# Patient Record
Sex: MALE | Race: WHITE | HISPANIC OR LATINO | Employment: OTHER | URBAN - METROPOLITAN AREA
[De-identification: names, ages, dates, MRNs, and addresses within clinical notes are randomized per-mention and may not be internally consistent; named-entity substitution may affect disease eponyms.]

---

## 2017-11-12 ENCOUNTER — HOSPITAL ENCOUNTER (INPATIENT)
Facility: HOSPITAL | Age: 67
LOS: 3 days | Discharge: HOME | DRG: 292 | End: 2017-11-15
Attending: EMERGENCY MEDICINE | Admitting: HOSPITALIST
Payer: COMMERCIAL

## 2017-11-12 ENCOUNTER — OFFICE VISIT (OUTPATIENT)
Dept: URGENT CARE | Facility: CLINIC | Age: 67
End: 2017-11-12
Payer: COMMERCIAL

## 2017-11-12 VITALS
HEIGHT: 74 IN | WEIGHT: 174 LBS | HEART RATE: 99 BPM | DIASTOLIC BLOOD PRESSURE: 96 MMHG | OXYGEN SATURATION: 95 % | TEMPERATURE: 98 F | SYSTOLIC BLOOD PRESSURE: 152 MMHG | BODY MASS INDEX: 22.33 KG/M2

## 2017-11-12 DIAGNOSIS — Z79.4 TYPE 2 DIABETES MELLITUS WITH DIABETIC NEPHROPATHY, WITH LONG-TERM CURRENT USE OF INSULIN: ICD-10-CM

## 2017-11-12 DIAGNOSIS — I51.7 CARDIOMEGALY: ICD-10-CM

## 2017-11-12 DIAGNOSIS — R06.00 DYSPNEA, UNSPECIFIED TYPE: Primary | ICD-10-CM

## 2017-11-12 DIAGNOSIS — I10 HYPERTENSION, ESSENTIAL: ICD-10-CM

## 2017-11-12 DIAGNOSIS — J90 PLEURAL EFFUSION: Primary | ICD-10-CM

## 2017-11-12 DIAGNOSIS — R05.9 COUGH: ICD-10-CM

## 2017-11-12 DIAGNOSIS — R06.02 SOB (SHORTNESS OF BREATH): ICD-10-CM

## 2017-11-12 DIAGNOSIS — E11.21 TYPE 2 DIABETES MELLITUS WITH DIABETIC NEPHROPATHY, WITH LONG-TERM CURRENT USE OF INSULIN: ICD-10-CM

## 2017-11-12 DIAGNOSIS — I51.89 COMBINED SYSTOLIC AND DIASTOLIC CARDIAC DYSFUNCTION: ICD-10-CM

## 2017-11-12 DIAGNOSIS — J90 PLEURAL EFFUSION, BILATERAL: ICD-10-CM

## 2017-11-12 PROBLEM — E11.9 DIABETES MELLITUS, TYPE 2: Status: ACTIVE | Noted: 2017-11-12

## 2017-11-12 LAB
ALBUMIN SERPL BCP-MCNC: 3.5 G/DL
ALP SERPL-CCNC: 93 U/L
ALT SERPL W/O P-5'-P-CCNC: 42 U/L
ANION GAP SERPL CALC-SCNC: 7 MMOL/L
AST SERPL-CCNC: 21 U/L
BACTERIA #/AREA URNS AUTO: ABNORMAL /HPF
BASOPHILS # BLD AUTO: 0.04 K/UL
BASOPHILS NFR BLD: 0.6 %
BILIRUB SERPL-MCNC: 1 MG/DL
BILIRUB UR QL STRIP: NEGATIVE
BNP SERPL-MCNC: 1136 PG/ML
BUN SERPL-MCNC: 14 MG/DL
CALCIUM SERPL-MCNC: 9.7 MG/DL
CHLORIDE SERPL-SCNC: 102 MMOL/L
CLARITY UR REFRACT.AUTO: ABNORMAL
CO2 SERPL-SCNC: 28 MMOL/L
COLOR UR AUTO: YELLOW
CREAT SERPL-MCNC: 0.8 MG/DL
CREAT UR-MCNC: 45 MG/DL
DIFFERENTIAL METHOD: ABNORMAL
EOSINOPHIL # BLD AUTO: 0.1 K/UL
EOSINOPHIL NFR BLD: 1.1 %
ERYTHROCYTE [DISTWIDTH] IN BLOOD BY AUTOMATED COUNT: 11.7 %
EST. GFR  (AFRICAN AMERICAN): >60 ML/MIN/1.73 M^2
EST. GFR  (NON AFRICAN AMERICAN): >60 ML/MIN/1.73 M^2
GLUCOSE SERPL-MCNC: 273 MG/DL
GLUCOSE UR QL STRIP: ABNORMAL
HCT VFR BLD AUTO: 45.3 %
HGB BLD-MCNC: 15.5 G/DL
HGB UR QL STRIP: ABNORMAL
HYALINE CASTS UR QL AUTO: 0 /LPF
IMM GRANULOCYTES # BLD AUTO: 0.02 K/UL
IMM GRANULOCYTES NFR BLD AUTO: 0.3 %
INR PPP: 1.1
KETONES UR QL STRIP: ABNORMAL
LEUKOCYTE ESTERASE UR QL STRIP: ABNORMAL
LYMPHOCYTES # BLD AUTO: 1.1 K/UL
LYMPHOCYTES NFR BLD: 17.3 %
MCH RBC QN AUTO: 31.4 PG
MCHC RBC AUTO-ENTMCNC: 34.2 G/DL
MCV RBC AUTO: 92 FL
MICROSCOPIC COMMENT: ABNORMAL
MONOCYTES # BLD AUTO: 0.6 K/UL
MONOCYTES NFR BLD: 9 %
NEUTROPHILS # BLD AUTO: 4.6 K/UL
NEUTROPHILS NFR BLD: 71.7 %
NITRITE UR QL STRIP: NEGATIVE
NRBC BLD-RTO: 0 /100 WBC
PH UR STRIP: 5 [PH] (ref 5–8)
PLATELET # BLD AUTO: 201 K/UL
PMV BLD AUTO: 11.8 FL
POCT GLUCOSE: 285 MG/DL (ref 70–110)
POCT GLUCOSE: 318 MG/DL (ref 70–110)
POCT GLUCOSE: 325 MG/DL (ref 70–110)
POTASSIUM SERPL-SCNC: 4.1 MMOL/L
PROT SERPL-MCNC: 6.9 G/DL
PROT UR QL STRIP: ABNORMAL
PROT UR-MCNC: 66 MG/DL
PROT/CREAT RATIO, UR: 1.47
PROTHROMBIN TIME: 11.5 SEC
RBC # BLD AUTO: 4.93 M/UL
RBC #/AREA URNS AUTO: 6 /HPF (ref 0–4)
SODIUM SERPL-SCNC: 137 MMOL/L
SP GR UR STRIP: 1.03 (ref 1–1.03)
SQUAMOUS #/AREA URNS AUTO: 1 /HPF
TROPONIN I SERPL DL<=0.01 NG/ML-MCNC: 0.01 NG/ML
URN SPEC COLLECT METH UR: ABNORMAL
UROBILINOGEN UR STRIP-ACNC: NEGATIVE EU/DL
WBC # BLD AUTO: 6.42 K/UL
WBC #/AREA URNS AUTO: 21 /HPF (ref 0–5)
YEAST UR QL AUTO: ABNORMAL

## 2017-11-12 PROCEDURE — 96374 THER/PROPH/DIAG INJ IV PUSH: CPT

## 2017-11-12 PROCEDURE — 63600175 PHARM REV CODE 636 W HCPCS: Performed by: HOSPITALIST

## 2017-11-12 PROCEDURE — 81001 URINALYSIS AUTO W/SCOPE: CPT

## 2017-11-12 PROCEDURE — 83880 ASSAY OF NATRIURETIC PEPTIDE: CPT

## 2017-11-12 PROCEDURE — 11000001 HC ACUTE MED/SURG PRIVATE ROOM

## 2017-11-12 PROCEDURE — 85025 COMPLETE CBC W/AUTO DIFF WBC: CPT

## 2017-11-12 PROCEDURE — 85610 PROTHROMBIN TIME: CPT

## 2017-11-12 PROCEDURE — 82962 GLUCOSE BLOOD TEST: CPT

## 2017-11-12 PROCEDURE — 25500020 PHARM REV CODE 255: Performed by: EMERGENCY MEDICINE

## 2017-11-12 PROCEDURE — 63600175 PHARM REV CODE 636 W HCPCS: Performed by: PHYSICIAN ASSISTANT

## 2017-11-12 PROCEDURE — 93010 ELECTROCARDIOGRAM REPORT: CPT | Mod: ,,, | Performed by: INTERNAL MEDICINE

## 2017-11-12 PROCEDURE — 96372 THER/PROPH/DIAG INJ SC/IM: CPT | Mod: S$GLB,,, | Performed by: EMERGENCY MEDICINE

## 2017-11-12 PROCEDURE — 93005 ELECTROCARDIOGRAM TRACING: CPT

## 2017-11-12 PROCEDURE — 93010 ELECTROCARDIOGRAM REPORT: CPT | Mod: 76,,, | Performed by: INTERNAL MEDICINE

## 2017-11-12 PROCEDURE — 84484 ASSAY OF TROPONIN QUANT: CPT

## 2017-11-12 PROCEDURE — 80053 COMPREHEN METABOLIC PANEL: CPT

## 2017-11-12 PROCEDURE — 99204 OFFICE O/P NEW MOD 45 MIN: CPT | Mod: 25,S$GLB,, | Performed by: EMERGENCY MEDICINE

## 2017-11-12 PROCEDURE — 96372 THER/PROPH/DIAG INJ SC/IM: CPT

## 2017-11-12 PROCEDURE — 99285 EMERGENCY DEPT VISIT HI MDM: CPT | Mod: 25

## 2017-11-12 PROCEDURE — 82570 ASSAY OF URINE CREATININE: CPT

## 2017-11-12 PROCEDURE — 99223 1ST HOSP IP/OBS HIGH 75: CPT | Mod: ,,, | Performed by: HOSPITALIST

## 2017-11-12 PROCEDURE — 99285 EMERGENCY DEPT VISIT HI MDM: CPT | Mod: ,,, | Performed by: EMERGENCY MEDICINE

## 2017-11-12 RX ORDER — IBUPROFEN 200 MG
16 TABLET ORAL
Status: DISCONTINUED | OUTPATIENT
Start: 2017-11-12 | End: 2017-11-15 | Stop reason: HOSPADM

## 2017-11-12 RX ORDER — INSULIN ASPART 100 [IU]/ML
0-5 INJECTION, SOLUTION INTRAVENOUS; SUBCUTANEOUS
Status: DISCONTINUED | OUTPATIENT
Start: 2017-11-12 | End: 2017-11-15 | Stop reason: HOSPADM

## 2017-11-12 RX ORDER — BETAMETHASONE SODIUM PHOSPHATE AND BETAMETHASONE ACETATE 3; 3 MG/ML; MG/ML
9 INJECTION, SUSPENSION INTRA-ARTICULAR; INTRALESIONAL; INTRAMUSCULAR; SOFT TISSUE
Status: DISCONTINUED | OUTPATIENT
Start: 2017-11-12 | End: 2017-11-12 | Stop reason: HOSPADM

## 2017-11-12 RX ORDER — IBUPROFEN 200 MG
24 TABLET ORAL
Status: DISCONTINUED | OUTPATIENT
Start: 2017-11-12 | End: 2017-11-15 | Stop reason: HOSPADM

## 2017-11-12 RX ORDER — LISINOPRIL 10 MG/1
10 TABLET ORAL DAILY
Status: DISCONTINUED | OUTPATIENT
Start: 2017-11-13 | End: 2017-11-15 | Stop reason: HOSPADM

## 2017-11-12 RX ORDER — SODIUM CHLORIDE 0.9 % (FLUSH) 0.9 %
3 SYRINGE (ML) INJECTION EVERY 6 HOURS PRN
Status: DISCONTINUED | OUTPATIENT
Start: 2017-11-12 | End: 2017-11-15 | Stop reason: HOSPADM

## 2017-11-12 RX ORDER — FUROSEMIDE 10 MG/ML
40 INJECTION INTRAMUSCULAR; INTRAVENOUS
Status: COMPLETED | OUTPATIENT
Start: 2017-11-12 | End: 2017-11-12

## 2017-11-12 RX ORDER — FUROSEMIDE 10 MG/ML
40 INJECTION INTRAMUSCULAR; INTRAVENOUS 2 TIMES DAILY
Status: DISCONTINUED | OUTPATIENT
Start: 2017-11-13 | End: 2017-11-13

## 2017-11-12 RX ORDER — ACETAMINOPHEN 325 MG/1
650 TABLET ORAL EVERY 4 HOURS PRN
Status: DISCONTINUED | OUTPATIENT
Start: 2017-11-12 | End: 2017-11-15 | Stop reason: HOSPADM

## 2017-11-12 RX ORDER — GLUCAGON 1 MG
1 KIT INJECTION
Status: DISCONTINUED | OUTPATIENT
Start: 2017-11-12 | End: 2017-11-15 | Stop reason: HOSPADM

## 2017-11-12 RX ADMIN — INSULIN DETEMIR 5 UNITS: 100 INJECTION, SOLUTION SUBCUTANEOUS at 11:11

## 2017-11-12 RX ADMIN — INSULIN ASPART 3 UNITS: 100 INJECTION, SOLUTION INTRAVENOUS; SUBCUTANEOUS at 04:11

## 2017-11-12 RX ADMIN — IOHEXOL 75 ML: 350 INJECTION, SOLUTION INTRAVENOUS at 01:11

## 2017-11-12 RX ADMIN — BETAMETHASONE SODIUM PHOSPHATE AND BETAMETHASONE ACETATE 9 MG: 3; 3 INJECTION, SUSPENSION INTRA-ARTICULAR; INTRALESIONAL; INTRAMUSCULAR; SOFT TISSUE at 09:11

## 2017-11-12 RX ADMIN — FUROSEMIDE 40 MG: 10 INJECTION, SOLUTION INTRAMUSCULAR; INTRAVENOUS at 03:11

## 2017-11-12 RX ADMIN — INSULIN ASPART 2 UNITS: 100 INJECTION, SOLUTION INTRAVENOUS; SUBCUTANEOUS at 08:11

## 2017-11-12 NOTE — ASSESSMENT & PLAN NOTE
CBC and vitals not suggestive of infection      BMP 1,136.  Troponin 0.012.  +LE edema on admit.  Chest CT shows moderate bilateral pleural effusions with underlying depressive atelectasis.  Small ill-defined bilateral upper lobe groundglass opacities versus nodules possibly infectious/inflammatory disease or malignancy.  Given untreated DM, HTn, BNP, LE edema, PAD by exam favor CHF causing effusions.  - TTE  - thoracentesis to r/o other causes of effusion and therapeutic as he needs O2 NC now  - c/w lasix IV

## 2017-11-12 NOTE — HOSPITAL COURSE
See problem list  11/14 - discussed s cardiology.  Card spect scan for tomorrow.  Pt feels well. No chest pain.  Discussed CHF< HTN and diabetes.  Need diabetic education, insulin instruction.   11/15 - reveied spect scan w cardiology, negative.  Pt can be discharged.

## 2017-11-12 NOTE — SUBJECTIVE & OBJECTIVE
Past Medical History:   Diagnosis Date    Diabetes mellitus, type 2 11/12/2017    Hypertension, essential 11/12/2017       Past Surgical History:   Procedure Laterality Date    BACK SURGERY  1990       Review of patient's allergies indicates:   Allergen Reactions    Metformin Diarrhea     On 500mg he had such GI issues he lost weight       Current Facility-Administered Medications on File Prior to Encounter   Medication    [COMPLETED] betamethasone acetate-betamethasone sodium phosphate injection 9 mg     No current outpatient prescriptions on file prior to encounter.     Family History     None        Social History Main Topics    Smoking status: Never Smoker    Smokeless tobacco: Never Used    Alcohol use 3.6 oz/week     6 Cans of beer per week    Drug use: No    Sexual activity: Yes     Review of Systems   Constitutional: Negative for chills, fever and unexpected weight change.   HENT: Negative for congestion.    Eyes: Negative for discharge.   Respiratory: Positive for cough and shortness of breath. Negative for wheezing.    Cardiovascular: Positive for leg swelling. Negative for chest pain and palpitations.   Gastrointestinal: Negative for abdominal distention and abdominal pain.   Endocrine: Negative for cold intolerance.   Genitourinary: Negative for difficulty urinating.   Musculoskeletal: Negative for arthralgias.   Skin: Negative for color change.   Allergic/Immunologic: Negative for environmental allergies.   Neurological: Negative for dizziness.   Hematological: Negative for adenopathy.   Psychiatric/Behavioral: Negative for agitation.     Objective:     Vital Signs (Most Recent):  Temp: 98.5 °F (36.9 °C) (11/12/17 1600)  Pulse: 101 (11/12/17 1603)  Resp: (!) 24 (11/12/17 1600)  BP: (!) 162/103 (11/12/17 1603)  SpO2: 95 % (11/12/17 1603) Vital Signs (24h Range):  Temp:  [98 °F (36.7 °C)-98.5 °F (36.9 °C)] 98.5 °F (36.9 °C)  Pulse:  [] 101  Resp:  [17-28] 24  SpO2:  [92 %-96 %] 95 %  BP:  (150-168)/() 162/103     Weight: 78.9 kg (174 lb)  Body mass index is 22.34 kg/m².    Physical Exam   Constitutional: He is oriented to person, place, and time. He appears well-developed and well-nourished. No distress.   HENT:   Head: Atraumatic.   Eyes: EOM are normal. No scleral icterus.   Neck: No JVD present.   Cardiovascular: Normal rate, regular rhythm and normal heart sounds.    No murmur heard.  Pulses:       Dorsalis pedis pulses are 1+ on the right side, and 1+ on the left side.   Pulmonary/Chest: Effort normal. No respiratory distress. He has decreased breath sounds in the right lower field and the left lower field. He has no wheezes. He has no rhonchi.   Abdominal: Soft. Bowel sounds are normal. He exhibits no distension.   Musculoskeletal: He exhibits edema (pitting to shins).   Lymphadenopathy:     He has no cervical adenopathy.   Neurological: He is alert and oriented to person, place, and time.   Skin: Capillary refill takes more than 3 seconds. No cyanosis. No pallor (but feet cool to  touch). Nails show no clubbing.   Psychiatric: He has a normal mood and affect.        Significant Labs: All pertinent labs within the past 24 hours have been reviewed.    Significant Imaging: I have reviewed and interpreted all pertinent imaging results/findings within the past 24 hours.

## 2017-11-12 NOTE — PATIENT INSTRUCTIONS
GO TO THE EMERGENCY DEPARTMENT NOW AS PLANNED FOR FURTHER EVALUATION, IMAGING AND RELIEF OF FLUID BUILDUP.  THEY WILL HAVE YOUR XRAY REPORTS AND IMAGES

## 2017-11-12 NOTE — H&P
[facility] Hospital Medicine  History & Physical    Patient Name: Jose Pete  MRN: [id number]  Admission Date: 11/12/2017  Attending Physician: Devin Segovia MD   Primary Care Provider: Primary Doctor [facility] Medicine Team: [facility] Devin Segovia MD     Patient information was obtained from patient and ER records.     Subjective:     Principal Problem:Pleural effusion    Chief Complaint:   Chief Complaint   Patient presents with    Shortness of Breath     cough for 3 weeks, seen in  and sent here fluid in L lung' per  family member        HPI: Patient is a 66-year-old male who does not follow with any PCP who presents to the emergency department due to a 3 week history of a dry cough.  Patient states he was having nasal drip 3 weeks ago and then developed a dry cough.  Patient states that he is having worsening shortness of breath on exertion and orthopnea but denies PND.  Patient denies  weight gain or loss.  He has had some LE edema last few days.      Patient denies any fevers or chills.  Patient denies any chest pain.  Patient states that he reported to the urgent care for above symptoms and was given a steroid shot and underwent a chest x-ray which showed bilateral pleural effusions.  She was instructed to report to the emergency department.    He last saw a physician during [place] when he was told he had HTN and DM for the first time.  Due to moving/evacuating he was lost to follow-up.    Past Medical History:   Diagnosis Date    Diabetes mellitus, type 2 11/12/2017    Hypertension, essential 11/12/2017       Past Surgical History:   Procedure Laterality Date    BACK SURGERY  1990       Review of patient's allergies indicates:   Allergen Reactions    Metformin Diarrhea     On 500mg he had such GI issues he lost weight       Current Facility-Administered Medications on File Prior to Encounter   Medication    [COMPLETED] betamethasone acetate-betamethasone  sodium phosphate injection 9 mg     No current outpatient prescriptions on file prior to encounter.     Family History     None        Social History Main Topics    Smoking status: Never Smoker    Smokeless tobacco: Never Used    Alcohol use 3.6 oz/week     6 Cans of beer per week    Drug use: No    Sexual activity: Yes     Review of Systems   Constitutional: Negative for chills, fever and unexpected weight change.   HENT: Negative for congestion.    Eyes: Negative for discharge.   Respiratory: Positive for cough and shortness of breath. Negative for wheezing.    Cardiovascular: Positive for leg swelling. Negative for chest pain and palpitations.   Gastrointestinal: Negative for abdominal distention and abdominal pain.   Endocrine: Negative for cold intolerance.   Genitourinary: Negative for difficulty urinating.   Musculoskeletal: Negative for arthralgias.   Skin: Negative for color change.   Allergic/Immunologic: Negative for environmental allergies.   Neurological: Negative for dizziness.   Hematological: Negative for adenopathy.   Psychiatric/Behavioral: Negative for agitation.     Objective:     Vital Signs (Most Recent):  Temp: 98.5 °F (36.9 °C) (11/12/17 1600)  Pulse: 101 (11/12/17 1603)  Resp: (!) 24 (11/12/17 1600)  BP: (!) 162/103 (11/12/17 1603)  SpO2: 95 % (11/12/17 1603) Vital Signs (24h Range):  Temp:  [98 °F (36.7 °C)-98.5 °F (36.9 °C)] 98.5 °F (36.9 °C)  Pulse:  [] 101  Resp:  [17-28] 24  SpO2:  [92 %-96 %] 95 %  BP: (150-168)/() 162/103     Weight: 78.9 kg (174 lb)  Body mass index is 22.34 kg/m².    Physical Exam   Constitutional: He is oriented to person, place, and time. He appears well-developed and well-nourished. No distress.   HENT:   Head: Atraumatic.   Eyes: EOM are normal. No scleral icterus.   Neck: No JVD present.   Cardiovascular: Normal rate, regular rhythm and normal heart sounds.    No murmur heard.  Pulses:       Dorsalis pedis pulses are 1+ on the right side, and  1+ on the left side.   Pulmonary/Chest: Effort normal. No respiratory distress. He has decreased breath sounds in the right lower field and the left lower field. He has no wheezes. He has no rhonchi.   Abdominal: Soft. Bowel sounds are normal. He exhibits no distension.   Musculoskeletal: He exhibits edema (pitting to shins).   Lymphadenopathy:     He has no cervical adenopathy.   Neurological: He is alert and oriented to person, place, and time.   Skin: Capillary refill takes more than 3 seconds. No cyanosis. No pallor (but feet cool to  touch). Nails show no clubbing.   Psychiatric: He has a normal mood and affect.        Significant Labs: All pertinent labs within the past 24 hours have been reviewed.    Significant Imaging: I have reviewed and interpreted all pertinent imaging results/findings within the past 24 hours.    Assessment/Plan:     * Pleural effusion    CBC and vitals not suggestive of infection      BMP 1,136.  Troponin 0.012.   +LE edema on admit.  Chest CT shows moderate bilateral pleural effusions with underlying depressive atelectasis.  Small ill-defined bilateral upper lobe groundglass opacities versus nodules possibly infectious/inflammatory disease or malignancy.  Given untreated DM, HTn, BNP, LE edema, PAD by exam favor CHF causing effusions.  - TTE  - thoracentesis to r/o other causes of effusion and therapeutic as he needs O2 NC now  - c/w lasix IV          Hypertension, essential    New diagnosis  -  Start low dose acei + diuretic  - later BB if BP tolerates  - start ASA after thoracentesis          Diabetes mellitus, type 2    New diagnosis.  - SSi and titrate   - a1c            VTE Risk Mitigation         Ordered     Low Risk of VTE  Once      11/12/17 1522             Devin Segovia MD  Department of Hospital Medicine   [facility]

## 2017-11-12 NOTE — ED TRIAGE NOTES
Patient states he has SOB and weakness for about 3 weeks.  Patient states he was diagnosed with fluid in one of his lungs today at the urgent care.  Patient is sating 90% on RA.  Denies pain. Patient c/o dry cough.

## 2017-11-12 NOTE — ED PROVIDER NOTES
Encounter Date: 11/12/2017       History     Chief Complaint   Patient presents with    Shortness of Breath     cough for 3 weeks, seen in  and sent here fluid in L lung' per  family member     Patient is a 66-year-old male with no significant past medical history who presents to the emergency department due to a 3 week history of a dry cough.  Patient states he was having nasal drip 3 weeks ago and then developed a dry cough.  Patient states that he is having worsening shortness of breath on exertion and orthopnea but denies PND.  Patient denies any lower extremity edema or weight gain.  Patient denies any fevers or chills.  Patient denies any chest pain.  Patient states that he reported to the urgent care for above symptoms and was given a steroid shot and underwent a chest x-ray which showed bilateral pleural effusions.  She was instructed to report to the emergency department.          Review of patient's allergies indicates:  No Known Allergies  History reviewed. No pertinent past medical history.  Past Surgical History:   Procedure Laterality Date    BACK SURGERY       History reviewed. No pertinent family history.  Social History   Substance Use Topics    Smoking status: Never Smoker    Smokeless tobacco: Never Used    Alcohol use 3.6 oz/week     6 Cans of beer per week     Review of Systems   Constitutional: Negative for activity change, appetite change, chills, fatigue and fever.   HENT: Negative for congestion, drooling, ear discharge, ear pain, facial swelling, hearing loss, mouth sores, sore throat and trouble swallowing.    Eyes: Negative for photophobia, pain and discharge.   Respiratory: Positive for cough and shortness of breath. Negative for apnea, chest tightness and wheezing.    Cardiovascular: Negative for chest pain and leg swelling.   Gastrointestinal: Negative for abdominal distention, abdominal pain, blood in stool, constipation, diarrhea, nausea and vomiting.   Endocrine: Negative for  cold intolerance, polydipsia and polyuria.   Genitourinary: Negative for decreased urine volume, difficulty urinating, enuresis, frequency and hematuria.   Musculoskeletal: Negative for arthralgias, gait problem, myalgias and neck stiffness.   Skin: Negative for color change, rash and wound.   Allergic/Immunologic: Negative for environmental allergies.   Neurological: Negative for dizziness, tremors, syncope, weakness, light-headedness, numbness and headaches.   Hematological: Negative for adenopathy.   Psychiatric/Behavioral: Negative for agitation.       Physical Exam     Initial Vitals [11/12/17 1052]   BP Pulse Resp Temp SpO2   (!) 150/91 100 (!) 24 98.3 °F (36.8 °C) (!) 93 %      MAP       110.67         Physical Exam    Nursing note and vitals reviewed.  Constitutional: He appears well-developed and well-nourished. He is not diaphoretic. No distress.   HENT:   Head: Normocephalic and atraumatic.   Neck: Normal range of motion. Neck supple.   Cardiovascular: Normal rate, regular rhythm and normal heart sounds. Exam reveals no gallop and no friction rub.    No murmur heard.  Pulmonary/Chest: He has no wheezes. He has no rhonchi. He has rales.   Abdominal: Soft. Bowel sounds are normal. There is no tenderness. There is no rebound and no guarding.   Musculoskeletal: Normal range of motion.   Neurological: He is alert and oriented to person, place, and time.   Skin: Skin is warm and dry. No rash noted. No erythema.   Psychiatric: He has a normal mood and affect.         ED Course   Procedures  Labs Reviewed   CBC W/ AUTO DIFFERENTIAL - Abnormal; Notable for the following:        Result Value    MCH 31.4 (*)     Lymph% 17.3 (*)     All other components within normal limits   COMPREHENSIVE METABOLIC PANEL - Abnormal; Notable for the following:     Glucose 273 (*)     Anion Gap 7 (*)     All other components within normal limits   B-TYPE NATRIURETIC PEPTIDE - Abnormal; Notable for the following:     BNP 1,136 (*)      All other components within normal limits   TROPONIN I   PROTIME-INR   PROTIME-INR    Narrative:     ADDING ON PROTIME INR PER LEIGHANN LEHMAN PA-C 12:35  11/12/2017                    APC / Resident Notes:   Patient is a 66-year-old male with no significant past medical history who presents to the emergency department due to a 3 week history of a dry cough.  Vital signs show tachycardia of a heart rate of 100, respiratory rate 25, and pulse ox of 93% on room air.  Physical exam reveals male in no acute distress.  Heart regular rate and rhythm.  Lungs with crackles and lower lung fields.  Will do a cardiac workup.    CBC reveals white blood cell count of 6.42.  CMP shows glucose of 273 and anion gap 7.  BMP 1,136.  Troponin 0.012.  Chest CT shows moderate bilateral pleural effusions with underlying depressive atelectasis.  Small ill-defined bilateral upper lobe groundglass opacities versus nodules possibly infectious/inflammatory disease or malignancy.  Patient will be given 40 mg of IV Lasix.  Patient will be admitted to hospital medicine for further workup of pleural effusions.  Suspect patient will need workup to include echocardiogram and thoracentesis.  Patient of treatment discussed with attending physician and he is agreeable.             Attending Attestation:     Physician Attestation Statement for NP/PA:   I have conducted a face to face encounter with this patient in addition to the NP/PA, due to Medical Complexity    Other NP/PA Attestation Additions:    History of Present Illness: Sent from urgent care for further eval  States only several days of dyspnea  Dry cough   no fevers or night sweats  approx 18lb unintentional weight loss in last 6 weeks  No cp  No edema     Physical Exam: bs diminished but w/ bibasilar rales bilat, no wheezing  Speaking full sentences, appears mildly dyspneic  Rrr, nl s1/2  abd benign  1+ edema BLE, symmetric  2+ distal pulses  aox3   Medical Decision Making: New-onset bilat  pleural effusions - r/o chf, liver failure, malignancy, less likely parapneumonic/empyema.                   ED Course      Clinical Impression:   The primary encounter diagnosis was Pleural effusion. A diagnosis of SOB (shortness of breath) was also pertinent to this visit.    Disposition:   Disposition: Admitted  Condition: Stable                        Kiko Pagan MD  11/12/17 5495

## 2017-11-12 NOTE — ASSESSMENT & PLAN NOTE
New diagnosis  -  Start low dose acei + diuretic  - later BB if BP tolerates  - start ASA after thoracentesis

## 2017-11-12 NOTE — HPI
Patient is a 66-year-old male who does not follow with any PCP who presents to the emergency department due to a 3 week history of a dry cough.  Patient states he was having nasal drip 3 weeks ago and then developed a dry cough.  Patient states that he is having worsening shortness of breath on exertion and orthopnea but denies PND.  Patient denies  weight gain or loss.  He has had some LE edema last few days.      Patient denies any fevers or chills.  Patient denies any chest pain.  Patient states that he reported to the urgent care for above symptoms and was given a steroid shot and underwent a chest x-ray which showed bilateral pleural effusions.  She was instructed to report to the emergency department.    He last saw a physician during [place] when he was told he had HTN and DM for the first time.  Due to moving/evacuating he was lost to follow-up.

## 2017-11-12 NOTE — PROGRESS NOTES
"Subjective:       Patient ID: Jose Pete is a 66 y.o. male.    Vitals:  height is 6' 2" (1.88 m) and weight is 78.9 kg (174 lb). His temperature is 98 °F (36.7 °C). His blood pressure is 152/96 (abnormal) and his pulse is 99. His oxygen saturation is 95%.     Chief Complaint: Cough    COUGH FOR 3 WEEKS AND WORSENING GRADUAL SHORTNESS OF BREATH FOR 3 WEEKS. DENIES FEVERS, DENIES PAIN, REPORTS LYONS, AND UNABLE TO DO WHAT HE NORMALLY DOES. NO LE EDEMA, NO LE PAIN, NO CHILLS, NO FEVERS, NO WEAKNESS. NO HX OF SMOKING, NO HX OF COPD NOR CHF      Cough   This is a new problem. The current episode started 1 to 4 weeks ago (3 weeks with cough). The problem has been unchanged. The problem occurs every few minutes. The cough is non-productive. Associated symptoms include shortness of breath. Pertinent negatives include no chest pain, chills, fever, headaches, rash or sore throat. The symptoms are aggravated by lying down and other. He has tried nothing for the symptoms. The treatment provided no relief.     Review of Systems   Constitution: Positive for decreased appetite and weakness. Negative for chills and fever.   HENT: Negative for sore throat.    Eyes: Negative for blurred vision.   Cardiovascular: Negative for chest pain.   Respiratory: Positive for cough and shortness of breath.    Skin: Negative for rash.   Musculoskeletal: Negative for back pain and joint pain.   Gastrointestinal: Negative for abdominal pain, diarrhea, nausea and vomiting.   Neurological: Negative for headaches.   Psychiatric/Behavioral: The patient is not nervous/anxious.        Objective:      Physical Exam   Constitutional: He is oriented to person, place, and time. He appears well-developed and well-nourished. He is cooperative.  Non-toxic appearance. He does not appear ill. No distress.   HENT:   Head: Normocephalic and atraumatic.   Right Ear: Hearing, tympanic membrane, external ear and ear canal normal.   Left Ear: Hearing, tympanic " membrane, external ear and ear canal normal.   Nose: Nose normal. No mucosal edema, rhinorrhea or nasal deformity. No epistaxis. Right sinus exhibits no maxillary sinus tenderness and no frontal sinus tenderness. Left sinus exhibits no maxillary sinus tenderness and no frontal sinus tenderness.   Mouth/Throat: Uvula is midline, oropharynx is clear and moist and mucous membranes are normal. No trismus in the jaw. Normal dentition. No uvula swelling. No posterior oropharyngeal erythema.   Eyes: Conjunctivae and lids are normal.   Neck: Trachea normal, full passive range of motion without pain and phonation normal. Neck supple.   Cardiovascular: Normal rate, regular rhythm, normal heart sounds, intact distal pulses and normal pulses.    Pulmonary/Chest: Effort normal. He has rales.   DECREASED BS AT THE BASES AND SLIGHT CRACKLES MID TO LOWER LUNG ANDERSON POSTERIORLY   Abdominal: Soft. Normal appearance and bowel sounds are normal. He exhibits no distension. There is no tenderness.   Musculoskeletal: Normal range of motion. He exhibits no edema or deformity.   NO LOWER EXTREMITY EDEMA   Neurological: He is alert and oriented to person, place, and time. He exhibits normal muscle tone. Coordination normal.   Skin: Skin is warm, dry and intact. He is not diaphoretic. No pallor.   Psychiatric: He has a normal mood and affect. His speech is normal and behavior is normal. Cognition and memory are normal.   Nursing note and vitals reviewed.      Assessment:       1. Dyspnea, unspecified type    2. Cough    3. Pleural effusion, bilateral        Plan:         Dyspnea, unspecified type  -     betamethasone acetate-betamethasone sodium phosphate injection 9 mg; Inject 1.5 mLs (9 mg total) into the muscle one time.  -     X-Ray Chest PA And Lateral; Future; Expected date: 11/12/2017    Cough  -     betamethasone acetate-betamethasone sodium phosphate injection 9 mg; Inject 1.5 mLs (9 mg total) into the muscle one time.  -     X-Ray  Chest PA And Lateral; Future; Expected date: 11/12/2017    Pleural effusion, bilateral  -     Refer to Emergency Dept.

## 2017-11-13 LAB
ALBUMIN SERPL BCP-MCNC: 3.2 G/DL
ANION GAP SERPL CALC-SCNC: 10 MMOL/L
APPEARANCE FLD: NORMAL
BASOPHILS # BLD AUTO: 0.02 K/UL
BASOPHILS NFR BLD: 0.2 %
BODY FLD TYPE: NORMAL
BODY FLUID SOURCE, LDH: NORMAL
BUN SERPL-MCNC: 17 MG/DL
CALCIUM SERPL-MCNC: 9.9 MG/DL
CHLORIDE SERPL-SCNC: 101 MMOL/L
CO2 SERPL-SCNC: 26 MMOL/L
COLOR FLD: YELLOW
CREAT SERPL-MCNC: 0.8 MG/DL
DIFFERENTIAL METHOD: ABNORMAL
EOSINOPHIL # BLD AUTO: 0 K/UL
EOSINOPHIL NFR BLD: 0 %
ERYTHROCYTE [DISTWIDTH] IN BLOOD BY AUTOMATED COUNT: 11.6 %
EST. GFR  (AFRICAN AMERICAN): >60 ML/MIN/1.73 M^2
EST. GFR  (NON AFRICAN AMERICAN): >60 ML/MIN/1.73 M^2
ESTIMATED AVG GLUCOSE: 246 MG/DL
GLOBAL PERICARDIAL EFFUSION: ABNORMAL
GLUCOSE SERPL-MCNC: 306 MG/DL
GRAM STN SPEC: NORMAL
HBA1C MFR BLD HPLC: 10.2 %
HCT VFR BLD AUTO: 45.7 %
HGB BLD-MCNC: 15.8 G/DL
IMM GRANULOCYTES # BLD AUTO: 0.02 K/UL
IMM GRANULOCYTES NFR BLD AUTO: 0.2 %
LDH FLD L TO P-CCNC: 83 U/L
LYMPHOCYTES # BLD AUTO: 1 K/UL
LYMPHOCYTES NFR BLD: 10.3 %
LYMPHOCYTES NFR FLD MANUAL: 79 %
MCH RBC QN AUTO: 31.6 PG
MCHC RBC AUTO-ENTMCNC: 34.6 G/DL
MCV RBC AUTO: 91 FL
MESOTHL CELL NFR FLD MANUAL: 5 %
MITRAL VALVE MOBILITY: NORMAL
MONOCYTES # BLD AUTO: 0.6 K/UL
MONOCYTES NFR BLD: 6.6 %
MONOS+MACROS NFR FLD MANUAL: 10 %
NEUTROPHILS # BLD AUTO: 7.6 K/UL
NEUTROPHILS NFR BLD: 82.7 %
NEUTROPHILS NFR FLD MANUAL: 6 %
NRBC BLD-RTO: 0 /100 WBC
PHOSPHATE SERPL-MCNC: 2.7 MG/DL
PLATELET # BLD AUTO: 208 K/UL
PMV BLD AUTO: 11.8 FL
POCT GLUCOSE: 126 MG/DL (ref 70–110)
POCT GLUCOSE: 192 MG/DL (ref 70–110)
POCT GLUCOSE: 291 MG/DL (ref 70–110)
POCT GLUCOSE: 312 MG/DL (ref 70–110)
POTASSIUM SERPL-SCNC: 4.2 MMOL/L
PROT FLD-MCNC: 2.3 G/DL
RBC # BLD AUTO: 5 M/UL
RETIRED EF AND QEF - SEE NOTES: 25 (ref 55–65)
SODIUM SERPL-SCNC: 137 MMOL/L
SPECIMEN SOURCE: NORMAL
TSH SERPL DL<=0.005 MIU/L-ACNC: 1.03 UIU/ML
WBC # BLD AUTO: 9.21 K/UL
WBC # FLD: 359 /CU MM

## 2017-11-13 PROCEDURE — 25000003 PHARM REV CODE 250: Performed by: HOSPITALIST

## 2017-11-13 PROCEDURE — 93307 TTE W/O DOPPLER COMPLETE: CPT | Mod: 26,,, | Performed by: INTERNAL MEDICINE

## 2017-11-13 PROCEDURE — 85025 COMPLETE CBC W/AUTO DIFF WBC: CPT

## 2017-11-13 PROCEDURE — 99222 1ST HOSP IP/OBS MODERATE 55: CPT | Mod: GC,,, | Performed by: INTERNAL MEDICINE

## 2017-11-13 PROCEDURE — 83036 HEMOGLOBIN GLYCOSYLATED A1C: CPT

## 2017-11-13 PROCEDURE — 11000001 HC ACUTE MED/SURG PRIVATE ROOM

## 2017-11-13 PROCEDURE — 87015 SPECIMEN INFECT AGNT CONCNTJ: CPT

## 2017-11-13 PROCEDURE — 99232 SBSQ HOSP IP/OBS MODERATE 35: CPT | Mod: ,,, | Performed by: HOSPITALIST

## 2017-11-13 PROCEDURE — 36415 COLL VENOUS BLD VENIPUNCTURE: CPT

## 2017-11-13 PROCEDURE — 87070 CULTURE OTHR SPECIMN AEROBIC: CPT

## 2017-11-13 PROCEDURE — 88112 CYTOPATH CELL ENHANCE TECH: CPT | Mod: 26,,, | Performed by: PATHOLOGY

## 2017-11-13 PROCEDURE — 0W9B3ZX DRAINAGE OF LEFT PLEURAL CAVITY, PERCUTANEOUS APPROACH, DIAGNOSTIC: ICD-10-PCS | Performed by: RADIOLOGY

## 2017-11-13 PROCEDURE — 63600175 PHARM REV CODE 636 W HCPCS: Performed by: HOSPITALIST

## 2017-11-13 PROCEDURE — 88305 TISSUE EXAM BY PATHOLOGIST: CPT | Mod: 26,,, | Performed by: PATHOLOGY

## 2017-11-13 PROCEDURE — 83615 LACTATE (LD) (LDH) ENZYME: CPT

## 2017-11-13 PROCEDURE — 80069 RENAL FUNCTION PANEL: CPT

## 2017-11-13 PROCEDURE — 89051 BODY FLUID CELL COUNT: CPT

## 2017-11-13 PROCEDURE — 88305 TISSUE EXAM BY PATHOLOGIST: CPT | Performed by: PATHOLOGY

## 2017-11-13 PROCEDURE — 87205 SMEAR GRAM STAIN: CPT

## 2017-11-13 PROCEDURE — C8923 2D TTE W OR W/O FOL W/CON,CO: HCPCS

## 2017-11-13 PROCEDURE — 87116 MYCOBACTERIA CULTURE: CPT

## 2017-11-13 PROCEDURE — 87075 CULTR BACTERIA EXCEPT BLOOD: CPT

## 2017-11-13 PROCEDURE — 84157 ASSAY OF PROTEIN OTHER: CPT

## 2017-11-13 PROCEDURE — 84443 ASSAY THYROID STIM HORMONE: CPT

## 2017-11-13 RX ORDER — FUROSEMIDE 40 MG/1
40 TABLET ORAL DAILY
Status: DISCONTINUED | OUTPATIENT
Start: 2017-11-14 | End: 2017-11-15 | Stop reason: HOSPADM

## 2017-11-13 RX ORDER — INSULIN ASPART 100 [IU]/ML
5 INJECTION, SOLUTION INTRAVENOUS; SUBCUTANEOUS
Status: DISCONTINUED | OUTPATIENT
Start: 2017-11-13 | End: 2017-11-15 | Stop reason: HOSPADM

## 2017-11-13 RX ORDER — ASPIRIN 81 MG/1
81 TABLET ORAL DAILY
Status: DISCONTINUED | OUTPATIENT
Start: 2017-11-14 | End: 2017-11-15 | Stop reason: HOSPADM

## 2017-11-13 RX ORDER — CARVEDILOL 3.12 MG/1
3.12 TABLET ORAL 2 TIMES DAILY
Status: DISCONTINUED | OUTPATIENT
Start: 2017-11-13 | End: 2017-11-15 | Stop reason: HOSPADM

## 2017-11-13 RX ORDER — ENOXAPARIN SODIUM 100 MG/ML
40 INJECTION SUBCUTANEOUS EVERY 24 HOURS
Status: DISCONTINUED | OUTPATIENT
Start: 2017-11-14 | End: 2017-11-15 | Stop reason: HOSPADM

## 2017-11-13 RX ADMIN — INSULIN DETEMIR 15 UNITS: 100 INJECTION, SOLUTION SUBCUTANEOUS at 09:11

## 2017-11-13 RX ADMIN — CARVEDILOL 3.12 MG: 3.12 TABLET, FILM COATED ORAL at 09:11

## 2017-11-13 RX ADMIN — LISINOPRIL 10 MG: 10 TABLET ORAL at 08:11

## 2017-11-13 RX ADMIN — INSULIN ASPART 5 UNITS: 100 INJECTION, SOLUTION INTRAVENOUS; SUBCUTANEOUS at 05:11

## 2017-11-13 RX ADMIN — INSULIN ASPART 3 UNITS: 100 INJECTION, SOLUTION INTRAVENOUS; SUBCUTANEOUS at 08:11

## 2017-11-13 RX ADMIN — INSULIN ASPART 5 UNITS: 100 INJECTION, SOLUTION INTRAVENOUS; SUBCUTANEOUS at 11:11

## 2017-11-13 RX ADMIN — INSULIN ASPART 4 UNITS: 100 INJECTION, SOLUTION INTRAVENOUS; SUBCUTANEOUS at 11:11

## 2017-11-13 RX ADMIN — FUROSEMIDE 40 MG: 10 INJECTION, SOLUTION INTRAVENOUS at 08:11

## 2017-11-13 NOTE — PROCEDURES
Radiology Post-Procedure Note    Pre Op Diagnosis: Pleural effusion   Post Op Diagnosis: Same    Procedure: Thoracentesis     Procedure performed by: Dr. Pearce and Dr. Burgos     Written Informed Consent Obtained: Yes  Specimen Removed: YES   Estimated Blood Loss: Minimal    Findings:   Successful thoracentesis. Fluid sent to the lab for ordered studies.     Patient tolerated procedure well.    Clyde Burgos MD  Department of Radiology   PGY II  [phone]

## 2017-11-13 NOTE — SUBJECTIVE & OBJECTIVE
Interval History:   Symptoms:  Breathing better after thoracentesis  Diet:  Adequate intake.    Activity level:  Returning to normal.  Pain:  No pain.       Review of Systems   Constitutional: Negative for fever.   Respiratory: Positive for cough (dry) and shortness of breath.    Cardiovascular: Negative for chest pain.   Gastrointestinal: Negative for abdominal distention.     Objective:     Vital Signs (Most Recent):  Temp: 97.2 °F (36.2 °C) (11/13/17 1131)  Pulse: 82 (11/13/17 1131)  Resp: 18 (11/13/17 1131)  BP: 122/69 (11/13/17 1131)  SpO2: 95 % (11/13/17 1131) Vital Signs (24h Range):  Temp:  [96.9 °F (36.1 °C)-98.5 °F (36.9 °C)] 97.2 °F (36.2 °C)  Pulse:  [] 82  Resp:  [15-28] 18  SpO2:  [93 %-96 %] 95 %  BP: (122-168)/() 122/69     Weight: 78.9 kg (174 lb)  Body mass index is 22.34 kg/m².    Intake/Output Summary (Last 24 hours) at 11/13/17 1347  Last data filed at 11/13/17 0542   Gross per 24 hour   Intake                0 ml   Output              650 ml   Net             -650 ml      Physical Exam   Constitutional: He is oriented to person, place, and time. No distress.   Neck: No JVD present.   Cardiovascular: Normal rate, regular rhythm and normal heart sounds.    Pulmonary/Chest: Effort normal. He has decreased breath sounds in the right lower field. He has rales in the left lower field.   Abdominal: Soft. Bowel sounds are normal. He exhibits no distension.   Musculoskeletal: He exhibits edema (1+ pitting).   Neurological: He is alert and oriented to person, place, and time.   Skin: Skin is warm and dry.   Psychiatric: He has a normal mood and affect.       Significant Labs: All pertinent labs within the past 24 hours have been reviewed.    Significant Imaging: I have reviewed and interpreted all pertinent imaging results/findings within the past 24 hours.

## 2017-11-13 NOTE — PROGRESS NOTES
Thoracentesis complete.  1500 mLs Clear yellow pleural fluid drained. Pt tolerated well. Dressing to  L back clean, dry, and intact. Lab specimens ordered, collected and sent to lab for testing. Report called to CITLALY Arenas.  Awaiting CXR.

## 2017-11-13 NOTE — ASSESSMENT & PLAN NOTE
CBC and vitals not suggestive of infection      BMP 1,136.  Troponin 0.012.  +LE edema on admit.  TSH WNL.  EKG in NSR but with minor other abnormalities.  Chest CT shows moderate bilateral pleural effusions with underlying depressive atelectasis.  Small ill-defined bilateral upper lobe groundglass opacities versus nodules possibly infectious/inflammatory disease or malignancy.  11/13 thoracentesis by radiology, 1500 mLs Clear yellow pleural fluid drained.    Given untreated DM, HTn, BNP, LE edema on admit, slightly abnormal ekg, PAD by exam favor CHF causing effusions.  - TTE  - f/u pleural fluid labs  - c/w lasix IV

## 2017-11-13 NOTE — PROGRESS NOTES
[facility] Hospital Medicine  Progress Note    Patient Name: Jose Pete  MRN: [id number]  Patient Class: IP- Inpatient   Admission Date: 11/12/2017  Length of Stay: 1 days  Attending Physician: Devin Segovia MD  Primary Care Provider: Primary Doctor [facility] Medicine Team: [facility] MED  Devin Segoiva MD    Subjective:     Principal Problem:Pleural effusion    HPI:  Patient is a 66-year-old male who does not follow with any PCP who presents to the emergency department due to a 3 week history of a dry cough.  Patient states he was having nasal drip 3 weeks ago and then developed a dry cough.  Patient states that he is having worsening shortness of breath on exertion and orthopnea but denies PND.  Patient denies  weight gain or loss.  He has had some LE edema last few days.      Patient denies any fevers or chills.  Patient denies any chest pain.  Patient states that he reported to the urgent care for above symptoms and was given a steroid shot and underwent a chest x-ray which showed bilateral pleural effusions.  She was instructed to report to the emergency department.    He last saw a physician during Sarah when he was told he had HTN and DM for the first time.  Due to moving/evacuating he was lost to follow-up.    Hospital Course:  See problem list    Interval History:   Symptoms:  Breathing better after thoracentesis  Diet:  Adequate intake.    Activity level:  Returning to normal.  Pain:  No pain.       Review of Systems   Constitutional: Negative for fever.   Respiratory: Positive for cough (dry) and shortness of breath.    Cardiovascular: Negative for chest pain.   Gastrointestinal: Negative for abdominal distention.     Objective:     Vital Signs (Most Recent):  Temp: 97.2 °F (36.2 °C) (11/13/17 1131)  Pulse: 82 (11/13/17 1131)  Resp: 18 (11/13/17 1131)  BP: 122/69 (11/13/17 1131)  SpO2: 95 % (11/13/17 1131) Vital Signs (24h Range):  Temp:  [96.9 °F (36.1 °C)-98.5 °F  (36.9 °C)] 97.2 °F (36.2 °C)  Pulse:  [] 82  Resp:  [15-28] 18  SpO2:  [93 %-96 %] 95 %  BP: (122-168)/() 122/69     Weight: 78.9 kg (174 lb)  Body mass index is 22.34 kg/m².    Intake/Output Summary (Last 24 hours) at 11/13/17 1347  Last data filed at 11/13/17 0542   Gross per 24 hour   Intake                0 ml   Output              650 ml   Net             -650 ml      Physical Exam   Constitutional: He is oriented to person, place, and time. No distress.   Neck: No JVD present.   Cardiovascular: Normal rate, regular rhythm and normal heart sounds.    Pulmonary/Chest: Effort normal. He has decreased breath sounds in the right lower field. He has rales in the left lower field.   Abdominal: Soft. Bowel sounds are normal. He exhibits no distension.   Musculoskeletal: He exhibits edema (1+ pitting).   Neurological: He is alert and oriented to person, place, and time.   Skin: Skin is warm and dry.   Psychiatric: He has a normal mood and affect.       Significant Labs: All pertinent labs within the past 24 hours have been reviewed.    Significant Imaging: I have reviewed and interpreted all pertinent imaging results/findings within the past 24 hours.    Assessment/Plan:      * Pleural effusion    CBC and vitals not suggestive of infection      B NP 1,136.  Troponin 0.012.  +LE edema on admit.  TSH WNL.  EKG in NSR but with minor other abnormalities.  Chest CT shows moderate bilateral pleural effusions with underlying depressive atelectasis.  Small ill-defined bilateral upper lobe groundglass opacities versus nodules possibly infectious/inflammatory disease or malignancy.  11/13 thoracentesis by radiology, 1500 mLs Clear yellow pleural fluid drained.    Given untreated DM, HTn, BNP, LE edema on admit, slightly abnormal ekg, PAD by exam favor CHF causing effusions.  - TTE  - f/u pleural fluid labs  - c/w lasix IV  - repeat cxr in am (reviewed today's cxr with IR radiology who do not believe he has a ptx; pt  doing well on exam)          Hypertension, essential    New diagnosis  -  Start low dose acei + diuretic  - later BB if BP tolerates  - start ASA after thoracentesis          Type 2 diabetes mellitus with diabetic nephropathy, with long-term current use of insulin    New diagnosis. A1c 10.6  UA had 2+protein, 1+ketones, and a pr/Cr ration of 1.47H all indicating nephropathy.  - SSi and titrate   - acei started  - DC referral to nephrology (ordered); referred to PCP (ordered)  - needs insulin education on DC              VTE Risk Mitigation         Ordered     Low Risk of VTE  Once      11/13/17 0158              Devin Segovia MD  Department of Hospital Medicine   [facility]

## 2017-11-13 NOTE — PLAN OF CARE
CM met with patient's friend for discharge planning.  Plan is to discharge home with no needs.    Pharmacy:  [facility]    PCP:  Dr. Milagros Lebron    Payor: MEDICARE / Plan: MEDICARE PART A & B / Product Type: [facility] /        11/13/17 1603   Discharge Assessment   Assessment Type Discharge Planning Assessment   Confirmed/corrected address and phone number on facesheet? Yes   Assessment information obtained from? Caregiver   Expected Length of Stay (days) 3   Communicated expected length of stay with patient/caregiver yes   Prior to hospitilization cognitive status: Alert/Oriented   Prior to hospitalization functional status: Independent   Current cognitive status: Alert/Oriented   Current Functional Status: Independent   Facility Arrived From: Emergency   Lives With alone   Able to Return to Prior Arrangements yes   Is patient able to care for self after discharge? Yes   Who are your caregiver(s) and their phone number(s)? Pallavi Maria - friend [phone]   Patient's perception of discharge disposition home or selfcare   Readmission Within The Last 30 Days no previous admission in last 30 days   Patient currently being followed by outpatient case management? No   Patient currently receives any other outside agency services? No   Equipment Currently Used at Home none   Do you have any problems affording any of your prescribed medications? No   Is the patient taking medications as prescribed? yes   Does the patient have transportation home? Yes   Transportation Available family or friend will provide   Does the patient receive services at the [facility]mad[facility]? No   Discharge Plan A Home   Discharge Plan B Home Health   Patient/Family In Agreement With Plan yes

## 2017-11-13 NOTE — CONSULTS
[facility]  Cardiology  Consult Note    Patient Name: Jose Pete  MRN: [id number]  Admission Date: 11/12/2017  Hospital Length of Stay: 1 days  Code Status: Full Code   Attending Provider: Dmitry Morelos MD  Consulting Provider: Jared Kolb MD  Primary Care Physician: Milagros Lebron MD  Principal Problem:Pleural effusion    Patient information was obtained from patient and past medical records.     Inpatient consult to Cardiology  Consult performed by: JARED CUMMINGS  Consult ordered by: WILVER LEVY        Subjective:     Chief Complaint:  SOB    HPI: Patient is A 66 year old male with h/o DM and HTN presented to ED with SOB /cough for 3 weeks duration. Also gives history of symptoms suggestive of orthopnea, PND, leg swelling. 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 He reports difficulty in doing routine work for the past 3 weeks. Went to urgent care. CXR showed pleural effusions and hence referred for thoracentesis. Since he was symptomatic he came to ED.   He was on treatment for DM/HTN over 10 years ago pre Sarah, but stopped taking them as he had some side effects from metformin- severe GI side effects and his blood pressure had improved hence stopped taking BP meds too. Denies any CP. Never had a stress test evaluation.     Past Medical History:   Diagnosis Date    Diabetes mellitus, type 2 11/12/2017    Hypertension, essential 11/12/2017       Past Surgical History:   Procedure Laterality Date    BACK SURGERY  1990       Review of patient's allergies indicates:   Allergen Reactions    Metformin Diarrhea     On 500mg he had such GI issues he lost weight       No current facility-administered medications on file prior to encounter.      No current outpatient prescriptions on file prior to encounter.     Family History     None        Social History Main Topics    Smoking status: Never Smoker    Smokeless tobacco:  Never Used    Alcohol use 3.6 oz/week     6 Cans of beer per week    Drug use: No    Sexual activity: Yes     ROS     Constitutional: Negative for chills, fever and unexpected weight change.   HENT: Negative for congestion.    Eyes: Negative for discharge.   Respiratory: Positive for cough and shortness of breath. Negative for wheezing.    Cardiovascular: Positive for leg swelling. Negative for chest pain and palpitations.   Gastrointestinal: Negative for abdominal distention and abdominal pain.   Endocrine: Negative for cold intolerance.   Genitourinary: Negative for difficulty urinating.   Musculoskeletal: Negative for arthralgias.   Skin: Negative for color change.   Allergic/Immunologic: Negative for environmental allergies.   Neurological: Negative for dizziness.   Hematological: Negative for adenopathy.   Psychiatric/Behavioral: Negative for agitation.    Objective:     Vital Signs (Most Recent):  Temp: 98 °F (36.7 °C) (11/13/17 1519)  Pulse: 84 (11/13/17 1519)  Resp: 18 (11/13/17 1519)  BP: 123/72 (11/13/17 1519)  SpO2: (!) 93 % (11/13/17 1519) Vital Signs (24h Range):  Temp:  [96.9 °F (36.1 °C)-98.3 °F (36.8 °C)] 98 °F (36.7 °C)  Pulse:  [80-92] 84  Resp:  [15-20] 18  SpO2:  [93 %-96 %] 93 %  BP: (122-157)/(69-95) 123/72     Weight: 78.9 kg (174 lb)  Body mass index is 22.34 kg/m².    SpO2: (!) 93 %  O2 Device (Oxygen Therapy): room air      Intake/Output Summary (Last 24 hours) at 11/13/17 5777  Last data filed at 11/13/17 0542   Gross per 24 hour   Intake                0 ml   Output              650 ml   Net             -650 ml       Physical Exam    Constitutional: He is oriented to person, place, and time. He appears well-developed and well-nourished. No distress.   HENT:   Head: Atraumatic.   Eyes: EOM are normal. No scleral icterus.   Neck: JVD present.   Cardiovascular: Normal rate, regular rhythm and normal heart sounds.    No murmur heard.  Pulses:      Dorsalis pedis pulses are 1+ on the right  side, and 1+ on the left side.   Pulmonary/Chest: Effort normal. No respiratory distress. He has bibasilar crackles He has no wheezes. He has no rhonchi.   Abdominal: Soft. Bowel sounds are normal. He exhibits no distension.   Musculoskeletal: He exhibits edema .   Neurological: He is alert and oriented to person, place, and time.   Psychiatric: He has a normal mood and affect.      Significant Labs:   CMP   Recent Labs  Lab 11/12/17  1135 11/13/17  0547    137   K 4.1 4.2    101   CO2 28 26   * 306*   BUN 14 17   CREATININE 0.8 0.8   CALCIUM 9.7 9.9   PROT 6.9  --    ALBUMIN 3.5 3.2*   BILITOT 1.0  --    ALKPHOS 93  --    AST 21  --    ALT 42  --    ANIONGAP 7* 10   ESTGFRAFRICA >60.0 >60.0   EGFRNONAA >60.0 >60.0   , CBC   Recent Labs  Lab 11/12/17  1135 11/13/17  0547   WBC 6.42 9.21   HGB 15.5 15.8   HCT 45.3 45.7    208    and Troponin   Recent Labs  Lab 11/12/17  1135   TROPONINI 0.012       Significant Imaging: Echocardiogram: 2D echo with color flow doppler: No results found for this or any previous visit.     CONCLUSIONS     1 - Eccentric LVH with severely depressed left ventricular systolic function (EF 25-30%).     2 - Severe left atrial enlargement.     3 - There is diastolic dysfunction present the stage is Indeterminate.     4 - Right ventricular enlargement with low normal to mildly depressed systolic function.     5 - Right pleural effusion.     6 - Small pericardial effusion.     7 - There is AI noted on limited images.   Assessment and Plan:     Active Diagnoses:    Diagnosis Date Noted POA    PRINCIPAL PROBLEM:  Pleural effusion [J90] 11/12/2017 Yes    Type 2 diabetes mellitus with diabetic nephropathy, with long-term current use of insulin [E11.21, Z79.4] 11/12/2017 Not Applicable    Hypertension, essential [I10] 11/12/2017 Yes      Problems Resolved During this Admission:    Diagnosis Date Noted Date Resolved POA       VTE Risk Mitigation         Ordered      enoxaparin injection 40 mg  Daily     Route:  Subcutaneous        11/13/17 1527     Low Risk of VTE  Once      11/13/17 0158          66 year old male patient with h/o DM/HTN with newly diagnosed severely depressed LV systolic function and symptoms of HF- decompensated. Feels better with 1.5 L thoracentesis. However he still has mild increased volume. He is on Lasix. Continue diuretics. Fluid and salt restriction.  Goal directed medical therapy with beta blockers, ACEi, spironolactone.   Once he is euvolemic will get ischemic evaluation with LHC tomorrow or day after . Please keep him NPO from midnight tonight will reassess in AM to determine if a LHC is feasible on 11/14.    Bilateral Pleural effusion: transudative. Likely secondary to heart failure. Serum Albumin low normal (3.5)    Will d/w Dr Morelos.     Thank you for your consult. I will follow-up with patient. Please contact us if you have any additional questions.    Jared Kolb MD  Cardiology   [facility]

## 2017-11-13 NOTE — H&P
Inpatient Radiology Pre-procedure Note    History of Present Illness:  Jose Pete is a 66 y.o. male who presents for thoracentesis.    Admission H&P reviewed.  Past Medical History:   Diagnosis Date    Diabetes mellitus, type 2 11/12/2017    Hypertension, essential 11/12/2017     Past Surgical History:   Procedure Laterality Date    BACK SURGERY  1990       Review of Systems:   As documented in primary team H&P    Home Meds:   Prior to Admission medications    Not on File     Scheduled Meds:    furosemide  40 mg Intravenous BID    insulin detemir  5 Units Subcutaneous QHS    lisinopril  10 mg Oral Daily     Continuous Infusions:    PRN Meds:acetaminophen, dextrose 50%, dextrose 50%, glucagon (human recombinant), glucose, glucose, insulin aspart, sodium chloride 0.9%  Anticoagulants/Antiplatelets: no anticoagulation    Allergies:   Review of patient's allergies indicates:   Allergen Reactions    Metformin Diarrhea     On 500mg he had such GI issues he lost weight     Sedation Hx: have not been any systemic reactions    Labs:    Recent Labs  Lab 11/12/17  1135   INR 1.1       Recent Labs  Lab 11/13/17  0547   WBC 9.21   HGB 15.8   HCT 45.7   MCV 91         Recent Labs  Lab 11/12/17  1135 11/13/17  0547   * 306*    137   K 4.1 4.2    101   CO2 28 26   BUN 14 17   CREATININE 0.8 0.8   CALCIUM 9.7 9.9   ALT 42  --    AST 21  --    ALBUMIN 3.5 3.2*   BILITOT 1.0  --          Vitals:  Temp: 98.3 °F (36.8 °C) (11/13/17 0743)  Pulse: 91 (11/13/17 0743)  Resp: 16 (11/13/17 0743)  BP: (!) 153/95 (11/13/17 0743)  SpO2: (!) 94 % (11/13/17 0743)     Physical Exam:  ASA: 2  Mallampati: 2    General: no acute distress  Mental Status: alert and oriented to person, place and time  HEENT: normocephalic, atraumatic  Chest: unlabored breathing  Heart: regular heart rate  Abdomen: nondistended  Extremity: moves all extremities    Plan: patient will undergo thoracentesis, diagnostic and therapeutic.    Sedation Plan: local     Clyde Burgos MD  Department of Radiology   PGY II  [phone]

## 2017-11-13 NOTE — UM SECONDARY REVIEW
Physician Advisor External    Level of Care Issue    Approved Inpatient- 11/13/2017 @ 0831- per Dr. Donna Ontiveros, [facility], approves Inpatient.   LMD, RN

## 2017-11-13 NOTE — ASSESSMENT & PLAN NOTE
New diagnosis. A1c 10.6  UA had 2+protein, 1+ketones, and a pr/Cr ration of 1.47H all indicating nephropathy.  - SSi and titrate   - acei started  - DC referral to nephrology (ordered); referred to PCP (ordered)  - needs insulin education on DC

## 2017-11-13 NOTE — PROGRESS NOTES
Dr. Burgos reviewing CXR.  Pt ok to return to room per Dr. Burgos. Pt transported back to [facility] via transport service.

## 2017-11-13 NOTE — PLAN OF CARE
Problem: Patient Care Overview  Goal: Plan of Care Review  Outcome: Ongoing (interventions implemented as appropriate)  Pt free of falls or injury during shift. VSS, afebrile, AAOx4. Pain assessed and denied. Pt anxious regarding thoracentesis schedule. Bed locked and in lowest position, side rails raised x2, call light and personal belongings within reach. Will continue to monitor.

## 2017-11-13 NOTE — PROGRESS NOTES
Per glycemic index committee protocol, diabetes education provided for HbA1c of 10.2. Provided and explained handout regarding carb counting and the plate method. Pt voiced understanding. Pt would benefit from outpt diabetes education. RD to f/u.

## 2017-11-13 NOTE — PROGRESS NOTES
Administered optison contrast to assist with Echo. Allergies verified and consent signed. NAD noted. IV flushed.

## 2017-11-13 NOTE — ASSESSMENT & PLAN NOTE
CBC and vitals not suggestive of infection      BNP 1,136.  Troponin 0.012.  +LE edema on admit.  TSH WNL.  EKG in NSR but with minor other abnormalities.  Chest CT shows moderate bilateral pleural effusions with underlying depressive atelectasis.  Small ill-defined bilateral upper lobe groundglass opacities versus nodules possibly infectious/inflammatory disease or malignancy.  11/13 thoracentesis by radiology, 1500 mLs Clear yellow pleural fluid drained.    Given untreated DM, HTn, BNP, LE edema on admit, slightly abnormal ekg, PAD by exam favor CHF causing effusions.  - TTE  - f/u pleural fluid labs  - c/w lasix IV  - repeat cxr in am (reviewed today's cxr with IR radiology who do not believe he has a ptx; pt doing well on exam)  11/14 - dyspnea resolved

## 2017-11-14 DIAGNOSIS — I51.7 CARDIOMEGALY: ICD-10-CM

## 2017-11-14 DIAGNOSIS — I51.89 HYPERKINETIC HEART DISEASE: ICD-10-CM

## 2017-11-14 DIAGNOSIS — R06.02 SHORTNESS OF BREATH: Primary | ICD-10-CM

## 2017-11-14 LAB
ALBUMIN SERPL BCP-MCNC: 3 G/DL
ANION GAP SERPL CALC-SCNC: 7 MMOL/L
BASOPHILS # BLD AUTO: 0.05 K/UL
BASOPHILS NFR BLD: 0.5 %
BUN SERPL-MCNC: 20 MG/DL
CALCIUM SERPL-MCNC: 9.9 MG/DL
CHLORIDE SERPL-SCNC: 100 MMOL/L
CO2 SERPL-SCNC: 34 MMOL/L
CREAT SERPL-MCNC: 0.9 MG/DL
DIFFERENTIAL METHOD: NORMAL
EOSINOPHIL # BLD AUTO: 0.1 K/UL
EOSINOPHIL NFR BLD: 1.2 %
ERYTHROCYTE [DISTWIDTH] IN BLOOD BY AUTOMATED COUNT: 11.6 %
EST. GFR  (AFRICAN AMERICAN): >60 ML/MIN/1.73 M^2
EST. GFR  (NON AFRICAN AMERICAN): >60 ML/MIN/1.73 M^2
GLUCOSE SERPL-MCNC: 156 MG/DL
HCT VFR BLD AUTO: 45.5 %
HGB BLD-MCNC: 15.1 G/DL
IMM GRANULOCYTES # BLD AUTO: 0.02 K/UL
IMM GRANULOCYTES NFR BLD AUTO: 0.2 %
LYMPHOCYTES # BLD AUTO: 1.9 K/UL
LYMPHOCYTES NFR BLD: 18 %
MCH RBC QN AUTO: 30.4 PG
MCHC RBC AUTO-ENTMCNC: 33.2 G/DL
MCV RBC AUTO: 92 FL
MONOCYTES # BLD AUTO: 1 K/UL
MONOCYTES NFR BLD: 9.4 %
NEUTROPHILS # BLD AUTO: 7.3 K/UL
NEUTROPHILS NFR BLD: 70.7 %
NRBC BLD-RTO: 0 /100 WBC
PHOSPHATE SERPL-MCNC: 2.7 MG/DL
PLATELET # BLD AUTO: 218 K/UL
PMV BLD AUTO: 11.9 FL
POCT GLUCOSE: 149 MG/DL (ref 70–110)
POCT GLUCOSE: 212 MG/DL (ref 70–110)
POCT GLUCOSE: 253 MG/DL (ref 70–110)
POCT GLUCOSE: 268 MG/DL (ref 70–110)
POTASSIUM SERPL-SCNC: 4 MMOL/L
RBC # BLD AUTO: 4.97 M/UL
SODIUM SERPL-SCNC: 141 MMOL/L
WBC # BLD AUTO: 10.33 K/UL

## 2017-11-14 PROCEDURE — 36415 COLL VENOUS BLD VENIPUNCTURE: CPT

## 2017-11-14 PROCEDURE — 99232 SBSQ HOSP IP/OBS MODERATE 35: CPT | Mod: GC,,, | Performed by: INTERNAL MEDICINE

## 2017-11-14 PROCEDURE — 25000003 PHARM REV CODE 250: Performed by: HOSPITALIST

## 2017-11-14 PROCEDURE — 99233 SBSQ HOSP IP/OBS HIGH 50: CPT | Mod: ,,, | Performed by: HOSPITALIST

## 2017-11-14 PROCEDURE — 63600175 PHARM REV CODE 636 W HCPCS: Performed by: HOSPITALIST

## 2017-11-14 PROCEDURE — 11000001 HC ACUTE MED/SURG PRIVATE ROOM

## 2017-11-14 PROCEDURE — 80069 RENAL FUNCTION PANEL: CPT

## 2017-11-14 PROCEDURE — 85025 COMPLETE CBC W/AUTO DIFF WBC: CPT

## 2017-11-14 RX ORDER — LANCING DEVICE
1 EACH MISCELLANEOUS 2 TIMES DAILY WITH MEALS
Qty: 1 EACH | Refills: 0 | Status: SHIPPED | OUTPATIENT
Start: 2017-11-14 | End: 2024-03-04

## 2017-11-14 RX ORDER — LANCETS
1 EACH MISCELLANEOUS 2 TIMES DAILY WITH MEALS
Qty: 100 EACH | Refills: 11 | Status: SHIPPED | OUTPATIENT
Start: 2017-11-14 | End: 2018-11-28

## 2017-11-14 RX ORDER — INSULIN PUMP SYRINGE, 3 ML
EACH MISCELLANEOUS
Qty: 1 EACH | Refills: 0 | Status: SHIPPED | OUTPATIENT
Start: 2017-11-14 | End: 2018-11-28

## 2017-11-14 RX ADMIN — INSULIN ASPART 1 UNITS: 100 INJECTION, SOLUTION INTRAVENOUS; SUBCUTANEOUS at 10:11

## 2017-11-14 RX ADMIN — INSULIN ASPART 2 UNITS: 100 INJECTION, SOLUTION INTRAVENOUS; SUBCUTANEOUS at 12:11

## 2017-11-14 RX ADMIN — INSULIN ASPART 3 UNITS: 100 INJECTION, SOLUTION INTRAVENOUS; SUBCUTANEOUS at 03:11

## 2017-11-14 RX ADMIN — INSULIN ASPART 5 UNITS: 100 INJECTION, SOLUTION INTRAVENOUS; SUBCUTANEOUS at 03:11

## 2017-11-14 RX ADMIN — ENOXAPARIN SODIUM 40 MG: 40 INJECTION SUBCUTANEOUS at 05:11

## 2017-11-14 RX ADMIN — ASPIRIN 81 MG: 81 TABLET, COATED ORAL at 09:11

## 2017-11-14 RX ADMIN — LISINOPRIL 10 MG: 10 TABLET ORAL at 09:11

## 2017-11-14 RX ADMIN — CARVEDILOL 3.12 MG: 3.12 TABLET, FILM COATED ORAL at 10:11

## 2017-11-14 RX ADMIN — INSULIN DETEMIR 15 UNITS: 100 INJECTION, SOLUTION SUBCUTANEOUS at 10:11

## 2017-11-14 RX ADMIN — FUROSEMIDE 40 MG: 40 TABLET ORAL at 09:11

## 2017-11-14 RX ADMIN — CARVEDILOL 3.12 MG: 3.12 TABLET, FILM COATED ORAL at 09:11

## 2017-11-14 NOTE — ASSESSMENT & PLAN NOTE
- CXR with bilateral pleural effusion.  - Thoracentesis with 1.5L removed showed transudate.  - Secondary to CHF decompensation.

## 2017-11-14 NOTE — ASSESSMENT & PLAN NOTE
New diagnosis  -  Start low dose acei + diuretic  - later BB if BP tolerates  - start ASA after thoracentesis  11/14 - 132/75

## 2017-11-14 NOTE — HPI
Patient is A 66 year old male with h/o DM and HTN presented to ED with SOB /cough for 3 weeks duration. Also gives history of symptoms suggestive of orthopnea, PND, leg swelling. 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 He reports difficulty in doing routine work for the past 3 weeks. Went to urgent care. CXR showed pleural effusions and hence referred for thoracentesis. Since he was symptomatic he came to ED.   He was on treatment for DM/HTN over 10 years ago pre Sarah, but stopped taking them as he had some side effects from metformin- severe GI side effects and his blood pressure had improved hence stopped taking BP meds too. Denies any CP. Never had a stress test evaluation.

## 2017-11-14 NOTE — PLAN OF CARE
Problem: Patient Care Overview  Goal: Plan of Care Review  Outcome: Ongoing (interventions implemented as appropriate)  Diminished breath sounds noted. 97% on room air. Denies LYONS. Currently in CXR. Strict intake and output maintained.Tolerating 1800 ADA diet.  Skin intact.  No c/o pain this shift.  Patient to be NPO past midnight for nuclear med stress test.  Telemetry in progress with SR noted.    Problem: Diabetes, Type 2 (Adult)  Intervention: Optimize Glycemic Control  Glucoses have ranged between 149 - 212 mg/dl with patient receiving 2 units novolog insulin per correctional dose this shift.      Problem: Fall Risk (Adult)  Intervention: Patient Rounds  No injuries noted this shift.

## 2017-11-14 NOTE — PROGRESS NOTES
[facility]  Cardiology  Progress Note    Patient Name: Jose Pete  MRN: [id number]  Admission Date: 11/12/2017  Hospital Length of Stay: 2 days  Code Status: Full Code   Attending Physician: Hannah Garcia MD   Primary Care Physician: Milagros Lebron MD  Expected Discharge Date: 11/15/2017  Principal Problem:Combined systolic and diastolic cardiac dysfunction    Subjective:       Interval History: Patient reports improvement of his symptoms since thoracentesis. Will go for a SPECT tomorrow.    Review of Systems   Constitution: Negative for chills and fever.   Cardiovascular: Negative for chest pain, dyspnea on exertion and leg swelling.   Respiratory: Negative for cough and shortness of breath.    Gastrointestinal: Negative for abdominal pain, nausea and vomiting.   Genitourinary: Negative for dysuria and hematuria.   Neurological: Negative for dizziness and light-headedness.   Psychiatric/Behavioral: Negative for altered mental status.   All other systems reviewed and are negative.    Objective:     Vital Signs (Most Recent):  Temp: 97.8 °F (36.6 °C) (11/14/17 0811)  Pulse: 77 (11/14/17 1055)  Resp: 18 (11/14/17 0811)  BP: 135/81 (11/14/17 0811)  SpO2: 99 % (11/14/17 0811) Vital Signs (24h Range):  Temp:  [97.6 °F (36.4 °C)-98 °F (36.7 °C)] 97.8 °F (36.6 °C)  Pulse:  [71-84] 77  Resp:  [14-18] 18  SpO2:  [91 %-99 %] 99 %  BP: (117-135)/(69-85) 135/81     Weight: 79.3 kg (174 lb 13.2 oz)  Body mass index is 22.45 kg/m².     SpO2: 99 %  O2 Device (Oxygen Therapy): room air      Intake/Output Summary (Last 24 hours) at 11/14/17 1142  Last data filed at 11/14/17 1032   Gross per 24 hour   Intake               60 ml   Output              300 ml   Net             -240 ml       Lines/Drains/Airways     Peripheral Intravenous Line                 Peripheral IV - Single Lumen 11/12/17 1114 Left Antecubital 2 days                Physical Exam   Constitutional: He is oriented to person, place, and  time. He appears well-developed and well-nourished. No distress.   HENT:   Head: Normocephalic and atraumatic.   Eyes: Pupils are equal, round, and reactive to light.   Neck: Neck supple. No JVD present.   Cardiovascular: Normal rate, regular rhythm, normal heart sounds and intact distal pulses.    No murmur heard.  Pulmonary/Chest: Effort normal. No respiratory distress. He has no wheezes. He has rales (bibasilar).   Abdominal: Soft. Bowel sounds are normal. He exhibits no distension. There is no tenderness.   Musculoskeletal: He exhibits edema (+1 BLE, pitting).   Neurological: He is alert and oriented to person, place, and time.   Skin: Skin is warm and dry. He is not diaphoretic.   Psychiatric: He has a normal mood and affect. His behavior is normal.       Significant Labs:   CMP   Recent Labs  Lab 11/13/17  0547 11/14/17  0407    141   K 4.2 4.0    100   CO2 26 34*   * 156*   BUN 17 20   CREATININE 0.8 0.9   CALCIUM 9.9 9.9   ALBUMIN 3.2* 3.0*   ANIONGAP 10 7*   ESTGFRAFRICA >60.0 >60.0   EGFRNONAA >60.0 >60.0   , CBC   Recent Labs  Lab 11/13/17  0547 11/14/17  0408   WBC 9.21 10.33   HGB 15.8 15.1   HCT 45.7 45.5    218    and All pertinent lab results from the last 24 hours have been reviewed.      Assessment and Plan:       * Combined systolic and diastolic cardiac dysfunction    - Patient presented to with cough for 3 weeks and SOB for 2 days.  - CXR at urgent care showed bilateral pleural effusion.  - Referred to [facility] [place] (1.5L removed), which showed a transudative effucion.  - Patient reports much improvement in his symptoms after thoracentesis.  - Echo on 11/13/2017:  CONCLUSIONS     1 - Eccentric LVH with severely depressed left ventricular systolic function (EF 25-30%).     2 - Severe left atrial enlargement.     3 - There is diastolic dysfunction present the stage is Indeterminate.     4 - Right ventricular enlargement with low normal to mildly depressed systolic  function.  - New diagnosis of heart failure established.  - Likely secondary to uncontrolled HTN vs ischemia.  - Will do SPECT tomorrow to rule out ischemic etiology.  - Continue goal-directed medical therapy with diuresis, carvedilol, lisinopril, and ASA.        Pleural effusion    - CXR with bilateral pleural effusion.  - Thoracentesis with 1.5L removed showed transudate.  - Secondary to CHF decompensation.            VTE Risk Mitigation         Ordered     enoxaparin injection 40 mg  Daily     Route:  Subcutaneous        11/13/17 1527     Low Risk of VTE  Once      11/13/17 0151            Isaura Bustillo MD  Cardiology  [facility]  Pager # [phone]  SpectraLink # [phone]

## 2017-11-14 NOTE — PROGRESS NOTES
[facility] Hospital Medicine  Progress Note    Patient Name: Jose Pete  MRN: [id number]  Patient Class: IP- Inpatient   Admission Date: 11/12/2017  Length of Stay: 2 days  Attending Physician: Hannah Garcia MD  Primary Care Provider: Milagros Lebron MD    [facility] Medicine Team: [facility] G Hannah Garcia MD    Subjective:     Principal Problem:Combined systolic and diastolic cardiac dysfunction    HPI:  Patient is a 66-year-old male who does not follow with any PCP who presents to the emergency department due to a 3 week history of a dry cough.  Patient states he was having nasal drip 3 weeks ago and then developed a dry cough.  Patient states that he is having worsening shortness of breath on exertion and orthopnea but denies PND.  Patient denies  weight gain or loss.  He has had some LE edema last few days.      Patient denies any fevers or chills.  Patient denies any chest pain.  Patient states that he reported to the urgent care for above symptoms and was given a steroid shot and underwent a chest x-ray which showed bilateral pleural effusions.  She was instructed to report to the emergency department.    He last saw a physician during [place] when he was told he had HTN and DM for the first time.  Due to moving/evacuating he was lost to follow-up.    Hospital Course:  See problem list  11/14 - discussed s cardiology.  Card spect scan for tomorrow.  Pt feels well. No chest pain.  Discussed CHF< HTN and diabetes.  Need diabetic education, insulin instruction.     Interval History: comfortable, no SOB    Review of Systems   Constitutional: Negative for chills, diaphoresis, fatigue and fever.   HENT: Negative for congestion, sore throat and trouble swallowing.    Eyes: Negative for visual disturbance.   Respiratory: Negative for cough, choking, chest tightness and shortness of breath.    Cardiovascular: Negative for chest pain and leg swelling.   Gastrointestinal: Negative for  abdominal distention, abdominal pain, blood in stool, constipation, diarrhea, nausea and vomiting.   Genitourinary: Negative for difficulty urinating and hematuria.   Musculoskeletal: Negative for arthralgias, gait problem, joint swelling, myalgias, neck pain and neck stiffness.   Skin: Negative for rash.   Neurological: Negative for dizziness and headaches.   Psychiatric/Behavioral: Negative for agitation, behavioral problems, confusion and decreased concentration.     Objective:     Vital Signs (Most Recent):  Temp: 97.9 °F (36.6 °C) (11/14/17 1212)  Pulse: 75 (11/14/17 1212)  Resp: 16 (11/14/17 1212)  BP: 132/75 (11/14/17 1212)  SpO2: (!) 92 % (11/14/17 1212) Vital Signs (24h Range):  Temp:  [97.6 °F (36.4 °C)-98 °F (36.7 °C)] 97.9 °F (36.6 °C)  Pulse:  [71-84] 75  Resp:  [14-18] 16  SpO2:  [91 %-99 %] 92 %  BP: (117-135)/(69-85) 132/75     Weight: 79.3 kg (174 lb 13.2 oz)  Body mass index is 22.45 kg/m².    Intake/Output Summary (Last 24 hours) at 11/14/17 1355  Last data filed at 11/14/17 1338   Gross per 24 hour   Intake              180 ml   Output              875 ml   Net             -695 ml      Physical Exam   Constitutional: He is oriented to person, place, and time. He appears well-developed and well-nourished. No distress.   HENT:   Head: Normocephalic and atraumatic.   Mouth/Throat: Oropharynx is clear and moist.   Eyes: EOM are normal. Pupils are equal, round, and reactive to light. No scleral icterus.   Neck: Normal range of motion. Neck supple.   Cardiovascular: Normal rate, regular rhythm, normal heart sounds and intact distal pulses.    Pulmonary/Chest: Effort normal. He has no wheezes. He has no rales.   Abdominal: Soft. Bowel sounds are normal. He exhibits no distension and no mass. There is no tenderness. There is no rebound and no guarding. No hernia.   Musculoskeletal: Normal range of motion. He exhibits no edema or deformity.   Lymphadenopathy:     He has no cervical adenopathy.    Neurological: He is alert and oriented to person, place, and time.   Skin: Skin is warm and dry. No rash noted. He is not diaphoretic.   Psychiatric: He has a normal mood and affect. His behavior is normal. Thought content normal.       Significant Labs:   CBC:   Recent Labs  Lab 11/13/17  0547 11/14/17  0408   WBC 9.21 10.33   HGB 15.8 15.1   HCT 45.7 45.5    218     CMP:   Recent Labs  Lab 11/13/17  0547 11/14/17  0407    141   K 4.2 4.0    100   CO2 26 34*   * 156*   BUN 17 20   CREATININE 0.8 0.9   CALCIUM 9.9 9.9   ALBUMIN 3.2* 3.0*   ANIONGAP 10 7*   EGFRNONAA >60.0 >60.0         Assessment/Plan:      * Combined systolic and diastolic cardiac dysfunction    Echo ef 25 %  Discussed w cardiology   spect scan in am          Hypertension, essential    New diagnosis  -  Start low dose acei + diuretic  - later BB if BP tolerates  - start ASA after thoracentesis  11/14 - 132/75            Type 2 diabetes mellitus with diabetic nephropathy, with long-term current use of insulin    New diagnosis. A1c 10.6  UA had 2+protein, 1+ketones, and a pr/Cr ration of 1.47H all indicating nephropathy.  - SSi and titrate   - acei started  - DC referral to nephrology (ordered); referred to PCP (ordered)  - needs insulin education on DC  Recent Labs      11/13/17   0804  11/13/17   1132  11/13/17   1705  11/13/17   2122  11/14/17   0822  11/14/17   1159   POCTGLUCOSE  291*  312*  192*  126*  149*  212*               Pleural effusion    CBC and vitals not suggestive of infection      B NP 1,136.  Troponin 0.012.  +LE edema on admit.  TSH WNL.  EKG in NSR but with minor other abnormalities.  Chest CT shows moderate bilateral pleural effusions with underlying depressive atelectasis.  Small ill-defined bilateral upper lobe groundglass opacities versus nodules possibly infectious/inflammatory disease or malignancy.  11/13 thoracentesis by radiology, 1500 mLs Clear yellow pleural fluid drained.    Given  untreated DM, HTn, BNP, LE edema on admit, slightly abnormal ekg, PAD by exam favor CHF causing effusions.  - TTE  - f/u pleural fluid labs  - c/w lasix IV  - repeat cxr in am (reviewed today's cxr with IR radiology who do not believe he has a ptx; pt doing well on exam)  11/14 - dyspnea resolved            VTE Risk Mitigation         Ordered     enoxaparin injection 40 mg  Daily     Route:  Subcutaneous        11/13/17 1527     Low Risk of VTE  Once      11/13/17 0158              Hannah Garcia MD  Department of Hospital Medicine   [facility]

## 2017-11-14 NOTE — ASSESSMENT & PLAN NOTE
New diagnosis. A1c 10.6  UA had 2+protein, 1+ketones, and a pr/Cr ration of 1.47H all indicating nephropathy.  - SSi and titrate   - acei started  - DC referral to nephrology (ordered); referred to PCP (ordered)  - needs insulin education on DC  Recent Labs      11/13/17   0804  11/13/17   1132  11/13/17   1705  11/13/17   2122  11/14/17   0822  11/14/17   1159   POCTGLUCOSE  291*  312*  192*  126*  149*  212*

## 2017-11-14 NOTE — SUBJECTIVE & OBJECTIVE
Interval History: comfortable, no SOB    Review of Systems   Constitutional: Negative for chills, diaphoresis, fatigue and fever.   HENT: Negative for congestion, sore throat and trouble swallowing.    Eyes: Negative for visual disturbance.   Respiratory: Negative for cough, choking, chest tightness and shortness of breath.    Cardiovascular: Negative for chest pain and leg swelling.   Gastrointestinal: Negative for abdominal distention, abdominal pain, blood in stool, constipation, diarrhea, nausea and vomiting.   Genitourinary: Negative for difficulty urinating and hematuria.   Musculoskeletal: Negative for arthralgias, gait problem, joint swelling, myalgias, neck pain and neck stiffness.   Skin: Negative for rash.   Neurological: Negative for dizziness and headaches.   Psychiatric/Behavioral: Negative for agitation, behavioral problems, confusion and decreased concentration.     Objective:     Vital Signs (Most Recent):  Temp: 97.9 °F (36.6 °C) (11/14/17 1212)  Pulse: 75 (11/14/17 1212)  Resp: 16 (11/14/17 1212)  BP: 132/75 (11/14/17 1212)  SpO2: (!) 92 % (11/14/17 1212) Vital Signs (24h Range):  Temp:  [97.6 °F (36.4 °C)-98 °F (36.7 °C)] 97.9 °F (36.6 °C)  Pulse:  [71-84] 75  Resp:  [14-18] 16  SpO2:  [91 %-99 %] 92 %  BP: (117-135)/(69-85) 132/75     Weight: 79.3 kg (174 lb 13.2 oz)  Body mass index is 22.45 kg/m².    Intake/Output Summary (Last 24 hours) at 11/14/17 1355  Last data filed at 11/14/17 1338   Gross per 24 hour   Intake              180 ml   Output              875 ml   Net             -695 ml      Physical Exam   Constitutional: He is oriented to person, place, and time. He appears well-developed and well-nourished. No distress.   HENT:   Head: Normocephalic and atraumatic.   Mouth/Throat: Oropharynx is clear and moist.   Eyes: EOM are normal. Pupils are equal, round, and reactive to light. No scleral icterus.   Neck: Normal range of motion. Neck supple.   Cardiovascular: Normal rate, regular  rhythm, normal heart sounds and intact distal pulses.    Pulmonary/Chest: Effort normal. He has no wheezes. He has no rales.   Abdominal: Soft. Bowel sounds are normal. He exhibits no distension and no mass. There is no tenderness. There is no rebound and no guarding. No hernia.   Musculoskeletal: Normal range of motion. He exhibits no edema or deformity.   Lymphadenopathy:     He has no cervical adenopathy.   Neurological: He is alert and oriented to person, place, and time.   Skin: Skin is warm and dry. No rash noted. He is not diaphoretic.   Psychiatric: He has a normal mood and affect. His behavior is normal. Thought content normal.       Significant Labs:   CBC:   Recent Labs  Lab 11/13/17  0547 11/14/17  0408   WBC 9.21 10.33   HGB 15.8 15.1   HCT 45.7 45.5    218     CMP:   Recent Labs  Lab 11/13/17  0547 11/14/17  0407    141   K 4.2 4.0    100   CO2 26 34*   * 156*   BUN 17 20   CREATININE 0.8 0.9   CALCIUM 9.9 9.9   ALBUMIN 3.2* 3.0*   ANIONGAP 10 7*   EGFRNONAA >60.0 >60.0

## 2017-11-14 NOTE — ASSESSMENT & PLAN NOTE
- Patient presented to with cough for 3 weeks and SOB for 2 days.  - CXR at urgent care showed bilateral pleural effusion.  - Referred to [facility] for thoracentesis (1.5L removed), which showed a transudative effucion.  - Patient reports much improvement in his symptoms after thoracentesis.  - Echo on 11/13/2017:  CONCLUSIONS     1 - Eccentric LVH with severely depressed left ventricular systolic function (EF 25-30%).     2 - Severe left atrial enlargement.     3 - There is diastolic dysfunction present the stage is Indeterminate.     4 - Right ventricular enlargement with low normal to mildly depressed systolic function.  - New diagnosis of heart failure established.  - Likely secondary to uncontrolled HTN vs ischemia.  - Will do SPECT tomorrow to rule out ischemic etiology.  - Continue goal-directed medical therapy with diuresis, carvedilol, lisinopril, and ASA.

## 2017-11-14 NOTE — SUBJECTIVE & OBJECTIVE
Interval History: Patient reports improvement of his symptoms since thoracentesis. Will go for a SPECT tomorrow.    Review of Systems   Constitution: Negative for chills and fever.   Cardiovascular: Negative for chest pain, dyspnea on exertion and leg swelling.   Respiratory: Negative for cough and shortness of breath.    Gastrointestinal: Negative for abdominal pain, nausea and vomiting.   Genitourinary: Negative for dysuria and hematuria.   Neurological: Negative for dizziness and light-headedness.   Psychiatric/Behavioral: Negative for altered mental status.   All other systems reviewed and are negative.    Objective:     Vital Signs (Most Recent):  Temp: 97.8 °F (36.6 °C) (11/14/17 0811)  Pulse: 77 (11/14/17 1055)  Resp: 18 (11/14/17 0811)  BP: 135/81 (11/14/17 0811)  SpO2: 99 % (11/14/17 0811) Vital Signs (24h Range):  Temp:  [97.6 °F (36.4 °C)-98 °F (36.7 °C)] 97.8 °F (36.6 °C)  Pulse:  [71-84] 77  Resp:  [14-18] 18  SpO2:  [91 %-99 %] 99 %  BP: (117-135)/(69-85) 135/81     Weight: 79.3 kg (174 lb 13.2 oz)  Body mass index is 22.45 kg/m².     SpO2: 99 %  O2 Device (Oxygen Therapy): room air      Intake/Output Summary (Last 24 hours) at 11/14/17 1142  Last data filed at 11/14/17 1032   Gross per 24 hour   Intake               60 ml   Output              300 ml   Net             -240 ml       Lines/Drains/Airways     Peripheral Intravenous Line                 Peripheral IV - Single Lumen 11/12/17 1114 Left Antecubital 2 days                Physical Exam   Constitutional: He is oriented to person, place, and time. He appears well-developed and well-nourished. No distress.   HENT:   Head: Normocephalic and atraumatic.   Eyes: Pupils are equal, round, and reactive to light.   Neck: Neck supple. No JVD present.   Cardiovascular: Normal rate, regular rhythm, normal heart sounds and intact distal pulses.    No murmur heard.  Pulmonary/Chest: Effort normal. No respiratory distress. He has no wheezes. He has rales  (bibasilar).   Abdominal: Soft. Bowel sounds are normal. He exhibits no distension. There is no tenderness.   Musculoskeletal: He exhibits edema (+1 BLE, pitting).   Neurological: He is alert and oriented to person, place, and time.   Skin: Skin is warm and dry. He is not diaphoretic.   Psychiatric: He has a normal mood and affect. His behavior is normal.       Significant Labs:   CMP   Recent Labs  Lab 11/13/17  0547 11/14/17  0407    141   K 4.2 4.0    100   CO2 26 34*   * 156*   BUN 17 20   CREATININE 0.8 0.9   CALCIUM 9.9 9.9   ALBUMIN 3.2* 3.0*   ANIONGAP 10 7*   ESTGFRAFRICA >60.0 >60.0   EGFRNONAA >60.0 >60.0   , CBC   Recent Labs  Lab 11/13/17  0547 11/14/17  0408   WBC 9.21 10.33   HGB 15.8 15.1   HCT 45.7 45.5    218    and All pertinent lab results from the last 24 hours have been reviewed.

## 2017-11-15 ENCOUNTER — CLINICAL SUPPORT (OUTPATIENT)
Dept: CARDIOLOGY | Facility: CLINIC | Age: 67
DRG: 292 | End: 2017-11-15
Attending: EMERGENCY MEDICINE
Payer: COMMERCIAL

## 2017-11-15 VITALS
OXYGEN SATURATION: 95 % | RESPIRATION RATE: 18 BRPM | HEART RATE: 74 BPM | WEIGHT: 174.81 LBS | DIASTOLIC BLOOD PRESSURE: 80 MMHG | HEIGHT: 74 IN | BODY MASS INDEX: 22.43 KG/M2 | TEMPERATURE: 98 F | SYSTOLIC BLOOD PRESSURE: 134 MMHG

## 2017-11-15 DIAGNOSIS — I51.89 HYPERKINETIC HEART DISEASE: ICD-10-CM

## 2017-11-15 DIAGNOSIS — R06.02 SHORTNESS OF BREATH: ICD-10-CM

## 2017-11-15 DIAGNOSIS — I51.7 CARDIOMEGALY: ICD-10-CM

## 2017-11-15 LAB
DIASTOLIC DYSFUNCTION: NO
POCT GLUCOSE: 156 MG/DL (ref 70–110)
POCT GLUCOSE: 162 MG/DL (ref 70–110)
POCT GLUCOSE: 180 MG/DL (ref 70–110)
POCT GLUCOSE: 202 MG/DL (ref 70–110)

## 2017-11-15 PROCEDURE — 78452 HT MUSCLE IMAGE SPECT MULT: CPT | Mod: 26,,, | Performed by: INTERNAL MEDICINE

## 2017-11-15 PROCEDURE — 63600175 PHARM REV CODE 636 W HCPCS: Performed by: HOSPITALIST

## 2017-11-15 PROCEDURE — 99239 HOSP IP/OBS DSCHRG MGMT >30: CPT | Mod: ,,, | Performed by: HOSPITALIST

## 2017-11-15 PROCEDURE — 93018 CV STRESS TEST I&R ONLY: CPT | Mod: ,,, | Performed by: INTERNAL MEDICINE

## 2017-11-15 PROCEDURE — 99232 SBSQ HOSP IP/OBS MODERATE 35: CPT | Mod: ,,, | Performed by: INTERNAL MEDICINE

## 2017-11-15 PROCEDURE — 93016 CV STRESS TEST SUPVJ ONLY: CPT | Mod: ,,, | Performed by: INTERNAL MEDICINE

## 2017-11-15 PROCEDURE — 25000003 PHARM REV CODE 250: Performed by: HOSPITALIST

## 2017-11-15 RX ORDER — FUROSEMIDE 40 MG/1
40 TABLET ORAL DAILY
Qty: 30 TABLET | Refills: 11 | Status: SHIPPED | OUTPATIENT
Start: 2017-11-16 | End: 2018-03-21 | Stop reason: SDUPTHER

## 2017-11-15 RX ORDER — INSULIN LISPRO 100 [IU]/ML
5 INJECTION, SOLUTION INTRAVENOUS; SUBCUTANEOUS
Qty: 4.5 ML | Refills: 11 | Status: SHIPPED | OUTPATIENT
Start: 2017-11-15 | End: 2017-12-12

## 2017-11-15 RX ORDER — LISINOPRIL 10 MG/1
10 TABLET ORAL DAILY
Qty: 90 TABLET | Refills: 3 | Status: SHIPPED | OUTPATIENT
Start: 2017-11-16 | End: 2018-01-08 | Stop reason: SDUPTHER

## 2017-11-15 RX ORDER — CARVEDILOL 3.12 MG/1
3.12 TABLET ORAL 2 TIMES DAILY
Qty: 60 TABLET | Refills: 11 | Status: SHIPPED | OUTPATIENT
Start: 2017-11-15 | End: 2017-12-04 | Stop reason: SDUPTHER

## 2017-11-15 RX ORDER — ASPIRIN 81 MG/1
81 TABLET ORAL DAILY
Refills: 0 | COMMUNITY
Start: 2017-11-16 | End: 2018-07-09 | Stop reason: SDUPTHER

## 2017-11-15 RX ADMIN — FUROSEMIDE 40 MG: 40 TABLET ORAL at 11:11

## 2017-11-15 RX ADMIN — ENOXAPARIN SODIUM 40 MG: 40 INJECTION SUBCUTANEOUS at 05:11

## 2017-11-15 RX ADMIN — INSULIN ASPART 5 UNITS: 100 INJECTION, SOLUTION INTRAVENOUS; SUBCUTANEOUS at 03:11

## 2017-11-15 RX ADMIN — LISINOPRIL 10 MG: 10 TABLET ORAL at 11:11

## 2017-11-15 RX ADMIN — INSULIN ASPART 5 UNITS: 100 INJECTION, SOLUTION INTRAVENOUS; SUBCUTANEOUS at 11:11

## 2017-11-15 RX ADMIN — INSULIN ASPART 2 UNITS: 100 INJECTION, SOLUTION INTRAVENOUS; SUBCUTANEOUS at 03:11

## 2017-11-15 RX ADMIN — ASPIRIN 81 MG: 81 TABLET, COATED ORAL at 11:11

## 2017-11-15 RX ADMIN — CARVEDILOL 3.12 MG: 3.12 TABLET, FILM COATED ORAL at 11:11

## 2017-11-15 NOTE — SUBJECTIVE & OBJECTIVE
Interval History: NAEON. Continues to report improvement, denies SOB or orthopnea. Diuresing well (net -2.5L). Plan to do SPECT today.    Review of Systems   Constitution: Negative for chills and fever.   Cardiovascular: Negative for chest pain, dyspnea on exertion and leg swelling.   Respiratory: Negative for cough and shortness of breath.    Gastrointestinal: Negative for abdominal pain, nausea and vomiting.   Genitourinary: Negative for dysuria and hematuria.   Neurological: Negative for dizziness and light-headedness.   Psychiatric/Behavioral: Negative for altered mental status.   All other systems reviewed and are negative.    Objective:     Vital Signs (Most Recent):  Temp: 97.7 °F (36.5 °C) (11/15/17 0704)  Pulse: 82 (11/15/17 0846)  Resp: 18 (11/15/17 0704)  BP: 136/80 (11/15/17 0704)  SpO2: (!) 93 % (11/15/17 0704) Vital Signs (24h Range):  Temp:  [96.1 °F (35.6 °C)-98.9 °F (37.2 °C)] 97.7 °F (36.5 °C)  Pulse:  [71-85] 82  Resp:  [16-18] 18  SpO2:  [92 %-98 %] 93 %  BP: (128-142)/(75-85) 136/80     Weight: 79.3 kg (174 lb 13.2 oz)  Body mass index is 22.45 kg/m².     SpO2: (!) 93 %  O2 Device (Oxygen Therapy):  (O2 order changed to PRN per Dr. Hannah Garcia)      Intake/Output Summary (Last 24 hours) at 11/15/17 1051  Last data filed at 11/15/17 0800   Gross per 24 hour   Intake              540 ml   Output             3050 ml   Net            -2510 ml       Lines/Drains/Airways     Peripheral Intravenous Line                 Peripheral IV - Single Lumen 11/12/17 1114 Left Antecubital 2 days                Physical Exam   Constitutional: He is oriented to person, place, and time. He appears well-developed and well-nourished. No distress.   HENT:   Head: Normocephalic and atraumatic.   Eyes: Pupils are equal, round, and reactive to light.   Neck: Neck supple. No JVD present.   Cardiovascular: Normal rate, regular rhythm, normal heart sounds and intact distal pulses.    No murmur heard.  Pulmonary/Chest:  Effort normal. No respiratory distress. He has no wheezes.   Abdominal: Soft. Bowel sounds are normal. He exhibits no distension. There is no tenderness.   Musculoskeletal: He exhibits no edema.   Neurological: He is alert and oriented to person, place, and time.   Skin: Skin is warm and dry. He is not diaphoretic.   Psychiatric: He has a normal mood and affect. His behavior is normal.

## 2017-11-15 NOTE — DISCHARGE SUMMARY
[facility] Hospital Medicine  Discharge Summary      Patient Name: Jose Pete  MRN: [id number]  Admission Date: 11/12/2017  Hospital Length of Stay: 3 days  Discharge Date and Time: No discharge date for patient encounter.  Attending Physician: Hannah Garcia MD   Discharging Provider: Hannah Garcia MD  Primary Care Provider: Milagros Lebron MD  [facility] Medicine Team: [facility] MED  Hannah Garcia MD    HPI:   Patient is a 66-year-old male who does not follow with any PCP who presents to the emergency department due to a 3 week history of a dry cough.  Patient states he was having nasal drip 3 weeks ago and then developed a dry cough.  Patient states that he is having worsening shortness of breath on exertion and orthopnea but denies PND.  Patient denies  weight gain or loss.  He has had some LE edema last few days.      Patient denies any fevers or chills.  Patient denies any chest pain.  Patient states that he reported to the urgent care for above symptoms and was given a steroid shot and underwent a chest x-ray which showed bilateral pleural effusions.  She was instructed to report to the emergency department.    He last saw a physician during [place] when he was told he had HTN and DM for the first time.  Due to moving/evacuating he was lost to follow-up.    * No surgery found *      Hospital Course:   See problem list  11/14 - discussed s cardiology.  Card spect scan for tomorrow.  Pt feels well. No chest pain.  Discussed CHF< HTN and diabetes.  Need diabetic education, insulin instruction.   11/15 - reveied spect scan w cardiology, negative.  Pt can be discharged.      Consults:   Consults         Status Ordering Provider     Inpatient consult to Cardiology  Once     Provider:  (Not yet assigned)    Completed WILVER LEVY          * Combined systolic and diastolic cardiac dysfunction    Echo ef 25 %  Discussed w cardiology   spect scan in am  1115 - stable on lisinopril,   coreg, and lasix          Cardiomegaly              Hypertension, essential    New diagnosis  -  Start low dose acei + diuretic  - later BB if BP tolerates  - start ASA after thoracentesis  11/14 - 132/75            Type 2 diabetes mellitus with diabetic nephropathy, with long-term current use of insulin    New diagnosis. A1c 10.6  UA had 2+protein, 1+ketones, and a pr/Cr ration of 1.47H all indicating nephropathy.  - SSi and titrate   - acei started  - DC referral to nephrology (ordered); referred to PCP (ordered)  - needs insulin education on DC  Recent Labs      11/14/17   0822  11/14/17   1159  11/14/17   1552  11/14/17   2208  11/15/17   0045  11/15/17   0506   POCTGLUCOSE  149*  212*  268*  253*  180*  156*     Dc on insulin, Pt has been taught, new supplies ordered          Pleural effusion    CBC and vitals not suggestive of infection      BNP 1,136.  Troponin 0.012.  +LE edema on admit.  TSH WNL.  EKG in NSR but with minor other abnormalities.  Chest CT shows moderate bilateral pleural effusions with underlying depressive atelectasis.  Small ill-defined bilateral upper lobe groundglass opacities versus nodules possibly infectious/inflammatory disease or malignancy.  11/13 thoracentesis by radiology, 1500 mLs Clear yellow pleural fluid drained.    Given untreated DM, HTn, BNP, LE edema on admit, slightly abnormal ekg, PAD by exam favor CHF causing effusions.  - TTE  - f/u pleural fluid labs  - c/w lasix IV  - repeat cxr in am (reviewed today's cxr with IR radiology who do not believe he has a ptx; pt doing well on exam)  11/14 - dyspnea resolved            Final Active Diagnoses:    Diagnosis Date Noted POA    PRINCIPAL PROBLEM:  Combined systolic and diastolic cardiac dysfunction [I51.89] 11/14/2017 Yes    Cardiomegaly [I51.7] 11/14/2017 Yes    SOB (shortness of breath) [R06.02] 11/14/2017 Yes    Pleural effusion [J90] 11/12/2017 Yes    Type 2 diabetes mellitus with diabetic nephropathy, with  long-term current use of insulin [E11.21, Z79.4] 11/12/2017 Not Applicable    Hypertension, essential [I10] 11/12/2017 Yes      Problems Resolved During this Admission:    Diagnosis Date Noted Date Resolved POA       Discharged Condition: good    Disposition: Home or Self Care    Follow Up:  Follow-up Information     Schedule an appointment as soon as possible for a visit with Aryan [facility] - Nephrology.    Specialty:  Nephrology  Why:  for diabetic kidneys  Contact information:  [address] [facility] [address] [phone]  Additional information:  [facility] [place] - 5th Floor           Schedule an appointment as soon as possible for a visit with [facility].    Why:  primary care  Contact information:  [address] [place], [place] [address]  [address] [facility] [address]           Milagros Lebron MD.    Specialty:  Internal Medicine  Contact information:  [address]  [facility] [address]  [phone]             Aryan Mayer - Cardiology.    Specialty:  Cardiology  Contact information:  [address] [facility] [address] [phone]  Additional information:  3rd floor               Patient Instructions:     Ambulatory consult to Nephrology   Referral Priority: Routine Referral Type: Consultation   Referral Reason: Specialty Services Required    Requested Specialty: Nephrology    Number of Visits Requested: 1      Ambulatory consult to Internal Medicine   Referral Priority: Routine Referral Type: Consultation   Referral Reason: Specialty Services Required    Requested Specialty: Internal Medicine    Number of Visits Requested: 1      Diet general     Activity as tolerated         Significant Diagnostic Studies: Labs:   CMP   Recent Labs  Lab 11/14/17  0407      K 4.0      CO2 34*   *   BUN 20   CREATININE 0.9   CALCIUM 9.9   ALBUMIN 3.0*   ANIONGAP 7*   ESTGFRAFRICA >60.0   EGFRNONAA >60.0   , CBC   Recent Labs  Lab 11/14/17  0408   WBC  10.33   HGB 15.1   HCT 45.5      , Troponin   Recent Labs  Lab 11/12/17  1135   TROPONINI 0.012    and A1C:   Recent Labs  Lab 11/13/17  0547   HGBA1C 10.2*       Pending Diagnostic Studies:     Procedure Component Value Units Date/Time    Cytology Specimen-Medical Cytology (Fluid/Wash/Brush) [[id number]] Collected:  11/13/17 0937    Order Status:  Sent Lab Status:  In process Updated:  11/13/17 1328    Specimen:  Pleural Fluid          Medications:  Reconciled Home Medications:   Current Discharge Medication List      START taking these medications    Details   aspirin (ECOTRIN) 81 MG EC tablet Take 1 tablet (81 mg total) by mouth once daily.  Refills: 0      blood sugar diagnostic Strp 1 strip by Misc.(Non-Drug; Combo Route) route 2 (two) times daily with meals.  Qty: 100 strip, Refills: 11      blood-glucose meter kit Use as instructed  Qty: 1 each, Refills: 0      carvedilol (COREG) 3.125 MG tablet Take 1 tablet (3.125 mg total) by mouth 2 (two) times daily.  Qty: 60 tablet, Refills: 11      furosemide (LASIX) 40 MG tablet Take 1 tablet (40 mg total) by mouth once daily.  Qty: 30 tablet, Refills: 11      insulin detemir (LEVEMIR FLEXTOUCH) 100 unit/mL (3 mL) SubQ InPn pen Inject 15 Units into the skin every evening.  Qty: 3 mL, Refills: 0      insulin lispro (HUMALOG) 100 unit/mL injection Inject 5 Units into the skin 3 (three) times daily before meals.  Qty: 4.5 mL, Refills: 11      lancets Misc 1 lancet by Misc.(Non-Drug; Combo Route) route 2 (two) times daily with meals.  Qty: 100 each, Refills: 11      lancing device Misc 1 Device by Misc.(Non-Drug; Combo Route) route 2 (two) times daily with meals.  Qty: 1 each, Refills: 0      lisinopril 10 MG tablet Take 1 tablet (10 mg total) by mouth once daily.  Qty: 90 tablet, Refills: 3             Indwelling Lines/Drains at time of discharge:   Lines/Drains/Airways          No matching active lines, drains, or airways          Time spent on the discharge of  patient: 40  minutes  Patient was seen and examined on the date of discharge and determined to be suitable for discharge.         Hannah Garcia MD  Department of Hospital Medicine  [facility]

## 2017-11-15 NOTE — ASSESSMENT & PLAN NOTE
Echo ef 25 %  Discussed w cardiology   spect scan in am  1115 - stable on lisinopril,  coreg, and lasix

## 2017-11-15 NOTE — PLAN OF CARE
Problem: Patient Care Overview  Goal: Plan of Care Review  Outcome: Outcome(s) achieved Date Met: 11/15/17  No c/o pain this shift. Skin intact.  Stress echo done today with the following noted:  Impression: NORMAL MYOCARDIAL PERFUSION  1. The perfusion scan is free of evidence for myocardial ischemia or injury.   2. There is a small mild intensity defect in the anteroseptal wall consistent with the RV insertion site.   3. There is abnormal wall motion at rest showing severe global hypokinesis of the left ventricle.   4. There is resting LV dysfunction with a reduced ejection fraction of 22 %.  (normal is >= 51%)  5. The left ventricular volume is mildly increased at rest.   6. The extracardiac distribution of radioactivity is normal. There are bilateral pleural effusions     Patient being discharged today in stable condition; no questions/concerns regarding discharge.    Problem: Diabetes, Type 2 (Adult)  Intervention: Optimize Glycemic Control  Glucoses have ranged between 156 - 162mg/dl this shift; tolerating 1800 ADA diet without difficulty.      Problem: Fall Risk (Adult)  Intervention: Patient Rounds  No injuries noted this admission.    Goal: Identify Related Risk Factors and Signs and Symptoms  Related risk factors and signs and symptoms are identified upon initiation of Human Response Clinical Practice Guideline (CPG)   Outcome: Outcome(s) achieved Date Met: 11/15/17  Fall precautions maintained with no injuries noted this shift.

## 2017-11-15 NOTE — ASSESSMENT & PLAN NOTE
- Patient presented to with cough for 3 weeks and SOB for 2 days, with bilateral pleural effusion on CXR.  - Referred to [facility] for thoracentesis (1.5L removed), which showed a transudative effucion.  - Patient reports much improvement in his symptoms after thoracentesis.  - Echo on 11/13/2017: (EF 25-30%)  - New diagnosis of heart failure established.  - Likely secondary to uncontrolled HTN vs ischemia.    Recommendations:  - Continue goal-directed medical therapy with diuresis, carvedilol, lisinopril, and ASA.  - Follow up SPECT today.

## 2017-11-15 NOTE — ASSESSMENT & PLAN NOTE
New diagnosis. A1c 10.6  UA had 2+protein, 1+ketones, and a pr/Cr ration of 1.47H all indicating nephropathy.  - SSi and titrate   - acei started  - DC referral to nephrology (ordered); referred to PCP (ordered)  - needs insulin education on DC  Recent Labs      11/14/17   0822  11/14/17   1159  11/14/17   1552  11/14/17   2208  11/15/17   0045  11/15/17   0506   POCTGLUCOSE  149*  212*  268*  253*  180*  156*     Dc on insulin, Pt has been taught, new supplies ordered

## 2017-11-15 NOTE — PROGRESS NOTES
[facility]  Cardiology  Progress Note    Patient Name: Jose Pete  MRN: [id number]  Admission Date: 11/12/2017  Hospital Length of Stay: 3 days  Code Status: Full Code   Attending Physician: Hannah Garcia MD   Primary Care Physician: Milagros Lebron MD  Expected Discharge Date: 11/15/2017  Principal Problem:Combined systolic and diastolic cardiac dysfunction    Subjective:     Hospital Course:   11/14: Patient reports improvement of his symptoms since thoracentesis. Plan for SPECT scan.    Interval History: NAEON. Continues to report improvement, denies SOB or orthopnea. Diuresing well (net -2.5L). Plan to do SPECT today.    Review of Systems   Constitution: Negative for chills and fever.   Cardiovascular: Negative for chest pain, dyspnea on exertion and leg swelling.   Respiratory: Negative for cough and shortness of breath.    Gastrointestinal: Negative for abdominal pain, nausea and vomiting.   Genitourinary: Negative for dysuria and hematuria.   Neurological: Negative for dizziness and light-headedness.   Psychiatric/Behavioral: Negative for altered mental status.   All other systems reviewed and are negative.    Objective:     Vital Signs (Most Recent):  Temp: 97.7 °F (36.5 °C) (11/15/17 0704)  Pulse: 82 (11/15/17 0846)  Resp: 18 (11/15/17 0704)  BP: 136/80 (11/15/17 0704)  SpO2: (!) 93 % (11/15/17 0704) Vital Signs (24h Range):  Temp:  [96.1 °F (35.6 °C)-98.9 °F (37.2 °C)] 97.7 °F (36.5 °C)  Pulse:  [71-85] 82  Resp:  [16-18] 18  SpO2:  [92 %-98 %] 93 %  BP: (128-142)/(75-85) 136/80     Weight: 79.3 kg (174 lb 13.2 oz)  Body mass index is 22.45 kg/m².     SpO2: (!) 93 %  O2 Device (Oxygen Therapy):  (O2 order changed to PRN per Dr. Hannah Garcia)      Intake/Output Summary (Last 24 hours) at 11/15/17 1051  Last data filed at 11/15/17 0800   Gross per 24 hour   Intake              540 ml   Output             3050 ml   Net            -2510 ml       Lines/Drains/Airways      Peripheral Intravenous Line                 Peripheral IV - Single Lumen 11/12/17 1114 Left Antecubital 2 days                Physical Exam   Constitutional: He is oriented to person, place, and time. He appears well-developed and well-nourished. No distress.   HENT:   Head: Normocephalic and atraumatic.   Eyes: Pupils are equal, round, and reactive to light.   Neck: Neck supple. No JVD present.   Cardiovascular: Normal rate, regular rhythm, normal heart sounds and intact distal pulses.    No murmur heard.  Pulmonary/Chest: Effort normal. No respiratory distress. He has no wheezes.   Abdominal: Soft. Bowel sounds are normal. He exhibits no distension. There is no tenderness.   Musculoskeletal: He exhibits no edema.   Neurological: He is alert and oriented to person, place, and time.   Skin: Skin is warm and dry. He is not diaphoretic.   Psychiatric: He has a normal mood and affect. His behavior is normal.       Assessment and Plan:     * Combined systolic and diastolic cardiac dysfunction    - Patient presented to with cough for 3 weeks and SOB for 2 days, with bilateral pleural effusion on CXR.  - Referred to [facility] for thoracentesis (1.5L removed), which showed a transudative effucion.  - Patient reports much improvement in his symptoms after thoracentesis.  - Echo on 11/13/2017: (EF 25-30%)  - New diagnosis of heart failure established.  - Likely secondary to uncontrolled HTN vs ischemia.    Recommendations:  1- Continue goal-directed medical therapy with diuresis, carvedilol, lisinopril, and ASA.  2- Follow up SPECT today.            VTE Risk Mitigation         Ordered     enoxaparin injection 40 mg  Daily     Route:  Subcutaneous        11/13/17 1527     Low Risk of VTE  Once      11/13/17 0158            Isaura Bustillo MD  Cardiology  [facility]  Pager # [phone]  SpectraLink # [phone]

## 2017-11-16 ENCOUNTER — TELEPHONE (OUTPATIENT)
Dept: NEPHROLOGY | Facility: CLINIC | Age: 67
End: 2017-11-16

## 2017-11-16 LAB — BACTERIA SPEC AEROBE CULT: NO GROWTH

## 2017-11-16 RX ORDER — INSULIN PUMP SYRINGE, 3 ML
EACH MISCELLANEOUS
Qty: 1 EACH | Refills: 0 | Status: SHIPPED | OUTPATIENT
Start: 2017-11-16 | End: 2018-07-10

## 2017-11-16 NOTE — PLAN OF CARE
11/16/2017      Jose Pete  [address] [address]          [facility] Medicine Dept.  [facility]  [address] [facility] [address] [phone] after hours  [phone] fax Diagnosis:  Combined systolic and diastolic cardiac dysfunction                   Diabetes 2, insulin requiring      Home glucose meter    Strips #100  Lanccets #100            _______________________  Hannah Gracia MD  11/16/2017

## 2017-11-16 NOTE — TELEPHONE ENCOUNTER
"Called pt caregiver to inform her the pt can come next week because they had cancellation . Pt caregiver took 11/22/16 at 1pm      ----- Message from Kait Cedillo sent at 11/16/2017  8:21 AM CST -----  Contact: Pallavi Maria / Caregiver /  tel:  [phone]   Caller says pt. Was discharged yesterday.    Needs a f/u appt. From the hospital stay.    Pls call w/ an appt. To come in.    Doesn't remember what drBella He saw in the hospital, says the dr's name started w/ a "M".      Coming to [facility] on 11/29th, if you have any opennings on that day.    Pls call.     "

## 2017-11-16 NOTE — PLAN OF CARE
Plan is to discharge home with family and no needs.    Future Appointments  Date Time Provider Department Center   11/29/2017 3:00 PM [facility] Le[facility]        11/16/17 0721   Final Note   Assessment Type Final Discharge Note   Discharge Disposition Home   Hospital Follow Up  Appt(s) scheduled? Yes   Discharge plans and expectations educations in teach back method with documentation complete? Yes   Right Care Referral Info   Post Acute Recommendation No Care

## 2017-11-20 LAB — BACTERIA SPEC ANAEROBE CULT: NORMAL

## 2017-11-22 ENCOUNTER — OFFICE VISIT (OUTPATIENT)
Dept: NEPHROLOGY | Facility: CLINIC | Age: 67
End: 2017-11-22
Payer: COMMERCIAL

## 2017-11-22 VITALS
HEART RATE: 84 BPM | SYSTOLIC BLOOD PRESSURE: 120 MMHG | BODY MASS INDEX: 21.25 KG/M2 | DIASTOLIC BLOOD PRESSURE: 78 MMHG | HEIGHT: 74 IN | WEIGHT: 165.56 LBS | OXYGEN SATURATION: 97 %

## 2017-11-22 DIAGNOSIS — R80.9 PROTEINURIA, UNSPECIFIED TYPE: ICD-10-CM

## 2017-11-22 DIAGNOSIS — I10 ESSENTIAL HYPERTENSION: ICD-10-CM

## 2017-11-22 DIAGNOSIS — I50.42 CHRONIC COMBINED SYSTOLIC AND DIASTOLIC HEART FAILURE: ICD-10-CM

## 2017-11-22 DIAGNOSIS — N18.30 CHRONIC KIDNEY DISEASE (CKD), STAGE III (MODERATE): Primary | ICD-10-CM

## 2017-11-22 PROCEDURE — 99999 PR PBB SHADOW E&M-EST. PATIENT-LVL III: CPT | Mod: PBBFAC,,, | Performed by: INTERNAL MEDICINE

## 2017-11-22 PROCEDURE — 99213 OFFICE O/P EST LOW 20 MIN: CPT | Mod: PBBFAC | Performed by: INTERNAL MEDICINE

## 2017-11-22 PROCEDURE — 99203 OFFICE O/P NEW LOW 30 MIN: CPT | Mod: S$GLB,,, | Performed by: INTERNAL MEDICINE

## 2017-11-22 RX ORDER — INSULIN DETEMIR 100 [IU]/ML
INJECTION, SOLUTION SUBCUTANEOUS
Refills: 0 | COMMUNITY
Start: 2017-11-15 | End: 2017-11-22 | Stop reason: SDUPTHER

## 2017-11-22 NOTE — PROGRESS NOTES
Patient is here today for evaluation of CKD. 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 His most recent lab (1/14/17) Cr; 0.9 mg/dl; eGFR > 60 ml/min; potassium; 4.0 mmol/L; last A1c 10.2%. He is hypertensive and has chronic systolic and diastolic heart failure. Noted to have sub-nephrotic proteinuria. Today he has no new complaints      ROS;  Chronically ill man; no acute distress; oriented x 3  Mood and Affect;  Appropriate  Otherwise non-contributory  Exam  HEENT; (+) retinopathy  CHEST; Clear P&A  HEART; RR; S1&S2 no murmur rub gallop  ABD; BS(+) non-tender; (-) CVAT  EXT; (-) Edema      Impression  CKD By virtue of proteinuria he has glomerular disease;   Repeat labs; 1 mo       Plan  Return Visit; 6 weeks; pending above

## 2017-11-29 ENCOUNTER — OFFICE VISIT (OUTPATIENT)
Dept: INTERNAL MEDICINE | Facility: CLINIC | Age: 67
End: 2017-11-29
Payer: COMMERCIAL

## 2017-11-29 ENCOUNTER — DOCUMENTATION ONLY (OUTPATIENT)
Dept: INTERNAL MEDICINE | Facility: CLINIC | Age: 67
End: 2017-11-29

## 2017-11-29 VITALS
HEART RATE: 84 BPM | BODY MASS INDEX: 20.66 KG/M2 | DIASTOLIC BLOOD PRESSURE: 73 MMHG | WEIGHT: 161 LBS | SYSTOLIC BLOOD PRESSURE: 120 MMHG | HEIGHT: 74 IN | OXYGEN SATURATION: 95 %

## 2017-11-29 DIAGNOSIS — I10 HYPERTENSION, ESSENTIAL: ICD-10-CM

## 2017-11-29 DIAGNOSIS — Z12.11 COLON CANCER SCREENING: ICD-10-CM

## 2017-11-29 DIAGNOSIS — Z23 NEED FOR STREPTOCOCCUS PNEUMONIAE VACCINATION: ICD-10-CM

## 2017-11-29 DIAGNOSIS — I51.89 COMBINED SYSTOLIC AND DIASTOLIC CARDIAC DYSFUNCTION: ICD-10-CM

## 2017-11-29 DIAGNOSIS — E11.21 TYPE 2 DIABETES MELLITUS WITH DIABETIC NEPHROPATHY, WITH LONG-TERM CURRENT USE OF INSULIN: Primary | ICD-10-CM

## 2017-11-29 DIAGNOSIS — Z79.4 TYPE 2 DIABETES MELLITUS WITH DIABETIC NEPHROPATHY, WITH LONG-TERM CURRENT USE OF INSULIN: Primary | ICD-10-CM

## 2017-11-29 PROCEDURE — 99214 OFFICE O/P EST MOD 30 MIN: CPT | Mod: S$GLB,,, | Performed by: INTERNAL MEDICINE

## 2017-11-29 PROCEDURE — G0009 ADMIN PNEUMOCOCCAL VACCINE: HCPCS | Mod: PBBFAC

## 2017-11-29 PROCEDURE — G0009 ADMIN PNEUMOCOCCAL VACCINE: HCPCS | Mod: S$GLB,,, | Performed by: INTERNAL MEDICINE

## 2017-11-29 PROCEDURE — 90670 PCV13 VACCINE IM: CPT | Mod: S$GLB,,, | Performed by: INTERNAL MEDICINE

## 2017-11-29 PROCEDURE — 99999 PR PBB SHADOW E&M-EST. PATIENT-LVL V: CPT | Mod: PBBFAC,,, | Performed by: INTERNAL MEDICINE

## 2017-11-29 PROCEDURE — 99215 OFFICE O/P EST HI 40 MIN: CPT | Mod: PBBFAC | Performed by: INTERNAL MEDICINE

## 2017-11-29 PROCEDURE — 90670 PCV13 VACCINE IM: CPT | Mod: PBBFAC

## 2017-11-29 NOTE — PROGRESS NOTES
"Subjective:       Patient ID: Jose Pete is a 66 y.o. male.    Chief Complaint: Establish Care (seeking out a new primary care doctor.) and Diabetes (pt was diagnosed with diabetes, checked blood glucose this am, was 151. today it was 116.)    HPI   Jose Pete is a 66 y.o. male here to establish care and have yearly preventative healthcare visit.     Combined systolic and diastolic cardiac dysfunction  Echo ef 25%  Pleural effusion - Suspected to be 2/2 CHF, post thoracentesis  Bb and acei  Lasix 40 daily    HTN  Carvedilol 3.125 bid  Lisinopril 10mg daily    IDDM - new diagnosis during hospitalization earlier this month.   a1c 10.2% on 11/13/17  Started on insulin  acei  levemir 15 qhs; humalog 5 units tid  Has been doing well with his insulin.   BG log:  Evenings high sometimes up to 279, morning usually around 113. He doesn't have log with him.   He was started on metformin for diabetes after radha but was told he could stop it.     Proteinuria, last GFR >60  Saw Dr. Jo earlier this month.     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 Has been out of work since hospitalization.   Review of Systems   Constitutional: Negative for fever.   HENT: Negative.    Eyes: Negative.    Respiratory: Negative for shortness of breath.    Cardiovascular: Negative for chest pain and leg swelling.   Gastrointestinal: Negative for abdominal pain, diarrhea, nausea and vomiting.   Genitourinary: Negative.    Musculoskeletal: Negative for arthralgias.   Skin: Negative for rash.   Psychiatric/Behavioral: Negative.        Objective:   /73 (BP Location: Right arm, Patient Position: Sitting, BP Method: Small (Manual))   Pulse 84   Ht 6' 2" (1.88 m)   Wt 73 kg (161 lb)   SpO2 95%   BMI 20.67 kg/m²      Physical Exam   Constitutional: He is oriented to person, place, and time. He appears well-developed and well-nourished.   HENT:   Head: Normocephalic and atraumatic.   Eyes: Conjunctivae and EOM are normal. Pupils are " equal, round, and reactive to light.   Neck: Neck supple. No thyromegaly present.   Cardiovascular: Normal rate, regular rhythm and normal heart sounds.    No murmur heard.  Pulmonary/Chest: Effort normal and breath sounds normal. No respiratory distress. He has no wheezes.   Abdominal: Soft. Bowel sounds are normal. He exhibits no distension. There is no tenderness.   Musculoskeletal: Normal range of motion.   Neurological: He is alert and oriented to person, place, and time.   Skin: Skin is warm and dry. No rash noted.   Psychiatric: He has a normal mood and affect. Judgment and thought content normal.   Vitals reviewed.      Protective Sensation (w/ 10 gram monofilament):  Right: Decreased slightly decreased base of 3rd toe  Left: Decreased slightly decreased base of 3rd toe    Visual Inspection:  Callus -  Bilateral    Pedal Pulses:   Right: Present  Left: Present    Posterior tibialis:   Right:Present  Left: Present      Assessment:       1. Type 2 diabetes mellitus with diabetic nephropathy, with long-term current use of insulin    2. Hypertension, essential    3. Combined systolic and diastolic cardiac dysfunction    4. Need for Streptococcus pneumoniae vaccination    5. Colon cancer screening        Plan:       Jose was seen today for establish care and diabetes.    Diagnoses and all orders for this visit:    Type 2 diabetes mellitus with diabetic nephropathy, with long-term current use of insulin  -     Lipid panel; Future  -     Ambulatory Referral to Diabetes Education  -     Ambulatory referral to Optometry   Foot exam done today    Hypertension, essential  At goal, continue bb, acei    Combined systolic and diastolic cardiac dysfunction  -     Ambulatory Referral to Cardiology   Asymptomatic and appears euvolemic at this time    Need for Streptococcus pneumoniae vaccination  -     (In Office Administered) Pneumococcal Conjugate Vaccine (13 Valent) (IM)    Colon cancer screening  -     Fecal  Immunochemical Test (iFOBT); Future    Patient given written rx to receive tdap vaccine at pharmacy

## 2017-12-04 ENCOUNTER — CLINICAL SUPPORT (OUTPATIENT)
Dept: DIABETES | Facility: CLINIC | Age: 67
End: 2017-12-04
Payer: COMMERCIAL

## 2017-12-04 ENCOUNTER — OFFICE VISIT (OUTPATIENT)
Dept: CARDIOLOGY | Facility: CLINIC | Age: 67
End: 2017-12-04
Payer: COMMERCIAL

## 2017-12-04 VITALS
HEIGHT: 74 IN | HEART RATE: 85 BPM | DIASTOLIC BLOOD PRESSURE: 78 MMHG | SYSTOLIC BLOOD PRESSURE: 136 MMHG | BODY MASS INDEX: 20.32 KG/M2 | WEIGHT: 158.31 LBS

## 2017-12-04 DIAGNOSIS — E11.21 TYPE 2 DIABETES MELLITUS WITH DIABETIC NEPHROPATHY, WITH LONG-TERM CURRENT USE OF INSULIN: ICD-10-CM

## 2017-12-04 DIAGNOSIS — I50.20 SYSTOLIC HEART FAILURE, UNSPECIFIED HEART FAILURE CHRONICITY: ICD-10-CM

## 2017-12-04 DIAGNOSIS — Z79.4 TYPE 2 DIABETES MELLITUS WITH DIABETIC NEPHROPATHY, WITH LONG-TERM CURRENT USE OF INSULIN: ICD-10-CM

## 2017-12-04 PROCEDURE — 99999 PR PBB SHADOW E&M-EST. PATIENT-LVL IV: CPT | Mod: PBBFAC,,, | Performed by: INTERNAL MEDICINE

## 2017-12-04 PROCEDURE — 99214 OFFICE O/P EST MOD 30 MIN: CPT | Mod: S$GLB,,, | Performed by: INTERNAL MEDICINE

## 2017-12-04 PROCEDURE — G0108 DIAB MANAGE TRN  PER INDIV: HCPCS | Mod: PBBFAC | Performed by: DIETITIAN, REGISTERED

## 2017-12-04 PROCEDURE — 99214 OFFICE O/P EST MOD 30 MIN: CPT | Mod: PBBFAC | Performed by: INTERNAL MEDICINE

## 2017-12-04 RX ORDER — CARVEDILOL 3.12 MG/1
6.25 TABLET ORAL 2 TIMES DAILY
Qty: 120 TABLET | Refills: 11 | Status: SHIPPED | OUTPATIENT
Start: 2017-12-04 | End: 2018-02-05 | Stop reason: ALTCHOICE

## 2017-12-04 NOTE — PROGRESS NOTES
" Patient ID:  Jose Pete is a 67 y.o. male who presents for evaluation of HFrEF.    No prior Cardiology Clinic notes.    Discharged on 11/12/2017 after a 3 day admission for dry cough, worsening shortness of breath on exertion and orthopnea. He denied chest pain and PND. He had bilateral pleural effusions. He ruled out for myocardial infarction and was diagnosed with HFrEF and diastolic dysfunction (BNP 1136 pg/ml).    Pt has no prior cardiac hx. He never had symptoms to suggest myocardial ischemia and heart failure. He was unaware of having poor cardiac function until the recent hospital admission. 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     Since discharge he has felt better but he states that his physical capacity is only "50-60% of his normal baseline". He has not yet returned to work.    BP at home averages 120/90 mmHg    PMH includes: HFrEF with diastolic dysfunction; hypertension; T2 DM on insulin    He never smoked; used to drink 2 beers per day; he has no family hx of premature CAD.      Lab Results   Component Value Date     11/14/2017    K 4.0 11/14/2017     11/14/2017    CO2 34 (H) 11/14/2017    BUN 20 11/14/2017    CREATININE 0.9 11/14/2017     (H) 11/14/2017    HGBA1C 10.2 (H) 11/13/2017    AST 21 11/12/2017    ALT 42 11/12/2017    ALBUMIN 3.0 (L) 11/14/2017    PROT 6.9 11/12/2017    BILITOT 1.0 11/12/2017    WBC 10.33 11/14/2017    HGB 15.1 11/14/2017    HCT 45.5 11/14/2017    MCV 92 11/14/2017     11/14/2017    INR 1.1 11/12/2017    TSH 1.033 11/13/2017         No results found for: CHOL, HDL, TRIG    No results found for: LDLCALC    Past Medical History:   Diagnosis Date    Combined systolic and diastolic cardiac dysfunction 11/14/2017    Hypertension, essential 11/12/2017    Pleural effusion 11/12/2017    treated with thoracentesis, appeared to be due to CHF    Type 2 " diabetes mellitus with diabetic nephropathy, with long-term current use of insulin 11/12/2017     Hypertension Medications             carvedilol (COREG) 3.125 MG tablet Take 1 tablet (3.125 mg total) by mouth 2 (two) times daily.    furosemide (LASIX) 40 MG tablet Take 1 tablet (40 mg total) by mouth once daily.    lisinopril 10 MG tablet Take 1 tablet (10 mg total) by mouth once daily.            Review of Systems   Constitution: Positive for weakness. Negative for decreased appetite, diaphoresis, fever, malaise/fatigue, weight gain and weight loss.   HENT: Negative for congestion, ear discharge, ear pain and nosebleeds.    Eyes: Negative for blurred vision, double vision and visual disturbance.   Cardiovascular: Positive for dyspnea on exertion. Negative for chest pain, claudication, cyanosis, irregular heartbeat, leg swelling, near-syncope, orthopnea, palpitations, paroxysmal nocturnal dyspnea and syncope.   Respiratory: Negative for cough, hemoptysis, shortness of breath, sleep disturbances due to breathing, snoring, sputum production and wheezing.    Endocrine: Negative for polydipsia, polyphagia and polyuria.   Hematologic/Lymphatic: Negative for adenopathy and bleeding problem. Does not bruise/bleed easily.   Skin: Negative for color change, nail changes, poor wound healing and rash.   Musculoskeletal: Negative for muscle cramps and muscle weakness.   Gastrointestinal: Negative for abdominal pain, anorexia, change in bowel habit, hematochezia, nausea and vomiting.   Genitourinary: Negative for dysuria, frequency and hematuria.   Neurological: Negative for brief paralysis, difficulty with concentration, excessive daytime sleepiness, dizziness, focal weakness, headaches, light-headedness, seizures and vertigo.   Psychiatric/Behavioral: Negative for altered mental status and depression.   Allergic/Immunologic: Negative for persistent infections.                Objective:         /78 (BP Location: Left  "arm, Patient Position: Sitting, BP Method: Large (Automatic))   Pulse 85   Ht 6' 2" (1.88 m)   Wt 71.8 kg (158 lb 4.6 oz)   BMI 20.32 kg/m²    Physical Exam   Constitutional: He is oriented to person, place, and time. He appears well-developed and well-nourished.   HENT:   Head: Normocephalic.   Right Ear: External ear normal.   Left Ear: External ear normal.   Nose: Nose normal.   Inspection of lips, teeth and gums normal   Eyes: EOM are normal. Pupils are equal, round, and reactive to light. No scleral icterus.   Neck: Normal range of motion. Neck supple. No JVD present. No tracheal deviation present. No thyromegaly present.   Cardiovascular: Normal rate, regular rhythm, normal heart sounds and intact distal pulses.  Exam reveals no gallop and no friction rub.    No murmur heard.  Pulses:       Carotid pulses are 2+ on the right side, and 2+ on the left side.       Radial pulses are 2+ on the right side, and 2+ on the left side.        Femoral pulses are 2+ on the right side, and 2+ on the left side.       Dorsalis pedis pulses are 2+ on the right side, and 2+ on the left side.        Posterior tibial pulses are 2+ on the right side, and 2+ on the left side.   Pulmonary/Chest: Effort normal and breath sounds normal.   Abdominal: Bowel sounds are normal. He exhibits no distension. There is no hepatosplenomegaly. There is no tenderness. There is no guarding.   Musculoskeletal: Normal range of motion. He exhibits no edema or tenderness.   Lymphadenopathy:   Palpation of neck and groin lymph nodes normal   Neurological: He is alert and oriented to person, place, and time. No cranial nerve deficit. He exhibits normal muscle tone. Coordination normal.   Skin: Skin is dry.   No ankle nor pretibial edema.   Psychiatric: His behavior is normal. Judgment and thought content normal.             ECG (12-NOV-2017)  Normal sinus rhythm   Left atrial enlargement  Vertical axis  Nonspecific ST and/or T wave " abnormalities  Abnormal ECG  When compared with ECG of 12-NOV-2017 11:08,  No significant change was found          Echo (11/13/2017)    TEST DESCRIPTION   Technical Quality: This is a technically adequate study. This study was performed in conjunction with a 3ml intravenous injection of Optison contrast agent.     Aorta: The aortic root is normal in size, measuring 3.5 cm at sinotubular junction and 3.8 cm at Sinuses of Valsalva. The proximal ascending aorta is normal in size, measuring 3.5 cm across.     Left Atrium: The left atrial volume index is severely enlarged, measuring 52.97 cc/m2.     Left Ventricle: The left ventricle is mildly enlarged, with an end-diastolic diameter of 6.3 cm, and an end-systolic diameter of 5.7 cm. LV wall thickness is normal, with the septum and the posterior wall each measuring 1.0 cm across. Relative wall   thickness was normal at 0.32, and the LV mass index was increased at 156.6 g/m2 consistent with severe eccentric left ventricular hypertrophy. There is global hypokinesis. Left ventricular systolic function appears severely depressed. Visually estimated   ejection fraction is 25-30%. The LV Doppler derived stroke volume equals 43.0 ccs.     Diastolic indices: E wave velocity 0.8 m/s, E/A ratio 2.0,  msec., E/e' ratio(avg) 14. Pulmonary vein systolic/diastolic ratio is blunted. Diastolic function is indeterminate.     Right Atrium: The right atrium is normal in size, measuring 4.0 cm in length and 3.7 cm in width in the apical view.     Right Ventricle: The right ventricle is mildly enlarged measuring 4.1 cm at the base in the apical right ventricle-focused view. Global right ventricular systolic function appears low normal to mildly depressed. Tricuspid annular plane systolic excursion   (TAPSE) is 1.9 cm.     Aortic Valve:  The aortic valve is mildly sclerotic with normal leaflet mobility. The aortic valve is tri-leaflet in structure.     Mitral Valve:  The mitral valve  is mildly sclerotic with normal leaflet mobility.     Tricuspid Valve:  The tricuspid valve is normal in structure with normal leaflet mobility.     Pulmonary Valve:  The pulmonic valve is not well seen.     Pericardium: There is evidence of a small circumferential pericardial effusion.     IVC: IVC is normal in size and collapses > 50% with a sniff, suggesting normal right atrial pressure of 3 mmHg.     Intracavitary: There is no evidence of intracavity mass, thrombi, or vegetation.     Other: There is a right pleural effusion present. If clinically indicated, a full Doppler study can be performed.     CONCLUSIONS     1 - Eccentric LVH with severely depressed left ventricular systolic function (EF 25-30%).     2 - Severe left atrial enlargement.     3 - There is diastolic dysfunction present the stage is Indeterminate.     4 - Right ventricular enlargement with low normal to mildly depressed systolic function.     5 - Right pleural effusion.     6 - Small pericardial effusion.     7 - There is AI noted on limited images.     This document has been electronically    SIGNED BY: Ami Lai MD          Regadenoson Nuclear Stress Test (11/15/2017)    PRE-TEST DATA   EKG: Resting electrocardiogram reveals normal sinus rhythm at a rate of 82 bpm. There was right axis deviation, right ventricular hypertrophy or enlargement, and repolarization abnormality secondary to ventricular hypertrophy.     Indication for Stress Test:  decreased LVEF    Clinical Notes:  Hypertension,  Diabetes and Cardiomyopathy    TEST DESCRIPTION   The patient received 0.4 mg of Regadenoson as an IV bolus. Peak heart rate was 84 bpm, which is 55% of the age predicted maximum heart rate. .     EKG Conclusions:    1. The EKG portion of this study is abnormal but non diagnostic for ischemia at a peak heart rate of 84 bpm (55% of predicted).   2. Specificity is reduced secondary to resting ST segment changes.   3. Blood pressure remained stable  throughout the protocol  (Presenting BP: 130/86 Peak BP: 133/83).   4. No significant arrhythmias were present.   5. There were no symptoms of chest discomfort or significant dyspnea throughout the protocol.     Nuclear Procedure:  Following a single isotope protocol, 9.8 mCi of Tc99 labeled Tetrofosmin was given at rest and tomographic imaging was performed. Regadenoson pharmacologic stress testing was performed as described above. Immediately following the IV bolus of   regadenoson, 30 mCi of Tc99 labeled Tetrofosmin was given and tomographic imaging was performed. The site of the IV injection was the left AC. Images were obtained on a Siemens C-Cam camera.     Comments:  This is a technically excellent study. Inspection of the transaxial images demonstrated no significant cranial, caudal, or lateral patient motion in the camera between rest and stress acquisitions. On stress images, there is a mild slit-like defect seen   in the anteroseptal wall of the left ventricle, which correspond with the RV insertion site.  The remainder of the myocardium demonstrates normal radiotracer uptake. The extracardiac distribution of radioactivity is normal. The left ventricular cavity is   increased in size at rest and does not dilate further with stress. The left ventricular wall motion is abnormal at rest showing severe global hypokinesis of the left ventricle.     Nuclear Quantitative Functional Analysis:   LVEF: 22 % (normal is >= 51%)  LVED Volume: 184 ml (normal is <=171)  LVES Volume: 144 ml (normal is <=70)    Impression: NORMAL MYOCARDIAL PERFUSION  1. The perfusion scan is free of evidence for myocardial ischemia or injury.   2. There is a small mild intensity defect in the anteroseptal wall consistent with the RV insertion site.   3. There is abnormal wall motion at rest showing severe global hypokinesis of the left ventricle.   4. There is resting LV dysfunction with a reduced ejection fraction of 22 %.  (normal is  >= 51%)  5. The left ventricular volume is mildly increased at rest.   6. The extracardiac distribution of radioactivity is normal. There are bilateral pleural effusions (R>L)    This document has been electronically    SIGNED BY: Manish Arreguin MD         I have reviewed the following:     Details / Date    []   Labs     []   Imaging     []   Cardiology Procedures     []   Other      Assessment and Plan:       1. Systolic heart failure, unspecified heart failure chronicity         HFrEF (heart failure with reduced ejection fraction)  HFrEF of unknown etiology: possibly due to hypertension or CAD (no hx of CAD; no ischemia on Ragadenoson Nuclear Stress test).    Interventional Cardiology consult for possible LHC to rule out CAD  Home BP monitoring as instructed (tid for 5 days) starting 7-10 days after increasing carvedilol  Increase carvedilol from 3.125 mg to 6.25 mg bid  Continue lisinopril 10 mg qd  Continue Lasix 40 mg qd  RTC in 2 weeks

## 2017-12-04 NOTE — LETTER
December 4, 2017      Milagros Lebron MD  [address]  [place] [address]           [facility] - Cardiology  [address]  [place] [address]  Phone: [phone]          Patient: Jose Pete   MR Number: [id number]   YOB: 1950   Date of Visit: 12/4/2017       Dear Dr. Milagros Lebron:    Thank you for referring Jose Pete to me for evaluation. Attached you will find relevant portions of my assessment and plan of care.    If you have questions, please do not hesitate to call me. I look forward to following Jose Pete along with you.    Sincerely,    Keegan Velez MD    Enclosure  CC:  No Recipients    If you would like to receive this communication electronically, please contact [email or web address] or [phone] to request more information on [employer] Link access.    For providers and/or their staff who would like to refer a patient to [facility], please contact us through our one-stop-shop provider referral line, [facility], at [phone].    If you feel you have received this communication in error or would no longer like to receive these types of communications, please e-mail [email or web address]

## 2017-12-04 NOTE — ASSESSMENT & PLAN NOTE
HFrEF of unknown etiology (LVEF 25-30%) possibly due to hypertension or CAD (no hx of CAD; no ischemia on Ragadenoson Nuclear Stress test).    Interventional Cardiology consult for possible LHC to rule out CAD  Home BP monitoring as instructed (tid for 5 days) starting 7-10 days after increasing carvedilol  Increase carvedilol from 3.125 mg to 6.25 mg bid  Continue lisinopril 10 mg qd  Continue Lasix 40 mg qd  Continue ASA 81 mg qd  RTC in 2 weeks

## 2017-12-08 NOTE — PROGRESS NOTES
Diabetes Education  Author: Jannet Guerra RD  Date: 2017    Diabetes Education Visit  Diabetes Education Record Assessment/Progress: Initial    Diabetes Type  Diabetes Type : Type II    Diabetes History  Diabetes Diagnosis: >10 years (dx around Sarah (started on metformin, but stopped, lost to f/u); recently re-diagnosed on recent admit to hospital for pleural effucsion, A1c 10.2)    Nutrition  Meal Planning: water, 3 meals per day  What type of beverages do you drink?: diet soda/tea (diet cranberry juice; green tea (doesn't know if SF or not); milk 1% - 2 glasses per day - or with meals)    Meal Plan 24 Hour Recall - Breakfast:  (2 slices whole grain bread, 1 egg, half banana; coffee with SF creamer; OR kashi cereal)  Meal Plan 24 Hour Recall - Lunch:  (hamburger (whole grain bread), salad (lettuce), apple)  Meal Plan 24 Hour Recall - Dinner:  (red beans and rice (very little) with 1 slice whole grain bread, salad)  Meal Plan 24 Hour Recall - Snack:  (gingerbread cookie (2 or 3) in afternoon;)    Monitoring   Self Monitoring :  (SMBG 3 times daily before meals: fastin-149 mg/dL )  Blood Glucose Logs: No    Current Diabetes Treatment   Current Treatment: Insulin (levemir 15 units at bedtime; humalog 5 units AC (8am, 2pm, 8pm))    Social History  Preferred Learning Method: Face to Face  Primary Support: Self  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 Reviewed disease course over time. No known family history of  diabetes.)    Nutrition (Incorporating nutritional management into one's lifestyle): Discussion, Instructed, Individual Session, Written Materials Provided, Comprehends Key Points (Recent diet changes since hosp admit. Trying to limit sodium and saturated fats; eating mostly whole grains. Does not drink SSB. Reviewed carb vs non-carb foods. Discussed appropriate amounts of carbs to have at meals and snacks. Recommended 45-60 gm carb per meal, and 0-15 gm carb per snack. Discussed meal plans and snack lists amenable to pt.)    Physical Activity (incorporating physical activity into one's lifestyle): Discussion, Instructed, Individual Session, Written Materials Provided (Encouraged PA as tolerated under guidance of MD.)    Medications (states correct name, dose, onset, peak, duration, side effects & timing of meds): Discussion, Instructed, Individual Session, Written Materials Provided, Comprehends Key Points (Instructed on appropriate pen use and insulin storage. Reviewed rotation of sites. Discussed timing and MOA of levemir and humalog. Pt has been taking Humalog on a set schedule; discussed he can take whenever, as long as >4 hours apart. Always take with carbs at a meal. Skip humalog if skipping a meal or eating a very low carb meal. Instructed on correction scale of 180-230 +1. )    Monitoring (monitoring blood glucose/other parameters & using results): Discussion, Instructed, Individual Session, Comprehends Key Points, Written Materials Provided (Discussed BG goals. Encouraged to continue checking 3 times daily.)    Acute Complications (preventing, detecting, and treating acute complications): Discussion, Instructed, Individual Session, Written Materials Provided, Comprehends Key Points (Has not had a low BG since starting insulin. Discussed hypo symptoms and appropriate treatment.)    Chronic Complications (preventing, detecting, and treating chronic complications): Discussion, Written Materials  Provided    Clinical (diabetes and other pertinent medical history): Discussion, Individual Session (Pt was diagnosed with diabetes around Sarah, but was told he could stop metformin (unsure of who told him). He had not been seen by MD since that time. Recently admitted to hosp for pleural effusion, A1c 10.2 at that time; d/c'd on MDI.)    Cognitive (knowledge of self-management skills, functional health literacy): Discussion, Demonstrates Understanding/Competency (verbalizes/demonstrates)  Psychosocial (emotional response to diabetes): Discussion, Comprehends Key Points  Diabetes Distress and Support Systems: Comprehends Key Points, Discussion  Behavioral (readiness for change, lifestyle practices, self-care behaviors): Discussion, Demonstrates Understanding/Competency (verbalizes/demonstrates)      Goals  Patient has selected/evaluated goals during today's session: Yes, selected    Medications: Set (Take insulins as instructed)  Start Date: 12/04/17    Other: Set (Keep endo appt for 12/12)  Start Date: 12/04/17         Diabetes Care Plan/Intervention  Education Plan/Intervention: Endocrine Provider Visit Set Up, Individual Follow-Up DSMT      Diabetes Meal Plan  Restrictions: Restricted Carbohydrate  Carbohydrate Per Meal: 45-60g  Carbohydrate Per Snack :  (0-5 gm)      Education Units of Time   Time Spent: 60 min      Health Maintenance Topics with due status: Not Due       Topic Last Completion Date    Hemoglobin A1c 11/13/2017    Pneumococcal (65+) 11/29/2017    Foot Exam 11/29/2017     Health Maintenance Due   Topic Date Due    Hepatitis C Screening  1950    Lipid Panel  1950    Fecal Occult Blood Test (FOBT)/FitKit  1950    Eye Exam  11/30/1960    TETANUS VACCINE  11/30/1968    Zoster Vaccine  11/30/2010

## 2017-12-12 ENCOUNTER — LAB VISIT (OUTPATIENT)
Dept: LAB | Facility: HOSPITAL | Age: 67
End: 2017-12-12
Attending: INTERNAL MEDICINE
Payer: COMMERCIAL

## 2017-12-12 ENCOUNTER — OFFICE VISIT (OUTPATIENT)
Dept: ENDOCRINOLOGY | Facility: CLINIC | Age: 67
End: 2017-12-12
Payer: COMMERCIAL

## 2017-12-12 VITALS
WEIGHT: 160 LBS | HEIGHT: 74 IN | SYSTOLIC BLOOD PRESSURE: 138 MMHG | BODY MASS INDEX: 20.53 KG/M2 | HEART RATE: 82 BPM | DIASTOLIC BLOOD PRESSURE: 80 MMHG

## 2017-12-12 DIAGNOSIS — Z79.4 TYPE 2 DIABETES MELLITUS WITH DIABETIC NEPHROPATHY, WITH LONG-TERM CURRENT USE OF INSULIN: ICD-10-CM

## 2017-12-12 DIAGNOSIS — E11.29 PROTEINURIA DUE TO TYPE 2 DIABETES MELLITUS: ICD-10-CM

## 2017-12-12 DIAGNOSIS — E11.21 TYPE 2 DIABETES MELLITUS WITH DIABETIC NEPHROPATHY, WITH LONG-TERM CURRENT USE OF INSULIN: ICD-10-CM

## 2017-12-12 DIAGNOSIS — E11.42 TYPE 2 DIABETES MELLITUS WITH DIABETIC POLYNEUROPATHY, WITHOUT LONG-TERM CURRENT USE OF INSULIN: Chronic | ICD-10-CM

## 2017-12-12 DIAGNOSIS — R80.9 PROTEINURIA, UNSPECIFIED TYPE: ICD-10-CM

## 2017-12-12 DIAGNOSIS — Z79.4 TYPE 2 DIABETES MELLITUS WITH DIABETIC NEPHROPATHY, WITH LONG-TERM CURRENT USE OF INSULIN: Primary | ICD-10-CM

## 2017-12-12 DIAGNOSIS — E11.21 TYPE 2 DIABETES MELLITUS WITH DIABETIC NEPHROPATHY, WITH LONG-TERM CURRENT USE OF INSULIN: Primary | ICD-10-CM

## 2017-12-12 DIAGNOSIS — I51.89 COMBINED SYSTOLIC AND DIASTOLIC CARDIAC DYSFUNCTION: ICD-10-CM

## 2017-12-12 DIAGNOSIS — I10 HYPERTENSION, ESSENTIAL: ICD-10-CM

## 2017-12-12 DIAGNOSIS — R80.9 PROTEINURIA DUE TO TYPE 2 DIABETES MELLITUS: ICD-10-CM

## 2017-12-12 DIAGNOSIS — N18.30 CHRONIC KIDNEY DISEASE (CKD), STAGE III (MODERATE): ICD-10-CM

## 2017-12-12 PROBLEM — J90 PLEURAL EFFUSION: Status: RESOLVED | Noted: 2017-11-12 | Resolved: 2017-12-12

## 2017-12-12 PROBLEM — R06.02 SOB (SHORTNESS OF BREATH): Status: RESOLVED | Noted: 2017-11-14 | Resolved: 2017-12-12

## 2017-12-12 LAB
ALBUMIN SERPL BCP-MCNC: 3.8 G/DL
ANION GAP SERPL CALC-SCNC: 7 MMOL/L
BUN SERPL-MCNC: 17 MG/DL
C3 SERPL-MCNC: 110 MG/DL
C4 SERPL-MCNC: 29 MG/DL
CALCIUM SERPL-MCNC: 11.1 MG/DL
CHLORIDE SERPL-SCNC: 97 MMOL/L
CHOLEST SERPL-MCNC: 180 MG/DL
CHOLEST/HDLC SERPL: 4.4 {RATIO}
CO2 SERPL-SCNC: 34 MMOL/L
CREAT SERPL-MCNC: 1 MG/DL
EST. GFR  (AFRICAN AMERICAN): >60 ML/MIN/1.73 M^2
EST. GFR  (NON AFRICAN AMERICAN): >60 ML/MIN/1.73 M^2
GLUCOSE SERPL-MCNC: 195 MG/DL
HDLC SERPL-MCNC: 41 MG/DL
HDLC SERPL: 22.8 %
LDLC SERPL CALC-MCNC: 119.6 MG/DL
NONHDLC SERPL-MCNC: 139 MG/DL
PHOSPHATE SERPL-MCNC: 3.5 MG/DL
POTASSIUM SERPL-SCNC: 4.8 MMOL/L
SODIUM SERPL-SCNC: 138 MMOL/L
TRIGL SERPL-MCNC: 97 MG/DL

## 2017-12-12 PROCEDURE — 86706 HEP B SURFACE ANTIBODY: CPT

## 2017-12-12 PROCEDURE — 99999 PR PBB SHADOW E&M-EST. PATIENT-LVL IV: CPT | Mod: PBBFAC,,, | Performed by: NURSE PRACTITIONER

## 2017-12-12 PROCEDURE — 86038 ANTINUCLEAR ANTIBODIES: CPT

## 2017-12-12 PROCEDURE — 86160 COMPLEMENT ANTIGEN: CPT

## 2017-12-12 PROCEDURE — 80069 RENAL FUNCTION PANEL: CPT

## 2017-12-12 PROCEDURE — 87340 HEPATITIS B SURFACE AG IA: CPT

## 2017-12-12 PROCEDURE — 86803 HEPATITIS C AB TEST: CPT

## 2017-12-12 PROCEDURE — 84165 PROTEIN E-PHORESIS SERUM: CPT

## 2017-12-12 PROCEDURE — 86160 COMPLEMENT ANTIGEN: CPT | Mod: 59

## 2017-12-12 PROCEDURE — 80061 LIPID PANEL: CPT | Mod: 91

## 2017-12-12 PROCEDURE — 99204 OFFICE O/P NEW MOD 45 MIN: CPT | Mod: S$GLB,,, | Performed by: NURSE PRACTITIONER

## 2017-12-12 PROCEDURE — 84165 PROTEIN E-PHORESIS SERUM: CPT | Mod: 26,,, | Performed by: PATHOLOGY

## 2017-12-12 PROCEDURE — 36415 COLL VENOUS BLD VENIPUNCTURE: CPT

## 2017-12-12 RX ORDER — GLIPIZIDE 5 MG/1
5 TABLET ORAL 2 TIMES DAILY WITH MEALS
Qty: 60 TABLET | Refills: 4 | Status: SHIPPED | OUTPATIENT
Start: 2017-12-12 | End: 2018-03-21 | Stop reason: SDUPTHER

## 2017-12-12 NOTE — PROGRESS NOTES
"CC:  Diabetes.     HPI: Jose Pete is a 67 y.o. male presents for visit. The patient has had diabetes along with associated comorbidities indicated in the Visit Diagnosis section of this encounter. The patient was diagnosed with Type 2 diabetes in ~2005. Not on medications for DM before admission, previously on Metformin post Sarah but was told "he could stop it", unsure why and was not treated with any DM medications since until hospital admission 11/2017    Previous h/o significant diarrhea with low dose Metformin resulting in weight loss    Was hospitalized November for pleural effusion, stayed inpatient 2 week, d/c home on MDI.   Today, presents with complete BG logs, Doing great on MDI post diabetes education  BG near goal     DIABETES COMPLICATIONS: nephropathy and cardiovascular disease     STANDARDS of CARE:  Statin:  No - will assess at next visit   ACEI or ARB:  Yes - lisinopril   ASA:  Yes - 81 mg   Last eye exam  - plans to do 12/14/17  Microalbumin/Creatinine ratio:  No results found for: MICALBCREAT - will do today       CURRENT A1C:    Hemoglobin A1C   Date Value Ref Range Status   11/13/2017 10.2 (H) 4.0 - 5.6 % Final     Comment:     According to ADA guidelines, hemoglobin A1c <7.0% represents  optimal control in non-pregnant diabetic patients. Different  metrics may apply to specific patient populations.   Standards of Medical Care in Diabetes-2016.  For the purpose of screening for the presence of diabetes:  <5.7%     Consistent with the absence of diabetes  5.7-6.4%  Consistent with increasing risk for diabetes   (prediabetes)  >or=6.5%  Consistent with diabetes  Currently, no consensus exists for use of hemoglobin A1c  for diagnosis of diabetes for children.  This Hemoglobin A1c assay has significant interference with fetal   hemoglobin   (HbF). The results are invalid for patients with abnormal amounts of   HbF,   including those with known Hereditary Persistence   of Fetal Hemoglobin. " "Heterozygous hemoglobin variants (HbAS, HbAC,   HbAD, HbAE, HbA2) do not significantly interfere with this assay;   however, presence of multiple variants in a sample may impact the %   interference.         GOAL A1C: <7%    DM MEDICATIONS USED IN THE PAST: Metformin    CURRENT DIABETES MEDICATIONS: Humalog 5 units AC (vials), Levemir 15 units nightly (vials)  Denies missing any doses of medications     BLOOD GLUCOSE MONITORING:            HYPOGLYCEMIA:  No    MEALS:   Eating 3 meals daily    Snacking in between some meals on 2 small jovon snaps    CURRENT EXERCISE:  Yes - walking 6 blocks 2 times per week     MEDICATIONS, ALLERGIES, LABS, VS's, MEDICAL, SURGICAL, SOCIAL, AND FAMILY HISTORY reviewed and updated in the appropriate sections during this encounter    ROS:     Constitutional: Negative for weight change  Endocrine:  Denies polyuria, polydipsia, nocturia.  HENT: Denies neck swelling, lumps, horseness or trouble swallowing. Denies any personal or family history of thyroid cancer.    Eyes: no blurry vision.   Respiratory: Negative for cough or shortness or breath.  Cardiovascular: Negative for chest pain or claudication.   Gastrointestinal: Negative for nausea, diarrhea, vomiting, bloating.  No history of pancreatitis or gastroparesis.  Genitourinary: Negative for urgency, frequency, frequent urinary tract infections.  Skin: Denies prolonged wound healing.  Neurological: Negative for syncope, + intermittent numbness/burning of extremities.  Psychiatric/Behavioral: Negative for depression or anxiety      All other systems reviewed and are negative.    PE:  Constitutional: /80   Pulse 82   Ht 6' 2" (1.88 m)   Wt 72.6 kg (160 lb)   BMI 20.54 kg/m²    Well developed, well nourished, NAD.    HENT:   External ears, nose: no masses. No significant findings.   Hearing: normal     Neck:  No trachial deviation present, No neck masses noticed   Thyroid:  No thyromegaly present.  No thyroid tenderness. "      Cardiovascular:    Auscultation:  No murmurs or abnormal sounds   Lower extremities:  No edema or cyanosis.     Respiratory:    Effort:  Normal, no use of accessory muscles.   Auscultation: breath sounds normal, no adventitious sounds.  Abdomen:     Exam:  Soft, non-tender, no masses.      No hernia noted.  Skin:    Inspection: no rashes, lesion or ulcers, + acanthosis nigracans.   Palpation: no induration or nodules.    Insulin injection sites: no lipoatrophy or lipohypertrophy.  Psychiatric:  Judgement and insight good with normal mood and affect.  Musculoskeletal:  Gait steady. No gross abnormalities.        FOOT EXAMINATION:   Protective Sensation (w/ 10 gram monofilament):  Right: Decreased heel  Left: Decreased left great toe     Visual Inspection:  Callus -  Bilateral, thickened nails, well trimmed     Pedal Pulses:   Right: Present  Left: Present    Posterior tibialis:   Right:Present  Left: Present    Decreased vibratory response to 128 Hz tuning fork bilaterally.       Lab Results   Component Value Date    TSH 1.033 11/13/2017         ______________________________________________________________________     ASSESSMENT and PLAN:    1- Type 2 diabetes with neuropathy, proteinuria and hyperglycemia - A1c elevated before MDI start, now BG much improved, can attempt orals. Reviewed A1c and BG goals.  Discussed diagnosis of DM, progression of disease, long term complications and tx options, including Glipizide and possible DPP-4 in future. D/c Humalog. Start Glipizide 5 mg bID with breakfast and dinner. Discussed risk of hypoglycemia. Decrease Levemir to 10 units nightly (goal to wean this to off at next visit)    Asymptomatic for years and untreated so do not suspect T1DM. If does not respond to orals, will obtain T1DM/DENISE labs    Will avoid Metformin currently given h/o severe diarrhea leading to weight loss, although can consider XR use in future      Instructed to monitor BG twice dialy, document on  BG logs, and bring complete BG logs to all visits.     Return in about 4 weeks (around 1/9/2018). in [facility] DM clinic, f/u with diabetes education in 2 weeks for log review (and likely increase of Glip if needed, d/c of levemir)    Orders Placed This Encounter   Procedures    Microalbumin/creatinine urine ratio    Comprehensive metabolic panel    Hemoglobin A1c    Lipid panel    Ambulatory Referral to Diabetes Education     No results found for: LDLCALC - obtain before next visit    2- Peripheral neuropathy - Educated patient to check feet daily for any foreign objects and/or wounds. Discussed with patient the importance of wearing appropriate footwear at all times, not to walk barefoot ever, and to check shoes before putting them on feet. Instructed patient to keep feet dry by regularly changing shoes and socks and drying feet after baths and exercises. Also, instructed patient to report any new lesions, discolorations, or swelling to a healthcare professional.    3 - Proteinuria - MAC today     4- Hypertension - goal < 140/90 mmHg -  At goal, on ACE-I, continue current medications   BP Readings from Last 3 Encounters:   12/12/17 138/80   12/04/17 136/78   11/29/17 120/73     5- Chronic HF - EF 25-30% on echo 11/2017, no SOB, no LYONS, no chest pain

## 2017-12-12 NOTE — PATIENT INSTRUCTIONS
Start Glipizide 5 mg twice daily with breakfast and dinner - ALWAYS eat with this medication     STOP Humalog with meals     Decrease your Levemir to 10 units nightly

## 2017-12-13 LAB
ALBUMIN SERPL ELPH-MCNC: 4.05 G/DL
ALPHA1 GLOB SERPL ELPH-MCNC: 0.27 G/DL
ALPHA2 GLOB SERPL ELPH-MCNC: 0.87 G/DL
ANA SER QL IF: NORMAL
B-GLOBULIN SERPL ELPH-MCNC: 0.81 G/DL
GAMMA GLOB SERPL ELPH-MCNC: 1.39 G/DL
HBV SURFACE AB SER-ACNC: NEGATIVE M[IU]/ML
HBV SURFACE AG SERPL QL IA: NEGATIVE
HCV AB SERPL QL IA: NEGATIVE
PROT SERPL-MCNC: 7.4 G/DL

## 2017-12-14 ENCOUNTER — TELEPHONE (OUTPATIENT)
Dept: DIABETES | Facility: CLINIC | Age: 67
End: 2017-12-14

## 2017-12-14 ENCOUNTER — INITIAL CONSULT (OUTPATIENT)
Dept: OPTOMETRY | Facility: CLINIC | Age: 67
End: 2017-12-14
Payer: COMMERCIAL

## 2017-12-14 DIAGNOSIS — Z79.4 TYPE 2 DIABETES MELLITUS WITH BOTH EYES AFFECTED BY MODERATE NONPROLIFERATIVE RETINOPATHY AND MACULAR EDEMA, WITH LONG-TERM CURRENT USE OF INSULIN: Primary | ICD-10-CM

## 2017-12-14 DIAGNOSIS — E11.3313 TYPE 2 DIABETES MELLITUS WITH BOTH EYES AFFECTED BY MODERATE NONPROLIFERATIVE RETINOPATHY AND MACULAR EDEMA, WITH LONG-TERM CURRENT USE OF INSULIN: Primary | ICD-10-CM

## 2017-12-14 DIAGNOSIS — H25.13 NUCLEAR SCLEROSIS, BILATERAL: ICD-10-CM

## 2017-12-14 DIAGNOSIS — H40.013 OAG (OPEN ANGLE GLAUCOMA) SUSPECT, LOW RISK, BILATERAL: ICD-10-CM

## 2017-12-14 DIAGNOSIS — E78.00 PURE HYPERCHOLESTEROLEMIA: ICD-10-CM

## 2017-12-14 DIAGNOSIS — H52.4 HYPEROPIA WITH PRESBYOPIA OF BOTH EYES: ICD-10-CM

## 2017-12-14 DIAGNOSIS — H52.03 HYPEROPIA WITH PRESBYOPIA OF BOTH EYES: ICD-10-CM

## 2017-12-14 PROCEDURE — 92015 DETERMINE REFRACTIVE STATE: CPT | Mod: S$GLB,,, | Performed by: OPTOMETRIST

## 2017-12-14 PROCEDURE — 92004 COMPRE OPH EXAM NEW PT 1/>: CPT | Mod: S$GLB,,, | Performed by: OPTOMETRIST

## 2017-12-14 PROCEDURE — 99999 PR PBB SHADOW E&M-EST. PATIENT-LVL II: CPT | Mod: PBBFAC,,, | Performed by: OPTOMETRIST

## 2017-12-14 PROCEDURE — 92134 CPTRZ OPH DX IMG PST SGM RTA: CPT | Mod: S$GLB,,, | Performed by: OPTOMETRIST

## 2017-12-14 RX ORDER — ATORVASTATIN CALCIUM 20 MG/1
20 TABLET, FILM COATED ORAL DAILY
Qty: 90 TABLET | Refills: 3 | Status: ON HOLD | OUTPATIENT
Start: 2017-12-14 | End: 2018-01-29

## 2017-12-14 NOTE — TELEPHONE ENCOUNTER
Please call pt and notify him of the following for his labs  - kidney and liver function looked good  - LDL (bad) cholesterol was too high     Also, because of his high LDL cholesterol + risk factors (heart disease, age >40, diabetes, high blood pressure), I recommend that he start a cholesterol medication called Atorvastatin 20 mg daily. I sent the rx to [facility]

## 2017-12-14 NOTE — LETTER
December 14, 2017      Milagros Lebron MD  [address]  [facility] [address]           [facility]  [address]  [place] [address]  Phone: [phone]  Fax: [phone]          Patient: Jose Pete   MR Number: [id number]   YOB: 1950   Date of Visit: 12/14/2017       Dear Dr. Milagros Lebron:    Thank you for referring Jose Pete to me for evaluation. Attached you will find relevant portions of my assessment and plan of care.    If you have questions, please do not hesitate to call me. I look forward to following Jose Pete along with you.    Sincerely,    Manish Cedillo, OD    Enclosure  CC:  No Recipients    If you would like to receive this communication electronically, please contact [email or web address] or [phone] to request more information on [employer] access.    For providers and/or their staff who would like to refer a patient to [facility], please contact us through our one-stop-shop provider referral line, [facility] , at [phone].    If you feel you have received this communication in error or would no longer like to receive these types of communications, please e-mail [email or web address]

## 2017-12-14 NOTE — PROGRESS NOTES
HPI     67 year old male   1 st eye exam  Type 2 diabetes-new diagnosis    Hemoglobin A1C       Date                     Value               Ref Range             Status                12/12/2017               10.4 (H)            4.0 - 5.6 %           Final               11/13/2017               10.2 (H)            4.0 - 5.6 %           Final          Denies itch, tear, burn. No gtts  Denies Flashes, Floaters              Last edited by Manish Cedillo, OD on 12/14/2017  2:34 PM. (History)            Assessment /Plan     For exam results, see Encounter Report.    Type 2 diabetes mellitus with both eyes affected by moderate nonproliferative retinopathy and macular edema, with long-term current use of insulin  -     OCT- Retina; Future  -     Ambulatory Referral to Ophthalmology  -referral [facility]  -OCT DME OS    OAG (open angle glaucoma) suspect, low risk, bilateral  -High risk suspect 6 mo HVF and DFE after retina    Nuclear sclerosis, bilateral  -Educated patient on presence of cataracts at today's exam, monitor at annual dilated fundus exam. 8+ years surgical estimate.    Hyperopia with presbyopia of both eyes  -hold 2/2 DME OS    RTC 6 mo GLC

## 2017-12-14 NOTE — TELEPHONE ENCOUNTER
Spoke with patient in regards to his labs results,    kidney and liver function looked good  - LDL (bad) cholesterol was too high.    Also told patient that you sent a Rx to his [facility]

## 2017-12-15 ENCOUNTER — INITIAL CONSULT (OUTPATIENT)
Dept: CARDIOLOGY | Facility: CLINIC | Age: 67
End: 2017-12-15
Payer: COMMERCIAL

## 2017-12-15 ENCOUNTER — EDUCATION (OUTPATIENT)
Dept: CARDIOLOGY | Facility: CLINIC | Age: 67
End: 2017-12-15

## 2017-12-15 VITALS
BODY MASS INDEX: 22.13 KG/M2 | SYSTOLIC BLOOD PRESSURE: 156 MMHG | DIASTOLIC BLOOD PRESSURE: 86 MMHG | HEART RATE: 83 BPM | HEIGHT: 72 IN | WEIGHT: 163.38 LBS | OXYGEN SATURATION: 100 %

## 2017-12-15 DIAGNOSIS — I42.9 CARDIOMYOPATHY, UNSPECIFIED TYPE: Primary | ICD-10-CM

## 2017-12-15 DIAGNOSIS — I50.42 CHRONIC COMBINED SYSTOLIC AND DIASTOLIC CONGESTIVE HEART FAILURE: ICD-10-CM

## 2017-12-15 DIAGNOSIS — E78.00 PURE HYPERCHOLESTEROLEMIA: ICD-10-CM

## 2017-12-15 DIAGNOSIS — E11.42 TYPE 2 DIABETES MELLITUS WITH DIABETIC POLYNEUROPATHY, WITHOUT LONG-TERM CURRENT USE OF INSULIN: Chronic | ICD-10-CM

## 2017-12-15 DIAGNOSIS — I10 HYPERTENSION, ESSENTIAL: ICD-10-CM

## 2017-12-15 LAB — PATHOLOGIST INTERPRETATION SPE: NORMAL

## 2017-12-15 PROCEDURE — 99999 PR PBB SHADOW E&M-EST. PATIENT-LVL IV: CPT | Mod: PBBFAC,,, | Performed by: INTERNAL MEDICINE

## 2017-12-15 PROCEDURE — 99205 OFFICE O/P NEW HI 60 MIN: CPT | Mod: S$GLB,,, | Performed by: INTERNAL MEDICINE

## 2017-12-15 RX ORDER — DEXTROSE MONOHYDRATE AND SODIUM CHLORIDE 5; .45 G/100ML; G/100ML
INJECTION, SOLUTION INTRAVENOUS CONTINUOUS
Status: CANCELLED | OUTPATIENT
Start: 2017-12-15

## 2017-12-15 RX ORDER — CLOPIDOGREL BISULFATE 75 MG/1
TABLET ORAL
Qty: 30 TABLET | Refills: 11 | Status: ON HOLD | OUTPATIENT
Start: 2017-12-15 | End: 2018-01-29 | Stop reason: HOSPADM

## 2017-12-15 RX ORDER — DIPHENHYDRAMINE HCL 25 MG
50 CAPSULE ORAL ONCE
Status: CANCELLED | OUTPATIENT
Start: 2017-12-15 | End: 2017-12-15

## 2017-12-15 NOTE — ASSESSMENT & PLAN NOTE
Stable.   On Statin.   Results for as of 12/15/2017 10:57   Ref. Range 12/12/2017 12:20   Cholesterol Latest Ref Range: 120 - 199 mg/dL 161   HDL Latest Ref Range: 40 - 75 mg/dL 39 (L)   LDL Cholesterol Latest Ref Range: 63.0 - 159.0 mg/dL 106.6   Total Cholesterol/HDL Ratio Latest Ref Range: 2.0 - 5.0  4.1   Triglycerides Latest Ref Range: 30 - 150 mg/dL 77

## 2017-12-15 NOTE — PROGRESS NOTES
OUTPATIENT CATHETERIZATION INSTRUCTIONS    You have been scheduled for a procedure in the catheterization lab on Wednesday, January 3,  2018.     Please report to the Cardiology Waiting Area on the Third floor of the hospital and check in at 6 AM.   You will then be taken to the SSCU (Short Stay Cardiac Unit) and prepared for your procedure. Please be aware that this is not the time of your procedure but the time you are to arrive. The procedures are scheduled on an hourly basis; however, emergency cases take precedence over all other cases.       You may not have anything to eat or drink after midnight the night before your test. You may take your regular morning medications with water. If there are any medications that you should not take you will be instructed to hold them that morning. If you are diabetic and on Metformin (Glucophage) do not take it the day before, the day of, and for 2 days after your procedure.      The procedure will take 1-2 hours to perform. After the procedure, you will return to SSCU on the third floor of the hospital. You will need to lie still (or keep your arm still) for the next 4 to 6 hours to minimize bleeding from the puncture site. Your family may remain in the room with you during this time.       You may be able to be discharged home that same afternoon if there is someone to drive you home and there were no complications. If you have one of the balloon, stent, or device procedures you may spend the night in the hospital. Your doctor will determine, based on your progress, the date and time of your discharge. The results of your procedure will be discussed with you before you are discharged. Any further testing or procedures will be scheduled for you either before you leave or you will be called with these appointments.       If you should have any questions, concerns, or need to change the date of your procedure, please call [facility]AL[facility] Bond @ [phone]    Special  Instructions:  Plavix 75m tablets night before and 1 morning of        THE ABOVE INSTRUCTIONS WERE GIVEN TO THE PATIENT VERBALLY AND THEY VERBALIZED UNDERSTANDING.  THEY DO NOT REQUIRE ANY SPECIAL NEEDS AND DO NOT HAVE ANY LEARNING BARRIERS.          Directions for Reporting to Cardiology Waiting Area in the Hospital  If you park in the Parking Garage:  Take elevators to the1st floor of the parking garage.  Continue past the gift shop, coffee shop, and piano.  Take a right and go to the gold elevators. (Elevator B)  Take the elevator to the 3rd floor.  Follow the arrow on the sign on the wall that says Cath Lab Registration/EP Lab Registration.  Follow the long hallway all the way around until you come to a big open area.  This is the registration area.  Check in at Reception Desk.    OR    If family is dropping you off:  Have them drop you off at the front of the Hospital under the green overhang.  Enter through the doors and take a right.  Take the E elevators to the 3rd floor Cardiology Waiting Area.  Check in at the Reception Desk in the waiting room.

## 2017-12-15 NOTE — ASSESSMENT & PLAN NOTE
EF= 25% by echo. Unknown etiology.   Well compensated clinically at this time. NYHA Class II sx.  On Lasix daily + Coreg and lisinopril.

## 2017-12-15 NOTE — LETTER
December 15, 2017      Keegan Velez MD  [address]  [place] [address]           [facility]  [address]  [place] [address]  Phone: [phone]          Patient: Jose Pete   MR Number: [id number]   YOB: 1950   Date of Visit: 12/15/2017       Dear Dr. Keegan Velez:    Thank you for referring Jose Pete to me for evaluation. Attached you will find relevant portions of my assessment and plan of care.    If you have questions, please do not hesitate to call me. I look forward to following Jose Pete along with you.    Sincerely,    Stone Escudero MD    Enclosure  CC:  No Recipients    If you would like to receive this communication electronically, please contact [email or web address] or [phone] to request more information on [employer] access.    For providers and/or their staff who would like to refer a patient to [facility], please contact us through our one-stop-shop provider referral line, [facility], at [phone].    If you feel you have received this communication in error or would no longer like to receive these types of communications, please e-mail [email or web address]

## 2017-12-15 NOTE — PROGRESS NOTES
"Subjective:    Patient ID:  Jose Pete is a 67 y.o. male who presents for evaluation of Cardiomyopathy    Referring: Dr. Velez    HPI  Mr. Pete is a delightful man born in [place] with a PMH significant for DM and HTN. He was diagnosed just after Sarah, but he was unable to tolerate metformin and was lost to follow-up. His most recent Hgb A1C was 10. He is now on insulin and is following with his PCP, Dr. Lebron.     He was hospitalized on 11/12 with decompensated heart failure. He diuresed 1.5L and had a thoracentesis with 1500ccs clear fluid removed. An echo showed an EF= 25%.  He had a nuclear stress test that showed no evidence of ischemia.     He states since his discharge he feels "much better". He denies any LYONS, CP, PND, orthopnea, or LE edema. He walks 6-8 blocks/day and has no symptoms as long as he does not go "too fast". He does have fatigue and states his stamina has not returned to baseline since his discharge.     He saw Dr. Velez in clinic and was subsequently referred for coronary angiography to rule out ischemia as an etiology for his cardiomyopathy.     The 10-year ASCVD risk score (Anette DC Jr., et al., 2013) is: 40.1%    Values used to calculate the score:      Age: 67 years      Sex: Male      Is Non- : No      Diabetic: Yes      Tobacco smoker: No      Systolic Blood Pressure: 156 mmHg      Is BP treated: Yes      HDL Cholesterol: 39 mg/dL      Total Cholesterol: 161 mg/dL      Review of patient's allergies indicates:   Allergen Reactions    Metformin Diarrhea     On 500mg he had such GI issues he lost weight       Current Outpatient Prescriptions:     aspirin (ECOTRIN) 81 MG EC tablet, Take 1 tablet (81 mg total) by mouth once daily., Disp: , Rfl: 0    atorvastatin (LIPITOR) 20 MG tablet, Take 1 tablet (20 mg total) by mouth once daily., Disp: 90 tablet, Rfl: 3    carvedilol (COREG) 3.125 MG tablet, Take 2 tablets (6.25 mg total) by mouth 2 (two) " times daily., Disp: 120 tablet, Rfl: 11    furosemide (LASIX) 40 MG tablet, Take 1 tablet (40 mg total) by mouth once daily., Disp: 30 tablet, Rfl: 11    glipiZIDE (GLUCOTROL) 5 MG tablet, Take 1 tablet (5 mg total) by mouth 2 (two) times daily with meals., Disp: 60 tablet, Rfl: 4    insulin detemir (LEVEMIR FLEXTOUCH) 100 unit/mL (3 mL) SubQ InPn pen, Inject 10 Units into the skin every evening., Disp: 3 mL, Rfl: 0    lisinopril 10 MG tablet, Take 1 tablet (10 mg total) by mouth once daily., Disp: 90 tablet, Rfl: 3    blood sugar diagnostic Strp, 1 strip by Misc.(Non-Drug; Combo Route) route 2 (two) times daily with meals., Disp: 100 strip, Rfl: 11    blood-glucose meter kit, Use as instructed, Disp: 1 each, Rfl: 0    blood-glucose meter kit, Use as instructed, Disp: 1 each, Rfl: 0    clopidogrel (PLAVIX) 75 mg tablet, Take 4 pills the night before procedure, then 1 pill daily., Disp: 30 tablet, Rfl: 11    lancets Misc, 1 lancet by Misc.(Non-Drug; Combo Route) route 2 (two) times daily with meals., Disp: 100 each, Rfl: 11    lancing device Misc, 1 Device by Misc.(Non-Drug; Combo Route) route 2 (two) times daily with meals., Disp: 1 each, Rfl: 0    Review of Systems   Constitution: Positive for malaise/fatigue. Negative for chills, diaphoresis, fever, weakness, weight gain and weight loss.   HENT: Negative for sore throat.    Eyes: Negative for blurred vision, vision loss in left eye, vision loss in right eye and visual disturbance.   Cardiovascular: Negative for chest pain, claudication, dyspnea on exertion, leg swelling, near-syncope, orthopnea, palpitations, paroxysmal nocturnal dyspnea and syncope.   Respiratory: Negative for cough, hemoptysis, shortness of breath, sputum production and wheezing.    Endocrine: Negative for cold intolerance and heat intolerance.   Hematologic/Lymphatic: Negative for adenopathy. Does not bruise/bleed easily.   Skin: Negative for rash.   Musculoskeletal: Negative for  "falls, muscle weakness and myalgias.   Gastrointestinal: Negative for abdominal pain, change in bowel habit, constipation, diarrhea, melena and nausea.   Genitourinary: Negative for bladder incontinence.   Neurological: Negative for dizziness, focal weakness, headaches, light-headedness and numbness.   Psychiatric/Behavioral: Negative for altered mental status.         Vitals:    12/15/17 0929 12/15/17 0933   BP: (!) 154/84 (!) 156/86   BP Location: Right arm Left arm   Patient Position: Sitting Sitting   BP Method: Large (Automatic) Large (Automatic)   Pulse: 100 83   SpO2: (!) 84% 100%   Weight: 74.1 kg (163 lb 5.8 oz) 74.1 kg (163 lb 5.8 oz)   Height: 6' 2" (1.88 m) 6' 0.2" (1.834 m)   Body mass index is 22.03 kg/m².    Objective:    Physical Exam   Constitutional: He is oriented to person, place, and time. He appears well-developed and well-nourished.   HENT:   Head: Normocephalic and atraumatic.   Eyes: EOM are normal. Pupils are equal, round, and reactive to light.   Neck: Neck supple. No JVD present. No tracheal deviation present. No thyromegaly present.   Cardiovascular: Normal rate, regular rhythm, S1 normal, S2 normal, intact distal pulses and normal pulses.  PMI is not displaced.  Exam reveals gallop and S3. Exam reveals no friction rub.    No murmur heard.  Pulses:       Carotid pulses are 2+ on the right side, and 2+ on the left side.       Radial pulses are 2+ on the right side, and 2+ on the left side.        Femoral pulses are 2+ on the right side, and 2+ on the left side.  Normal Allens on R   Pulmonary/Chest: Effort normal and breath sounds normal. No respiratory distress. He has no wheezes. He has no rales. He exhibits no tenderness.   Abdominal: Soft. Bowel sounds are normal. He exhibits no distension and no mass. There is no tenderness.   Musculoskeletal: Normal range of motion. He exhibits no edema or tenderness.   Neurological: He is alert and oriented to person, place, and time.   Skin: Skin " is warm and dry. No rash noted.   Psychiatric: He has a normal mood and affect. His behavior is normal.   Vitals reviewed.        Assessment:       Chronic combined systolic and diastolic congestive heart failure  EF= 25% by echo. Unknown etiology.   Well compensated clinically at this time. NYHA Class II sx.  On Lasix daily + Coreg and lisinopril.       Hypertension, essential  Controlled on Coreg and lisinopril.     Pure hypercholesterolemia  Stable.   On Statin.   Results for as of 12/15/2017 10:57   Ref. Range 12/12/2017 12:20   Cholesterol Latest Ref Range: 120 - 199 mg/dL 161   HDL Latest Ref Range: 40 - 75 mg/dL 39 (L)   LDL Cholesterol Latest Ref Range: 63.0 - 159.0 mg/dL 106.6   Total Cholesterol/HDL Ratio Latest Ref Range: 2.0 - 5.0  4.1   Triglycerides Latest Ref Range: 30 - 150 mg/dL 77         Type 2 diabetes mellitus with diabetic polyneuropathy, without long-term current use of insulin  On Glipizide and Insulin.  Last HgbA1C = 10%.        Plan:       1. Cardiac catheterization with possible PCI.   2. Antiplatelets: On ASA. Loaded with Plavix the night prior to procedure  3. Access: R Radial  4. Catheters: Shahab, 6F Slender Sheath  5. Pt is a ALEXANDRU candidate and understands the importance of taking plavix for at least one year, understands that in case of receiving a drug coated stent the failure to comply with dual anti-platelet therapy is likely to result in stent clothing, heart attack and death.   6. The risks, benefits, and alternatives of coronary vascular angiography and possible intervention were discussed with the patient. All questions were answered and informed consent was obtained. I had a detailed discussion with the patient regarding risk of stroke, MI, bleeding access site complications including limb loss, allergy, kidney failure including dialysis and death.  7. The patient understands the risks and benefits and wishes to go ahead with the procedure.  8. All patient's questions were  answered

## 2017-12-18 ENCOUNTER — TELEPHONE (OUTPATIENT)
Dept: INTERNAL MEDICINE | Facility: CLINIC | Age: 67
End: 2017-12-18

## 2017-12-18 NOTE — TELEPHONE ENCOUNTER
Spoke with Milagrosciara Lowery with [employer] She states that would like something written verbalizing his conditions, reasons why he is on Disability & medical leave. I advised pt to please contact the office . Mrs. Linares states that they need the paperwork to be signed & faxed over to them.

## 2017-12-18 NOTE — TELEPHONE ENCOUNTER
Spoke with pt. Pt states that he needs the paperwork signed by you for me to send to his insurance company ([employer]). [t verbalized that he hasn't gotten paid for a month now. Please advise.

## 2017-12-18 NOTE — TELEPHONE ENCOUNTER
----- Message from Temitope Vega sent at 12/18/2017  9:57 AM CST -----  Contact: pt [phone]  Pt would like a call from the nurse pt want to know did the doctor sent the paper work to the pt insurance company pt said he have not gotten paid in a month now,please advise pt

## 2017-12-20 ENCOUNTER — TELEPHONE (OUTPATIENT)
Dept: INTERNAL MEDICINE | Facility: CLINIC | Age: 67
End: 2017-12-20

## 2017-12-28 ENCOUNTER — OFFICE VISIT (OUTPATIENT)
Dept: INTERNAL MEDICINE | Facility: CLINIC | Age: 67
End: 2017-12-28
Payer: COMMERCIAL

## 2017-12-28 VITALS
HEART RATE: 83 BPM | DIASTOLIC BLOOD PRESSURE: 62 MMHG | WEIGHT: 165 LBS | OXYGEN SATURATION: 98 % | BODY MASS INDEX: 21.17 KG/M2 | HEIGHT: 74 IN | SYSTOLIC BLOOD PRESSURE: 124 MMHG

## 2017-12-28 DIAGNOSIS — E11.21 TYPE 2 DIABETES MELLITUS WITH DIABETIC NEPHROPATHY, WITH LONG-TERM CURRENT USE OF INSULIN: ICD-10-CM

## 2017-12-28 DIAGNOSIS — I50.42 CHRONIC COMBINED SYSTOLIC AND DIASTOLIC CONGESTIVE HEART FAILURE: Primary | ICD-10-CM

## 2017-12-28 DIAGNOSIS — Z79.4 TYPE 2 DIABETES MELLITUS WITH DIABETIC NEPHROPATHY, WITH LONG-TERM CURRENT USE OF INSULIN: ICD-10-CM

## 2017-12-28 DIAGNOSIS — Z23 NEED FOR TDAP VACCINATION: ICD-10-CM

## 2017-12-28 PROCEDURE — 99999 PR PBB SHADOW E&M-EST. PATIENT-LVL III: CPT | Mod: PBBFAC,,, | Performed by: INTERNAL MEDICINE

## 2017-12-28 PROCEDURE — 90471 IMMUNIZATION ADMIN: CPT | Mod: S$GLB,,, | Performed by: INTERNAL MEDICINE

## 2017-12-28 PROCEDURE — 99213 OFFICE O/P EST LOW 20 MIN: CPT | Mod: 25,S$GLB,, | Performed by: INTERNAL MEDICINE

## 2017-12-28 PROCEDURE — 90715 TDAP VACCINE 7 YRS/> IM: CPT | Mod: S$GLB,,, | Performed by: INTERNAL MEDICINE

## 2017-12-28 NOTE — PROGRESS NOTES
"Subjective:       Patient ID: Jose Pete is a 67 y.o. male.    Chief Complaint: Follow-up (4 wk follow up.)    HPI   68 yo M here for follow up of DM2, systolic and diastolic HF  Plan for [facility].   Echo with global hypokinesis, EF 25%, severe LVH.   Stress test  With nonspecific ekg changes.   [facility] planned 1/3/17.    DM  Start glip 5 bid meals  Stop humalog   Dec levemir to 10 u qhs - plan to wean off    Carvedilol inc by cards to 6.25mg bid, lisinopril 10mg, lasix 40 mg daily.     Has been trying to walk 15 blocks when weather is good. He is not getting short of breath with walking. No chest discomfort.   Review of Systems   Constitutional: Negative for fever.   HENT: Negative.    Eyes: Negative.    Respiratory: Negative for shortness of breath.    Cardiovascular: Negative for chest pain and leg swelling.   Gastrointestinal: Negative for abdominal pain, diarrhea, nausea and vomiting.   Genitourinary: Negative.    Musculoskeletal: Negative for arthralgias.   Skin: Negative for rash.   Psychiatric/Behavioral: Negative.        Objective:   /62 (BP Location: Left arm, Patient Position: Sitting, BP Method: Medium (Manual))   Pulse 83   Ht 6' 2" (1.88 m)   Wt 74.8 kg (165 lb)   SpO2 98%   BMI 21.18 kg/m²      Physical Exam   Constitutional: He is oriented to person, place, and time. He appears well-developed and well-nourished. No distress.   HENT:   Head: Normocephalic and atraumatic.   Cardiovascular: Normal rate and regular rhythm.    No murmur heard.  Pulmonary/Chest: Effort normal. No respiratory distress. He has no wheezes. He has no rales.   Neurological: He is alert and oriented to person, place, and time.   Skin: Skin is warm and dry. He is not diaphoretic.   Psychiatric: He has a normal mood and affect. His behavior is normal.       Assessment:       1. Chronic combined systolic and diastolic congestive heart failure    2. Type 2 diabetes mellitus with diabetic nephropathy, with long-term current use " of insulin    3. Need for Tdap vaccination        Plan:       Jose was seen today for follow-up.    Diagnoses and all orders for this visit:    Chronic combined systolic and diastolic congestive heart failure  [facility] 1/3/18  Is walking ~15 blocks per day without issue. Previous job was very physically demanding. Unsure when he will be able to return. Will defer decision until after [facility] and cardiology follow up    Type 2 diabetes mellitus with diabetic nephropathy, with long-term current use of insulin  Start glip 5 bid meals  Stop humalog   Dec levemir to 10 u qhs - plan to wean off    Need for Tdap vaccination  -     (In Office Administered) Tdap Vaccine

## 2018-01-05 ENCOUNTER — INITIAL CONSULT (OUTPATIENT)
Dept: OPHTHALMOLOGY | Facility: CLINIC | Age: 68
End: 2018-01-05
Payer: COMMERCIAL

## 2018-01-05 DIAGNOSIS — Z79.4 TYPE 2 DIABETES MELLITUS WITH LEFT EYE AFFECTED BY MODERATE NONPROLIFERATIVE RETINOPATHY AND MACULAR EDEMA, WITH LONG-TERM CURRENT USE OF INSULIN: Primary | ICD-10-CM

## 2018-01-05 DIAGNOSIS — E11.3391 TYPE 2 DIABETES MELLITUS WITH RIGHT EYE AFFECTED BY MODERATE NONPROLIFERATIVE RETINOPATHY WITHOUT MACULAR EDEMA, WITH LONG-TERM CURRENT USE OF INSULIN: ICD-10-CM

## 2018-01-05 DIAGNOSIS — E11.3312 TYPE 2 DIABETES MELLITUS WITH LEFT EYE AFFECTED BY MODERATE NONPROLIFERATIVE RETINOPATHY AND MACULAR EDEMA, WITH LONG-TERM CURRENT USE OF INSULIN: Primary | ICD-10-CM

## 2018-01-05 DIAGNOSIS — Z79.4 TYPE 2 DIABETES MELLITUS WITH RIGHT EYE AFFECTED BY MODERATE NONPROLIFERATIVE RETINOPATHY WITHOUT MACULAR EDEMA, WITH LONG-TERM CURRENT USE OF INSULIN: ICD-10-CM

## 2018-01-05 PROCEDURE — 92134 CPTRZ OPH DX IMG PST SGM RTA: CPT | Mod: S$GLB,,, | Performed by: OPHTHALMOLOGY

## 2018-01-05 PROCEDURE — 99999 PR PBB SHADOW E&M-EST. PATIENT-LVL II: CPT | Mod: PBBFAC,,, | Performed by: OPHTHALMOLOGY

## 2018-01-05 PROCEDURE — 92014 COMPRE OPH EXAM EST PT 1/>: CPT | Mod: S$GLB,,, | Performed by: OPHTHALMOLOGY

## 2018-01-05 PROCEDURE — 92225 PR SPECIAL EYE EXAM, INITIAL: CPT | Mod: LT,S$GLB,, | Performed by: OPHTHALMOLOGY

## 2018-01-05 NOTE — LETTER
January 6, 2018      Manish Cedillo, OD  [address]  [place] [address]           [facility]  [address]  [place] [address]  Phone: [phone]  Fax: [phone]          Patient: Jose Pete   MR Number: [id number]   YOB: 1950   Date of Visit: 1/5/2018       Dear Dr. Manish Cedillo:    Thank you for referring Jose Pete to me for evaluation. Attached you will find relevant portions of my assessment and plan of care.    If you have questions, please do not hesitate to call me. I look forward to following Jose Pete along with you.    Sincerely,    Lars Garcia MD    Enclosure  CC:  No Recipients    If you would like to receive this communication electronically, please contact [email or web address] or [phone] to request more information on Pocket Link access.    For providers and/or their staff who would like to refer a patient to [facility], please contact us through our one-stop-shop provider referral line, [facility], at [phone].    If you feel you have received this communication in error or would no longer like to receive these types of communications, please e-mail [email or web address]

## 2018-01-05 NOTE — PROGRESS NOTES
HPI     Diabetes    Additional comments: Referred by Dr Cedillo           Comments   68 y/o diabetic male presents for evaluation per Dr Cedillo.  Pt is Type 2   DM x 10 + yrs.  Was intially diagnoised after Sarah. Was on medication   for it for 3 months but was taken off of medication as diabetis was   controlled.  Then November was having trouble breathing.  Couldn't sleep   lying down anymore.  Went in to the Hospital and had to had 2 liters of   fluid taken off of lungs.  At that time was told he is indeed diabetic and   needed medication.  Was put of Insulin and Glipizide.  BS much improved.    Running in the lower 100's since.  Pt feels vision is good.  Only wears   glasses to read.  No f/f?pain  Referred by Dr. Cedillo for DME    LBSL: 123 this am  Hemoglobin A1C       Date                     Value               Ref Range             Status                12/12/2017               10.4 (H)            4.0 - 5.6 %           Final                      Last edited by Lars Garcia MD on 1/5/2018  1:29 PM. (History)        [facility] SDOCT:   OD: good quality, good contour, no ME  OS: good quality, exudates, IRF, min thickening, fovea preserved  No sig change from Dec scan OU.      Assessment /Plan     For exam results, see Encounter Report.    There are no diagnoses linked to this encounter.  Type 2 DM  Mod NPDR OU with ME OS  Currently getting better control of BS and health    6 wks with FA transit OS  Consider focal vs inj OS if not improved    Diabetic Retinopathy discussed in detail, all questions answered  Stressed importance of good BS/BP/Chol Control  RTC 6 wks, sooner PRN

## 2018-01-07 NOTE — PROGRESS NOTES
Patient ID:  Jose Pete is a 67 y.o. male who presents for follow-up of HFrEF    Pt has been stable with no change in physical capacity since last appointment with me on 12/4/2017. He occasionally monitors BP at home and systolic values average 140 mmHg.    He will be admitted for LHC and possible PCI on 1/17/2018    Discharged on 11/12/2017 after a 3 day admission for dry cough, worsening shortness of breath on exertion and orthopnea. He ruled out for myocardial infarction and was diagnosed with HFrEF and diastolic dysfunction (BNP 1136 pg/ml).     Pt has no prior cardiac hx. He never had symptoms to suggest myocardial ischemia and heart failure. He was unaware of having poor cardiac function until the recent hospital admission. 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       PMH includes: HFrEF with diastolic dysfunction; hypertension; T2 DM on insulin     He never smoked; used to drink 2 beers per day; he has no family hx of premature CAD.      Lab Results   Component Value Date     12/12/2017    K 4.2 12/12/2017     12/12/2017    CO2 34 (H) 12/12/2017    BUN 16 12/12/2017    CREATININE 0.9 12/12/2017     (H) 12/12/2017    HGBA1C 10.4 (H) 12/12/2017    AST 17 12/12/2017    ALT 25 12/12/2017    ALBUMIN 3.5 12/12/2017    PROT 7.5 12/12/2017    BILITOT 1.0 12/12/2017    WBC 10.33 11/14/2017    HGB 15.1 11/14/2017    HCT 45.5 11/14/2017    MCV 92 11/14/2017     11/14/2017    INR 1.1 11/12/2017    TSH 1.033 11/13/2017       Past Medical History:   Diagnosis Date    Combined systolic and diastolic cardiac dysfunction 2005    Hypertension, essential 11/12/2017    Pleural effusion 11/12/2017    treated with thoracentesis, appeared to be due to CHF    Proteinuria due to type 2 diabetes mellitus 2017    SOB (shortness of breath) 11/14/2017    Type 2 diabetes mellitus 2005    Type 2 diabetes  mellitus with diabetic polyneuropathy, without long-term current use of insulin 12/12/2017     Family History   Problem Relation Age of Onset    Heart failure Mother     Emphysema Father     HIV Brother     Cancer Neg Hx      Social History     Social History    Marital status: Single     Spouse name: N/A    Number of children: N/A    Years of education: N/A     Occupational History    Not on file.     Social History Main Topics    Smoking status: Never Smoker    Smokeless tobacco: Never Used    Alcohol use No      Comment: hasn't drank since October 2017, previous 1-2 beers/day    Drug use: No    Sexual activity: Yes     Other Topics Concern    Not on file     Social History Narrative    No narrative on file       Lab Results   Component Value Date    CHOL 161 12/12/2017    HDL 39 (L) 12/12/2017    TRIG 77 12/12/2017       Lab Results   Component Value Date    LDLCALC 106.6 12/12/2017       Past Medical History:   Diagnosis Date    Combined systolic and diastolic cardiac dysfunction 2005    Hypertension, essential 11/12/2017    Pleural effusion 11/12/2017    treated with thoracentesis, appeared to be due to CHF    Proteinuria due to type 2 diabetes mellitus 2017    SOB (shortness of breath) 11/14/2017    Type 2 diabetes mellitus 2005    Type 2 diabetes mellitus with diabetic polyneuropathy, without long-term current use of insulin 12/12/2017     Hypertension Medications             carvedilol (COREG) 3.125 MG tablet Take 2 tablets (6.25 mg total) by mouth 2 (two) times daily.    furosemide (LASIX) 40 MG tablet Take 1 tablet (40 mg total) by mouth once daily.    lisinopril 10 MG tablet Take 1 tablet (10 mg total) by mouth once daily.            Review of Systems   Constitution: Positive for weakness. Negative for decreased appetite, diaphoresis, fever, malaise/fatigue, weight gain and weight loss.   HENT: Negative for congestion, ear discharge, ear pain and nosebleeds.    Eyes: Negative for  "blurred vision, double vision and visual disturbance.   Cardiovascular: Positive for dyspnea on exertion. Negative for chest pain, claudication, cyanosis, irregular heartbeat, leg swelling, near-syncope, orthopnea, palpitations, paroxysmal nocturnal dyspnea and syncope.   Respiratory: Negative for cough, hemoptysis, shortness of breath, sleep disturbances due to breathing, snoring, sputum production and wheezing.    Endocrine: Negative for polydipsia, polyphagia and polyuria.   Hematologic/Lymphatic: Negative for adenopathy and bleeding problem. Does not bruise/bleed easily.   Skin: Negative for color change, nail changes, poor wound healing and rash.   Musculoskeletal: Negative for muscle cramps and muscle weakness.   Gastrointestinal: Negative for abdominal pain, anorexia, change in bowel habit, hematochezia, nausea and vomiting.   Genitourinary: Negative for dysuria, frequency and hematuria.   Neurological: Negative for brief paralysis, difficulty with concentration, excessive daytime sleepiness, dizziness, focal weakness, headaches, light-headedness, seizures and vertigo.   Psychiatric/Behavioral: Negative for altered mental status and depression.   Allergic/Immunologic: Negative for persistent infections.                Objective:         BP (!) 140/69 (BP Location: Left arm, Patient Position: Sitting, BP Method: Medium (Automatic))   Pulse 82   Ht 6' 2" (1.88 m)   Wt 76.4 kg (168 lb 6.9 oz)   BMI 21.63 kg/m²    Physical Exam   Constitutional: He is oriented to person, place, and time. He appears well-developed and well-nourished.   HENT:   Head: Normocephalic.   Right Ear: External ear normal.   Left Ear: External ear normal.   Nose: Nose normal.   Inspection of lips, teeth and gums normal   Eyes: EOM are normal. Pupils are equal, round, and reactive to light. No scleral icterus.   Neck: Normal range of motion. Neck supple. No JVD present. No tracheal deviation present. No thyromegaly present. "   Cardiovascular: Normal rate, regular rhythm, S1 normal, S2 normal, normal heart sounds and intact distal pulses.  Exam reveals no gallop and no friction rub.    No murmur heard.  Pulses:       Carotid pulses are 2+ on the right side, and 2+ on the left side.       Radial pulses are 2+ on the right side, and 2+ on the left side.        Femoral pulses are 2+ on the right side, and 2+ on the left side.       Dorsalis pedis pulses are 2+ on the right side, and 2+ on the left side.        Posterior tibial pulses are 2+ on the right side, and 2+ on the left side.   Pulmonary/Chest: Effort normal and breath sounds normal.   Abdominal: Bowel sounds are normal. He exhibits no distension. There is no hepatosplenomegaly. There is no tenderness. There is no guarding.   Musculoskeletal: Normal range of motion. He exhibits no edema or tenderness.   Lymphadenopathy:   Palpation of neck and groin lymph nodes normal   Neurological: He is alert and oriented to person, place, and time. No cranial nerve deficit. He exhibits normal muscle tone. Coordination normal.   Skin: Skin is dry.   No ankle nor pretibial edema.   Psychiatric: His behavior is normal. Judgment and thought content normal.           ECG (12-NOV-2017)  Normal sinus rhythm   Left atrial enlargement  Vertical axis  Nonspecific ST and/or T wave abnormalities  Abnormal ECG  When compared with ECG of 12-NOV-2017 11:08,  No significant change was found              Echo (11/13/2017)    TEST DESCRIPTION   Technical Quality: This is a technically adequate study. This study was performed in conjunction with a 3ml intravenous injection of Optison contrast agent.     Aorta: The aortic root is normal in size, measuring 3.5 cm at sinotubular junction and 3.8 cm at Sinuses of Valsalva. The proximal ascending aorta is normal in size, measuring 3.5 cm across.     Left Atrium: The left atrial volume index is severely enlarged, measuring 52.97 cc/m2.     Left Ventricle: The left  ventricle is mildly enlarged, with an end-diastolic diameter of 6.3 cm, and an end-systolic diameter of 5.7 cm. LV wall thickness is normal, with the septum and the posterior wall each measuring 1.0 cm across. Relative wall   thickness was normal at 0.32, and the LV mass index was increased at 156.6 g/m2 consistent with severe eccentric left ventricular hypertrophy. There is global hypokinesis. Left ventricular systolic function appears severely depressed. Visually estimated   ejection fraction is 25-30%. The LV Doppler derived stroke volume equals 43.0 ccs.     Diastolic indices: E wave velocity 0.8 m/s, E/A ratio 2.0,  msec., E/e' ratio(avg) 14. Pulmonary vein systolic/diastolic ratio is blunted. Diastolic function is indeterminate.     Right Atrium: The right atrium is normal in size, measuring 4.0 cm in length and 3.7 cm in width in the apical view.     Right Ventricle: The right ventricle is mildly enlarged measuring 4.1 cm at the base in the apical right ventricle-focused view. Global right ventricular systolic function appears low normal to mildly depressed. Tricuspid annular plane systolic excursion   (TAPSE) is 1.9 cm.     Aortic Valve:  The aortic valve is mildly sclerotic with normal leaflet mobility. The aortic valve is tri-leaflet in structure.     Mitral Valve:  The mitral valve is mildly sclerotic with normal leaflet mobility.     Tricuspid Valve:  The tricuspid valve is normal in structure with normal leaflet mobility.     Pulmonary Valve:  The pulmonic valve is not well seen.     Pericardium: There is evidence of a small circumferential pericardial effusion.     IVC: IVC is normal in size and collapses > 50% with a sniff, suggesting normal right atrial pressure of 3 mmHg.     Intracavitary: There is no evidence of intracavity mass, thrombi, or vegetation.     Other: There is a right pleural effusion present. If clinically indicated, a full Doppler study can be performed.     CONCLUSIONS     1  - Eccentric LVH with severely depressed left ventricular systolic function (EF 25-30%).     2 - Severe left atrial enlargement.     3 - There is diastolic dysfunction present the stage is Indeterminate.     4 - Right ventricular enlargement with low normal to mildly depressed systolic function.     5 - Right pleural effusion.     6 - Small pericardial effusion.     7 - There is AI noted on limited images.     This document has been electronically    SIGNED BY: Ami Lai MD              Regadenoson Nuclear Stress Test (11/15/2017)    PRE-TEST DATA   EKG: Resting electrocardiogram reveals normal sinus rhythm at a rate of 82 bpm. There was right axis deviation, right ventricular hypertrophy or enlargement, and repolarization abnormality secondary to ventricular hypertrophy.     Indication for Stress Test:  decreased LVEF    Clinical Notes:  Hypertension,  Diabetes and Cardiomyopathy    TEST DESCRIPTION   The patient received 0.4 mg of Regadenoson as an IV bolus. Peak heart rate was 84 bpm, which is 55% of the age predicted maximum heart rate. .     EKG Conclusions:    1. The EKG portion of this study is abnormal but non diagnostic for ischemia at a peak heart rate of 84 bpm (55% of predicted).   2. Specificity is reduced secondary to resting ST segment changes.   3. Blood pressure remained stable throughout the protocol  (Presenting BP: 130/86 Peak BP: 133/83).   4. No significant arrhythmias were present.   5. There were no symptoms of chest discomfort or significant dyspnea throughout the protocol.     Nuclear Procedure:  Following a single isotope protocol, 9.8 mCi of Tc99 labeled Tetrofosmin was given at rest and tomographic imaging was performed. Regadenoson pharmacologic stress testing was performed as described above. Immediately following the IV bolus of   regadenoson, 30 mCi of Tc99 labeled Tetrofosmin was given and tomographic imaging was performed. The site of the IV injection was the left AC. Images  were obtained on a Siemens C-Cam camera.     Comments:  This is a technically excellent study. Inspection of the transaxial images demonstrated no significant cranial, caudal, or lateral patient motion in the camera between rest and stress acquisitions. On stress images, there is a mild slit-like defect seen   in the anteroseptal wall of the left ventricle, which correspond with the RV insertion site.  The remainder of the myocardium demonstrates normal radiotracer uptake. The extracardiac distribution of radioactivity is normal. The left ventricular cavity is   increased in size at rest and does not dilate further with stress. The left ventricular wall motion is abnormal at rest showing severe global hypokinesis of the left ventricle.     Nuclear Quantitative Functional Analysis:   LVEF: 22 % (normal is >= 51%)  LVED Volume: 184 ml (normal is <=171)  LVES Volume: 144 ml (normal is <=70)    Impression: NORMAL MYOCARDIAL PERFUSION  1. The perfusion scan is free of evidence for myocardial ischemia or injury.   2. There is a small mild intensity defect in the anteroseptal wall consistent with the RV insertion site.   3. There is abnormal wall motion at rest showing severe global hypokinesis of the left ventricle.   4. There is resting LV dysfunction with a reduced ejection fraction of 22 %.  (normal is >= 51%)  5. The left ventricular volume is mildly increased at rest.   6. The extracardiac distribution of radioactivity is normal. There are bilateral pleural effusions (R>L)    This document has been electronically    SIGNED BY: Manish Arreguin MD         I have reviewed the following:     Details / Date    []   Labs     []   Imaging     []   Cardiology Procedures     []   Other      Assessment and Plan:       1. Systolic heart failure, unspecified heart failure chronicity    2. Hypertension, essential         HFrEF (heart failure with reduced ejection fraction)  HFrEF of unknown etiology: possibly due to hypertension or  CAD (no hx of CAD; no ischemia on Ragadenoson Nuclear Stress test). He will be admitted for [facility] on 1/17/2018 to rule out CAD    Home BP monitoring as instructed (tid for 5 days) starting in 7-10 days   Continue carvedilol 6.25 mg bid  Increase lisinopril to 20 mg qd  Continue Lasix 40 mg qd  RTC in 4 weeks         Hypertension, essential  BP not well controlled at home (average systolic values 140 mmHg)    Home BP monitoring as instructed (tid for 5 days) starting in 7-10 days   Continue carvedilol 6.25 mg bid  Increase lisinopril to 20 mg qd  Continue Lasix 40 mg qd

## 2018-01-08 ENCOUNTER — OFFICE VISIT (OUTPATIENT)
Dept: CARDIOLOGY | Facility: CLINIC | Age: 68
End: 2018-01-08
Payer: COMMERCIAL

## 2018-01-08 VITALS
SYSTOLIC BLOOD PRESSURE: 140 MMHG | WEIGHT: 168.44 LBS | DIASTOLIC BLOOD PRESSURE: 69 MMHG | HEART RATE: 82 BPM | BODY MASS INDEX: 21.62 KG/M2 | HEIGHT: 74 IN

## 2018-01-08 DIAGNOSIS — I50.20 SYSTOLIC HEART FAILURE, UNSPECIFIED HEART FAILURE CHRONICITY: ICD-10-CM

## 2018-01-08 DIAGNOSIS — I10 HYPERTENSION, ESSENTIAL: ICD-10-CM

## 2018-01-08 PROBLEM — I50.42 CHRONIC COMBINED SYSTOLIC AND DIASTOLIC CONGESTIVE HEART FAILURE: Status: RESOLVED | Noted: 2017-11-14 | Resolved: 2018-01-08

## 2018-01-08 PROBLEM — I51.7 CARDIOMEGALY: Status: RESOLVED | Noted: 2017-11-14 | Resolved: 2018-01-08

## 2018-01-08 PROCEDURE — 99999 PR PBB SHADOW E&M-EST. PATIENT-LVL IV: CPT | Mod: PBBFAC,,, | Performed by: INTERNAL MEDICINE

## 2018-01-08 PROCEDURE — 99213 OFFICE O/P EST LOW 20 MIN: CPT | Mod: S$GLB,,, | Performed by: INTERNAL MEDICINE

## 2018-01-08 RX ORDER — LISINOPRIL 10 MG/1
20 TABLET ORAL DAILY
Qty: 180 TABLET | Refills: 3 | Status: SHIPPED | OUTPATIENT
Start: 2018-01-08 | End: 2018-03-28 | Stop reason: SDUPTHER

## 2018-01-08 NOTE — ASSESSMENT & PLAN NOTE
HFrEF of unknown etiology: possibly due to hypertension or CAD (no hx of CAD; no ischemia on Ragadenoson Nuclear Stress test). He will be admitted for [facility] on 1/17/2018 to rule out CAD    Home BP monitoring as instructed (tid for 5 days) starting in 7-10 days   Continue carvedilol 6.25 mg bid  Increase lisinopril to 20 mg qd  Continue Lasix 40 mg qd  RTC in 4 weeks

## 2018-01-08 NOTE — ASSESSMENT & PLAN NOTE
BP not well controlled at home (average systolic values 140 mmHg)    Home BP monitoring as instructed (tid for 5 days) starting in 7-10 days   Continue carvedilol 6.25 mg bid  Increase lisinopril to 20 mg qd  Continue Lasix 40 mg qd

## 2018-01-15 LAB
ACID FAST MOD KINY STN SPEC: NORMAL
MYCOBACTERIUM SPEC QL CULT: NORMAL

## 2018-01-22 ENCOUNTER — TELEPHONE (OUTPATIENT)
Dept: CARDIOLOGY | Facility: CLINIC | Age: 68
End: 2018-01-22

## 2018-01-26 ENCOUNTER — TELEPHONE (OUTPATIENT)
Dept: INTERNAL MEDICINE | Facility: CLINIC | Age: 68
End: 2018-01-26

## 2018-01-26 NOTE — TELEPHONE ENCOUNTER
----- Message from Natalia Armstrong sent at 1/26/2018  9:27 AM CST -----  Contact: self/[phone]  Patient called in regards needing to talk with Dr Lebron about getting a verification that patient in short term disability. If verification can be fax to [employer] at #[phone].  # [phone]. Patient need the paper work so he can get pay.  Please call and advise.       Thank you!!!

## 2018-01-29 ENCOUNTER — HOSPITAL ENCOUNTER (OUTPATIENT)
Facility: HOSPITAL | Age: 68
Discharge: HOME | End: 2018-01-29
Attending: INTERNAL MEDICINE | Admitting: INTERNAL MEDICINE
Payer: COMMERCIAL

## 2018-01-29 VITALS
OXYGEN SATURATION: 97 % | RESPIRATION RATE: 16 BRPM | SYSTOLIC BLOOD PRESSURE: 158 MMHG | HEART RATE: 70 BPM | DIASTOLIC BLOOD PRESSURE: 73 MMHG | TEMPERATURE: 97 F | BODY MASS INDEX: 21.17 KG/M2 | HEIGHT: 74 IN | WEIGHT: 165 LBS

## 2018-01-29 DIAGNOSIS — I42.9 CARDIOMYOPATHY, UNSPECIFIED TYPE: ICD-10-CM

## 2018-01-29 DIAGNOSIS — Z79.4 LONG TERM CURRENT USE OF INSULIN: ICD-10-CM

## 2018-01-29 DIAGNOSIS — I42.9 CARDIOMYOPATHY: ICD-10-CM

## 2018-01-29 DIAGNOSIS — E78.00 PURE HYPERCHOLESTEROLEMIA: ICD-10-CM

## 2018-01-29 LAB
ABO + RH BLD: NORMAL
ANION GAP SERPL CALC-SCNC: 8 MMOL/L
APTT BLDCRRT: 23.6 SEC
BASOPHILS # BLD AUTO: 0.06 K/UL
BASOPHILS NFR BLD: 0.9 %
BLD GP AB SCN CELLS X3 SERPL QL: NORMAL
BUN SERPL-MCNC: 15 MG/DL
CALCIUM SERPL-MCNC: 10.5 MG/DL
CHLORIDE SERPL-SCNC: 103 MMOL/L
CO2 SERPL-SCNC: 27 MMOL/L
CORONARY STENOSIS: ABNORMAL
CREAT SERPL-MCNC: 0.9 MG/DL
DIFFERENTIAL METHOD: ABNORMAL
EOSINOPHIL # BLD AUTO: 0.2 K/UL
EOSINOPHIL NFR BLD: 2.9 %
ERYTHROCYTE [DISTWIDTH] IN BLOOD BY AUTOMATED COUNT: 11.9 %
EST. GFR  (AFRICAN AMERICAN): >60 ML/MIN/1.73 M^2
EST. GFR  (NON AFRICAN AMERICAN): >60 ML/MIN/1.73 M^2
GLUCOSE SERPL-MCNC: 216 MG/DL
HCT VFR BLD AUTO: 39.8 %
HGB BLD-MCNC: 13.5 G/DL
IMM GRANULOCYTES # BLD AUTO: 0.02 K/UL
IMM GRANULOCYTES NFR BLD AUTO: 0.3 %
INR PPP: 1
LYMPHOCYTES # BLD AUTO: 1.8 K/UL
LYMPHOCYTES NFR BLD: 25.9 %
MCH RBC QN AUTO: 29.5 PG
MCHC RBC AUTO-ENTMCNC: 33.9 G/DL
MCV RBC AUTO: 87 FL
MONOCYTES # BLD AUTO: 0.8 K/UL
MONOCYTES NFR BLD: 11.1 %
NEUTROPHILS # BLD AUTO: 4.1 K/UL
NEUTROPHILS NFR BLD: 58.9 %
NRBC BLD-RTO: 0 /100 WBC
PLATELET # BLD AUTO: 195 K/UL
PLATELET RESPONSE PLAVIX: 234 PRU
PMV BLD AUTO: 11.4 FL
POCT GLUCOSE: 186 MG/DL (ref 70–110)
POCT GLUCOSE: 214 MG/DL (ref 70–110)
POTASSIUM SERPL-SCNC: 5.1 MMOL/L
PROTHROMBIN TIME: 10.3 SEC
RBC # BLD AUTO: 4.58 M/UL
SODIUM SERPL-SCNC: 138 MMOL/L
WBC # BLD AUTO: 6.96 K/UL

## 2018-01-29 PROCEDURE — 85576 BLOOD PLATELET AGGREGATION: CPT

## 2018-01-29 PROCEDURE — 93458 L HRT ARTERY/VENTRICLE ANGIO: CPT

## 2018-01-29 PROCEDURE — 85610 PROTHROMBIN TIME: CPT

## 2018-01-29 PROCEDURE — 93571 IV DOP VEL&/PRESS C FLO 1ST: CPT | Mod: 26,LD,, | Performed by: INTERNAL MEDICINE

## 2018-01-29 PROCEDURE — 25000003 PHARM REV CODE 250

## 2018-01-29 PROCEDURE — 80048 BASIC METABOLIC PNL TOTAL CA: CPT

## 2018-01-29 PROCEDURE — 99152 MOD SED SAME PHYS/QHP 5/>YRS: CPT | Mod: ,,, | Performed by: INTERNAL MEDICINE

## 2018-01-29 PROCEDURE — 93010 ELECTROCARDIOGRAM REPORT: CPT | Mod: 76,,, | Performed by: INTERNAL MEDICINE

## 2018-01-29 PROCEDURE — 63600175 PHARM REV CODE 636 W HCPCS

## 2018-01-29 PROCEDURE — 25000003 PHARM REV CODE 250: Performed by: INTERNAL MEDICINE

## 2018-01-29 PROCEDURE — 85730 THROMBOPLASTIN TIME PARTIAL: CPT

## 2018-01-29 PROCEDURE — 86850 RBC ANTIBODY SCREEN: CPT

## 2018-01-29 PROCEDURE — 93005 ELECTROCARDIOGRAM TRACING: CPT

## 2018-01-29 PROCEDURE — 93458 L HRT ARTERY/VENTRICLE ANGIO: CPT | Mod: 26,,, | Performed by: INTERNAL MEDICINE

## 2018-01-29 PROCEDURE — 99152 MOD SED SAME PHYS/QHP 5/>YRS: CPT

## 2018-01-29 PROCEDURE — 93010 ELECTROCARDIOGRAM REPORT: CPT | Mod: ,,, | Performed by: INTERNAL MEDICINE

## 2018-01-29 PROCEDURE — 25000003 PHARM REV CODE 250: Performed by: PHYSICIAN ASSISTANT

## 2018-01-29 PROCEDURE — 82962 GLUCOSE BLOOD TEST: CPT | Mod: 91

## 2018-01-29 PROCEDURE — 85025 COMPLETE CBC W/AUTO DIFF WBC: CPT

## 2018-01-29 RX ORDER — SODIUM CHLORIDE 9 MG/ML
INJECTION, SOLUTION INTRAVENOUS CONTINUOUS
Status: DISCONTINUED | OUTPATIENT
Start: 2018-01-29 | End: 2018-01-29 | Stop reason: HOSPADM

## 2018-01-29 RX ORDER — DEXTROSE MONOHYDRATE AND SODIUM CHLORIDE 5; .45 G/100ML; G/100ML
INJECTION, SOLUTION INTRAVENOUS CONTINUOUS
Status: DISCONTINUED | OUTPATIENT
Start: 2018-01-29 | End: 2018-01-29

## 2018-01-29 RX ORDER — SODIUM CHLORIDE 9 MG/ML
20 INJECTION, SOLUTION INTRAVENOUS CONTINUOUS
Status: ACTIVE | OUTPATIENT
Start: 2018-01-29 | End: 2018-01-29

## 2018-01-29 RX ORDER — DIPHENHYDRAMINE HCL 50 MG
50 CAPSULE ORAL ONCE
Status: COMPLETED | OUTPATIENT
Start: 2018-01-29 | End: 2018-01-29

## 2018-01-29 RX ORDER — ATORVASTATIN CALCIUM 40 MG/1
40 TABLET, FILM COATED ORAL DAILY
Qty: 90 TABLET | Refills: 3 | Status: SHIPPED | OUTPATIENT
Start: 2018-01-29 | End: 2018-03-14 | Stop reason: SDUPTHER

## 2018-01-29 RX ADMIN — SODIUM CHLORIDE: 0.9 INJECTION, SOLUTION INTRAVENOUS at 07:01

## 2018-01-29 RX ADMIN — DIPHENHYDRAMINE HYDROCHLORIDE 50 MG: 50 CAPSULE ORAL at 06:01

## 2018-01-29 NOTE — ASSESSMENT & PLAN NOTE
1. Cardiac catheterization with possible PCI.   2. Antiplatelets: On ASA. Loaded with Plavix.  3. Access: R Radial  4. Catheters: Shahab, 6F Slender Sheath  5. Pt is a ALEXANDRU candidate and understands the importance of taking plavix for at least one year, understands that in case of receiving a drug coated stent the failure to comply with dual anti-platelet therapy is likely to result in stent clothing, heart attack and death.   6. The risks, benefits, and alternatives of coronary vascular angiography and possible intervention were discussed with the patient. All questions were answered and informed consent was obtained. I had a detailed discussion with the patient regarding risk of stroke, MI, bleeding access site complications including limb loss, allergy, kidney failure including dialysis and death.  7. The patient understands the risks and benefits and wishes to go ahead with the procedure.  8. All patient's questions were answered

## 2018-01-29 NOTE — HOSPITAL COURSE
Mr. Pete underwent LHC via right radial access.  He did have a moderate lesion in his proximal LAD, which was non-significant based on iFR and FFR.  Ventriculogram showed EF 35-40%.  He tolerated the procedure well without difficulty.  He will follow up with Dr. Velez for further cardiovascular care.

## 2018-01-29 NOTE — NURSING
Received report from Leandro Dacosta. Patient s/p [facility], AAOx3. VSS, no c/o pain or discomfort at this time, resp even and unlabored. Vasc band to right wrist is CDI. No active bleeding. No hematoma noted. Post procedure protocol reviewed with patient and patient's family. Understanding verbalized. Family members at bedside. Nurse call bell within reach. Will continue to monitor per post procedure protocol.

## 2018-01-29 NOTE — PLAN OF CARE
Problem: Patient Care Overview  Goal: Plan of Care Review  Outcome: Ongoing (interventions implemented as appropriate)  Patient arrived to room. PIV placed, labs sent. Admit assessment completed. Plan of care discussed with patient. Will monitor.

## 2018-01-29 NOTE — HPI
"Mr. Pete is a delightful man born in [place] with a PMH significant for DM and HTN. He was diagnosed just after Sarah, but he was unable to tolerate metformin and was lost to follow-up. His most recent Hgb A1C was 10. He is now on insulin and is following with his PCP, Dr. Lebron.      He was hospitalized on 11/12 with decompensated heart failure. He diuresed with IV lasix and had a thoracentesis with 1500ccs clear fluid removed. An echo showed an EF= 25%.  He had a nuclear stress test that showed no evidence of ischemia.      He states since his discharge he feels "much better". He denies any LYONS, CP, PND, orthopnea, or LE edema. He walks 6-8 blocks/day and has no symptoms as long as he does not go "too fast". He does have fatigue and states his stamina has not returned to baseline since his discharge.      He saw Dr. Velez in clinic and was subsequently referred for coronary angiography to rule out ischemia as an etiology for his cardiomyopathy.   "

## 2018-01-29 NOTE — DISCHARGE INSTRUCTIONS
Discharge Instructions and Care of Your Wrist After a Cardiac Catheterization Procedure Performed via the Radial Artery    For 3-5 days following the procedure:  Do not subject your affected hand/arm to any forceful movements (i.e. supporting weight when rising from a chair or bed)  Avoid excessive (extension/flexion) wrist movement (i.e. supporting weight when rising from a chair or bed, push-ups, lifting garage doors, etc.)  Do not drive a car for 24 hours  The dressing on the puncture site may be removed after 24 hours and left open to air. If there is minor oozing, you may apply a Band-aid and remove after 12 hours  You may shower on the day following your procedure. Do not take a tub bath or submerge the puncture site in water for 3 days following the procedure  Do not lift anything heavier than 3 to 5 pounds with the affected hand/arm  Do not operate a lawnmower, motorcycle, chainsaw, or all-terrain vehicle       Do not engage in vigorous exercise (i.e. Tennis, Golf, Bowling) using the affected arm    If bleeding should occur following discharge:  Sit down and apply firm pressure to the puncture site with your fingers for 10 minutes   If the bleeding stops, continue to sit quietly, keeping your wrist straight for 2 hours. Notify your physician as soon as possible   If bleeding does not stop after 10 minutes or if there is a large amount of bleeding or spurting, call 911 immediately. Do not drive yourself to the hospital.     You should expect mild tingling in your hand and tenderness at the puncture site for up to 3 days.     Notify your physician if these symptoms persist or if you experience:  Change in color or temperature of the hand or arm  Redness, heat, or pus at the puncture site  Chills or fever greater than 101.0 F

## 2018-01-29 NOTE — PROGRESS NOTES
POST CATH NOTE    Procedure:  [facility] with FFR  Referring MD:  Dr. Velez    Indication:  Depressed LVEF   Access:  R Radial    Findings:  Proximal LAD 50% stenosis, not significant based on IFR/FFR    Intervention:  None    Patient tolerated the procedure well, no complications    Post Cath Exam:  Vitals:    01/29/18 0618   BP: (!) 148/80   Pulse:    Resp:    Temp:      No unusual pain, hematoma or thrill at vascular access site.  Distal pulse present without signs of ischemia.    Recommendation:  -Medical management  -high-potency statin, ACE-I and BB therapy  -vascular risk factor control  -f/u with Dr. Rosie Alfaro MD  Cardiology Fellow

## 2018-01-29 NOTE — TELEPHONE ENCOUNTER
Short term disability approval would come from his insurance's disability department. I have a confirmation from [employer] that we submitted claim - placed in the MA box, can send a copy to patient if desired.

## 2018-01-29 NOTE — NURSING
1045 - 9223    Vasc band removed per protocol over 1 hour period. Patient tolerated well. Gauze/tegaderm dressing placed. Will monitor.

## 2018-01-29 NOTE — SUBJECTIVE & OBJECTIVE
Past Medical History:   Diagnosis Date    CHF (congestive heart failure)     Combined systolic and diastolic cardiac dysfunction 2005    Hypertension, essential 11/12/2017    Pleural effusion 11/12/2017    treated with thoracentesis, appeared to be due to CHF    Proteinuria due to type 2 diabetes mellitus 2017    SOB (shortness of breath) 11/14/2017    Type 2 diabetes mellitus 2005    Type 2 diabetes mellitus with diabetic polyneuropathy, without long-term current use of insulin 12/12/2017       Past Surgical History:   Procedure Laterality Date    BACK SURGERY  1990       Review of patient's allergies indicates:   Allergen Reactions    Metformin Diarrhea     On 500mg he had such GI issues he lost weight       PTA Medications   Medication Sig    aspirin (ECOTRIN) 81 MG EC tablet Take 1 tablet (81 mg total) by mouth once daily.    atorvastatin (LIPITOR) 20 MG tablet Take 1 tablet (20 mg total) by mouth once daily.    carvedilol (COREG) 3.125 MG tablet Take 2 tablets (6.25 mg total) by mouth 2 (two) times daily.    clopidogrel (PLAVIX) 75 mg tablet Take 4 pills the night before procedure, then 1 pill daily.    furosemide (LASIX) 40 MG tablet Take 1 tablet (40 mg total) by mouth once daily.    glipiZIDE (GLUCOTROL) 5 MG tablet Take 1 tablet (5 mg total) by mouth 2 (two) times daily with meals.    insulin detemir (LEVEMIR FLEXTOUCH) 100 unit/mL (3 mL) SubQ InPn pen Inject 10 Units into the skin every evening.    lisinopril 10 MG tablet Take 2 tablets (20 mg total) by mouth once daily.    blood sugar diagnostic Strp 1 strip by Misc.(Non-Drug; Combo Route) route 2 (two) times daily with meals.    blood-glucose meter kit Use as instructed    blood-glucose meter kit Use as instructed    lancets Misc 1 lancet by Misc.(Non-Drug; Combo Route) route 2 (two) times daily with meals.    lancing device Misc 1 Device by Misc.(Non-Drug; Combo Route) route 2 (two) times daily with meals.     Family History      Problem Relation (Age of Onset)    Emphysema Father    HIV Brother    Heart failure Mother        Social History Main Topics    Smoking status: Never Smoker    Smokeless tobacco: Never Used    Alcohol use No      Comment: hasn't drank since October 2017, previous 1-2 beers/day    Drug use: No    Sexual activity: Yes     Review of Systems   Constitution: Negative.   HENT: Negative.    Eyes: Negative.    Cardiovascular: Positive for dyspnea on exertion. Negative for chest pain, irregular heartbeat and leg swelling.   Respiratory: Negative.    Gastrointestinal: Negative.    Genitourinary: Negative.    Neurological: Negative.      Objective:     Vital Signs (Most Recent):  Temp: 97.8 °F (36.6 °C) (01/29/18 0617)  Pulse: 76 (01/29/18 0617)  Resp: 16 (01/29/18 0617)  BP: (!) 148/80 (01/29/18 0618)  SpO2: 100 % (01/29/18 0617) Vital Signs (24h Range):  Temp:  [97.8 °F (36.6 °C)] 97.8 °F (36.6 °C)  Pulse:  [76] 76  Resp:  [16] 16  SpO2:  [100 %] 100 %  BP: (148-162)/(77-80) 148/80     Weight: 74.8 kg (165 lb)  Body mass index is 21.18 kg/m².    SpO2: 100 %  O2 Device (Oxygen Therapy): room air    No intake or output data in the 24 hours ending 01/29/18 0650    Lines/Drains/Airways     Peripheral Intravenous Line                 Peripheral IV - Single Lumen 11/12/17 1114 Left Antecubital 77 days         Peripheral IV - Single Lumen 01/29/18 0617 Left Antecubital less than 1 day         Peripheral IV - Single Lumen 01/29/18 0617 Left Hand less than 1 day                Physical Exam   Constitutional: He is oriented to person, place, and time. He appears well-developed and well-nourished.   HENT:   Head: Normocephalic and atraumatic.   Eyes: Conjunctivae and EOM are normal. Pupils are equal, round, and reactive to light.   Neck: Normal range of motion. Neck supple.   Cardiovascular: Normal rate, regular rhythm, normal heart sounds and intact distal pulses.    Pulses:       Radial pulses are 2+ on the right side, and 2+ on  the left side.        Femoral pulses are 2+ on the right side, and 2+ on the left side.       Dorsalis pedis pulses are 2+ on the right side, and 2+ on the left side.        Posterior tibial pulses are 2+ on the right side, and 2+ on the left side.   Normal Smith's on right.    Pulmonary/Chest: Effort normal and breath sounds normal.   Abdominal: Soft. Bowel sounds are normal.   Neurological: He is alert and oriented to person, place, and time.   Skin: Skin is warm and dry.   Psychiatric: He has a normal mood and affect. His behavior is normal.   Nursing note and vitals reviewed.      Significant Labs: BMP: No results for input(s): GLU, NA, K, CL, CO2, BUN, CREATININE, CALCIUM, MG in the last 48 hours. and CBC No results for input(s): WBC, HGB, HCT, PLT in the last 48 hours.    Significant Imaging:   Echocardiogram: 2D echo:   1 - Eccentric LVH with severely depressed left ventricular systolic function (EF 25-30%).     2 - Severe left atrial enlargement.     3 - There is diastolic dysfunction present the stage is Indeterminate.     4 - Right ventricular enlargement with low normal to mildly depressed systolic function.     5 - Right pleural effusion.     6 - Small pericardial effusion.     7 - There is AI noted on limited images.

## 2018-01-29 NOTE — DISCHARGE SUMMARY
"[facility]  Interventional Cardiology  Discharge Summary      Patient Name: Jose Pete  MRN: [id number]  Admission Date: 1/29/2018  Hospital Length of Stay: 0 days  Discharge Date and Time:  01/29/2018 3:08 PM  Attending Physician: Dr. Saucedo  Discharging Provider: Joaquin Alfaro MD  Primary Care Physician: Milagros Lebron MD    HPI:  Mr. Pete is a delightful man born in [place] with a PMH significant for DM and HTN. He was diagnosed just after Sarah, but he was unable to tolerate metformin and was lost to follow-up. His most recent Hgb A1C was 10. He is now on insulin and is following with his PCP, Dr. Lebron.      He was hospitalized on 11/12 with decompensated heart failure. He diuresed with IV lasix and had a thoracentesis with 1500ccs clear fluid removed. An echo showed an EF= 25%.  He had a nuclear stress test that showed no evidence of ischemia.      He states since his discharge he feels "much better". He denies any LYONS, CP, PND, orthopnea, or LE edema. He walks 6-8 blocks/day and has no symptoms as long as he does not go "too fast". He does have fatigue and states his stamina has not returned to baseline since his discharge.      He saw Dr. Velez in clinic and was subsequently referred for coronary angiography to rule out ischemia as an etiology for his cardiomyopathy.     Procedure(s) (LRB):  Left heart cath (Left)     Indwelling Lines/Drains at time of discharge:  Lines/Drains/Airways          No matching active lines, drains, or airways          Hospital Course:  Mr. Pete underwent LHC via right radial access.  He did have a moderate lesion in his proximal LAD, which was non-significant based on iFR and FFR.  Ventriculogram showed EF 35-40%.  He tolerated the procedure well without difficulty.  He will follow up with Dr. Velez for further cardiovascular care.         Significant Diagnostic Studies: Angiography:     Pending Diagnostic Studies:     Procedure Component " Value Units Date/Time    EKG 12-LEAD starting tomorrow [[id number]]     Order Status:  Sent Lab Status:  No result         No new Assessment & Plan notes have been filed under this hospital service since the last note was generated.  Service: Interventional Cardiology      Discharged Condition: good    Follow Up:  Follow-up Information     Keegan Velez MD.    Specialty:  Cardiology  Contact information:  [address]  [facility] [id number] [phone]                 Patient Instructions:     Type & Screen   Standing Status: Future  Standing Exp. Date: 02/13/19       Medications:  Reconciled Home Medications:   Discharge Medication List as of 1/29/2018 12:56 PM      CONTINUE these medications which have CHANGED    Details   atorvastatin (LIPITOR) 40 MG tablet Take 1 tablet (40 mg total) by mouth once daily., Starting Mon 1/29/2018, Until Tue 1/29/2019, Normal         CONTINUE these medications which have NOT CHANGED    Details   aspirin (ECOTRIN) 81 MG EC tablet Take 1 tablet (81 mg total) by mouth once daily., Starting Thu 11/16/2017, Until Fri 11/16/2018, OTC      carvedilol (COREG) 3.125 MG tablet Take 2 tablets (6.25 mg total) by mouth 2 (two) times daily., Starting Mon 12/4/2017, Until Tue 12/4/2018, Normal      furosemide (LASIX) 40 MG tablet Take 1 tablet (40 mg total) by mouth once daily., Starting Thu 11/16/2017, Until Fri 11/16/2018, Normal      glipiZIDE (GLUCOTROL) 5 MG tablet Take 1 tablet (5 mg total) by mouth 2 (two) times daily with meals., Starting Tue 12/12/2017, Normal      insulin detemir (LEVEMIR FLEXTOUCH) 100 unit/mL (3 mL) SubQ InPn pen Inject 10 Units into the skin every evening., Starting Tue 12/12/2017, No Print      lisinopril 10 MG tablet Take 2 tablets (20 mg total) by mouth once daily., Starting Mon 1/8/2018, Until Tue 1/8/2019, Normal      blood sugar diagnostic Strp 1 strip by Misc.(Non-Drug; Combo Route) route 2 (two) times daily with meals., Starting Tue  11/14/2017, Normal      !! blood-glucose meter kit Use as instructed, Normal      !! blood-glucose meter kit Use as instructed, Normal      lancets Misc 1 lancet by Misc.(Non-Drug; Combo Route) route 2 (two) times daily with meals., Starting Tue 11/14/2017, Normal      lancing device Misc 1 Device by Misc.(Non-Drug; Combo Route) route 2 (two) times daily with meals., Starting Tue 11/14/2017, Until Wed 11/14/2018, Normal       !! - Potential duplicate medications found. Please discuss with provider.      STOP taking these medications       clopidogrel (PLAVIX) 75 mg tablet Comments:   Reason for Stopping:                 Joaquin Alfaro MD  Interventional Cardiology  [facility]

## 2018-01-29 NOTE — H&P
"[facility]  Interventional Cardiology  H&P    Patient Name: Jose Pete  MRN: [id number]  Admission Date: 1/29/2018  Code Status: Prior   Attending Provider: Stone Escudero MD   Primary Care Physician: Milagros Lebron MD  Principal Problem:Cardiomyopathy  Referring: Dr. Velez    Patient information was obtained from patient and past medical records.     Subjective:     Chief Complaint:  HFrEF     HPI:  Mr. ePte is a delightful man born in [place] with a PMH significant for DM and HTN. He was diagnosed just after Asrah, but he was unable to tolerate metformin and was lost to follow-up. His most recent Hgb A1C was 10. He is now on insulin and is following with his PCP, Dr. Lebron.      He was hospitalized on 11/12 with decompensated heart failure. He diuresed with IV lasix and had a thoracentesis with 1500ccs clear fluid removed. An echo showed an EF= 25%.  He had a nuclear stress test that showed no evidence of ischemia.      He states since his discharge he feels "much better". He denies any LYONS, CP, PND, orthopnea, or LE edema. He walks 6-8 blocks/day and has no symptoms as long as he does not go "too fast". He does have fatigue and states his stamina has not returned to baseline since his discharge.      He saw Dr. Velez in clinic and was subsequently referred for coronary angiography to rule out ischemia as an etiology for his cardiomyopathy.     Past Medical History:   Diagnosis Date    CHF (congestive heart failure)     Combined systolic and diastolic cardiac dysfunction 2005    Hypertension, essential 11/12/2017    Pleural effusion 11/12/2017    treated with thoracentesis, appeared to be due to CHF    Proteinuria due to type 2 diabetes mellitus 2017    SOB (shortness of breath) 11/14/2017    Type 2 diabetes mellitus 2005    Type 2 diabetes mellitus with diabetic polyneuropathy, without long-term current use of insulin 12/12/2017       Past Surgical History: "   Procedure Laterality Date    BACK SURGERY  1990       Review of patient's allergies indicates:   Allergen Reactions    Metformin Diarrhea     On 500mg he had such GI issues he lost weight       PTA Medications   Medication Sig    aspirin (ECOTRIN) 81 MG EC tablet Take 1 tablet (81 mg total) by mouth once daily.    atorvastatin (LIPITOR) 20 MG tablet Take 1 tablet (20 mg total) by mouth once daily.    carvedilol (COREG) 3.125 MG tablet Take 2 tablets (6.25 mg total) by mouth 2 (two) times daily.    clopidogrel (PLAVIX) 75 mg tablet Take 4 pills the night before procedure, then 1 pill daily.    furosemide (LASIX) 40 MG tablet Take 1 tablet (40 mg total) by mouth once daily.    glipiZIDE (GLUCOTROL) 5 MG tablet Take 1 tablet (5 mg total) by mouth 2 (two) times daily with meals.    insulin detemir (LEVEMIR FLEXTOUCH) 100 unit/mL (3 mL) SubQ InPn pen Inject 10 Units into the skin every evening.    lisinopril 10 MG tablet Take 2 tablets (20 mg total) by mouth once daily.    blood sugar diagnostic Strp 1 strip by Misc.(Non-Drug; Combo Route) route 2 (two) times daily with meals.    blood-glucose meter kit Use as instructed    blood-glucose meter kit Use as instructed    lancets Misc 1 lancet by Misc.(Non-Drug; Combo Route) route 2 (two) times daily with meals.    lancing device Misc 1 Device by Misc.(Non-Drug; Combo Route) route 2 (two) times daily with meals.     Family History     Problem Relation (Age of Onset)    Emphysema Father    HIV Brother    Heart failure Mother        Social History Main Topics    Smoking status: Never Smoker    Smokeless tobacco: Never Used    Alcohol use No      Comment: hasn't drank since October 2017, previous 1-2 beers/day    Drug use: No    Sexual activity: Yes     Review of Systems   Constitution: Negative.   HENT: Negative.    Eyes: Negative.    Cardiovascular: Positive for dyspnea on exertion. Negative for chest pain, irregular heartbeat and leg swelling.    Respiratory: Negative.    Gastrointestinal: Negative.    Genitourinary: Negative.    Neurological: Negative.      Objective:     Vital Signs (Most Recent):  Temp: 97.8 °F (36.6 °C) (01/29/18 0617)  Pulse: 76 (01/29/18 0617)  Resp: 16 (01/29/18 0617)  BP: (!) 148/80 (01/29/18 0618)  SpO2: 100 % (01/29/18 0617) Vital Signs (24h Range):  Temp:  [97.8 °F (36.6 °C)] 97.8 °F (36.6 °C)  Pulse:  [76] 76  Resp:  [16] 16  SpO2:  [100 %] 100 %  BP: (148-162)/(77-80) 148/80     Weight: 74.8 kg (165 lb)  Body mass index is 21.18 kg/m².    SpO2: 100 %  O2 Device (Oxygen Therapy): room air    No intake or output data in the 24 hours ending 01/29/18 0650    Lines/Drains/Airways     Peripheral Intravenous Line                 Peripheral IV - Single Lumen 11/12/17 1114 Left Antecubital 77 days         Peripheral IV - Single Lumen 01/29/18 0617 Left Antecubital less than 1 day         Peripheral IV - Single Lumen 01/29/18 0617 Left Hand less than 1 day                Physical Exam   Constitutional: He is oriented to person, place, and time. He appears well-developed and well-nourished.   HENT:   Head: Normocephalic and atraumatic.   Eyes: Conjunctivae and EOM are normal. Pupils are equal, round, and reactive to light.   Neck: Normal range of motion. Neck supple.   Cardiovascular: Normal rate, regular rhythm, normal heart sounds and intact distal pulses.    Pulses:       Radial pulses are 2+ on the right side, and 2+ on the left side.        Femoral pulses are 2+ on the right side, and 2+ on the left side.       Dorsalis pedis pulses are 2+ on the right side, and 2+ on the left side.        Posterior tibial pulses are 2+ on the right side, and 2+ on the left side.   Normal Smith's on right.    Pulmonary/Chest: Effort normal and breath sounds normal.   Abdominal: Soft. Bowel sounds are normal.   Neurological: He is alert and oriented to person, place, and time.   Skin: Skin is warm and dry.   Psychiatric: He has a normal mood and  affect. His behavior is normal.   Nursing note and vitals reviewed.      Significant Labs: BMP: No results for input(s): GLU, NA, K, CL, CO2, BUN, CREATININE, CALCIUM, MG in the last 48 hours. and CBC No results for input(s): WBC, HGB, HCT, PLT in the last 48 hours.    Significant Imaging:   Echocardiogram: 2D echo:   1 - Eccentric LVH with severely depressed left ventricular systolic function (EF 25-30%).     2 - Severe left atrial enlargement.     3 - There is diastolic dysfunction present the stage is Indeterminate.     4 - Right ventricular enlargement with low normal to mildly depressed systolic function.     5 - Right pleural effusion.     6 - Small pericardial effusion.     7 - There is AI noted on limited images.     Assessment and Plan:     * Cardiomyopathy    [facility] as above.         HFrEF (heart failure with reduced ejection fraction)    1. Cardiac catheterization with possible PCI.   2. Antiplatelets: On ASA. Loaded with Plavix.  3. Access: R Radial  4. Catheters: Shahab, 6F Slender Sheath  5. Pt is a ALEXANDRU candidate and understands the importance of taking plavix for at least one year, understands that in case of receiving a drug coated stent the failure to comply with dual anti-platelet therapy is likely to result in stent clothing, heart attack and death.   6. The risks, benefits, and alternatives of coronary vascular angiography and possible intervention were discussed with the patient. All questions were answered and informed consent was obtained. I had a detailed discussion with the patient regarding risk of stroke, MI, bleeding access site complications including limb loss, allergy, kidney failure including dialysis and death.  7. The patient understands the risks and benefits and wishes to go ahead with the procedure.  8. All patient's questions were answered            Joaquin Alfaro MD  Interventional Cardiology   [facility]

## 2018-01-30 NOTE — TELEPHONE ENCOUNTER
Spoke with pt. Pt was given a copy last week of the submitted claim. Let pt know that his short term disability would come from pt's disability department. Verbalized understanding.

## 2018-02-05 ENCOUNTER — OFFICE VISIT (OUTPATIENT)
Dept: CARDIOLOGY | Facility: CLINIC | Age: 68
End: 2018-02-05
Payer: COMMERCIAL

## 2018-02-05 VITALS
SYSTOLIC BLOOD PRESSURE: 150 MMHG | DIASTOLIC BLOOD PRESSURE: 72 MMHG | BODY MASS INDEX: 21.42 KG/M2 | HEART RATE: 79 BPM | HEIGHT: 74 IN | WEIGHT: 166.88 LBS

## 2018-02-05 DIAGNOSIS — I50.22 CHRONIC SYSTOLIC HEART FAILURE: Primary | ICD-10-CM

## 2018-02-05 DIAGNOSIS — I10 HYPERTENSION, ESSENTIAL: ICD-10-CM

## 2018-02-05 PROCEDURE — 99214 OFFICE O/P EST MOD 30 MIN: CPT | Mod: S$GLB,,, | Performed by: INTERNAL MEDICINE

## 2018-02-05 PROCEDURE — 3008F BODY MASS INDEX DOCD: CPT | Mod: S$GLB,,, | Performed by: INTERNAL MEDICINE

## 2018-02-05 PROCEDURE — 99999 PR PBB SHADOW E&M-EST. PATIENT-LVL IV: CPT | Mod: PBBFAC,,, | Performed by: INTERNAL MEDICINE

## 2018-02-05 PROCEDURE — 1126F AMNT PAIN NOTED NONE PRSNT: CPT | Mod: S$GLB,,, | Performed by: INTERNAL MEDICINE

## 2018-02-05 PROCEDURE — 1159F MED LIST DOCD IN RCRD: CPT | Mod: S$GLB,,, | Performed by: INTERNAL MEDICINE

## 2018-02-05 RX ORDER — CARVEDILOL 12.5 MG/1
12.5 TABLET ORAL 2 TIMES DAILY WITH MEALS
Qty: 60 TABLET | Refills: 11 | Status: SHIPPED | OUTPATIENT
Start: 2018-02-05 | End: 2018-03-28 | Stop reason: SDUPTHER

## 2018-02-05 NOTE — ASSESSMENT & PLAN NOTE
Clinically stable. LHC shows non-obstructive CAD and LVEF 35-40% (better than previously reported by echo).     Repeat echocardiogram and if LVEF >35% no referral for ICD  Increase carvedilol to 12.5 mg bid  Continue Lisinopril 20 mg qd  Continue Furosemide 40 mg qd  RTC in 3 months

## 2018-02-05 NOTE — ASSESSMENT & PLAN NOTE
Unclear how well controlled BP us at home. Today in clinic /72 but he was upset because of problems with his medical insurance      Home BP monitoring as instructed (tid for 5 days) starting in 7-10 days   Increase carvedilol to 12.5 mg bid  Continue lisinopril 20 mg qd

## 2018-02-16 ENCOUNTER — PROCEDURE VISIT (OUTPATIENT)
Dept: OPHTHALMOLOGY | Facility: CLINIC | Age: 68
End: 2018-02-16
Payer: COMMERCIAL

## 2018-02-16 VITALS — SYSTOLIC BLOOD PRESSURE: 121 MMHG | DIASTOLIC BLOOD PRESSURE: 76 MMHG | HEART RATE: 95 BPM

## 2018-02-16 DIAGNOSIS — Z79.4 TYPE 2 DIABETES MELLITUS WITH LEFT EYE AFFECTED BY MODERATE NONPROLIFERATIVE RETINOPATHY AND MACULAR EDEMA, WITH LONG-TERM CURRENT USE OF INSULIN: Primary | ICD-10-CM

## 2018-02-16 DIAGNOSIS — E11.3312 TYPE 2 DIABETES MELLITUS WITH LEFT EYE AFFECTED BY MODERATE NONPROLIFERATIVE RETINOPATHY AND MACULAR EDEMA, WITH LONG-TERM CURRENT USE OF INSULIN: Primary | ICD-10-CM

## 2018-02-16 DIAGNOSIS — E11.3391 TYPE 2 DIABETES MELLITUS WITH RIGHT EYE AFFECTED BY MODERATE NONPROLIFERATIVE RETINOPATHY WITHOUT MACULAR EDEMA, WITH LONG-TERM CURRENT USE OF INSULIN: ICD-10-CM

## 2018-02-16 DIAGNOSIS — Z79.4 TYPE 2 DIABETES MELLITUS WITH RIGHT EYE AFFECTED BY MODERATE NONPROLIFERATIVE RETINOPATHY WITHOUT MACULAR EDEMA, WITH LONG-TERM CURRENT USE OF INSULIN: ICD-10-CM

## 2018-02-16 PROCEDURE — 92134 CPTRZ OPH DX IMG PST SGM RTA: CPT | Mod: S$GLB,,, | Performed by: OPHTHALMOLOGY

## 2018-02-16 PROCEDURE — 92012 INTRM OPH EXAM EST PATIENT: CPT | Mod: S$GLB,,, | Performed by: OPHTHALMOLOGY

## 2018-02-16 PROCEDURE — 92235 FLUORESCEIN ANGRPH MLTIFRAME: CPT | Mod: S$GLB,,, | Performed by: OPHTHALMOLOGY

## 2018-02-16 PROCEDURE — 92226 PR SPECIAL EYE EXAM, SUBSEQUENT: CPT | Mod: RT,S$GLB,, | Performed by: OPHTHALMOLOGY

## 2018-02-16 RX ORDER — CARVEDILOL 3.12 MG/1
TABLET ORAL
COMMUNITY
Start: 2018-02-12 | End: 2018-03-28

## 2018-02-16 NOTE — PATIENT INSTRUCTIONS
Fluorescein Angiogram procedure explained to pt. FA handout attached to AVS below.      Fluorescein Angiography  Fluorescein angiography is an eye test. It is done to look at the back of your eye, including:  · The blood vessels in your eye  · The layer of tissue at the back of your eye (the retina)  · The center of your retina (the macula)  · The optic nerve  This test can diagnose diseases found in these areas. It can also diagnose other conditions that affect these areas. To do this test, a dye called fluorescein is shot (injected) into your arm. The dye goes into your bloodstream and up into the blood vessels in your eyes. A special camera is then used to take images (angiograms) of your eyes.     A fluorescein angiogram of the retina   Getting ready for your test  Tell your healthcare provider if you:  · Are pregnant or think you may be pregnant  · Are breastfeeding  · Have a history of severe allergic reactions, including to X-ray dye or other medicines  · Have kidney problems  Tell your provider about any medicines you are taking. You may need to stop taking all or some of these before the test. This includes:  · All prescription medicines  · Over-the-counter medicines such as aspirin or ibuprofen  · Street drugs  · Herbs, vitamins, and other supplements  You should arrange for an adult family member or friend to drive you home after your test. Your vision will be blurry for up to 12 hours.  Follow any other instructions from your healthcare provider.  During your test  · You are given eye drops to enlarge (dilate) your pupils.  · You then sit in front of a special camera. You place your chin on the chin rest and look into the camera.  · Images are taken of your eyes, one eye at a time.  · Fluorescein dye is then injected into your arm. The lights in the room are turned off. You may have mild nausea. You may have a warm feeling in your arm or upper body. Tell your provider if your skin feels itchy or if you  are having trouble breathing. If so, you could be having an allergic reaction to the dye.  · More pictures of your eyes are taken over 15 to 30 minutes. The camera shines a bright light into your eyes. Try to keep your head still and your eyes open.  · When enough images have been taken, the test is over.  After your test  Your vision will be blurry for up to 4 to 12 hours. This is because of your dilated pupils. Your eye will be more sensitive to light for up to 12 hours. You may want to wear sunglasses during this time. Do not drive if your vision is very blurry. You may also find it uncomfortable to read. Your skin may look yellow for a few hours. This is from the dye. Your urine will be bright yellow or orange for 24 to 48 hours after the test.     Risks and possible complications  All procedures have some risks. Possible risks of fluorescein angiography include:  · Upset stomach (nausea) and vomiting  · Leaking dye around the injection site that causes pain and swelling  · Metallic taste in your mouth  · Infection at injection site  · Allergic reaction to the dye  · Dry mouth or too much saliva  · Faster heart rate  · Sweating  · Lower back pain

## 2018-03-08 ENCOUNTER — OFFICE VISIT (OUTPATIENT)
Dept: INTERNAL MEDICINE | Facility: CLINIC | Age: 68
End: 2018-03-08
Payer: MEDICARE

## 2018-03-08 VITALS
OXYGEN SATURATION: 98 % | WEIGHT: 168 LBS | HEIGHT: 74 IN | DIASTOLIC BLOOD PRESSURE: 78 MMHG | HEART RATE: 80 BPM | BODY MASS INDEX: 21.56 KG/M2 | SYSTOLIC BLOOD PRESSURE: 124 MMHG

## 2018-03-08 DIAGNOSIS — I50.22 CHRONIC SYSTOLIC CONGESTIVE HEART FAILURE, NYHA CLASS 3: Primary | ICD-10-CM

## 2018-03-08 DIAGNOSIS — E11.21 TYPE 2 DIABETES MELLITUS WITH DIABETIC NEPHROPATHY, WITH LONG-TERM CURRENT USE OF INSULIN: ICD-10-CM

## 2018-03-08 DIAGNOSIS — I50.22 CHRONIC SYSTOLIC HEART FAILURE: ICD-10-CM

## 2018-03-08 DIAGNOSIS — I10 HYPERTENSION, ESSENTIAL: ICD-10-CM

## 2018-03-08 DIAGNOSIS — Z79.4 TYPE 2 DIABETES MELLITUS WITH DIABETIC NEPHROPATHY, WITH LONG-TERM CURRENT USE OF INSULIN: ICD-10-CM

## 2018-03-08 DIAGNOSIS — I42.9 CARDIOMYOPATHY, UNSPECIFIED TYPE: ICD-10-CM

## 2018-03-08 PROCEDURE — 99214 OFFICE O/P EST MOD 30 MIN: CPT | Mod: S$PBB,,, | Performed by: INTERNAL MEDICINE

## 2018-03-08 PROCEDURE — 99214 OFFICE O/P EST MOD 30 MIN: CPT | Mod: PBBFAC | Performed by: INTERNAL MEDICINE

## 2018-03-08 PROCEDURE — 99999 PR PBB SHADOW E&M-EST. PATIENT-LVL IV: CPT | Mod: PBBFAC,,, | Performed by: INTERNAL MEDICINE

## 2018-03-08 NOTE — PROGRESS NOTES
"Subjective:       Patient ID: Jose Pete is a 67 y.o. male.    Chief Complaint: Follow-up (3 month follow up.)    HPI     66 yo M here for follow up of DM2, systolic and diastolic HF.   Echo with global hypokinesis, EF 25%, severe LVH.   [facility] 1/17/18 - non-obstructive CAD and LV global hypokenesis with 35-40% EF.   He never had symptoms to suggest myocardial ischemia and heart failure. He was unaware of having poor cardiac function until the recent hospital admission. 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   Last cards visit:  Increase carvedilol to 12.5 mg bid  Continue Lisinopril 20 mg qd  Continue Furosemide 40 mg qd    BP has been elevated on cardiology visits.     DM  Start glip 5 bid meals  Stop humalog   Dec levemir to 10 u qhs - plan to wean off    Trouble affording levemir.     Not short of breath but gets weak trying to work in yard and has to take a break. He can do light work for about 10 minutes before he needs a break (trimming bushes.) he cannot mow the grass. He can walk slowly 6 blocks at a time before he needs to stop and take a break.   Review of Systems   Constitutional: Negative for fever.   Respiratory: Negative for shortness of breath.    Cardiovascular: Negative for chest pain.   Musculoskeletal: Negative.    Skin: Negative.        Objective:   /78   Pulse 80   Ht 6' 2" (1.88 m)   Wt 76.2 kg (168 lb)   SpO2 98%   BMI 21.57 kg/m²      Physical Exam   Constitutional: He is oriented to person, place, and time. He appears well-developed and well-nourished. No distress.   HENT:   Head: Normocephalic and atraumatic.   Cardiovascular: Normal rate and regular rhythm.    No murmur heard.  Pulmonary/Chest: Effort normal. No respiratory distress. He has no wheezes. He has no rales.   Neurological: He is alert and oriented to person, place, and time.   Skin: Skin is warm and dry. He is not " diaphoretic.   Psychiatric: He has a normal mood and affect. His behavior is normal.       Assessment:       1. Chronic systolic congestive heart failure, NYHA class 3    2. Cardiomyopathy, unspecified type    3. Chronic systolic heart failure    4. Hypertension, essential    5. Type 2 diabetes mellitus with diabetic nephropathy, with long-term current use of insulin        Plan:       Jose was seen today for follow-up.    Diagnoses and all orders for this visit:    Class 3 systolic heart failure  Chronic systolic heart failure, LV hypokinesis  -     CBC auto differential; Future  -     Comprehensive metabolic panel; Future  -     Lipid panel; Future  He continues to have symptomatic heart failure with dyspnea at 6 blocks and inability to perform the taxing work that would be required of him at the box factory.       Hypertension, essential  -     CBC auto differential; Future  -     Comprehensive metabolic panel; Future   At goal on recheck otday    Type 2 diabetes mellitus with diabetic nephropathy, with long-term current use of insulin  -     CBC auto differential; Future  -     Comprehensive metabolic panel; Future  -     Hemoglobin A1c; Future  -     Lipid panel; Future  -     insulin detemir U-100 (LEVEMIR FLEXTOUCH) 100 unit/mL (3 mL) SubQ InPn pen; Inject 10 Units into the skin every evening.  -     Ambulatory Referral to Pharmacy Assistance for levemir  Continue glipizide  -     Ambulatory Referral to Diabetes Education            The Standard: Claim number: [id number]

## 2018-03-12 ENCOUNTER — CLINICAL SUPPORT (OUTPATIENT)
Dept: DIABETES | Facility: CLINIC | Age: 68
End: 2018-03-12
Payer: MEDICARE

## 2018-03-12 ENCOUNTER — PATIENT OUTREACH (OUTPATIENT)
Dept: DIABETES | Facility: CLINIC | Age: 68
End: 2018-03-12

## 2018-03-12 ENCOUNTER — LAB VISIT (OUTPATIENT)
Dept: LAB | Facility: HOSPITAL | Age: 68
End: 2018-03-12
Attending: INTERNAL MEDICINE
Payer: MEDICARE

## 2018-03-12 DIAGNOSIS — Z79.4 TYPE 2 DIABETES MELLITUS WITH DIABETIC NEPHROPATHY, WITH LONG-TERM CURRENT USE OF INSULIN: ICD-10-CM

## 2018-03-12 DIAGNOSIS — I42.9 CARDIOMYOPATHY, UNSPECIFIED TYPE: ICD-10-CM

## 2018-03-12 DIAGNOSIS — E11.65 TYPE 2 DIABETES MELLITUS WITH HYPERGLYCEMIA, UNSPECIFIED LONG TERM INSULIN USE STATUS: Primary | ICD-10-CM

## 2018-03-12 DIAGNOSIS — I10 HYPERTENSION, ESSENTIAL: ICD-10-CM

## 2018-03-12 DIAGNOSIS — I50.22 CHRONIC SYSTOLIC HEART FAILURE: ICD-10-CM

## 2018-03-12 DIAGNOSIS — E11.21 TYPE 2 DIABETES MELLITUS WITH DIABETIC NEPHROPATHY, WITH LONG-TERM CURRENT USE OF INSULIN: ICD-10-CM

## 2018-03-12 LAB
ALBUMIN SERPL BCP-MCNC: 4.1 G/DL
ALP SERPL-CCNC: 65 U/L
ALT SERPL W/O P-5'-P-CCNC: 27 U/L
ANION GAP SERPL CALC-SCNC: 9 MMOL/L
AST SERPL-CCNC: 18 U/L
BASOPHILS # BLD AUTO: 0.09 K/UL
BASOPHILS NFR BLD: 1.2 %
BILIRUB SERPL-MCNC: 0.7 MG/DL
BUN SERPL-MCNC: 21 MG/DL
CALCIUM SERPL-MCNC: 10.9 MG/DL
CHLORIDE SERPL-SCNC: 101 MMOL/L
CHOLEST SERPL-MCNC: 128 MG/DL
CHOLEST/HDLC SERPL: 3.2 {RATIO}
CO2 SERPL-SCNC: 29 MMOL/L
CREAT SERPL-MCNC: 1 MG/DL
DIFFERENTIAL METHOD: ABNORMAL
EOSINOPHIL # BLD AUTO: 0.3 K/UL
EOSINOPHIL NFR BLD: 3.4 %
ERYTHROCYTE [DISTWIDTH] IN BLOOD BY AUTOMATED COUNT: 12.7 %
EST. GFR  (AFRICAN AMERICAN): >60 ML/MIN/1.73 M^2
EST. GFR  (NON AFRICAN AMERICAN): >60 ML/MIN/1.73 M^2
ESTIMATED AVG GLUCOSE: 212 MG/DL
GLUCOSE SERPL-MCNC: 341 MG/DL
HBA1C MFR BLD HPLC: 9 %
HCT VFR BLD AUTO: 40 %
HDLC SERPL-MCNC: 40 MG/DL
HDLC SERPL: 31.3 %
HGB BLD-MCNC: 13.5 G/DL
LDLC SERPL CALC-MCNC: 69.4 MG/DL
LYMPHOCYTES # BLD AUTO: 1.3 K/UL
LYMPHOCYTES NFR BLD: 17.3 %
MCH RBC QN AUTO: 29.9 PG
MCHC RBC AUTO-ENTMCNC: 33.8 G/DL
MCV RBC AUTO: 89 FL
MONOCYTES # BLD AUTO: 0.8 K/UL
MONOCYTES NFR BLD: 10.2 %
NEUTROPHILS # BLD AUTO: 5.2 K/UL
NEUTROPHILS NFR BLD: 67.8 %
NONHDLC SERPL-MCNC: 88 MG/DL
PLATELET # BLD AUTO: 217 K/UL
PMV BLD AUTO: 11.1 FL
POTASSIUM SERPL-SCNC: 4.7 MMOL/L
PROT SERPL-MCNC: 7.6 G/DL
RBC # BLD AUTO: 4.52 M/UL
SODIUM SERPL-SCNC: 139 MMOL/L
TRIGL SERPL-MCNC: 93 MG/DL
WBC # BLD AUTO: 7.73 K/UL

## 2018-03-12 PROCEDURE — 80061 LIPID PANEL: CPT

## 2018-03-12 PROCEDURE — 80053 COMPREHEN METABOLIC PANEL: CPT

## 2018-03-12 PROCEDURE — 83036 HEMOGLOBIN GLYCOSYLATED A1C: CPT

## 2018-03-12 PROCEDURE — 36415 COLL VENOUS BLD VENIPUNCTURE: CPT

## 2018-03-12 PROCEDURE — 85025 COMPLETE CBC W/AUTO DIFF WBC: CPT

## 2018-03-12 PROCEDURE — G0108 DIAB MANAGE TRN  PER INDIV: HCPCS | Mod: PBBFAC | Performed by: DIETITIAN, REGISTERED

## 2018-03-12 RX ORDER — PEN NEEDLE, DIABETIC 30 GX3/16"
1 NEEDLE, DISPOSABLE MISCELLANEOUS DAILY
Qty: 30 EACH | Refills: 11 | Status: SHIPPED | OUTPATIENT
Start: 2018-03-12 | End: 2023-03-24 | Stop reason: SDUPTHER

## 2018-03-12 NOTE — PROGRESS NOTES
Called pt to discuss today's A1c results. A1c still elevated despite reported BG's within goal range. Pt was previously seen in [facility], but never made a f/u appointment in endo as instructed. Scheduled pt to see nick RIVERA next week Monday 3/12/18 at 8am. Pt confirmed appt time. Instructed pt to test BG's twice daily, fasting and pre-dinner, and bring written logs to appt.

## 2018-03-12 NOTE — PROGRESS NOTES
Diabetes Education  Author: Jannet Guerra RD  Date: 3/12/2018    Diabetes Education Visit  Diabetes Education Record Assessment/Progress: Post Program/Follow-up    Diabetes Type  Diabetes Type : Type II    Diabetes History  Diabetes Diagnosis: >10 years    Nutrition  Meal Planning: 3 meals per day (usually 2 meals daily; breakfast and dinner)  What type of beverages do you drink?: diet soda/tea (water, beet juice (reports no added sugar))  Meal Plan 24 Hour Recall - Breakfast:  (cereal with 3 strawberries)  Meal Plan 24 Hour Recall - Lunch:  (hamburger (whole grain bread), salad (lettuce), apple)  Meal Plan 24 Hour Recall - Dinner:  (butter beans with brown rice (small bowl))  Meal Plan 24 Hour Recall - Snack:  (bananas, strawberries, yogurt)    Monitoring   Self Monitoring :  (SMBG every evening before dinner (8pm): 129 (last night), sometimes 103)  Blood Glucose Logs: No  In the last month, how often have you had a low blood sugar reaction?: never  Can you tell when your blood sugar is too high?: no    Exercise   Exercise Type: walking (6 blocks (starts to feel weak at block 6) - 3 times weekly)  Frequency: 3-5 Times per week    Current Diabetes Treatment   Current Treatment: Oral Medication, Insulin (glipizide 5 mg BID; levemir 10 units at bedtime)    Social History  Preferred Learning Method: Face to Face  Primary Support: Self  Occupation:  (still out on short term disability - thinks he'll after to go on LT)  Smoking Status: Never a Smoker    Barriers to Change  Barriers to Change: None  Learning Challenges : None    Readiness to Learn   Readiness to Learn : Acceptance      Diabetes Education Assessment/Progress  Diabetes Disease Process (diabetes disease process and treatment options): Discussion, Instructed, Individual Session, Comprehends Key Points (Reviewed disease course and changes in medication management. Encouraged to continue following carb-controlled diet and increasing exercise to avoid MDI use in  the future. Previous goal of [facility] NP was to wean completely off insulin, but pt remains on basal therapy. Having repeat A1c today - will decide then to send to chronic endo (this was NP's previous recommendation if not controlled with glipizide) vs management by PCP.)    Nutrition (Incorporating nutritional management into one's lifestyle): Discussion, Instructed, Individual Session, Written Materials Provided, Comprehends Key Points (Limits sodium and saturated fats; eating mostly whole grains. Does not drink SSB. Reviewed carb vs non-carb foods and basic ADA diet recommendations. Encouraged to limit carbs at snacktimes. Double check carb amounts in his beet juice.)    Physical Activity (incorporating physical activity into one's lifestyle): Discussion, Instructed, Individual Session, Written Materials Provided (Encouraged PA as tolerated under guidance of MD.)    Medications (states correct name, dose, onset, peak, duration, side effects & timing of meds): Discussion, Instructed, Individual Session, Written Materials Provided, Comprehends Key Points (Pt reports that prior to today's visit, he was taking insulin via vial and syringe. Recently changed to pens and would like instruction; instructed on insulin pen use for basal therapy. He did not get pen needles from the pharmacy (needs Rx) so supplied him with some today.   Prescribed:  Levemir, 8 Units, subcutaneously daily.      Educated the patient on the following:  · Proper storage of the medication  · Attaching a pen needle to the pen.  The needle must be changed after every injection.  · Dialing the prescribed dose of medication  · Subcutaneous injections   · Preferred sites  · Skin preparation  · Site rotation  · Proper administration technique  · Disposal of pen needles  · Signs/symptoms of hypoglycemia  · Treatment of hypoglycemia    Demonstrated:  · Attaching a pen needle to the pen  · Priming the pen  · Dialing the prescribed dose of medication    · Subcutaneous injection with demo pen    Patient performed successful return demonstration of preparation and administration of medication.)      Monitoring (monitoring blood glucose/other parameters & using results): Discussion, Instructed, Individual Session, Comprehends Key Points, Written Materials Provided (Reports he is only testing once daily. Reviewed goal BG's. Encouraged to check twice every day, fasting and pre or 2hours post dinner.)    Acute Complications (preventing, detecting, and treating acute complications): Discussion, Instructed, Individual Session, Written Materials Provided, Comprehends Key Points (Has not had a low BG since starting insulin. Discussed hypo symptoms and appropriate treatment.)    Chronic Complications (preventing, detecting, and treating chronic complications): Discussion, Written Materials Provided    Clinical (diabetes, other pertinent medical history, and relevant comorbidities reviewed during visit): Discussion, Individual Session (Pt was diagnosed with diabetes around [place], but was told he could stop. He had not been seen by MD since that time. Admitted to hosp for pleural effusion in Nov 2017; A1c 10.2 at that time; d/c'd on MDI. Now weaned off mealtime insulin; goal was to wean off all insulin. Repeat A1c today.)    Diabetes Distress and Support Systems: Discussion (Reports girlfriend is supportive.)        Goals  Patient has selected/evaluated goals during today's session: Yes, selected    Medications: % Met (Take insulins as instructed)  Met Percentage : 100%  Start Date: 12/04/17  Target Date: 03/12/18         Diabetes Care Plan/Intervention  Education Plan/Intervention: Endocrine Provider Visit Set Up, Individual Follow-Up DSMT      Diabetes Meal Plan  Restrictions: Restricted Carbohydrate  Carbohydrate Per Meal: 45-60g  Carbohydrate Per Snack : 7-15g      Education Units of Time   Time Spent: 60 min      Health Maintenance was reviewed today with patient.  Discussed with patient importance of routine eye exams, foot exams/foot care, blood work (i.e.: A1c, microalbumin, and lipid), dental visits, yearly flu vaccine, and pneumonia vaccine as indicated by PCP. Patient verbalized understanding.       Health Maintenance Topics with due status: Not Due       Topic Last Completion Date    Pneumococcal (65+) 11/29/2017    Foot Exam 12/12/2017    Lipid Panel 12/12/2017    Hemoglobin A1c 12/12/2017    TETANUS VACCINE 12/28/2017    Eye Exam 02/16/2018    Low Dose Statin 03/08/2018     Health Maintenance Due   Topic Date Due    Fecal Occult Blood Test (FOBT)/[employer]  1950

## 2018-03-14 ENCOUNTER — TELEPHONE (OUTPATIENT)
Dept: INTERNAL MEDICINE | Facility: CLINIC | Age: 68
End: 2018-03-14

## 2018-03-14 DIAGNOSIS — E78.00 PURE HYPERCHOLESTEROLEMIA: ICD-10-CM

## 2018-03-14 RX ORDER — ATORVASTATIN CALCIUM 40 MG/1
40 TABLET, FILM COATED ORAL DAILY
Qty: 90 TABLET | Refills: 3 | Status: SHIPPED | OUTPATIENT
Start: 2018-03-14 | End: 2019-04-11

## 2018-03-14 NOTE — TELEPHONE ENCOUNTER
Spoke with pt. Pt stated that the questionnaire for pt's heart condition was faxed over early this morning & says that it needs to be faxed back to the same fax number as before. I will check fax to see if we've received it. Informed pt that the atorvastatin medication was sent over to our pharmacy.

## 2018-03-14 NOTE — TELEPHONE ENCOUNTER
Please let pt know his disability form has been completed and signed and disability office is sending it to his insurance. Did insurance company state if they needed any records? If so please direct him to medical record department.

## 2018-03-14 NOTE — TELEPHONE ENCOUNTER
Spoke with pt. Pt stated that you needed to fill out a form for him dealing with chronic systolic heart failure. Pt mentioned since the insurance won't pay for him to get the atorvastatin medication refilled, he would like to have it sent over [facility] pharmacy. Informed pt that the disability paperwork had been faxed and received.

## 2018-03-21 DIAGNOSIS — Z79.4 TYPE 2 DIABETES MELLITUS WITH DIABETIC NEPHROPATHY, WITH LONG-TERM CURRENT USE OF INSULIN: ICD-10-CM

## 2018-03-21 DIAGNOSIS — E11.21 TYPE 2 DIABETES MELLITUS WITH DIABETIC NEPHROPATHY, WITH LONG-TERM CURRENT USE OF INSULIN: ICD-10-CM

## 2018-03-21 RX ORDER — GLIPIZIDE 5 MG/1
5 TABLET ORAL 2 TIMES DAILY WITH MEALS
Qty: 60 TABLET | Refills: 4 | Status: SHIPPED | OUTPATIENT
Start: 2018-03-21 | End: 2018-07-10 | Stop reason: SDUPTHER

## 2018-03-21 RX ORDER — FUROSEMIDE 40 MG/1
40 TABLET ORAL DAILY
Qty: 30 TABLET | Refills: 11 | Status: SHIPPED | OUTPATIENT
Start: 2018-03-21 | End: 2019-04-11

## 2018-03-28 DIAGNOSIS — I50.22 CHRONIC SYSTOLIC CONGESTIVE HEART FAILURE, NYHA CLASS 3: Primary | ICD-10-CM

## 2018-03-28 DIAGNOSIS — I42.9 CARDIOMYOPATHY, UNSPECIFIED TYPE: ICD-10-CM

## 2018-03-28 RX ORDER — CARVEDILOL 12.5 MG/1
12.5 TABLET ORAL 2 TIMES DAILY WITH MEALS
Qty: 180 TABLET | Refills: 3 | Status: SHIPPED | OUTPATIENT
Start: 2018-03-28 | End: 2018-03-28 | Stop reason: SDUPTHER

## 2018-03-28 RX ORDER — LISINOPRIL 10 MG/1
20 TABLET ORAL DAILY
Qty: 180 TABLET | Refills: 3 | Status: SHIPPED | OUTPATIENT
Start: 2018-03-28 | End: 2018-03-28 | Stop reason: SDUPTHER

## 2018-03-28 RX ORDER — LISINOPRIL 10 MG/1
20 TABLET ORAL DAILY
Qty: 180 TABLET | Refills: 3 | Status: SHIPPED | OUTPATIENT
Start: 2018-03-28 | End: 2018-05-10 | Stop reason: SDUPTHER

## 2018-03-28 RX ORDER — CARVEDILOL 12.5 MG/1
12.5 TABLET ORAL 2 TIMES DAILY WITH MEALS
Qty: 180 TABLET | Refills: 3 | Status: SHIPPED | OUTPATIENT
Start: 2018-03-28 | End: 2018-06-04 | Stop reason: SDUPTHER

## 2018-03-30 ENCOUNTER — TELEPHONE (OUTPATIENT)
Dept: INTERNAL MEDICINE | Facility: CLINIC | Age: 68
End: 2018-03-30

## 2018-05-03 ENCOUNTER — OFFICE VISIT (OUTPATIENT)
Dept: OPHTHALMOLOGY | Facility: CLINIC | Age: 68
End: 2018-05-03
Payer: MEDICARE

## 2018-05-03 DIAGNOSIS — H25.13 NUCLEAR SCLEROSIS OF BOTH EYES: ICD-10-CM

## 2018-05-03 DIAGNOSIS — E11.3312 TYPE 2 DIABETES MELLITUS WITH LEFT EYE AFFECTED BY MODERATE NONPROLIFERATIVE RETINOPATHY AND MACULAR EDEMA, WITH LONG-TERM CURRENT USE OF INSULIN: Primary | ICD-10-CM

## 2018-05-03 DIAGNOSIS — Z79.4 TYPE 2 DIABETES MELLITUS WITH LEFT EYE AFFECTED BY MODERATE NONPROLIFERATIVE RETINOPATHY AND MACULAR EDEMA, WITH LONG-TERM CURRENT USE OF INSULIN: Primary | ICD-10-CM

## 2018-05-03 DIAGNOSIS — E11.3391 TYPE 2 DIABETES MELLITUS WITH RIGHT EYE AFFECTED BY MODERATE NONPROLIFERATIVE RETINOPATHY WITHOUT MACULAR EDEMA, WITH LONG-TERM CURRENT USE OF INSULIN: ICD-10-CM

## 2018-05-03 DIAGNOSIS — Z79.4 TYPE 2 DIABETES MELLITUS WITH RIGHT EYE AFFECTED BY MODERATE NONPROLIFERATIVE RETINOPATHY WITHOUT MACULAR EDEMA, WITH LONG-TERM CURRENT USE OF INSULIN: ICD-10-CM

## 2018-05-03 PROCEDURE — 99212 OFFICE O/P EST SF 10 MIN: CPT | Mod: PBBFAC | Performed by: OPHTHALMOLOGY

## 2018-05-03 PROCEDURE — 99999 PR PBB SHADOW E&M-EST. PATIENT-LVL II: CPT | Mod: PBBFAC,,, | Performed by: OPHTHALMOLOGY

## 2018-05-03 PROCEDURE — 92134 CPTRZ OPH DX IMG PST SGM RTA: CPT | Mod: PBBFAC | Performed by: OPHTHALMOLOGY

## 2018-05-03 PROCEDURE — 92014 COMPRE OPH EXAM EST PT 1/>: CPT | Mod: S$PBB,,, | Performed by: OPHTHALMOLOGY

## 2018-05-03 PROCEDURE — 92226 PR SPECIAL EYE EXAM, SUBSEQUENT: CPT | Mod: 50,S$PBB,, | Performed by: OPHTHALMOLOGY

## 2018-05-03 PROCEDURE — 92226 PR SPECIAL EYE EXAM, SUBSEQUENT: CPT | Mod: 50,PBBFAC | Performed by: OPHTHALMOLOGY

## 2018-05-03 NOTE — PROGRESS NOTES
HPI     DLS 0216/18  Dr. Garcia   2-3 mos follow up    Pt states that his VA OU is unchanged.  Pt is happy with current vision.    Denies any new F/F.  BS is better controlled per pt.  No pain.     DM 2  Mod NPDR OU w/ME OS      LBS : 103 a.m hour today    Hemoglobin A1C       Date                     Value               Ref Range             Status                03/12/2018               9.0 (H)             4.0 - 5.6 %           Final                     12/12/2017               10.4 (H)            4.0 - 5.6 %           Final                     11/13/2017               10.2 (H)            4.0 - 5.6 %           Final                    Last edited by Lars Garcia MD on 5/3/2018  3:42 PM. (History)        [facility] SDOCT:   OD: good quality, good contour, no ME, min flattening of foveal contour, min change from 2/16/18 scan  OS: good quality, exudates, IRF, thickening nasally and SN, fovea preserved, min worse since 2/16/18 scan    Assessment /Plan     For exam results, see Encounter Report.    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  At last visit had improved without Rx.  Will therefore continue to observe closely  Prior FA suggestive of Sev NPDR changes but level of heme appears sl less.  Still no sign of proliferation    NS OU.  Good Va.  Pt happy with vision.  Observe.    Diabetic Retinopathy discussed in detail, all questions answered  Stressed  importance of good BS/BP/Chol Control  RTC 2 months, sooner PRN

## 2018-05-09 NOTE — PROGRESS NOTES
"Cardiology Progress Note:    Patient ID:  Jose Pete is a 67 y.o. male who presents for follow-up of HFrEF    History of Present Illness:  Pt has done well since I last saw him on 2/5/2018. He refers that his exercise tolerance keeps on improving and that he now walks 6 blocks, three times per week, without problem.    No record of BP at home; he refers that systolic BP is usually 150 mmHg    [facility] (1/29/2018) shows: (1) non-obstructive CAD and (2) left ventricular global hypokinesis with estimated EF 35-40% (Better than reported on prior echo).     Relevant information from my 1/8/2018 note:  Discharged on 11/12/2017 after a 3 day admission for dry cough, worsening shortness of breath on exertion and orthopnea. He ruled out for myocardial infarction and was diagnosed with HFrEF and diastolic dysfunction (BNP 1136 pg/ml).   Pt has no prior cardiac hx. He never had symptoms to suggest myocardial ischemia and heart failure. He was unaware of having poor cardiac function until the recent hospital admission. 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    Social History Includes:  he works in a box factory. He never smoked; used to drink 2 beers per day.    Present Medical Therapy Includes:    Present medical therapy includes:  ASA 81 mg qd  Atorvastatin 40 mg qd  Carvedilol 12.5 mg bid  Lisinopril 20 mg qd  Furosemide 40 mg qd  Insulin    Full Medication List Includes:  Outpatient Encounter Prescriptions as of 5/10/2018   Medication Sig Dispense Refill    aspirin (ECOTRIN) 81 MG EC tablet Take 1 tablet (81 mg total) by mouth once daily.  0    atorvastatin (LIPITOR) 40 MG tablet Take 1 tablet (40 mg total) by mouth once daily. 90 tablet 3    blood sugar " "diagnostic Strp 1 strip by Misc.(Non-Drug; Combo Route) route 2 (two) times daily with meals. 100 strip 11    blood-glucose meter kit Use as instructed 1 each 0    blood-glucose meter kit Use as instructed 1 each 0    carvedilol (COREG) 12.5 MG tablet Take 1 tablet (12.5 mg total) by mouth 2 (two) times daily with meals. 180 tablet 3    furosemide (LASIX) 40 MG tablet Take 1 tablet (40 mg total) by mouth once daily. 30 tablet 11    glipiZIDE (GLUCOTROL) 5 MG tablet Take 1 tablet (5 mg total) by mouth 2 (two) times daily with meals. 60 tablet 4    insulin detemir U-100 (LEVEMIR FLEXTOUCH) 100 unit/mL (3 mL) SubQ InPn pen Inject 10 Units into the skin every evening. 3 mL 3    lancets Misc 1 lancet by Misc.(Non-Drug; Combo Route) route 2 (two) times daily with meals. 100 each 11    lancing device Misc 1 Device by Misc.(Non-Drug; Combo Route) route 2 (two) times daily with meals. 1 each 0    lisinopril (PRINIVIL,ZESTRIL) 40 MG Tab Take 1 tablet (40 mg total) by mouth once daily. 90 tablet 3    pen needle, diabetic 32 gauge x 5/32" Ndle 1 each by Misc.(Non-Drug; Combo Route) route once daily. 30 each 11    [DISCONTINUED] lisinopril 10 MG tablet Take 2 tablets (20 mg total) by mouth once daily. 180 tablet 3     No facility-administered encounter medications on file as of 5/10/2018.        Review of Systems:  Review of Systems   Constitution: Negative for decreased appetite, diaphoresis, fever, weakness, malaise/fatigue, weight gain and weight loss.   HENT: Negative for congestion, ear discharge, ear pain and nosebleeds.    Eyes: Negative for blurred vision, double vision and visual disturbance.   Cardiovascular: Negative for chest pain, claudication, cyanosis, dyspnea on exertion, irregular heartbeat, leg swelling, near-syncope, orthopnea, palpitations, paroxysmal nocturnal dyspnea and syncope.   Respiratory: Negative for cough, hemoptysis, shortness of breath, sleep disturbances due to breathing, snoring, " "sputum production and wheezing.    Endocrine: Negative for polydipsia, polyphagia and polyuria.   Hematologic/Lymphatic: Negative for adenopathy and bleeding problem. Does not bruise/bleed easily.   Skin: Negative for color change, nail changes, poor wound healing and rash.   Musculoskeletal: Negative for muscle cramps and muscle weakness.   Gastrointestinal: Negative for abdominal pain, anorexia, change in bowel habit, hematochezia, nausea and vomiting.   Genitourinary: Negative for dysuria, frequency and hematuria.   Neurological: Negative for brief paralysis, difficulty with concentration, excessive daytime sleepiness, dizziness, focal weakness, headaches, light-headedness, seizures and vertigo.   Psychiatric/Behavioral: Negative for altered mental status and depression.   Allergic/Immunologic: Negative for persistent infections.       Physical Exam:  BP (!) 144/76 (BP Location: Left arm, Patient Position: Sitting, BP Method: Large (Automatic))   Pulse 73   Ht 6' 2" (1.88 m)   Wt 76.1 kg (167 lb 12.3 oz)   BMI 21.54 kg/m²   Physical Exam     Blood Tests:  Lab Results   Component Value Date    BNP 1,136 (H) 11/12/2017     03/12/2018    K 4.7 03/12/2018     03/12/2018    CO2 29 03/12/2018    BUN 21 03/12/2018    CREATININE 1.0 03/12/2018     (H) 03/12/2018    HGBA1C 9.0 (H) 03/12/2018    AST 18 03/12/2018    ALT 27 03/12/2018    ALBUMIN 4.1 03/12/2018    PROT 7.6 03/12/2018    BILITOT 0.7 03/12/2018    WBC 7.73 03/12/2018    HGB 13.5 (L) 03/12/2018    HCT 40.0 03/12/2018    MCV 89 03/12/2018     03/12/2018    INR 1.0 01/29/2018    TSH 1.033 11/13/2017       Lab Results   Component Value Date    CHOL 128 03/12/2018    HDL 40 03/12/2018    TRIG 93 03/12/2018       Lab Results   Component Value Date    LDLCALC 69.4 03/12/2018       Urine Tests:  Lab Results   Component Value Date    COLORU Yellow 11/12/2017    APPEARANCEUA Hazy (A) 11/12/2017    PHUR 5.0 11/12/2017    SPECGRAV 1.030 " 11/12/2017    PROTEINUA 2+ (A) 11/12/2017    GLUCUA 3+ (A) 11/12/2017    KETONESU 1+ (A) 11/12/2017    BILIRUBINUA Negative 11/12/2017    OCCULTUA 2+ (A) 11/12/2017    NITRITE Negative 11/12/2017    UROBILINOGEN Negative 11/12/2017    LEUKOCYTESUR 3+ (A) 11/12/2017    PROTEINURINE 66 (H) 11/12/2017    CREATRANDUR 55.0 12/12/2017    UTPCR 1.47 (H) 11/12/2017           ECG (29-JAN-2018)  Normal sinus rhythm  Possible Left atrial enlargement  T wave abnormality, consider lateral ischemia  Abnormal ECG  When compared with ECG of 29-JAN-2018 06:09,  No significant change was found            Echo (11/13/2017)    TEST DESCRIPTION   Technical Quality: This is a technically adequate study. This study was performed in conjunction with a 3ml intravenous injection of Optison contrast agent.     Aorta: The aortic root is normal in size, measuring 3.5 cm at sinotubular junction and 3.8 cm at Sinuses of Valsalva. The proximal ascending aorta is normal in size, measuring 3.5 cm across.     Left Atrium: The left atrial volume index is severely enlarged, measuring 52.97 cc/m2.     Left Ventricle: The left ventricle is mildly enlarged, with an end-diastolic diameter of 6.3 cm, and an end-systolic diameter of 5.7 cm. LV wall thickness is normal, with the septum and the posterior wall each measuring 1.0 cm across. Relative wall   thickness was normal at 0.32, and the LV mass index was increased at 156.6 g/m2 consistent with severe eccentric left ventricular hypertrophy. There is global hypokinesis. Left ventricular systolic function appears severely depressed. Visually estimated   ejection fraction is 25-30%. The LV Doppler derived stroke volume equals 43.0 ccs.     Diastolic indices: E wave velocity 0.8 m/s, E/A ratio 2.0,  msec., E/e' ratio(avg) 14. Pulmonary vein systolic/diastolic ratio is blunted. Diastolic function is indeterminate.     Right Atrium: The right atrium is normal in size, measuring 4.0 cm in length and 3.7 cm  in width in the apical view.     Right Ventricle: The right ventricle is mildly enlarged measuring 4.1 cm at the base in the apical right ventricle-focused view. Global right ventricular systolic function appears low normal to mildly depressed. Tricuspid annular plane systolic excursion   (TAPSE) is 1.9 cm.     Aortic Valve:  The aortic valve is mildly sclerotic with normal leaflet mobility. The aortic valve is tri-leaflet in structure.     Mitral Valve:  The mitral valve is mildly sclerotic with normal leaflet mobility.     Tricuspid Valve:  The tricuspid valve is normal in structure with normal leaflet mobility.     Pulmonary Valve:  The pulmonic valve is not well seen.     Pericardium: There is evidence of a small circumferential pericardial effusion.     IVC: IVC is normal in size and collapses > 50% with a sniff, suggesting normal right atrial pressure of 3 mmHg.     Intracavitary: There is no evidence of intracavity mass, thrombi, or vegetation.     Other: There is a right pleural effusion present. If clinically indicated, a full Doppler study can be performed.     CONCLUSIONS     1 - Eccentric LVH with severely depressed left ventricular systolic function (EF 25-30%).     2 - Severe left atrial enlargement.     3 - There is diastolic dysfunction present the stage is Indeterminate.     4 - Right ventricular enlargement with low normal to mildly depressed systolic function.     5 - Right pleural effusion.     6 - Small pericardial effusion.     7 - There is AI noted on limited images.     This document has been electronically    SIGNED BY: Ami Lai MD              Regadenoson Nuclear Stress Test (11/15/2017)    PRE-TEST DATA   EKG: Resting electrocardiogram reveals normal sinus rhythm at a rate of 82 bpm. There was right axis deviation, right ventricular hypertrophy or enlargement, and repolarization abnormality secondary to ventricular hypertrophy.     Indication for Stress Test:  decreased  LVEF    Clinical Notes:  Hypertension,  Diabetes and Cardiomyopathy    TEST DESCRIPTION   The patient received 0.4 mg of Regadenoson as an IV bolus. Peak heart rate was 84 bpm, which is 55% of the age predicted maximum heart rate. .     EKG Conclusions:    1. The EKG portion of this study is abnormal but non diagnostic for ischemia at a peak heart rate of 84 bpm (55% of predicted).   2. Specificity is reduced secondary to resting ST segment changes.   3. Blood pressure remained stable throughout the protocol  (Presenting BP: 130/86 Peak BP: 133/83).   4. No significant arrhythmias were present.   5. There were no symptoms of chest discomfort or significant dyspnea throughout the protocol.     Nuclear Procedure:  Following a single isotope protocol, 9.8 mCi of Tc99 labeled Tetrofosmin was given at rest and tomographic imaging was performed. Regadenoson pharmacologic stress testing was performed as described above. Immediately following the IV bolus of   regadenoson, 30 mCi of Tc99 labeled Tetrofosmin was given and tomographic imaging was performed. The site of the IV injection was the left AC. Images were obtained on a Siemens C-Cam camera.     Comments:  This is a technically excellent study. Inspection of the transaxial images demonstrated no significant cranial, caudal, or lateral patient motion in the camera between rest and stress acquisitions. On stress images, there is a mild slit-like defect seen   in the anteroseptal wall of the left ventricle, which correspond with the RV insertion site.  The remainder of the myocardium demonstrates normal radiotracer uptake. The extracardiac distribution of radioactivity is normal. The left ventricular cavity is   increased in size at rest and does not dilate further with stress. The left ventricular wall motion is abnormal at rest showing severe global hypokinesis of the left ventricle.     Nuclear Quantitative Functional Analysis:   LVEF: 22 % (normal is >= 51%)  LVED  Volume: 184 ml (normal is <=171)  LVES Volume: 144 ml (normal is <=70)    Impression: NORMAL MYOCARDIAL PERFUSION  1. The perfusion scan is free of evidence for myocardial ischemia or injury.   2. There is a small mild intensity defect in the anteroseptal wall consistent with the RV insertion site.   3. There is abnormal wall motion at rest showing severe global hypokinesis of the left ventricle.   4. There is resting LV dysfunction with a reduced ejection fraction of 22 %.  (normal is >= 51%)  5. The left ventricular volume is mildly increased at rest.   6. The extracardiac distribution of radioactivity is normal. There are bilateral pleural effusions (R>L)    This document has been electronically    SIGNED BY: Mansih Arreguin MD             Left heart Cath (1/29/2018)    A. Indication/Pre-Operative Diagnosis     The patient is a 67 year old male that was referred for catheterization by Keegan Velez for cardiomypathy or decreased LV function.     B. Summary/Post-Operative Diagnosis       Non-obstructive CAD.    Left ventricular global hypokinesis with estimated EF 35-40% (Better than reported on prior echo).    D. Hemodynamic Results     Ejection Fraction: 35%  LV: 119/0  LVEDP: 4       E. Angiographic Results     Diagnostic:          Patient has a left dominant coronary artery.        The coronary vessels have luminal irregularities.        - Left Main Coronary Artery:             The LM has luminal irregularities. There is DANIELA 3 flow.     - Left Anterior Descending Artery:             The LAD has a 50% stenosis. There is DANIELA 3 flow. FFR was 0.83. IFR was 0.91.     - Left Circumflex Artery:             The LCX has luminal irregularities. There is DANIELA 3 flow.     - Right Coronary Artery:             The RCA has luminal irregularities. There is DANIELA 3 flow.    G. Recommendations     1. Maximize medical management.  2. Statin therapy.    I certify that I was present for catheter insertion, catheter  manipulation, angiography, and angiographic interpretation of this patient.     This document has been electronically    SIGNED BY: Stone Escudero MD         I have reviewed the following:     Details / Date    []   Labs     []   Imaging     []   Cardiology Procedures     []   Other      Assessment and Plan:     1. Chronic systolic congestive heart failure, NYHA class 3    2. Cardiomyopathy, unspecified type    3. Chronic systolic heart failure    4. Hypertension, essential         Heart failure with reduced ejection fraction  Clinically stable and asymptomatic. Exercise tolerance has improved.. LHC shows non-obstructive CAD and LVEF 35-40% (better than previously reported by echo).     Repeat echo at the time of next appointment   BMP prior to next appointment  Carvedilol 12.5 mg bid  Furosemide 40 mg qd  Increase Lisinopril to 40 mg qd    Hypertension, essential  Unclear how well controlled BP us at home. Today in clinic /76     Home BP monitoring and mail me the values.  Continue carvedilol to 12.5 mg bid  Increase lisinopril to 40 mg qd            Follow-up in about 4 months (around 9/10/2018).      Keegan Velez MD

## 2018-05-10 ENCOUNTER — OFFICE VISIT (OUTPATIENT)
Dept: CARDIOLOGY | Facility: CLINIC | Age: 68
End: 2018-05-10
Payer: MEDICARE

## 2018-05-10 VITALS
DIASTOLIC BLOOD PRESSURE: 76 MMHG | HEIGHT: 74 IN | HEART RATE: 73 BPM | BODY MASS INDEX: 21.53 KG/M2 | SYSTOLIC BLOOD PRESSURE: 144 MMHG | WEIGHT: 167.75 LBS

## 2018-05-10 DIAGNOSIS — I50.22 CHRONIC SYSTOLIC HEART FAILURE: ICD-10-CM

## 2018-05-10 DIAGNOSIS — I42.9 CARDIOMYOPATHY, UNSPECIFIED TYPE: ICD-10-CM

## 2018-05-10 DIAGNOSIS — I10 HYPERTENSION, ESSENTIAL: ICD-10-CM

## 2018-05-10 DIAGNOSIS — I50.22 CHRONIC SYSTOLIC CONGESTIVE HEART FAILURE, NYHA CLASS 3: ICD-10-CM

## 2018-05-10 PROCEDURE — 99999 PR PBB SHADOW E&M-EST. PATIENT-LVL IV: CPT | Mod: PBBFAC,,, | Performed by: INTERNAL MEDICINE

## 2018-05-10 PROCEDURE — 99214 OFFICE O/P EST MOD 30 MIN: CPT | Mod: S$PBB,,, | Performed by: INTERNAL MEDICINE

## 2018-05-10 PROCEDURE — 99214 OFFICE O/P EST MOD 30 MIN: CPT | Mod: PBBFAC | Performed by: INTERNAL MEDICINE

## 2018-05-10 RX ORDER — LISINOPRIL 40 MG/1
40 TABLET ORAL DAILY
Qty: 90 TABLET | Refills: 3 | Status: SHIPPED | OUTPATIENT
Start: 2018-05-10 | End: 2019-05-17

## 2018-05-10 NOTE — ASSESSMENT & PLAN NOTE
Unclear how well controlled BP us at home. Today in clinic /76     Home BP monitoring and mail me the values.  Continue carvedilol to 12.5 mg bid  Increase lisinopril to 40 mg qd

## 2018-05-10 NOTE — ASSESSMENT & PLAN NOTE
Clinically stable and asymptomatic. Exercise tolerance has improved.. LHC shows non-obstructive CAD and LVEF 35-40% (better than previously reported by echo).     Repeat echo at the time of next appointment   BMP prior to next appointment  Carvedilol 12.5 mg bid  Furosemide 40 mg qd  Increase Lisinopril to 40 mg qd

## 2018-06-04 ENCOUNTER — TELEPHONE (OUTPATIENT)
Dept: CARDIOLOGY | Facility: CLINIC | Age: 68
End: 2018-06-04

## 2018-06-04 DIAGNOSIS — I42.9 CARDIOMYOPATHY, UNSPECIFIED TYPE: ICD-10-CM

## 2018-06-04 DIAGNOSIS — I50.22 CHRONIC SYSTOLIC CONGESTIVE HEART FAILURE, NYHA CLASS 3: ICD-10-CM

## 2018-06-04 RX ORDER — CARVEDILOL 12.5 MG/1
25 TABLET ORAL 2 TIMES DAILY
Qty: 180 TABLET | Refills: 3 | Status: SHIPPED | OUTPATIENT
Start: 2018-06-04 | End: 2019-01-10

## 2018-06-04 NOTE — TELEPHONE ENCOUNTER
Home record shows average /60 mmHg with occasional systolic values up to 150. Heart rate 70-80/min.    Increase carvedilol to 25 mg bid.   Send me additional BP values in 2 weeks.    Keegan Velez

## 2018-06-08 ENCOUNTER — OFFICE VISIT (OUTPATIENT)
Dept: INTERNAL MEDICINE | Facility: CLINIC | Age: 68
End: 2018-06-08
Payer: MEDICARE

## 2018-06-08 ENCOUNTER — TELEPHONE (OUTPATIENT)
Dept: INTERNAL MEDICINE | Facility: CLINIC | Age: 68
End: 2018-06-08

## 2018-06-08 VITALS
OXYGEN SATURATION: 96 % | SYSTOLIC BLOOD PRESSURE: 134 MMHG | BODY MASS INDEX: 12.83 KG/M2 | WEIGHT: 100 LBS | HEIGHT: 74 IN | HEART RATE: 78 BPM | DIASTOLIC BLOOD PRESSURE: 78 MMHG

## 2018-06-08 DIAGNOSIS — E78.00 PURE HYPERCHOLESTEROLEMIA: ICD-10-CM

## 2018-06-08 DIAGNOSIS — I10 HYPERTENSION, ESSENTIAL: ICD-10-CM

## 2018-06-08 DIAGNOSIS — E11.21 TYPE 2 DIABETES MELLITUS WITH DIABETIC NEPHROPATHY, WITH LONG-TERM CURRENT USE OF INSULIN: Primary | ICD-10-CM

## 2018-06-08 DIAGNOSIS — I50.22 CHRONIC SYSTOLIC HEART FAILURE: ICD-10-CM

## 2018-06-08 DIAGNOSIS — Z79.4 TYPE 2 DIABETES MELLITUS WITH DIABETIC NEPHROPATHY, WITH LONG-TERM CURRENT USE OF INSULIN: Primary | ICD-10-CM

## 2018-06-08 DIAGNOSIS — Z00.00 HEALTH CARE MAINTENANCE: ICD-10-CM

## 2018-06-08 PROCEDURE — 99213 OFFICE O/P EST LOW 20 MIN: CPT | Mod: PBBFAC | Performed by: INTERNAL MEDICINE

## 2018-06-08 PROCEDURE — 99999 PR PBB SHADOW E&M-EST. PATIENT-LVL III: CPT | Mod: PBBFAC,,, | Performed by: INTERNAL MEDICINE

## 2018-06-08 PROCEDURE — 99214 OFFICE O/P EST MOD 30 MIN: CPT | Mod: S$PBB,,, | Performed by: INTERNAL MEDICINE

## 2018-06-08 NOTE — TELEPHONE ENCOUNTER
The disability desk never received new paperwork for him.  Can you ask him to contact his insurance company and have them fax needed reports to [phone]

## 2018-06-08 NOTE — PROGRESS NOTES
"Subjective:       Patient ID: Jose Pete is a 67 y.o. male.    Chief Complaint: Follow-up (3 month follow up.)    HPI   68 yo M here for follow up of HFrEF, IDDM.     Seeing Dr. Velez in cardiology.     [facility] (1/29/2018) shows: (1) non-obstructive CAD and (2) left ventricular global hypokinesis with estimated EF 35-40% (Better than reported on prior echo).    He was previously working a physically demanding job at box factory.    Carvedilol 25 mg bid increased on 6/4/18.  Furosemide 40 mg qd  Increased Lisinopril to 40 mg qd on 5/10/18.  Home reading today was 144/50.     A1c 9.0% in March.   glip 5mg bid meals  levemir 10 u qhs      this am. Last night 124. Yesterday am 103.    He continues to have symptomatic heart failure with dyspnea at 6 blocks and inability to perform the taxing work that would be required of him at the [email or web address].     Reports that [employer] (claim number [id number]; fax [phone]) was requesting updated form - our office has not been contacted to my knowledge.    Review of Systems   Constitutional: Negative for fever.   HENT: Negative.    Eyes: Negative.    Respiratory: Negative for shortness of breath.    Cardiovascular: Negative for chest pain and leg swelling.   Gastrointestinal: Negative for abdominal pain, diarrhea, nausea and vomiting.   Genitourinary: Negative.    Musculoskeletal: Negative for arthralgias.   Skin: Negative for rash.   Psychiatric/Behavioral: Negative.        Objective:   /78   Pulse 78   Ht 6' 2" (1.88 m)   Wt 45.4 kg (100 lb)   SpO2 96%   BMI 12.84 kg/m²      Physical Exam   Constitutional: He is oriented to person, place, and time. He appears well-developed and well-nourished. No distress.   HENT:   Head: Normocephalic and atraumatic.   Cardiovascular: Normal rate and regular rhythm.    No murmur heard.  Pulmonary/Chest: Effort normal. No respiratory distress. He has no wheezes. He has no rales.   Neurological: He is alert and oriented " to person, place, and time.   Skin: Skin is warm and dry. He is not diaphoretic.   Psychiatric: He has a normal mood and affect. His behavior is normal.       Assessment:       1. Type 2 diabetes mellitus with diabetic nephropathy, with long-term current use of insulin    2. Chronic systolic heart failure    3. Hypertension, essential    4. Pure hypercholesterolemia    5. Health care maintenance        Plan:       Jose was seen today for follow-up.    Diagnoses and all orders for this visit:    Type 2 diabetes mellitus with diabetic nephropathy, with long-term current use of insulin  -     Comprehensive metabolic panel; Future  -     Hemoglobin A1c; Future   Reported BG are in range. Check labs today. Continue medication.     Chronic systolic heart failure  Continues to be symptomatic at 6 blocks    Hypertension, essential  Improving, continue current meds    Pure hypercholesterolemia  Continue statin    Health care maintenance  -     Fecal Immunochemical Test (iFOBT); Future

## 2018-06-11 NOTE — TELEPHONE ENCOUNTER
Spoke with pt. Pt informed to contact his insurance company to have them fax needed paperwork since dsiability office never received it, with number provided. Pt voices understanding.

## 2018-06-19 ENCOUNTER — TELEPHONE (OUTPATIENT)
Dept: INTERNAL MEDICINE | Facility: CLINIC | Age: 68
End: 2018-06-19

## 2018-06-19 NOTE — TELEPHONE ENCOUNTER
Please call patient.  His A1c was very high at 11.5%.  Please ask him to monitor his blood sugar at least twice a day (ideally 3) and record it.  Please schedule appointment in the next few weeks for him to bring this blood sugar log and we can review and adjust medicine.  Has he been contacted by the diabetes team for follow-up with them?

## 2018-06-20 NOTE — TELEPHONE ENCOUNTER
Pt. Informed. He needs a sooner appointment with Dr Lebron than I can provide. Please have staff arrange and call pt. Also, he has not been contacted by the diabetic team.

## 2018-06-20 NOTE — TELEPHONE ENCOUNTER
Please schedule for diabetes, hyperglycemia.   I think there is a regular opening on 6/27, if not can use a daily change.

## 2018-06-21 NOTE — TELEPHONE ENCOUNTER
Spoke with pt. Pt is scheduled for visit on diabetes and hyperglycemia on Thurs 06/28 at 4 pm. Informed pt that I will definitely contact him if there are any cancellations between tomorrow and next wed. Pt voices understanding.

## 2018-07-03 ENCOUNTER — TELEPHONE (OUTPATIENT)
Dept: ENDOCRINOLOGY | Facility: CLINIC | Age: 68
End: 2018-07-03

## 2018-07-03 ENCOUNTER — OFFICE VISIT (OUTPATIENT)
Dept: INTERNAL MEDICINE | Facility: CLINIC | Age: 68
End: 2018-07-03
Payer: MEDICARE

## 2018-07-03 VITALS
OXYGEN SATURATION: 98 % | WEIGHT: 171 LBS | BODY MASS INDEX: 21.94 KG/M2 | DIASTOLIC BLOOD PRESSURE: 63 MMHG | SYSTOLIC BLOOD PRESSURE: 115 MMHG | HEART RATE: 73 BPM | HEIGHT: 74 IN

## 2018-07-03 DIAGNOSIS — E11.21 TYPE 2 DIABETES MELLITUS WITH DIABETIC NEPHROPATHY, WITH LONG-TERM CURRENT USE OF INSULIN: Primary | ICD-10-CM

## 2018-07-03 DIAGNOSIS — I50.22 CHRONIC SYSTOLIC HEART FAILURE: ICD-10-CM

## 2018-07-03 DIAGNOSIS — Z79.4 TYPE 2 DIABETES MELLITUS WITH DIABETIC NEPHROPATHY, WITH LONG-TERM CURRENT USE OF INSULIN: Primary | ICD-10-CM

## 2018-07-03 PROCEDURE — 99999 PR PBB SHADOW E&M-EST. PATIENT-LVL III: CPT | Mod: PBBFAC,,, | Performed by: INTERNAL MEDICINE

## 2018-07-03 PROCEDURE — 99213 OFFICE O/P EST LOW 20 MIN: CPT | Mod: PBBFAC | Performed by: INTERNAL MEDICINE

## 2018-07-03 PROCEDURE — 99214 OFFICE O/P EST MOD 30 MIN: CPT | Mod: S$PBB,,, | Performed by: INTERNAL MEDICINE

## 2018-07-03 NOTE — TELEPHONE ENCOUNTER
----- Message from Milagros Lebron MD sent at 7/3/2018 10:16 AM CDT -----  Regarding: a1c 11.5% with normal fasting BG  Mr. Pete was supposed to see you in March but missed his appointment. He is on glipizide and levemir and his a1c is very elevated despite goal range fasting BG. I am going to have him start checking post prandial BG. Would you be able to reschedule him to see you?  Thanks so much,  Milagros Lebron

## 2018-07-03 NOTE — PROGRESS NOTES
"Subjective:       Patient ID: Jose Pete is a 67 y.o. male.    Chief Complaint: Diabetes (follow up on diabetes.) and Hypoglycemia (pt's most recent blood glucose readings were 103, 120, 110, 107.)    HPI   68 yo M with poorly controlled DM, a1c 11.5% last month presents for follow up of DM with recent BG as above.   He also has symptomatic heart failure which has kept him from working, on disability.     Glipizide 5mg bid  levemir 10 u qhs    He was scheduled with endocrine NP on 3/12/18 and didn't show.     Review of Systems   Constitutional: Negative for fever.   HENT: Negative.    Eyes: Negative.    Respiratory: Negative for shortness of breath.    Cardiovascular: Negative for chest pain and leg swelling.   Gastrointestinal: Negative for abdominal pain, diarrhea, nausea and vomiting.   Genitourinary: Negative.    Musculoskeletal: Negative for arthralgias.   Skin: Negative for rash.   Psychiatric/Behavioral: Negative.        Objective:   /63 (BP Location: Left arm, Patient Position: Sitting, BP Method: Medium (Manual))   Pulse 73   Ht 6' 2" (1.88 m)   Wt 77.6 kg (171 lb)   SpO2 98%   BMI 21.96 kg/m²      Physical Exam   Constitutional: He is oriented to person, place, and time. He appears well-developed and well-nourished.   HENT:   Head: Normocephalic and atraumatic.   Eyes: Conjunctivae and EOM are normal. Pupils are equal, round, and reactive to light.   Neck: Neck supple. No thyromegaly present.   Cardiovascular: Normal rate, regular rhythm and normal heart sounds.    No murmur heard.  Pulmonary/Chest: Effort normal and breath sounds normal. No respiratory distress. He has no wheezes.   Abdominal: Soft. Bowel sounds are normal. He exhibits no distension. There is no tenderness.   Musculoskeletal: Normal range of motion.   Neurological: He is alert and oriented to person, place, and time.   Skin: Skin is warm and dry. No rash noted.   Psychiatric: He has a normal mood and affect. Judgment and " thought content normal.   Vitals reviewed.      Assessment:       1. Type 2 diabetes mellitus with diabetic nephropathy, with long-term current use of insulin    2. Chronic systolic heart failure        Plan:       Jose was seen today for diabetes and hypoglycemia.    Diagnoses and all orders for this visit:    Type 2 diabetes mellitus with diabetic nephropathy, with long-term current use of insulin  Fasting BG in goal range but a1c 11.5%  Start checking 2 hr post prandial BG  May need to add humalog?  Continue glipizide and levemir          Chronic systolic heart failure  Has class 2 symptoms which prevent him from working his previous strenous job.   Additional paperwork completed and mailed for insurance, will scan copy to chart.

## 2018-07-05 ENCOUNTER — OFFICE VISIT (OUTPATIENT)
Dept: OPHTHALMOLOGY | Facility: CLINIC | Age: 68
End: 2018-07-05
Payer: MEDICARE

## 2018-07-05 VITALS — SYSTOLIC BLOOD PRESSURE: 114 MMHG | HEART RATE: 83 BPM | DIASTOLIC BLOOD PRESSURE: 67 MMHG

## 2018-07-05 DIAGNOSIS — Z79.4 TYPE 2 DIABETES MELLITUS WITH LEFT EYE AFFECTED BY MODERATE NONPROLIFERATIVE RETINOPATHY AND MACULAR EDEMA, WITH LONG-TERM CURRENT USE OF INSULIN: Primary | ICD-10-CM

## 2018-07-05 DIAGNOSIS — E11.3312 TYPE 2 DIABETES MELLITUS WITH LEFT EYE AFFECTED BY MODERATE NONPROLIFERATIVE RETINOPATHY AND MACULAR EDEMA, WITH LONG-TERM CURRENT USE OF INSULIN: Primary | ICD-10-CM

## 2018-07-05 DIAGNOSIS — E11.3391 TYPE 2 DIABETES MELLITUS WITH RIGHT EYE AFFECTED BY MODERATE NONPROLIFERATIVE RETINOPATHY WITHOUT MACULAR EDEMA, WITH LONG-TERM CURRENT USE OF INSULIN: ICD-10-CM

## 2018-07-05 DIAGNOSIS — Z79.4 TYPE 2 DIABETES MELLITUS WITH RIGHT EYE AFFECTED BY MODERATE NONPROLIFERATIVE RETINOPATHY WITHOUT MACULAR EDEMA, WITH LONG-TERM CURRENT USE OF INSULIN: ICD-10-CM

## 2018-07-05 PROCEDURE — 92134 CPTRZ OPH DX IMG PST SGM RTA: CPT | Mod: PBBFAC | Performed by: OPHTHALMOLOGY

## 2018-07-05 PROCEDURE — 99213 OFFICE O/P EST LOW 20 MIN: CPT | Mod: PBBFAC,25 | Performed by: OPHTHALMOLOGY

## 2018-07-05 PROCEDURE — 99999 PR PBB SHADOW E&M-EST. PATIENT-LVL III: CPT | Mod: PBBFAC,,, | Performed by: OPHTHALMOLOGY

## 2018-07-05 PROCEDURE — 92226 PR SPECIAL EYE EXAM, SUBSEQUENT: CPT | Mod: 50,PBBFAC | Performed by: OPHTHALMOLOGY

## 2018-07-05 PROCEDURE — 92134 CPTRZ OPH DX IMG PST SGM RTA: CPT | Mod: 26,S$PBB,, | Performed by: OPHTHALMOLOGY

## 2018-07-05 PROCEDURE — 92226 PR SPECIAL EYE EXAM, SUBSEQUENT: CPT | Mod: 50,S$PBB,, | Performed by: OPHTHALMOLOGY

## 2018-07-05 PROCEDURE — 92012 INTRM OPH EXAM EST PATIENT: CPT | Mod: S$PBB,,, | Performed by: OPHTHALMOLOGY

## 2018-07-05 NOTE — PROGRESS NOTES
HPI     Eye Problem    Additional comments: 2 month check           Comments   DLS 5/3/18- About 2 hours after he takes his medication his vision is   blurry. He is not sure which medication because he takes a bunch of them    1) NPDR OU  Macular edema OS    2) NS OU       Last edited by Hope Early on 7/5/2018  2:13 PM. (History)            Assessment /Plan     For exam results, see Encounter Report.    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

## 2018-07-05 NOTE — PROGRESS NOTES
HPI     Eye Problem    Additional comments: 2 month check           Comments   DLS 5/3/18- About 2 hours after he takes his medication his vision is   blurry. He is not sure which medication because he takes a bunch of them    1) NPDR OU  Macular edema OS    2) NS OU       Last edited by Hope Early on 7/5/2018  2:13 PM. (History)          [facility] SDOCT:   OD: good quality, good contour, trace IRF improved from 5/8/18 scan  OS: good quality, exudates, IRF, thickening nasally and SN, fovea preserved, stable from 5/8/18 scan    A/P    No worse on OCT today. A1c up to 11.5 from 9. Discussed DM mgmt at length. Will therefore continue to observe closely.  If worse or not improving by next visit, will inject    Prior FA suggestive of Sev NPDR changes but level of heme appears sl less.  Still no sign of proliferation    NS OU.  Good Va.  Pt happy with vision.  Observe.    Large CDR - IOP good.  OCT ONH next visit    Diabetic Retinopathy discussed in detail, all questions answered  Stressed importance of good BS/BP/Chol Control  RTC 2 months, sooner PRN    OCT Mac and OCT Nerve next visit

## 2018-07-06 NOTE — PROGRESS NOTES
"CC:  Diabetes.     HPI: Jose Pete is a 67 y.o. male presents for visit. Previous patient of A MYRIAM Albert. Last seen in 2017. This is his first visit with me.     The patient was diagnosed with Type 2 diabetes in ~. Not on medications for DM before admission, previously on Metformin post Sarah but was told "he could stop it", unsure why and was not treated with any DM medications since until hospital admission 2017    Previous h/o significant diarrhea with low dose Metformin resulting in weight loss    DIABETES COMPLICATIONS: nephropathy and cardiovascular disease   Diabetes Management Status  Statin: Taking  ACE/ARB: Taking  Screening or Prevention Patient's value Goal Complete/Controlled?   HgA1C Testing and Control   Lab Results   Component Value Date    HGBA1C 11.5 (H) 2018      Annually/Less than 8% No   Lipid profile : 2018 Annually Yes   LDL control Lab Results   Component Value Date    LDLCALC 69.4 2018    Annually/Less than 100 mg/dl  Yes   Nephropathy screening Lab Results   Component Value Date    LABMICR 23.0 2017     Lab Results   Component Value Date    PROTEINUA 2+ (A) 2017    Annually Yes   Blood pressure BP Readings from Last 1 Encounters:   07/10/18 114/64    Less than 140/90 Yes   Dilated retinal exam : 2018 Annually Yes   Foot exam   : 2017 Annually Yes     GOAL A1C: <7%    DM MEDICATIONS USED IN THE PAST: Metformin    CURRENT DIABETES MEDICATIONS:  Levemir 10 units nightly (pens) & Glipizide 5 mg BID      Denies missing any doses of medications     BLOOD GLUCOSE MONITORIN-4x daily  Oral recall:  AM: 103   Post prandial usually ~ 115     HYPOGLYCEMIA:  No  Knows how to correct with 15 grams of carbs- juice, coke, or a peppermint.     MEALS:   Eating 3 meals daily  Snacking in between some meals on 2 small jovon snaps    CURRENT EXERCISE:  Yes - walking 6 blocks 2 times per week     ROS:   Constitutional: Negative for weight " "change  Endocrine:  Denies polyuria, polydipsia, nocturia.  HENT: Denies neck swelling, lumps, horseness or trouble swallowing. Denies any personal or family history of thyroid cancer.    Eyes: no blurry vision.   Respiratory: Negative for cough or shortness or breath.  Cardiovascular: Negative for chest pain or claudication.   Gastrointestinal: Negative for nausea, diarrhea, vomiting, bloating.  No history of pancreatitis or gastroparesis.  Genitourinary: Negative for urgency, frequency, frequent urinary tract infections.  Skin: Denies prolonged wound healing.  Neurological: Negative for syncope, + intermittent numbness/burning of extremities.  Psychiatric/Behavioral: Negative for depression or anxiety    PE:  Constitutional: /64   Pulse 78   Ht 6' 2" (1.88 m)   Wt 77.6 kg (170 lb 15.5 oz)   BMI 21.95 kg/m²    Well developed, well nourished, NAD.  HENT:   External ears, nose: no masses. No significant findings.   Hearing: normal  Neck:  No trachial deviation present, No neck masses noticed   Thyroid:  No thyromegaly present.  No thyroid tenderness.    Cardiovascular:    Auscultation:  No murmurs or abnormal sounds   Lower extremities:  No edema or cyanosis.   Respiratory:    Effort:  Normal, no use of accessory muscles.   Auscultation: breath sounds normal, no adventitious sounds.  Abdomen:     Exam:  Soft, non-tender, no masses.      No hernia noted.  Skin:    Inspection: no rashes, lesion or ulcers, + acanthosis nigracans.   Palpation: no induration or nodules.    Insulin injection sites: no lipoatrophy or lipohypertrophy.  Psychiatric:  Judgement and insight good with normal mood and affect.  Musculoskeletal:  Gait steady. No gross abnormalities.  Appropriate footwear, Foot exam deferred, done 12/2017.    Lab Results   Component Value Date    TSH 1.033 11/13/2017        ASSESSMENT and PLAN:  1. Type 2 diabetes mellitus with diabetic nephropathy, with long-term current use of insulin  glipiZIDE " (GLUCOTROL) 5 MG tablet    insulin detemir U-100 (LEVEMIR FLEXTOUCH) 100 unit/mL (3 mL) SubQ InPn pen    Hemoglobin A1c    Comprehensive metabolic panel    Microalbumin/creatinine urine ratio   2. Type 2 diabetes mellitus with diabetic polyneuropathy, without long-term current use of insulin     3. Proteinuria due to type 2 diabetes mellitus     4. Hypertension, essential     5. Chronic combined systolic and diastolic congestive heart failure, NYHA class 2     6. 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 Clarified proper insulin dose and timing in relation to meals, etc. Insulin injection sites and proper rotation instructed.    -- Advised frequent self blood glucose monitoring.  Patient encouraged to document glucose results and bring them to every clinic visit    -- Hypoglycemia precautions discussed. Instructed on precautions before driving.    -- Call for Bg repeatedly < 90 or > 180.   -- Close adherence to lifestyle changes recommended.   -- Periodic follow ups for eye evaluations, foot care and dental care suggested.    Will avoid Metformin currently given h/o severe diarrhea leading to weight loss, although can consider XR use in future     2- Peripheral neuropathy - Educated patient to check feet daily for any foreign objects and/or wounds. Discussed with patient the importance of wearing appropriate footwear at all times, not to walk barefoot ever, and to check shoes before putting them on feet. Instructed patient to keep feet dry by regularly changing shoes and socks and drying feet after baths and exercises. Also,  instructed patient to report any new lesions, discolorations, or swelling to a healthcare professional.    3 - Proteinuria - MAC prior to next visit     4- Hypertension - goal < 140/90 mmHg -  At goal, on ACE-I, continue current medications     5- Chronic HF - EF 25-30% on echo 11/2017, no SOB, no LYONS, no chest pain    Follow-up in about 2 months (around 9/10/2018).  Labs & urine prior

## 2018-07-09 ENCOUNTER — TELEPHONE (OUTPATIENT)
Dept: CARDIOLOGY | Facility: CLINIC | Age: 68
End: 2018-07-09

## 2018-07-09 ENCOUNTER — TELEPHONE (OUTPATIENT)
Dept: CARDIOLOGY | Facility: HOSPITAL | Age: 68
End: 2018-07-09

## 2018-07-09 RX ORDER — AMLODIPINE BESYLATE 5 MG/1
5 TABLET ORAL DAILY
Qty: 30 TABLET | Refills: 11 | Status: SHIPPED | OUTPATIENT
Start: 2018-07-09 | End: 2019-07-22 | Stop reason: SDUPTHER

## 2018-07-09 RX ORDER — ASPIRIN 81 MG/1
81 TABLET ORAL DAILY
Refills: 0 | COMMUNITY
Start: 2018-07-09 | End: 2024-03-04

## 2018-07-09 NOTE — TELEPHONE ENCOUNTER
Home record shows average /60 (range 140-160/45-70) and average heart rate 75/min (range 70-78).    BP monitoring to be repeated in 2 weeks    Start amlodipine 5 mg qd.    Keegan Velez

## 2018-07-09 NOTE — TELEPHONE ENCOUNTER
----- Message from Keegan Velez MD sent at 7/9/2018 10:18 AM CDT -----  Please inform pt that BP is not well controlled.    Start amlodipine 5 mg qd.  BP monitoring to be repeated in 2 weeks      Keegan Velez

## 2018-07-10 ENCOUNTER — OFFICE VISIT (OUTPATIENT)
Dept: ENDOCRINOLOGY | Facility: CLINIC | Age: 68
End: 2018-07-10
Payer: MEDICARE

## 2018-07-10 VITALS
DIASTOLIC BLOOD PRESSURE: 64 MMHG | HEART RATE: 78 BPM | HEIGHT: 74 IN | WEIGHT: 170.94 LBS | BODY MASS INDEX: 21.94 KG/M2 | SYSTOLIC BLOOD PRESSURE: 114 MMHG

## 2018-07-10 DIAGNOSIS — E11.21 TYPE 2 DIABETES MELLITUS WITH DIABETIC NEPHROPATHY, WITH LONG-TERM CURRENT USE OF INSULIN: Primary | ICD-10-CM

## 2018-07-10 DIAGNOSIS — I50.42 CHRONIC COMBINED SYSTOLIC AND DIASTOLIC CONGESTIVE HEART FAILURE, NYHA CLASS 2: ICD-10-CM

## 2018-07-10 DIAGNOSIS — Z79.4 TYPE 2 DIABETES MELLITUS WITH BOTH EYES AFFECTED BY MODERATE NONPROLIFERATIVE RETINOPATHY AND MACULAR EDEMA, WITH LONG-TERM CURRENT USE OF INSULIN: ICD-10-CM

## 2018-07-10 DIAGNOSIS — E11.29 PROTEINURIA DUE TO TYPE 2 DIABETES MELLITUS: ICD-10-CM

## 2018-07-10 DIAGNOSIS — R80.9 PROTEINURIA DUE TO TYPE 2 DIABETES MELLITUS: ICD-10-CM

## 2018-07-10 DIAGNOSIS — E11.42 TYPE 2 DIABETES MELLITUS WITH DIABETIC POLYNEUROPATHY, WITHOUT LONG-TERM CURRENT USE OF INSULIN: Chronic | ICD-10-CM

## 2018-07-10 DIAGNOSIS — Z79.4 TYPE 2 DIABETES MELLITUS WITH DIABETIC NEPHROPATHY, WITH LONG-TERM CURRENT USE OF INSULIN: Primary | ICD-10-CM

## 2018-07-10 DIAGNOSIS — E11.3313 TYPE 2 DIABETES MELLITUS WITH BOTH EYES AFFECTED BY MODERATE NONPROLIFERATIVE RETINOPATHY AND MACULAR EDEMA, WITH LONG-TERM CURRENT USE OF INSULIN: ICD-10-CM

## 2018-07-10 DIAGNOSIS — I10 HYPERTENSION, ESSENTIAL: ICD-10-CM

## 2018-07-10 PROCEDURE — 99214 OFFICE O/P EST MOD 30 MIN: CPT | Mod: S$PBB,,, | Performed by: NURSE PRACTITIONER

## 2018-07-10 PROCEDURE — 99213 OFFICE O/P EST LOW 20 MIN: CPT | Mod: PBBFAC | Performed by: NURSE PRACTITIONER

## 2018-07-10 PROCEDURE — 99999 PR PBB SHADOW E&M-EST. PATIENT-LVL III: CPT | Mod: PBBFAC,,, | Performed by: NURSE PRACTITIONER

## 2018-07-10 RX ORDER — GLIPIZIDE 5 MG/1
TABLET ORAL
Qty: 180 TABLET | Refills: 11 | Status: SHIPPED | OUTPATIENT
Start: 2018-07-10 | End: 2018-09-05

## 2018-07-11 RX ORDER — INSULIN GLARGINE 100 [IU]/ML
INJECTION, SOLUTION SUBCUTANEOUS
COMMUNITY
End: 2018-07-11 | Stop reason: SDUPTHER

## 2018-07-11 RX ORDER — INSULIN GLARGINE 100 [IU]/ML
14 INJECTION, SOLUTION SUBCUTANEOUS NIGHTLY
Qty: 15 ML | Refills: 6 | Status: SHIPPED | OUTPATIENT
Start: 2018-07-11 | End: 2018-09-05

## 2018-07-11 NOTE — TELEPHONE ENCOUNTER
----- Message from Geraldine Fragoso PharmD sent at 7/10/2018  4:20 PM CDT -----  Patients insurance will cover Basaglar, not Levemir. If the alternative is appropriate, please send a new rx to [facility]. Thanks!   [facility] Advanced Heart Failure Therapies  Advanced Heart Failure Visit    Date of visit: 12/27/2017  Patient Name: Jose Carlos Mcnamara Record Number: [id number]  YOB: 1947   Primary Physician: Alyce Goodpasture, MD. Dr. Thai Ordoñez  CC:    No chief complaint on file. SUBJECTIVE     HISTORY OF PRESENT Almetta Sandhoff is a 79year old male who presents to the clinic with the following CV history:    Initial Clinical Visit (12/7/15): He is accompanied by  wife who corroborates his story  Patient admits to a slow 12-18 month decline in his functional capacity. While he is capable of performing all of his activities of daily living and walking up to 3 miles he has noted increased fatigue, breathlessness and increased time to do these activities. Denies recent ICD discharge. He is compliant with dietary discretion (90%- and titrates diuretics to accommodate for fluid weight change), medications and outpatient clinical care. Generally his home blood pressures range between  systolic and he does note fatigue and dizziness after morning doses of medications. 2011 AFIB  8/13 CAD PCI stent with stent thrombosis LAD D1 (CABG LIMA LAD)  6/14 LVEF =27%  7/14 BiV ICD revision 8/14 for LV lead revision    Since last clinic visit:  He is unaccompanied  He presents to clinic today stating he is doing well. He generally walks 2-3 miles day. He denies cardiopulmonary symptoms or hospitalizations for cardiac concerns. He is compliant with medications and outpatient clinical care. He continues to affirm that he does not wish to pursue AHF options (LVAD/transplantation).  He continues to follow regularly with both Dr. Chris Reyes and Dr. Sarah Castillo his primary cardiologist.    He is currently a [place] Heart Association class [] 1 [x] 2 [] 3 [] [address]   [] ACEi [x] ARB [x] BB [] CCB [x] DIG [] Diuretic  [] Hydralazine [x] MRA [] Nitrate DEVICES  [] ICD [x] BIV - ICD [] PPM [] Life Vest  [] CardioMEMS  Followed by Dr. Star Vivar    Frequency / Description   []  YES    [x]  NO Angina:   []  YES    [x]  NO Claudication:   []  YES    [x]  NO Syncope:   []  YES    [x]  NO Orthopnea:   []  YES    [x]  NO PND:   []  YES    [x]  NO Pedal edema:   []  YES    [x]  NO Abd Swelling:   []  YES    [x]  NO Early Satiety:   []  YES    [x]  NO Hospitalization:   []  YES    [x]  NO ER Visit:   []  YES    [x]  NO Weight gain: Up 5 pounds since last visit   []  YES    [x]  NO Other:    COMPLIANCE   Description   [x]  YES    []  NO Med:   []  YES    [x]  NO Diet:    REVIEW OF SYSTEMS:  6 point review of systems was completed and is negative except as stated above. MEDICATIONS  Outpatient Prescriptions Marked as Taking for the 12/27/17 encounter (Office Visit) with Mercedes Chapman MD   Medication Sig Dispense Refill   â¢ apixaban (ELIQUIS) 5 MG Tab Take 1 tablet by mouth every 12 hours. 180 tablet 3   â¢ tiotropium (SPIRIVA HANDIHALER) 18 MCG capsule for inhaler Place 1 capsule into inhaler and inhale daily. Inhale contents of capsule into lungs once daily. 90 capsule 3   â¢ torsemide (DEMADEX) 20 MG tablet TAKE 1 TABLET BY MOUTH  DAILY 90 tablet 1   â¢ omeprazole (PRILOSEC) 20 MG capsule TAKE 1 CAPSULE BY MOUTH TWO TIMES DAILY 180 capsule 1   â¢ fluticasone-salmeterol (ADVAIR HFA) 115-21 MCG/ACT inhaler Inhale 2 puffs into the lungs 2 times daily. 1 Inhaler 11   â¢ digoxin (LANOXIN) 0.125 MG tablet Take 1 tablet alternating with 1/2 tablet 66 tablet 3   â¢ potassium chloride (K-DUR,KLOR-CON) 20 MEQ CR tablet Take 2 tablets by mouth 3 times daily. 540 tablet 3   â¢ dofetilide (TIKOSYN) 125 MCG capsule Take 1 capsule by mouth 2 times daily. 180 capsule 3   â¢ valsartan (DIOVAN) 40 MG tablet Take 1 tablet by mouth daily (at noon). 90 tablet 3   â¢ carvedilol (COREG) 12.5 MG tablet Take 1 tablet by mouth 2 times daily (with meals).  180 tablet 3   â¢ gemfibrozil (LOPID) 600 MG "tablet Take 1 tablet by mouth 2 times daily. 180 tablet 3   â¢ atorvastatin (LIPITOR) 20 MG tablet Take 1 tablet by mouth daily. 90 tablet 3   â¢ spironolactone (ALDACTONE) 25 MG tablet Take 1 tablet by mouth daily. 90 tablet 3   â¢ DiphenhydrAMINE HCl (BENADRYL PO) Take 25 mg by mouth daily. â¢ cetirizine (ZYRTEC ALLERGY) 10 MG tablet Take 1 tablet by mouth daily. 30 tablet 2   â¢ albuterol (PROAIR HFA) 108 (90 BASE) MCG/ACT inhaler Inhale 2 puffs into the lungs every 4 hours as needed (wheezing, sob). 1 Inhaler 1   â¢ aspirin 81 MG tablet Take 81 mg by mouth daily. â¢ Fluocinonide 0.05 % cream Apply  topically daily as needed (rash). To rash 30 g 0     OBJECTIVE  PAST HISTORY:  I have reviewed the past medical history, family history, social history, medications and allergies listed in the medical record as obtained by my nursing staff and support staff and agree with their documentation. PHYSICAL EXAMINATION:  Vitals:    Visit Vitals  /70 (BP [place] [facility] [place] Position: Sitting, Cuff Size: Regular)   Pulse 80   Ht 5' 8"" (1.727 m)   Wt 86.6 kg   SpO2 99%   BMI 29.04 kg/mÂ²      BMI: Body mass index is 29.04 kg/mÂ². Constitutional: well nourished 79year old male in no acute distress. Skin: Warm, dry, intact, no lesions. HEENT: Normocephalic, atraumatic. Oral mucous membranes moist, EOMs intact. Neck: Supple, trachea midline, RAP= 8 cm, negative HJR , negative carotid bruits bilateral.  No thyromegaly. Cardiovascular: Normal S1, S2. regular rhythm. Murmur: holosystolic. negative S3. Respiratory: Anterior/posterior lung sounds Clear  to auscultation bilaterally. Diaphragmatic pacing noted with deep inspiration   Abdomen: Soft, nontender, negative hepatomegaly and normal bowel sounds. Musculoskeletal/Extremities: CRT <3, no clubbing, no cyanosis, no edema. Neurological: No focal neurological deficits and speech normal. Sensation grossly intact.   Psych: Alert and oriented to person, place and " time.    LABORATORY:  Lab Results   Component Value Date    POTASSIUM 4.7 07/06/2017    SODIUM 135 07/06/2017    CO2 24 07/06/2017    CHLORIDE 101 07/06/2017    BUN 22 (H) 07/06/2017    CREATININE 1.25 (H) 07/06/2017    GLUCOSE 108 (H) 07/06/2017    CALCIUM 9.5 07/06/2017    WBC 11.5 (H) 05/16/2017    RBC 4.68 05/16/2017    HCT 41.7 05/16/2017    HGB 14.0 05/16/2017     05/16/2017    TSH 2.392 08/10/2015    CHOLESTEROL 118 11/04/2015    HDL 34 (L) 11/04/2015    CHOHDL 3.5 11/04/2015    TRIGLYCERIDE 155 (H) 11/04/2015    CALCLDL 53 11/04/2015    PSA 2.30 07/15/2011       DIAGNOSTICS:  The following diagnostics were reviewed    Cardiac   7/11/16 CPX  Cardiac and Pulmonary Diagnostics:  Peak VO2: 17.2 ml/kg/min  Peak VO2 Predicted: 78 %  Beta Blocker: Yes  Peak RER: 1.18  Exercise Effort: good  Peak VE/VCO2: 41  Oxygen pulse: 13.1  Oxygen Saturation: Rest 98 % & Peak 99 %    7/11/16 ECHO DOPPLER (interpreted by Wilfrid Montoya)  LVID= 5.8cm LVEF= 24% (extensive akinesis/dyskinesis LAD territory)  E/A= RMF  PASP= 30 + 8  MR grade= TR  TR grade= TR  RV size= normal  RV function= normal    1/12/16   Cardiac & Pulmonary Diagnostics:  Peak VO2: 15 ml/kg/min  Peak VO2 Predicted: 66 %  Beta Blocker: Yes  Peak RER: 1.24  Exercise Effort: excellent  Peak VE/VCO2: 47  Oxygen pulse: 12.8  Oxygen Saturation: Rest 99 % & Peak 97 %    1/12/16 ECHO DOPPLER (interpreted by Wilfrid Montoya)  LVID= 6cm LVEF= 24% (extensive akinesis/dyskinesis LAD territory)  E/A= RMF  PASP= 25 + 10  MR grade= 1  TR grade= TR  RV size= Normal  RV function= Normal    6/27/15 ECHO DOPPLER (interpreted by me)  LVID= 5.2cm LVEF= 27% (LAD distribution infarct)  E= 100  E/e'= 100/6  PASP= 36 + 15  MR grade= 1  TR grade= 1  RV size= normal  RV function= normal      Non-Cardiac     ASSESSMENT/PLAN:  ICMP with CHF: ACC/ AHA Stage C, NYHA 2. Volume status is normal.   Perfusion status is normal.   Labs Pending.     Based on objective data and clinical data will augment medical therapy as follows:  OMT: Continue present medical therapies. ICD: functional. Continue follow-up with Dr. Angela Chan: Record weights on a daily basis. Call with a weight gain greater than 3 pounds in one day or 5 pounds in one week. Follow a 2 gram sodium (2000mg)/2 liter (64 ounces) fluid restricted diet. Avoid the use of NSAID including but not limited to Ibuprofen, Advil and Aleve. AHF: Subjective and objective data would point toward cardiopulmonary limitations to functional capacity. Matthew Garcia has high risk features which include:   1. Severe left ventricular dysfunction without global remodeling   2. Diuretic resistance with borderline hyponatremia   3. Total cholesterol < 130    (1/16 and again 7/11 VT)  I did review the results of objective cardiovascular testing with the patient and his wife. We have had a long discussion today with reference to AHF options (LVAD/transplant). Patient and wife would like to defer evaluation at this time. (10/17/16 CRS)  Patient to determine further follow up with our clinic. He is not interested in AHFT options.    (1/9/17 VT)   Patient relates financial concerns may be abbreviating the frequency of visits with AHFT program. I have explained that we will see him as frequently as he wishes to follow-up. Patient will contact us once he has delineated financial/insurance concerns    Follow-up:  9 months  Clinic: [facility]: CPX, ECHO, BMP, BNP    Visit coordinated by: Shaw Vinson. Patient understands he can return sooner if any issues should arise.      Gunjan Cohn MD

## 2018-08-21 ENCOUNTER — TELEPHONE (OUTPATIENT)
Dept: INTERNAL MEDICINE | Facility: CLINIC | Age: 68
End: 2018-08-21

## 2018-08-21 NOTE — TELEPHONE ENCOUNTER
Please call pt. I received his log. Are these all blood sugars or are some of them blood pressures?

## 2018-08-29 ENCOUNTER — TELEPHONE (OUTPATIENT)
Dept: OPHTHALMOLOGY | Facility: CLINIC | Age: 68
End: 2018-08-29

## 2018-08-29 NOTE — TELEPHONE ENCOUNTER
Spoke with pt regarding appt scheduled on 09/05/18 [place] scheduled in error R/S appt to 09/07/18 @ 12:45 pt want appt with another appt he has at [facility].

## 2018-08-31 ENCOUNTER — LAB VISIT (OUTPATIENT)
Dept: LAB | Facility: HOSPITAL | Age: 68
End: 2018-08-31
Payer: MEDICARE

## 2018-08-31 ENCOUNTER — TELEPHONE (OUTPATIENT)
Dept: ENDOCRINOLOGY | Facility: CLINIC | Age: 68
End: 2018-08-31

## 2018-08-31 DIAGNOSIS — Z79.4 TYPE 2 DIABETES MELLITUS WITH DIABETIC NEPHROPATHY, WITH LONG-TERM CURRENT USE OF INSULIN: ICD-10-CM

## 2018-08-31 DIAGNOSIS — E11.21 TYPE 2 DIABETES MELLITUS WITH DIABETIC NEPHROPATHY, WITH LONG-TERM CURRENT USE OF INSULIN: ICD-10-CM

## 2018-08-31 LAB
ALBUMIN SERPL BCP-MCNC: 4.1 G/DL
ALP SERPL-CCNC: 69 U/L
ALT SERPL W/O P-5'-P-CCNC: 34 U/L
ANION GAP SERPL CALC-SCNC: 6 MMOL/L
AST SERPL-CCNC: 16 U/L
BILIRUB SERPL-MCNC: 0.8 MG/DL
BUN SERPL-MCNC: 26 MG/DL
CALCIUM SERPL-MCNC: 10.5 MG/DL
CHLORIDE SERPL-SCNC: 99 MMOL/L
CO2 SERPL-SCNC: 31 MMOL/L
CREAT SERPL-MCNC: 1.2 MG/DL
EST. GFR  (AFRICAN AMERICAN): >60 ML/MIN/1.73 M^2
EST. GFR  (NON AFRICAN AMERICAN): >60 ML/MIN/1.73 M^2
ESTIMATED AVG GLUCOSE: 303 MG/DL
GLUCOSE SERPL-MCNC: 430 MG/DL
HBA1C MFR BLD HPLC: 12.2 %
POTASSIUM SERPL-SCNC: 5.2 MMOL/L
PROT SERPL-MCNC: 7.6 G/DL
SODIUM SERPL-SCNC: 136 MMOL/L

## 2018-08-31 PROCEDURE — 80053 COMPREHEN METABOLIC PANEL: CPT

## 2018-08-31 PROCEDURE — 36415 COLL VENOUS BLD VENIPUNCTURE: CPT

## 2018-08-31 PROCEDURE — 83036 HEMOGLOBIN GLYCOSYLATED A1C: CPT

## 2018-08-31 NOTE — TELEPHONE ENCOUNTER
Spoke with pt and advise him to increase 18 units of Levemir at night until he see back Michaelle in 5 days. Pt verbally understand.

## 2018-08-31 NOTE — TELEPHONE ENCOUNTER
Spoke with pt and get the information for his current dose he is taking. Pt stated that he is taking 14 units Levemir at night and Glipizide 5 mg 2 in morning and 1 at night. Pt stated that his BG is going good this morning it was 103, yesterday  it was 106 and yesterday morning it was 108. He stated that his BG going around that no.

## 2018-09-04 NOTE — PROGRESS NOTES
"CC:  Diabetes.     HPI: Jose Pete is a 67 y.o. male presents for visit.    The patient was diagnosed with Type 2 diabetes in ~. Not on medications for DM before admission, previously on Metformin post Sarah but was told "he could stop it", unsure why and was not treated with any DM medications since until hospital admission 2017    Previous h/o significant diarrhea with low dose Metformin resulting in weight loss    DIABETES COMPLICATIONS: nephropathy and cardiovascular disease   Diabetes Management Status  Statin: Taking  ACE/ARB: Taking  Screening or Prevention Patient's value Goal Complete/Controlled?   HgA1C Testing and Control   Lab Results   Component Value Date    HGBA1C 12.2 (H) 2018      GOAL A1C: <7% No   Lipid profile : 2018 Annually Yes   LDL control Lab Results   Component Value Date    LDLCALC 69.4 2018    Annually/Less than 100 mg/dl  Yes   Nephropathy screening Lab Results   Component Value Date    LABMICR 11.0 2018     Lab Results   Component Value Date    PROTEINUA 2+ (A) 2017    Annually Yes   Blood pressure BP Readings from Last 1 Encounters:   18 134/84    Less than 140/90 Yes   Dilated retinal exam : 2018 Annually Yes   Foot exam   : 2017 Annually Yes     DM MEDICATIONS USED IN THE PAST: Metformin    CURRENT DIABETES MEDICATIONS:  Levemir 18 units nightly (pens) & Glipizide 10 mg AM & 5 mg HS     Denies missing any doses of medications     BLOOD GLUCOSE MONITORIN-4x daily  Oral recall:  AM: >200     HYPOGLYCEMIA:  No  Knows how to correct with 15 grams of carbs- juice, coke, or a peppermint.     MEALS:   Eating 3 meals daily  Snacking in between some meals on 2 small jovon snaps    CURRENT EXERCISE:  Yes - walking 6 blocks 2 times per week     Denies history of pancreatitis & personal/family history of medullary thyroid cancer.     ROS:   Constitutional: Negative for weight change  Endocrine:  Denies polyuria, polydipsia, " "nocturia.  HENT: Denies neck swelling, lumps, horseness or trouble swallowing. Denies any personal or family history of thyroid cancer.    Eyes: no blurry vision.   Respiratory: Negative for cough or shortness or breath.  Cardiovascular: Negative for chest pain or claudication.   Gastrointestinal: Negative for nausea, diarrhea, vomiting, bloating.  No history of pancreatitis or gastroparesis.  Genitourinary: Negative for urgency, frequency, frequent urinary tract infections.  Skin: Denies prolonged wound healing.  Neurological: Negative for syncope, + intermittent numbness/burning of extremities.  Psychiatric/Behavioral: Negative for depression or anxiety    PE:  Constitutional: /84   Pulse 69   Ht 6' 2" (1.88 m)   Wt 80.2 kg (176 lb 12.9 oz)   BMI 22.70 kg/m²    Well developed, well nourished, NAD.  HENT:   External ears, nose: no masses. No significant findings.   Hearing: normal  Neck:  No trachial deviation present, No neck masses noticed   Thyroid:  No thyromegaly present.  No thyroid tenderness.    Cardiovascular:    Auscultation:  No murmurs or abnormal sounds   Lower extremities:  No edema or cyanosis.   Respiratory:    Effort:  Normal, no use of accessory muscles.   Auscultation: breath sounds normal, no adventitious sounds.  Abdomen:     Exam:  Soft, non-tender, no masses.      No hernia noted.  Skin:    Inspection: no rashes, lesion or ulcers, + acanthosis nigracans.   Palpation: no induration or nodules.    Insulin injection sites: no lipoatrophy or lipohypertrophy.  Psychiatric:  Judgement and insight good with normal mood and affect.  Musculoskeletal:  Gait steady. No gross abnormalities.  Appropriate footwear, Foot exam deferred, done 12/2017.    Lab Results   Component Value Date    TSH 1.033 11/13/2017        ASSESSMENT and PLAN:  1. Type 2 diabetes mellitus with diabetic nephropathy, with long-term current use of insulin  dulaglutide 0.75 mg/0.5 mL PnIj    insulin aspart U-100 (NOVOLOG " FLEXPEN U-100 INSULIN) 100 unit/mL InPn pen    Ambulatory Referral to Diabetes Education    insulin detemir U-100 (LEVEMIR FLEXTOUCH) 100 unit/mL (3 mL) SubQ InPn pen    Hemoglobin A1c    Comprehensive metabolic panel   2. Type 2 diabetes mellitus with diabetic polyneuropathy, without long-term current use of insulin     3. Type 2 diabetes mellitus with both eyes affected by moderate nonproliferative retinopathy and macular edema, with long-term current use of insulin     4. Proteinuria due to type 2 diabetes mellitus     5. Hypertension, essential     6. Chronic combined systolic and diastolic congestive heart failure, NYHA class 2        1- Type 2 diabetes with neuropathy, proteinuria and hyperglycemia -   -- Reviewed goals of therapy are to get the best control we can without hypoglycemia  -- refer to diabetes education   Medication changes:   Stop glipizide   Increase Levemir 20u HS   Start Novolog 6u AC & trulicity 0.75 mg weekly. Discussed MOA & SE.  -- discussed use of insulin/trulicity pens in depth   -- Reviewed patient's current insulin regimen. Clarified proper insulin dose and timing in relation to meals, etc. Insulin injection sites and proper rotation instructed.    -- Advised frequent self blood glucose monitoring.  Patient encouraged to document glucose results and bring them to every clinic visit    -- Hypoglycemia precautions discussed. Instructed on precautions before driving.    -- Call for Bg repeatedly < 90 or > 180.   -- Close adherence to lifestyle changes recommended.   -- Periodic follow ups for eye evaluations, foot care and dental care suggested.    Will avoid Metformin currently given h/o severe diarrhea leading to weight loss, although can consider XR use in future     2- Peripheral neuropathy - Educated patient to check feet daily for any foreign objects and/or wounds. Discussed with patient the importance of wearing appropriate footwear at all times, not to walk barefoot ever, and to  check shoes before putting them on feet. Instructed patient to keep feet dry by regularly changing shoes and socks and drying feet after baths and exercises. Also, instructed patient to report any new lesions, discolorations, or swelling to a healthcare professional.    3 - Proteinuria - follows with Dr. Jo, optimize BG control.     4- Hypertension - goal < 140/90 mmHg -  At goal, on ACE-I, continue current medications     5- Chronic HF - EF 25-30% on echo 11/2017, no SOB, no LYONS, no chest pain    Follow-up in about 3 months (around 12/5/2018).  Labs prior

## 2018-09-05 ENCOUNTER — TELEPHONE (OUTPATIENT)
Dept: ENDOCRINOLOGY | Facility: CLINIC | Age: 68
End: 2018-09-05

## 2018-09-05 ENCOUNTER — OFFICE VISIT (OUTPATIENT)
Dept: ENDOCRINOLOGY | Facility: CLINIC | Age: 68
End: 2018-09-05
Payer: MEDICARE

## 2018-09-05 VITALS
WEIGHT: 176.81 LBS | DIASTOLIC BLOOD PRESSURE: 84 MMHG | HEIGHT: 74 IN | SYSTOLIC BLOOD PRESSURE: 134 MMHG | BODY MASS INDEX: 22.69 KG/M2 | HEART RATE: 69 BPM

## 2018-09-05 DIAGNOSIS — R80.9 PROTEINURIA DUE TO TYPE 2 DIABETES MELLITUS: ICD-10-CM

## 2018-09-05 DIAGNOSIS — Z79.4 TYPE 2 DIABETES MELLITUS WITH BOTH EYES AFFECTED BY MODERATE NONPROLIFERATIVE RETINOPATHY AND MACULAR EDEMA, WITH LONG-TERM CURRENT USE OF INSULIN: ICD-10-CM

## 2018-09-05 DIAGNOSIS — E11.3313 TYPE 2 DIABETES MELLITUS WITH BOTH EYES AFFECTED BY MODERATE NONPROLIFERATIVE RETINOPATHY AND MACULAR EDEMA, WITH LONG-TERM CURRENT USE OF INSULIN: ICD-10-CM

## 2018-09-05 DIAGNOSIS — I10 HYPERTENSION, ESSENTIAL: ICD-10-CM

## 2018-09-05 DIAGNOSIS — E11.29 PROTEINURIA DUE TO TYPE 2 DIABETES MELLITUS: ICD-10-CM

## 2018-09-05 DIAGNOSIS — Z79.4 TYPE 2 DIABETES MELLITUS WITH DIABETIC NEPHROPATHY, WITH LONG-TERM CURRENT USE OF INSULIN: Primary | ICD-10-CM

## 2018-09-05 DIAGNOSIS — I50.42 CHRONIC COMBINED SYSTOLIC AND DIASTOLIC CONGESTIVE HEART FAILURE, NYHA CLASS 2: ICD-10-CM

## 2018-09-05 DIAGNOSIS — E11.42 TYPE 2 DIABETES MELLITUS WITH DIABETIC POLYNEUROPATHY, WITHOUT LONG-TERM CURRENT USE OF INSULIN: Chronic | ICD-10-CM

## 2018-09-05 DIAGNOSIS — E11.21 TYPE 2 DIABETES MELLITUS WITH DIABETIC NEPHROPATHY, WITH LONG-TERM CURRENT USE OF INSULIN: Primary | ICD-10-CM

## 2018-09-05 PROCEDURE — 99214 OFFICE O/P EST MOD 30 MIN: CPT | Mod: S$PBB,,, | Performed by: NURSE PRACTITIONER

## 2018-09-05 PROCEDURE — 99999 PR PBB SHADOW E&M-EST. PATIENT-LVL III: CPT | Mod: PBBFAC,,, | Performed by: NURSE PRACTITIONER

## 2018-09-05 PROCEDURE — 99213 OFFICE O/P EST LOW 20 MIN: CPT | Mod: PBBFAC | Performed by: NURSE PRACTITIONER

## 2018-09-05 RX ORDER — INSULIN ASPART 100 [IU]/ML
6 INJECTION, SOLUTION INTRAVENOUS; SUBCUTANEOUS
Qty: 15 ML | Refills: 11 | Status: SHIPPED | OUTPATIENT
Start: 2018-09-05 | End: 2018-09-13 | Stop reason: CLARIF

## 2018-09-07 ENCOUNTER — OFFICE VISIT (OUTPATIENT)
Dept: INTERNAL MEDICINE | Facility: CLINIC | Age: 68
End: 2018-09-07
Payer: MEDICARE

## 2018-09-07 VITALS
WEIGHT: 178 LBS | DIASTOLIC BLOOD PRESSURE: 76 MMHG | SYSTOLIC BLOOD PRESSURE: 128 MMHG | OXYGEN SATURATION: 97 % | HEART RATE: 60 BPM | HEIGHT: 74 IN | BODY MASS INDEX: 22.84 KG/M2

## 2018-09-07 DIAGNOSIS — E11.42 TYPE 2 DIABETES MELLITUS WITH DIABETIC POLYNEUROPATHY, WITHOUT LONG-TERM CURRENT USE OF INSULIN: Chronic | ICD-10-CM

## 2018-09-07 DIAGNOSIS — I50.42 CHRONIC COMBINED SYSTOLIC AND DIASTOLIC CONGESTIVE HEART FAILURE, NYHA CLASS 2: Primary | ICD-10-CM

## 2018-09-07 DIAGNOSIS — Z00.00 HEALTH CARE MAINTENANCE: ICD-10-CM

## 2018-09-07 PROCEDURE — 99214 OFFICE O/P EST MOD 30 MIN: CPT | Mod: S$PBB,,, | Performed by: INTERNAL MEDICINE

## 2018-09-07 PROCEDURE — 99214 OFFICE O/P EST MOD 30 MIN: CPT | Mod: PBBFAC | Performed by: INTERNAL MEDICINE

## 2018-09-07 PROCEDURE — 99999 PR PBB SHADOW E&M-EST. PATIENT-LVL IV: CPT | Mod: PBBFAC,,, | Performed by: INTERNAL MEDICINE

## 2018-09-07 NOTE — PROGRESS NOTES
"Subjective:       Patient ID: Jose Pete is a 67 y.o. male.    Chief Complaint: Follow-up (3 month follow up.) and Medication Problem (pt was seen by Michaelle Loco NP. she wanted pt to stop Glipizide & start Novolog Insulin.)    HPI   66 yo M with uncontrolled DM  He also has symptomatic heart failure which has kept him from working, on disability.    12.2% one week ago  Saw Michaelle Loco.   recommended dulaglutide weekly, aspart 6 u tid meals, levemir 20 u qhs.  Stop glipizide  Waiting on insurance for dulaglutide and humalog.  Currently his BG is 103-105 in am.   Needs to start 3x/day BG check.    Walking 15 blocks few times a week. Limited by fatigue.   No orthopnea.   Review of Systems   Constitutional: Positive for fatigue. Negative for fever.   Respiratory: Negative for shortness of breath.    Cardiovascular: Negative for chest pain.   Musculoskeletal: Negative.    Skin: Negative.        Objective:   /76   Pulse 60   Ht 6' 2" (1.88 m)   Wt 80.7 kg (178 lb)   SpO2 97%   BMI 22.85 kg/m²      Physical Exam   Constitutional: He is oriented to person, place, and time. He appears well-developed and well-nourished. No distress.   HENT:   Head: Normocephalic and atraumatic.   Cardiovascular: Normal rate and regular rhythm.   No murmur heard.  Pulmonary/Chest: Effort normal. No respiratory distress. He has no wheezes. He has no rales.   Neurological: He is alert and oriented to person, place, and time.   Skin: Skin is warm and dry. He is not diaphoretic.   Psychiatric: He has a normal mood and affect. His behavior is normal.       Assessment:       1. Chronic combined systolic and diastolic congestive heart failure, NYHA class 2    2. Type 2 diabetes mellitus with diabetic polyneuropathy, without long-term current use of insulin    3. Health care maintenance        Plan:       oJse was seen today for follow-up and medication problem.    Diagnoses and all orders for this visit:    Chronic combined systolic " and diastolic congestive heart failure, NYHA class 2  -     2D echo with color flow doppler; Future   Follow up cardiology    Type 2 diabetes mellitus with diabetic polyneuropathy, without long-term current use of insulin  As above  Waiting on insurance approval for new meds    Health [facility] maintenance  -     Case request GI: COLONOSCOPY

## 2018-09-10 ENCOUNTER — HOSPITAL ENCOUNTER (OUTPATIENT)
Dept: CARDIOLOGY | Facility: CLINIC | Age: 68
Discharge: HOME | End: 2018-09-10
Attending: INTERNAL MEDICINE
Payer: MEDICARE

## 2018-09-10 DIAGNOSIS — I50.42 CHRONIC COMBINED SYSTOLIC AND DIASTOLIC CONGESTIVE HEART FAILURE, NYHA CLASS 2: ICD-10-CM

## 2018-09-10 LAB
AORTIC VALVE REGURGITATION: ABNORMAL
DIASTOLIC DYSFUNCTION: YES
ESTIMATED PA SYSTOLIC PRESSURE: 20.98
RETIRED EF AND QEF - SEE NOTES: 43 (ref 55–65)
TRICUSPID VALVE REGURGITATION: ABNORMAL

## 2018-09-10 PROCEDURE — 93306 TTE W/DOPPLER COMPLETE: CPT | Mod: PBBFAC | Performed by: INTERNAL MEDICINE

## 2018-09-13 RX ORDER — INSULIN LISPRO 100 [IU]/ML
INJECTION, SOLUTION INTRAVENOUS; SUBCUTANEOUS
COMMUNITY
End: 2018-09-13 | Stop reason: SDUPTHER

## 2018-09-13 RX ORDER — INSULIN LISPRO 100 [IU]/ML
6 INJECTION, SOLUTION INTRAVENOUS; SUBCUTANEOUS 3 TIMES DAILY
Qty: 15 ML | Refills: 6 | Status: SHIPPED | OUTPATIENT
Start: 2018-09-13 | End: 2019-10-23

## 2018-09-13 NOTE — TELEPHONE ENCOUNTER
Good Afternoon.    The prescription for Mr. Pete's short acting insulin (Novolog) that was sent to [facility] is not covered and requires step therapy per the patient's [employer] insurance plan.    Per the patient's plan they prefer the short-acting Humalog insulin and will cover it without a prior authorization.    If a drug change is appropriate please send over a medication change that will provide medication therapy for the patient.    Thanks.    Gely Whitfield CPhT.  Patient Care Advocate   [facility]  [address] [place] [address]  [email or web address]

## 2018-09-17 ENCOUNTER — TELEPHONE (OUTPATIENT)
Dept: INTERNAL MEDICINE | Facility: CLINIC | Age: 68
End: 2018-09-17

## 2018-09-17 NOTE — TELEPHONE ENCOUNTER
Unable to contact patient.    Disability forms under media.  Per the forms patient is a Class 2 disability, not a Class 3.  Please advise if this correct.

## 2018-09-17 NOTE — TELEPHONE ENCOUNTER
----- Message from Neva Watts sent at 9/17/2018  9:28 AM CDT -----  Contact: self [phone]  Pt is calling to speak with someone in regards to his disability being cut off. Pt states that he was put on short-term when it was supposed to be long-term. Pt would like someone to give him a call back concerning this matter. Please call and advise.      Thanks

## 2018-09-18 ENCOUNTER — TELEPHONE (OUTPATIENT)
Dept: PHARMACY | Facility: CLINIC | Age: 68
End: 2018-09-18

## 2018-09-18 NOTE — TELEPHONE ENCOUNTER
Patient is bringing in necessary documents to submit application to [employer] on 9/19/2018.

## 2018-09-19 NOTE — TELEPHONE ENCOUNTER
Spoke with pt. Pt informed that he would have to go through social securtiy office in regards to long term dsiability. Pt verbalized understanding. Pt mentioned that the he went to  two of his medications on this am which he had got for free. Advised pt that he has any trouble with medications or difficulty receiving, we can have him set up with pharmacy assistance prog.

## 2018-09-19 NOTE — TELEPHONE ENCOUNTER
Spoke with pt.  Pt stated that they've been having him on short term disability when it is supposed to have been long term. Pt verbalized that he has Rx's that are at the hospital that he cannot afford right now. Pt mentioned that paperwork from [employer] has been faxed over to us and PCP will have to complete a form.     Please advise.

## 2018-09-19 NOTE — TELEPHONE ENCOUNTER
For long term disability he will need to go through social security office. Our office only handles short term and FMLA.   Which medications is he having trouble with? Can refer to pharmacy assistance program.

## 2018-09-21 ENCOUNTER — TELEPHONE (OUTPATIENT)
Dept: INTERNAL MEDICINE | Facility: CLINIC | Age: 68
End: 2018-09-21

## 2018-09-21 NOTE — TELEPHONE ENCOUNTER
Spoke with pt out in lobby earlier. Pt informed me that [employer] faxed over some paperwork early this week for pt. Pt mentioned that there giving him an extended pay for 6 weeks. Paperwork is confirmation the insurance co needs from physician for short term, not long term, pt verbalizes.    Please advise.

## 2018-09-25 ENCOUNTER — TELEPHONE (OUTPATIENT)
Dept: INTERNAL MEDICINE | Facility: CLINIC | Age: 68
End: 2018-09-25

## 2018-09-25 NOTE — TELEPHONE ENCOUNTER
Please call patient.  His heart ultrasound shows that while the function is still impaired it has improved from November 2017.  Ejection fraction is 43% up from 25%.

## 2018-10-03 ENCOUNTER — OFFICE VISIT (OUTPATIENT)
Dept: INTERNAL MEDICINE | Facility: CLINIC | Age: 68
End: 2018-10-03
Payer: MEDICARE

## 2018-10-03 ENCOUNTER — TELEPHONE (OUTPATIENT)
Dept: PHARMACY | Facility: CLINIC | Age: 68
End: 2018-10-03

## 2018-10-03 ENCOUNTER — IMMUNIZATION (OUTPATIENT)
Dept: INTERNAL MEDICINE | Facility: CLINIC | Age: 68
End: 2018-10-03
Payer: MEDICARE

## 2018-10-03 VITALS
DIASTOLIC BLOOD PRESSURE: 73 MMHG | HEIGHT: 74 IN | WEIGHT: 176 LBS | HEART RATE: 67 BPM | BODY MASS INDEX: 22.59 KG/M2 | OXYGEN SATURATION: 98 % | SYSTOLIC BLOOD PRESSURE: 126 MMHG

## 2018-10-03 DIAGNOSIS — I50.42 CHRONIC COMBINED SYSTOLIC AND DIASTOLIC CONGESTIVE HEART FAILURE, NYHA CLASS 2: Primary | ICD-10-CM

## 2018-10-03 DIAGNOSIS — E11.3313 TYPE 2 DIABETES MELLITUS WITH BOTH EYES AFFECTED BY MODERATE NONPROLIFERATIVE RETINOPATHY AND MACULAR EDEMA, WITH LONG-TERM CURRENT USE OF INSULIN: ICD-10-CM

## 2018-10-03 DIAGNOSIS — Z79.4 TYPE 2 DIABETES MELLITUS WITH BOTH EYES AFFECTED BY MODERATE NONPROLIFERATIVE RETINOPATHY AND MACULAR EDEMA, WITH LONG-TERM CURRENT USE OF INSULIN: ICD-10-CM

## 2018-10-03 PROCEDURE — 99213 OFFICE O/P EST LOW 20 MIN: CPT | Mod: PBBFAC,25 | Performed by: INTERNAL MEDICINE

## 2018-10-03 PROCEDURE — 99214 OFFICE O/P EST MOD 30 MIN: CPT | Mod: S$PBB,,, | Performed by: INTERNAL MEDICINE

## 2018-10-03 PROCEDURE — 99999 PR PBB SHADOW E&M-EST. PATIENT-LVL III: CPT | Mod: PBBFAC,,, | Performed by: INTERNAL MEDICINE

## 2018-10-03 PROCEDURE — 90662 IIV NO PRSV INCREASED AG IM: CPT | Mod: PBBFAC

## 2018-10-03 NOTE — PROGRESS NOTES
"Subjective:       Patient ID: Jose Pete is a 67 y.o. male.    Chief Complaint: Follow-up (3 month follow up. pt stated insur[employer] co. faxed over paperwork regarding the short-term disability.)    HPI   66 yo M here for follow up of DM, CHF, HTN.    dulaglutide weekly, aspart 6 u tid meals, levemir 20 u qhs    EF 3 weeks ago 43% with diastolic dysfunction.  a1c 12.2% on 8/31/18.    He is in process of moving due to property being sold.    Class 3 heart failure. 15 blocks max, unable to perform the strenuous duties of his job.    Review of Systems   Constitutional: Positive for fatigue. Negative for fever.   Respiratory: Negative for shortness of breath.    Cardiovascular: Negative for chest pain.   Musculoskeletal: Negative.    Skin: Negative.        Objective:   /73 (BP Location: Left arm, Patient Position: Sitting, BP Method: Large (Manual))   Pulse 67   Ht 6' 2" (1.88 m)   Wt 79.8 kg (176 lb)   SpO2 98%   BMI 22.60 kg/m²      Physical Exam   Constitutional: He is oriented to person, place, and time. He appears well-developed and well-nourished. No distress.   HENT:   Head: Normocephalic and atraumatic.   Cardiovascular: Normal rate and regular rhythm.   No murmur heard.  Pulmonary/Chest: Effort normal. No respiratory distress. He has no wheezes. He has no rales.   Neurological: He is alert and oriented to person, place, and time.   Skin: Skin is warm and dry. He is not diaphoretic.   Psychiatric: He has a normal mood and affect. His behavior is normal.       Assessment:       1. Chronic combined systolic and diastolic congestive heart failure, NYHA class 2    2. Type 2 diabetes mellitus with both eyes affected by moderate nonproliferative retinopathy and macular edema, with long-term current use of insulin        Plan:       Jose was seen today for follow-up.    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

## 2018-10-03 NOTE — TELEPHONE ENCOUNTER
Patient has been approved with [employer] (Trulicity &Humalog Kwik Pen) until 12/31/2018.  Medication will be shipped to [facility] within 7-10 business days.

## 2018-10-05 ENCOUNTER — TELEPHONE (OUTPATIENT)
Dept: INTERNAL MEDICINE | Facility: CLINIC | Age: 68
End: 2018-10-05

## 2018-10-05 NOTE — TELEPHONE ENCOUNTER
----- Message from Natalia Armstrong sent at 10/5/2018  9:05 AM CDT -----  Contact: [employer]/vikki/[phone]/fax [phone]/thierry Walters called in regard needing for [employer] to be fax at [phone]. Thank you

## 2018-10-05 NOTE — TELEPHONE ENCOUNTER
Dr Lebron.   Selena was calling in regards to a questionnaire form for pt for short term disability. I faxed over disability papers on Wed pm. Do you have the form on hand?

## 2018-10-09 NOTE — TELEPHONE ENCOUNTER
I filled out form for him in office. Please have them check with patient if this was what was done.

## 2018-10-12 NOTE — TELEPHONE ENCOUNTER
Spoke with pt and the insurance place received papers but the date was wrong. Put march 18 and they don't know an exact date yet.  He said Thank you for everything.

## 2018-10-18 ENCOUNTER — OFFICE VISIT (OUTPATIENT)
Dept: OPHTHALMOLOGY | Facility: CLINIC | Age: 68
End: 2018-10-18
Payer: MEDICARE

## 2018-10-18 DIAGNOSIS — Z79.4 TYPE 2 DIABETES MELLITUS WITH BOTH EYES AFFECTED BY MODERATE NONPROLIFERATIVE RETINOPATHY AND MACULAR EDEMA, WITH LONG-TERM CURRENT USE OF INSULIN: Primary | ICD-10-CM

## 2018-10-18 DIAGNOSIS — E11.3313 TYPE 2 DIABETES MELLITUS WITH BOTH EYES AFFECTED BY MODERATE NONPROLIFERATIVE RETINOPATHY AND MACULAR EDEMA, WITH LONG-TERM CURRENT USE OF INSULIN: Primary | ICD-10-CM

## 2018-10-18 DIAGNOSIS — H25.13 NUCLEAR SCLEROSIS OF BOTH EYES: ICD-10-CM

## 2018-10-18 PROCEDURE — 99213 OFFICE O/P EST LOW 20 MIN: CPT | Mod: PBBFAC,25 | Performed by: OPHTHALMOLOGY

## 2018-10-18 PROCEDURE — 92134 CPTRZ OPH DX IMG PST SGM RTA: CPT | Mod: PBBFAC | Performed by: OPHTHALMOLOGY

## 2018-10-18 PROCEDURE — 99999 PR PBB SHADOW E&M-EST. PATIENT-LVL III: CPT | Mod: PBBFAC,,, | Performed by: OPHTHALMOLOGY

## 2018-10-18 PROCEDURE — 92014 COMPRE OPH EXAM EST PT 1/>: CPT | Mod: S$PBB,,, | Performed by: OPHTHALMOLOGY

## 2018-10-18 PROCEDURE — 92226 PR SPECIAL EYE EXAM, SUBSEQUENT: CPT | Mod: 50,S$PBB,, | Performed by: OPHTHALMOLOGY

## 2018-10-18 PROCEDURE — 92226 PR SPECIAL EYE EXAM, SUBSEQUENT: CPT | Mod: 50,PBBFAC | Performed by: OPHTHALMOLOGY

## 2018-10-18 NOTE — PROGRESS NOTES
HPI     DLS 7/5/18 dr don      -no vision changes  -no pain or irritation  -no flashes or floaters  +pt states when he takes his heart med his vision gets fuzzy for about an   hour - two. Also makes him fatigue (dr that prescribes told him that is   normal)    -no eye drops      1) NPDR OU  Macular edema OS    2) NS OU    Last edited by Tavia Mcgill on 10/18/2018  1:34 PM. (History)      [place] SDOCT:   OD: good quality, good contour, thickening and IRF increased since 7/5/18 scan  OS: good quality, exudates, IRF, thickening, worsened since 7/5/18 scan      Assessment /Plan     For exam results, see Encounter Report.    Type 2 diabetes mellitus with left eye affected by moderate nonproliferative retinopathy and macular edema, with long-term current use of insulin  -     Posterior Segment OCT Retina-Both eyes    Type 2 diabetes mellitus with right eye affected by moderate nonproliferative retinopathy without macular edema, with long-term current use of insulin  -     Posterior Segment OCT Retina-Both eyes    NS OU.  Not main limiting factor and would control ME before considering CE/IOL.  Also pt is currently happy with vision        ME worsening/persistent OU with exudates OS  Recommend Avastin OS.  Pt agrees but not today  RTC for Avastin OS.  Then will consider Avastin OD    A1c still increasing.  12.2 on 8/31/18  Diabetic Retinopathy discussed in detail, all questions answered  Stressed importance of good BS/BP/Chol Control  RTC 1 wks for Avastin OS, sooner PRN

## 2018-11-16 ENCOUNTER — PROCEDURE VISIT (OUTPATIENT)
Dept: OPHTHALMOLOGY | Facility: CLINIC | Age: 68
End: 2018-11-16
Payer: MEDICARE

## 2018-11-16 VITALS — DIASTOLIC BLOOD PRESSURE: 63 MMHG | SYSTOLIC BLOOD PRESSURE: 103 MMHG | HEART RATE: 69 BPM

## 2018-11-16 DIAGNOSIS — Z79.4 TYPE 2 DIABETES MELLITUS WITH BOTH EYES AFFECTED BY MODERATE NONPROLIFERATIVE RETINOPATHY AND MACULAR EDEMA, WITH LONG-TERM CURRENT USE OF INSULIN: Primary | ICD-10-CM

## 2018-11-16 DIAGNOSIS — E11.3313 TYPE 2 DIABETES MELLITUS WITH BOTH EYES AFFECTED BY MODERATE NONPROLIFERATIVE RETINOPATHY AND MACULAR EDEMA, WITH LONG-TERM CURRENT USE OF INSULIN: Primary | ICD-10-CM

## 2018-11-16 PROCEDURE — C9257 BEVACIZUMAB INJECTION: HCPCS | Mod: PBBFAC,JG | Performed by: OPHTHALMOLOGY

## 2018-11-16 PROCEDURE — 99499 UNLISTED E&M SERVICE: CPT | Mod: S$PBB,,, | Performed by: OPHTHALMOLOGY

## 2018-11-16 PROCEDURE — 67028 INJECTION EYE DRUG: CPT | Mod: S$PBB,LT,, | Performed by: OPHTHALMOLOGY

## 2018-11-16 PROCEDURE — 67028 INJECTION EYE DRUG: CPT | Mod: PBBFAC,LT | Performed by: OPHTHALMOLOGY

## 2018-11-16 RX ADMIN — BEVACIZUMAB 1.25 MG: 100 INJECTION, SOLUTION INTRAVENOUS at 02:11

## 2018-11-16 NOTE — PATIENT INSTRUCTIONS
POST INTRAVITREAL INJECTION PATIENT INSTRUCTIONS   Below are some guidelines for what to expect after your treatment and additional care instructions.    You may experience mild discomfort in your eye after the treatment. Usually your eye feels better within 24 to 48 hours after the procedure.      You have been given drops that numb the surface of your eye. DO NOT rub or touch your eye. DO NOT wear contacts until numbness goes away.      You may experience small spots that appear in your field of vision. These are usually caused by an air bubble or from the medication. It takes a few hours or days for these to go away.      The use of sunglasses will help reduce light sensitivity and glare.      DO NOT swim or put sink water (tap water) in your eyes for at least 4 hours after the injection.      You may get a gritty, burning, irritating or stinging feeling in the injected eye as a result of the antiseptic used. Use artificial tears or eye lubricant to reduce the sensation. These are available over-the-counter from your local pharmacy. If you already have some at home, be sure to check the expiration date and to avoid contaminating your eye. A cool pack over your eye brow above the injected eye may also relieve discomfort.     Call us right away or go to the Emergency Department if you have a dramatic decrease in vision or extreme pain in the eye.   [facility] [phone]   Item: [id number]   Revised: 09/2015

## 2018-11-16 NOTE — PROGRESS NOTES
HPI     DLS 10/18/18   66 Y/O M here today for Avastin injection only OS. stj     Va stable OU.  No f/f/pain OU.       this morning at 9 a.m. 11/16/18 stj     POHx:   1) NPDR OU   Macular edema OS     2) NS OU     Last edited by Lars Garcia MD on 11/16/2018  1:50 PM. (History)            Assessment /Plan     For exam results, see Encounter Report.    Type 2 diabetes mellitus with both eyes affected by moderate nonproliferative retinopathy and macular edema, with long-term current use of insulin      Avastin OS today as planned    Patient identified.  Timeout performed.    Risks, benefits, and alternatives to treatment were discussed in detail with the patient, including bleeding/infection (endophthalmitis)/etc.  The patient voiced understanding and wished to proceed with the procedure.  See separate consent form.    Injection Procedure Note:  Diagnosis: CSME Left Eye    Topical Proparacaine drop placed then topical 10% Betadine swabs to lids, lashes, and conjunctiva.  Sterile gloves used, and sterile lid speculum placed.  10% Betadine swabbed at injection site again prior to injection.  Avastin 1.25mg in 0.05cc Injected at 5:00 position 3.5-4mm posterior to the limbus.  Complications: None  Va at least CF at 5 feet post injection.  Retina, ONH, IOP normal after injection.    Followup as above.  Patient should return immediately PRN.  Retinal Detachment and Endophthalmitis precautions given.

## 2018-11-20 ENCOUNTER — TELEPHONE (OUTPATIENT)
Dept: INTERNAL MEDICINE | Facility: CLINIC | Age: 68
End: 2018-11-20

## 2018-11-20 DIAGNOSIS — E11.3313 TYPE 2 DIABETES MELLITUS WITH BOTH EYES AFFECTED BY MODERATE NONPROLIFERATIVE RETINOPATHY AND MACULAR EDEMA, WITH LONG-TERM CURRENT USE OF INSULIN: Primary | ICD-10-CM

## 2018-11-20 DIAGNOSIS — Z79.4 TYPE 2 DIABETES MELLITUS WITH BOTH EYES AFFECTED BY MODERATE NONPROLIFERATIVE RETINOPATHY AND MACULAR EDEMA, WITH LONG-TERM CURRENT USE OF INSULIN: Primary | ICD-10-CM

## 2018-11-20 RX ORDER — INSULIN GLARGINE 100 [IU]/ML
20 INJECTION, SOLUTION SUBCUTANEOUS NIGHTLY
Qty: 18 ML | Refills: 3 | Status: SHIPPED | OUTPATIENT
Start: 2018-11-20 | End: 2019-01-03

## 2018-11-20 NOTE — TELEPHONE ENCOUNTER
Spoke with patient out in [place] earlier. Patient states that he is currently using the Basaglar kwikpen insulin injection to check BG. patient is requesting that you send it in to our pharmacy here in [facility] (if possible) to get it much cheaper?    Please advise.

## 2018-11-21 ENCOUNTER — LAB VISIT (OUTPATIENT)
Dept: LAB | Facility: HOSPITAL | Age: 68
End: 2018-11-21
Payer: MEDICARE

## 2018-11-21 DIAGNOSIS — Z79.4 TYPE 2 DIABETES MELLITUS WITH DIABETIC NEPHROPATHY, WITH LONG-TERM CURRENT USE OF INSULIN: ICD-10-CM

## 2018-11-21 DIAGNOSIS — E11.21 TYPE 2 DIABETES MELLITUS WITH DIABETIC NEPHROPATHY, WITH LONG-TERM CURRENT USE OF INSULIN: ICD-10-CM

## 2018-11-21 LAB
ALBUMIN SERPL BCP-MCNC: 4 G/DL
ALP SERPL-CCNC: 79 U/L
ALT SERPL W/O P-5'-P-CCNC: 61 U/L
ANION GAP SERPL CALC-SCNC: 6 MMOL/L
AST SERPL-CCNC: 34 U/L
BILIRUB SERPL-MCNC: 0.7 MG/DL
BUN SERPL-MCNC: 15 MG/DL
CALCIUM SERPL-MCNC: 10.6 MG/DL
CHLORIDE SERPL-SCNC: 97 MMOL/L
CO2 SERPL-SCNC: 32 MMOL/L
CREAT SERPL-MCNC: 1.2 MG/DL
EST. GFR  (AFRICAN AMERICAN): >60 ML/MIN/1.73 M^2
EST. GFR  (NON AFRICAN AMERICAN): >60 ML/MIN/1.73 M^2
ESTIMATED AVG GLUCOSE: 312 MG/DL
GLUCOSE SERPL-MCNC: 447 MG/DL
HBA1C MFR BLD HPLC: 12.5 %
POTASSIUM SERPL-SCNC: 4.5 MMOL/L
PROT SERPL-MCNC: 7.7 G/DL
SODIUM SERPL-SCNC: 135 MMOL/L

## 2018-11-21 PROCEDURE — 36415 COLL VENOUS BLD VENIPUNCTURE: CPT

## 2018-11-21 PROCEDURE — 83036 HEMOGLOBIN GLYCOSYLATED A1C: CPT

## 2018-11-21 PROCEDURE — 80053 COMPREHEN METABOLIC PANEL: CPT

## 2018-11-26 NOTE — PROGRESS NOTES
"CC:  Diabetes.     HPI: Jose Pete is a 67 y.o. male presents for visit.    The patient was diagnosed with Type 2 diabetes in ~. Not on medications for DM before admission, previously on Metformin post Sarah but was told "he could stop it", unsure why and was not treated with any DM medications since until hospital admission 2017    Previous h/o significant diarrhea with low dose Metformin resulting in weight loss    DIABETES COMPLICATIONS: nephropathy and cardiovascular disease   Diabetes Management Status  Statin: Taking  ACE/ARB: Taking  Screening or Prevention Patient's value Goal Complete/Controlled?   HgA1C Testing and Control   Lab Results   Component Value Date    HGBA1C 12.5 (H) 2018      GOAL A1C: <7% No   Lipid profile : 2018 Annually Yes   LDL control Lab Results   Component Value Date    LDLCALC 69.4 2018    Annually/Less than 100 mg/dl  Yes   Nephropathy screening Lab Results   Component Value Date    LABMICR 11.0 2018     Lab Results   Component Value Date    PROTEINUA 2+ (A) 2017    Annually Yes   Blood pressure BP Readings from Last 1 Encounters:   18 122/78    Less than 140/90 Yes   Dilated retinal exam : 10/18/2018 Annually Yes   Foot exam   : 2017 Annually Yes     DM MEDICATIONS USED IN THE PAST: Metformin    CURRENT DIABETES MEDICATIONS:  Levemir 20 units nightly (pens) & Novolog 5 units AC  Trulicity 0.75 mg weekly      Denies missing any doses of medications     BLOOD GLUCOSE MONITORIN-5x daily  Oral recall:  AM: 100-103  Patient states that the highest is 150 (I believe that his meter is off)   BG in clinic today: 266      HYPOGLYCEMIA:  No  Knows how to correct with 15 grams of carbs- juice, coke, or a peppermint.     MEALS:   Eating 3 meals daily  Snacking in between some meals on 2 small jovon snaps    CURRENT EXERCISE:  Yes - walking 6 blocks 2 times per week     Denies history of pancreatitis & personal/family history of " "medullary thyroid cancer.     ROS:   Constitutional: Negative for weight change  Endocrine:  Denies polyuria, polydipsia, nocturia.  HENT: Denies neck swelling, lumps, horseness or trouble swallowing. Denies any personal or family history of thyroid cancer.    Eyes: no blurry vision.   Respiratory: Negative for cough or shortness or breath.  Cardiovascular: Negative for chest pain or claudication.   Gastrointestinal: Negative for nausea, diarrhea, vomiting, bloating.  No history of pancreatitis or gastroparesis.  Genitourinary: Negative for urgency, frequency, frequent urinary tract infections.  Skin: Denies prolonged wound healing.  Neurological: Negative for syncope, + intermittent numbness/burning of extremities.  Psychiatric/Behavioral: Negative for depression or anxiety    PE:  Constitutional: /78   Pulse 65   Ht 5' 6" (1.676 m)   Wt 79.4 kg (175 lb 0.7 oz)   BMI 28.25 kg/m²    Well developed, well nourished, NAD.  HENT:   External ears, nose: no masses. No significant findings.   Hearing: normal  Neck:  No trachial deviation present, No neck masses noticed   Thyroid:  No thyromegaly present.  No thyroid tenderness.    Cardiovascular:    Auscultation:  No murmurs or abnormal sounds   Lower extremities:  No edema or cyanosis.   Respiratory:    Effort:  Normal, no use of accessory muscles.   Auscultation: breath sounds normal, no adventitious sounds.  Abdomen:     Exam:  Soft, non-tender, no masses.      No hernia noted.  Skin:    Inspection: no rashes, lesion or ulcers, + acanthosis nigracans.   Palpation: no induration or nodules.    Insulin injection sites: no lipoatrophy or lipohypertrophy.  Psychiatric:  Judgement and insight good with normal mood and affect.  Musculoskeletal:  Gait steady. No gross abnormalities.  Appropriate footwear, Foot exam deferred, done 12/2017.    Lab Results   Component Value Date    TSH 1.033 11/13/2017        ASSESSMENT and PLAN:  1. 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 Type 2 diabetes mellitus with diabetic polyneuropathy, without long-term current use of insulin     3. Type 2 diabetes mellitus with diabetic nephropathy, with long-term current use of insulin     4. Proteinuria due to type 2 diabetes mellitus     5. Hypertension, essential     6. Chronic systolic heart failure        1- Type 2 diabetes with neuropathy, proteinuria and hyperglycemia -   -- Reviewed goals of therapy are to get the best control we can without hypoglycemia  Medication changes:   Increase Levemir 32u HS & Novolog 10u AC + correction scale 180/50 & trulicity 1.5 mg weekly. Discussed MOA & SE.  -- discussed use of insulin/trulicity pens in depth. Taught patient how to use meter.   -- Reviewed patient's current insulin regimen. Clarified proper insulin dose and timing in relation to meals, etc. Insulin injection sites and proper rotation instructed.    -- Advised frequent self blood glucose monitoring.  Patient encouraged to document glucose results and bring them to every clinic visit    -- Hypoglycemia precautions discussed. Instructed on precautions before driving.    -- Call for Bg repeatedly < 90 or > 180.   -- Close adherence to lifestyle changes recommended.   -- Periodic follow ups for eye evaluations, foot care and dental care suggested.    Will avoid Metformin currently given h/o severe diarrhea leading to weight loss, although can consider XR use in future     2- Peripheral neuropathy - Educated patient to check feet daily for any foreign objects and/or wounds. Discussed with patient the importance of wearing appropriate footwear at all times, not to walk barefoot ever, and to check shoes before putting them on feet. Instructed patient to keep feet dry by regularly  changing shoes and socks and drying feet after baths and exercises. Also, instructed patient to report any new lesions, discolorations, or swelling to a healthcare professional.    3 - Proteinuria - follows with Dr. Jo, optimize BG control.     4- Hypertension - goal < 140/90 mmHg -  At goal, on ACE-I, continue current medications     5- Chronic HF - EF 25-30% on echo 11/2017, no SOB, no LYONS, no chest pain    Follow-up in about 4 weeks (around 12/26/2018).

## 2018-11-28 ENCOUNTER — OFFICE VISIT (OUTPATIENT)
Dept: ENDOCRINOLOGY | Facility: CLINIC | Age: 68
End: 2018-11-28
Payer: MEDICARE

## 2018-11-28 VITALS
DIASTOLIC BLOOD PRESSURE: 78 MMHG | SYSTOLIC BLOOD PRESSURE: 122 MMHG | HEIGHT: 66 IN | HEART RATE: 65 BPM | WEIGHT: 175.06 LBS | BODY MASS INDEX: 28.14 KG/M2

## 2018-11-28 DIAGNOSIS — E11.42 TYPE 2 DIABETES MELLITUS WITH DIABETIC POLYNEUROPATHY, WITHOUT LONG-TERM CURRENT USE OF INSULIN: Chronic | ICD-10-CM

## 2018-11-28 DIAGNOSIS — E11.3313 TYPE 2 DIABETES MELLITUS WITH BOTH EYES AFFECTED BY MODERATE NONPROLIFERATIVE RETINOPATHY AND MACULAR EDEMA, WITH LONG-TERM CURRENT USE OF INSULIN: Primary | ICD-10-CM

## 2018-11-28 DIAGNOSIS — Z79.4 TYPE 2 DIABETES MELLITUS WITH DIABETIC NEPHROPATHY, WITH LONG-TERM CURRENT USE OF INSULIN: ICD-10-CM

## 2018-11-28 DIAGNOSIS — I10 HYPERTENSION, ESSENTIAL: ICD-10-CM

## 2018-11-28 DIAGNOSIS — Z79.4 TYPE 2 DIABETES MELLITUS WITH BOTH EYES AFFECTED BY MODERATE NONPROLIFERATIVE RETINOPATHY AND MACULAR EDEMA, WITH LONG-TERM CURRENT USE OF INSULIN: Primary | ICD-10-CM

## 2018-11-28 DIAGNOSIS — I50.22 CHRONIC SYSTOLIC HEART FAILURE: ICD-10-CM

## 2018-11-28 DIAGNOSIS — E11.29 PROTEINURIA DUE TO TYPE 2 DIABETES MELLITUS: ICD-10-CM

## 2018-11-28 DIAGNOSIS — E11.21 TYPE 2 DIABETES MELLITUS WITH DIABETIC NEPHROPATHY, WITH LONG-TERM CURRENT USE OF INSULIN: ICD-10-CM

## 2018-11-28 DIAGNOSIS — R80.9 PROTEINURIA DUE TO TYPE 2 DIABETES MELLITUS: ICD-10-CM

## 2018-11-28 PROCEDURE — 99214 OFFICE O/P EST MOD 30 MIN: CPT | Mod: S$PBB,,, | Performed by: NURSE PRACTITIONER

## 2018-11-28 PROCEDURE — 99999 PR PBB SHADOW E&M-EST. PATIENT-LVL III: CPT | Mod: PBBFAC,,, | Performed by: NURSE PRACTITIONER

## 2018-11-28 PROCEDURE — 99213 OFFICE O/P EST LOW 20 MIN: CPT | Mod: PBBFAC | Performed by: NURSE PRACTITIONER

## 2018-11-28 RX ORDER — LANCETS 33 GAUGE
EACH MISCELLANEOUS
Qty: 200 EACH | Refills: 11 | Status: SHIPPED | OUTPATIENT
Start: 2018-11-28

## 2018-11-28 RX ORDER — INSULIN PUMP SYRINGE, 3 ML
EACH MISCELLANEOUS
Qty: 1 EACH | Refills: 0 | Status: SHIPPED | OUTPATIENT
Start: 2018-11-28

## 2019-01-03 ENCOUNTER — OFFICE VISIT (OUTPATIENT)
Dept: INTERNAL MEDICINE | Facility: CLINIC | Age: 69
End: 2019-01-03
Payer: MEDICARE

## 2019-01-03 ENCOUNTER — CLINICAL SUPPORT (OUTPATIENT)
Dept: INTERNAL MEDICINE | Facility: CLINIC | Age: 69
End: 2019-01-03
Payer: MEDICARE

## 2019-01-03 VITALS
HEIGHT: 74 IN | OXYGEN SATURATION: 98 % | DIASTOLIC BLOOD PRESSURE: 84 MMHG | WEIGHT: 178 LBS | HEART RATE: 73 BPM | SYSTOLIC BLOOD PRESSURE: 138 MMHG | BODY MASS INDEX: 22.84 KG/M2

## 2019-01-03 DIAGNOSIS — Z23 NEED FOR 23-POLYVALENT PNEUMOCOCCAL POLYSACCHARIDE VACCINE: Primary | ICD-10-CM

## 2019-01-03 DIAGNOSIS — I50.42 CHRONIC COMBINED SYSTOLIC AND DIASTOLIC CONGESTIVE HEART FAILURE, NYHA CLASS 2: ICD-10-CM

## 2019-01-03 DIAGNOSIS — E11.21 TYPE 2 DIABETES MELLITUS WITH DIABETIC NEPHROPATHY, WITH LONG-TERM CURRENT USE OF INSULIN: ICD-10-CM

## 2019-01-03 DIAGNOSIS — Z79.4 TYPE 2 DIABETES MELLITUS WITH DIABETIC NEPHROPATHY, WITH LONG-TERM CURRENT USE OF INSULIN: ICD-10-CM

## 2019-01-03 DIAGNOSIS — Z79.4 TYPE 2 DIABETES MELLITUS WITH BOTH EYES AFFECTED BY MODERATE NONPROLIFERATIVE RETINOPATHY AND MACULAR EDEMA, WITH LONG-TERM CURRENT USE OF INSULIN: ICD-10-CM

## 2019-01-03 DIAGNOSIS — E11.3313 TYPE 2 DIABETES MELLITUS WITH BOTH EYES AFFECTED BY MODERATE NONPROLIFERATIVE RETINOPATHY AND MACULAR EDEMA, WITH LONG-TERM CURRENT USE OF INSULIN: ICD-10-CM

## 2019-01-03 PROBLEM — E11.42 TYPE 2 DIABETES MELLITUS WITH DIABETIC POLYNEUROPATHY, WITHOUT LONG-TERM CURRENT USE OF INSULIN: Chronic | Status: RESOLVED | Noted: 2017-12-12 | Resolved: 2019-01-03

## 2019-01-03 PROCEDURE — 99214 OFFICE O/P EST MOD 30 MIN: CPT | Mod: S$PBB,,, | Performed by: INTERNAL MEDICINE

## 2019-01-03 PROCEDURE — 99999 PR PBB SHADOW E&M-EST. PATIENT-LVL V: ICD-10-PCS | Mod: PBBFAC,,, | Performed by: INTERNAL MEDICINE

## 2019-01-03 PROCEDURE — G0009 ADMIN PNEUMOCOCCAL VACCINE: HCPCS | Mod: PBBFAC

## 2019-01-03 PROCEDURE — 99215 OFFICE O/P EST HI 40 MIN: CPT | Mod: PBBFAC | Performed by: INTERNAL MEDICINE

## 2019-01-03 PROCEDURE — 99999 PR PBB SHADOW E&M-EST. PATIENT-LVL V: CPT | Mod: PBBFAC,,, | Performed by: INTERNAL MEDICINE

## 2019-01-03 PROCEDURE — 99214 PR OFFICE/OUTPT VISIT, EST, LEVL IV, 30-39 MIN: ICD-10-PCS | Mod: S$PBB,,, | Performed by: INTERNAL MEDICINE

## 2019-01-03 NOTE — PATIENT INSTRUCTIONS
I recommend that you get the Shingrx vaccine. It has been on nationwide shortage but should be available soon. You can get the vaccine at our pharmacy once in stock. It is a series of 2 injections a month apart. Please call our pharmacy at [phone] to confirm availability. You could also check with your local pharmacy to see if they have it.       Please call endoscopy at [phone] to schedule your colonoscopy.

## 2019-01-03 NOTE — PROGRESS NOTES
"Subjective:       Patient ID: Jose Pete is a 68 y.o. male.    Chief Complaint: Follow-up (3 month follow up.) and Hyperglycemia (patient's BG levels have been elevated (possibly due to medication) as of lately, this am it was 375. )    HPI   67 yo M with CHF and DM here for follow up of these.  He saw MYRIAM Loco on 11/28/18.      Levemir 32u HS & Novolog 10u AC + correction scale 180/50 & trulicity 1.5 mg weekly.   No metformin due to diarrhea.  a1c 12.5% in November.  Was on reli-on meter but was giving him falsely low readings. He now has onetouch but the strips are very expensive. Readings are 200s-300s.  levemir 32 u qhs (he got confused and only taking it once per week)  Lispro 10 u tid meals  trulicity weekly - never picked up  Did not realize he had an appt on 12/28 - no show.    Combined systolic and diastolic HF  Echo 9/2018 with EF 40-45%  Grade 1 diastolic dysfunction  Overdue to see cardiology.    Breathing feels "okay."  Up to 15 blocks. He was living on [facility] and moved to [facility]. Before had 15 steps now 4 steps.   Review of Systems   Constitutional: Negative for fever.   Respiratory: Negative for shortness of breath.    Cardiovascular: Negative for chest pain.   Musculoskeletal: Negative.    Skin: Negative.        Objective:   /84 (BP Location: Left arm, Patient Position: Sitting, BP Method: Large (Manual))   Pulse 73   Ht 6' 2" (1.88 m)   Wt 80.7 kg (178 lb)   SpO2 98%   BMI 22.85 kg/m²      Physical Exam   Constitutional: He is oriented to person, place, and time. He appears well-developed and well-nourished. No distress.   HENT:   Head: Normocephalic and atraumatic.   Cardiovascular: Normal rate and regular rhythm.   No murmur heard.  Pulmonary/Chest: Effort normal. No respiratory distress. He has no wheezes. He has no rales.   Neurological: He is alert and oriented to person, place, and time.   Skin: Skin is warm and dry. He is not diaphoretic.   Psychiatric: He has a normal " mood and affect. His behavior is normal.       Assessment:       1. Type 2 diabetes mellitus with diabetic nephropathy, with long-term current use of insulin    2. Chronic combined systolic and diastolic congestive heart failure, NYHA class 2    3. Type 2 diabetes mellitus with both eyes affected by moderate nonproliferative retinopathy and macular edema, with long-term current use of insulin        Plan:       Jose was seen today for follow-up and hyperglycemia.    Diagnoses and all orders for this visit:    Type 2 diabetes mellitus with diabetic nephropathy and both eyes affected by moderate nonproliferative retinopathy and macular edema, with long-term current use of insulin  -     Start taking EVERY NIGHT: insulin detemir U-100 (LEVEMIR FLEXTOUCH) 100 unit/mL (3 mL) SubQ InPn pen; Inject 32 Units into the skin every evening.   dulaglutide (TRULICITY) 1.5 mg/0.5 mL PnIj; Inject 1.5 mg into the skin once a week.  Lispro 10 u tid meals    Chronic combined systolic and diastolic congestive heart failure, NYHA class 2  -     Ambulatory Referral to Cardiology   Continue current meds    class 2 C  Needs to restrict strenuous activitity, his previous job at [employer] was very strenuous, on disability.  Paperwork completed and returned to pt. Will keep copy for our records

## 2019-01-04 ENCOUNTER — PROCEDURE VISIT (OUTPATIENT)
Dept: OPHTHALMOLOGY | Facility: CLINIC | Age: 69
End: 2019-01-04
Payer: MEDICARE

## 2019-01-04 VITALS — SYSTOLIC BLOOD PRESSURE: 103 MMHG | DIASTOLIC BLOOD PRESSURE: 66 MMHG | HEART RATE: 73 BPM

## 2019-01-04 DIAGNOSIS — E11.3313 TYPE 2 DIABETES MELLITUS WITH BOTH EYES AFFECTED BY MODERATE NONPROLIFERATIVE RETINOPATHY AND MACULAR EDEMA, WITH LONG-TERM CURRENT USE OF INSULIN: ICD-10-CM

## 2019-01-04 DIAGNOSIS — Z79.4 TYPE 2 DIABETES MELLITUS WITH BOTH EYES AFFECTED BY MODERATE NONPROLIFERATIVE RETINOPATHY AND MACULAR EDEMA, WITH LONG-TERM CURRENT USE OF INSULIN: ICD-10-CM

## 2019-01-04 PROCEDURE — 92226 PR SPECIAL EYE EXAM, SUBSEQUENT: CPT | Mod: 50,S$PBB,, | Performed by: OPHTHALMOLOGY

## 2019-01-04 PROCEDURE — 92012 PR EYE EXAM, EST PATIENT,INTERMED: ICD-10-PCS | Mod: S$PBB,,, | Performed by: OPHTHALMOLOGY

## 2019-01-04 PROCEDURE — 92134 CPTRZ OPH DX IMG PST SGM RTA: CPT | Mod: PBBFAC | Performed by: OPHTHALMOLOGY

## 2019-01-04 PROCEDURE — 92226 PR SPECIAL EYE EXAM, SUBSEQUENT: ICD-10-PCS | Mod: 50,S$PBB,, | Performed by: OPHTHALMOLOGY

## 2019-01-04 PROCEDURE — 92134 POSTERIOR SEGMENT OCT RETINA (OCULAR COHERENCE TOMOGRAPHY)-BOTH EYES: ICD-10-PCS | Mod: 26,S$PBB,, | Performed by: OPHTHALMOLOGY

## 2019-01-04 PROCEDURE — 92226 PR SPECIAL EYE EXAM, SUBSEQUENT: CPT | Mod: 50,PBBFAC | Performed by: OPHTHALMOLOGY

## 2019-01-04 PROCEDURE — 92012 INTRM OPH EXAM EST PATIENT: CPT | Mod: S$PBB,,, | Performed by: OPHTHALMOLOGY

## 2019-01-04 NOTE — PROGRESS NOTES
HPI     DLS 11/16/18 by Dr. Garcia    Pt states that his vision and eyes are the same as last visit just when   taking certain medications causes the vision to get blurry about an hour   to two hours.  No pains,flashes,floaters or double vision      LBS- 200 x yesterday     Hemoglobin A1C       Date                     Value               Ref Range             Status                11/21/2018               12.5 (H)            4.0 - 5.6 %           Final                  08/31/2018               12.2 (H)            4.0 - 5.6 %           Final                   06/08/2018               11.5 (H)            4.0 - 5.6 %           Final                    POHx:   1) NPDR OU   Macular edema OS     2) NS OU     Last edited by Dixie Tabares MA on 1/4/2019 12:31 PM. (History)             [place] SDOCT:   OD: good quality, good contour, thickening and IRF sl improved since 10/8/18 scan  OS: good quality, exudates, IRF, thickening, sl improved since 10/18/18 scan      Assessment /Plan     For exam results, see Encounter Report.    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  Pt agrees but not today--has no  and feels he can't drive afterward  RTC for Avastin OS.    Then will consider Avastin OD if worsens    PMD is changing BS meds to try for better control  Diabetic Retinopathy discussed in detail, all questions answered  Stressed importance of good BS/BP/Chol Control  RTC 1 wks for Avastin OS, sooner PRN

## 2019-01-10 ENCOUNTER — OFFICE VISIT (OUTPATIENT)
Dept: CARDIOLOGY | Facility: CLINIC | Age: 69
End: 2019-01-10
Payer: MEDICARE

## 2019-01-10 VITALS
DIASTOLIC BLOOD PRESSURE: 59 MMHG | HEIGHT: 74 IN | BODY MASS INDEX: 22.27 KG/M2 | WEIGHT: 173.5 LBS | HEART RATE: 88 BPM | SYSTOLIC BLOOD PRESSURE: 106 MMHG

## 2019-01-10 DIAGNOSIS — I42.9 CARDIOMYOPATHY, UNSPECIFIED TYPE: Primary | ICD-10-CM

## 2019-01-10 DIAGNOSIS — I50.42 CHRONIC COMBINED SYSTOLIC AND DIASTOLIC CONGESTIVE HEART FAILURE, NYHA CLASS 2: ICD-10-CM

## 2019-01-10 DIAGNOSIS — E11.29 PROTEINURIA DUE TO TYPE 2 DIABETES MELLITUS: ICD-10-CM

## 2019-01-10 DIAGNOSIS — Z79.4 TYPE 2 DIABETES MELLITUS WITH DIABETIC NEPHROPATHY, WITH LONG-TERM CURRENT USE OF INSULIN: ICD-10-CM

## 2019-01-10 DIAGNOSIS — I10 HYPERTENSION, ESSENTIAL: ICD-10-CM

## 2019-01-10 DIAGNOSIS — R80.9 PROTEINURIA DUE TO TYPE 2 DIABETES MELLITUS: ICD-10-CM

## 2019-01-10 DIAGNOSIS — I50.22 CHRONIC SYSTOLIC HEART FAILURE: ICD-10-CM

## 2019-01-10 DIAGNOSIS — E78.00 PURE HYPERCHOLESTEROLEMIA: ICD-10-CM

## 2019-01-10 DIAGNOSIS — I50.22 CHRONIC SYSTOLIC CONGESTIVE HEART FAILURE, NYHA CLASS 3: ICD-10-CM

## 2019-01-10 DIAGNOSIS — E11.21 TYPE 2 DIABETES MELLITUS WITH DIABETIC NEPHROPATHY, WITH LONG-TERM CURRENT USE OF INSULIN: ICD-10-CM

## 2019-01-10 PROCEDURE — 99214 PR OFFICE/OUTPT VISIT, EST, LEVL IV, 30-39 MIN: ICD-10-PCS | Mod: S$PBB,,, | Performed by: INTERNAL MEDICINE

## 2019-01-10 PROCEDURE — 99999 PR PBB SHADOW E&M-EST. PATIENT-LVL III: CPT | Mod: PBBFAC,,, | Performed by: INTERNAL MEDICINE

## 2019-01-10 PROCEDURE — 99999 PR PBB SHADOW E&M-EST. PATIENT-LVL III: ICD-10-PCS | Mod: PBBFAC,,, | Performed by: INTERNAL MEDICINE

## 2019-01-10 PROCEDURE — 99214 OFFICE O/P EST MOD 30 MIN: CPT | Mod: S$PBB,,, | Performed by: INTERNAL MEDICINE

## 2019-01-10 PROCEDURE — 99213 OFFICE O/P EST LOW 20 MIN: CPT | Mod: PBBFAC | Performed by: INTERNAL MEDICINE

## 2019-01-10 RX ORDER — CARVEDILOL 25 MG/1
25 TABLET ORAL 2 TIMES DAILY
Qty: 180 TABLET | Refills: 3 | Status: SHIPPED | OUTPATIENT
Start: 2019-01-10 | End: 2020-03-26

## 2019-01-10 NOTE — LETTER
January 10, 2019      Milagros Lebron MD  [address]  [place] [address]           [facility] - Cardiology  [address]  [place] [address]  Phone: [phone]          Patient: Jose Pete   MR Number: [id number]   YOB: 1950   Date of Visit: 1/10/2019       Dear Dr. Milagros Lebron:    Thank you for referring Jose Pete to me for evaluation. Attached you will find relevant portions of my assessment and plan of care.    If you have questions, please do not hesitate to call me. I look forward to following Jose Pete along with you.    Sincerely,    Kiko Segovia MD    Enclosure  CC:  No Recipients    If you would like to receive this communication electronically, please contact [email or web address] or [phone] to request more information on [facility] access.    For providers and/or their staff who would like to refer a patient to [facility], please contact us through our one-stop-shop provider referral line, [facility] , at [phone].    If you feel you have received this communication in error or would no longer like to receive these types of communications, please e-mail [email or web address]

## 2019-01-10 NOTE — PROGRESS NOTES
Subjective:   Patient ID:  Jose Pete is a 68 y.o. male who presents for follow up of Chronic systolic congestive heart failure, NYHA class 3      HPI: Very pleasant man with a cardiomyopathy here to establish care with me.  He previously saw Dr. Velez.  He's on Coreg 25 bid and lisinopril 40 daily for his myopathy.  He states that he gets a bit fatigued after walking 15 blocks, which he does many days, but he doesn't have to stop for dyspnea.    He denies chest discomfort, palpitations, PND/orthopnea, lightheadedness and syncope.    2018 Echo   CONCLUSIONS     1 - Eccentric LVH with mildly to moderately depressed left ventricular systolic function (EF 40-45%).     2 - Moderate left atrial enlargement.     3 - Impaired LV relaxation, normal LAP (grade 1 diastolic dysfunction).     4 - Normal right ventricular systolic function .     5 - Mild aortic regurgitation.      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    aspirin (ECOTRIN) 81 MG EC tablet Take 1 tablet (81 mg total) by mouth once daily.    atorvastatin (LIPITOR) 40 MG tablet Take 1 tablet (40 mg total) by mouth once daily.    blood sugar diagnostic Strp To check BG 5 times daily, to use with insurance preferred meter    blood-glucose meter kit To check BG 5 times  "daily, to use with insurance preferred meter    carvedilol (COREG) 12.5 MG tablet Take 2 tablets (25 mg total) by mouth 2 (two) times daily.    dulaglutide (TRULICITY) 1.5 mg/0.5 mL PnIj Inject 1.5 mg into the skin once a week.    furosemide (LASIX) 40 MG tablet Take 1 tablet (40 mg total) by mouth once daily.    insulin detemir U-100 (LEVEMIR FLEXTOUCH) 100 unit/mL (3 mL) SubQ InPn pen Inject 32 Units into the skin every evening.    insulin lispro (HUMALOG KWIKPEN INSULIN) 100 unit/mL InPn pen Inject 6 Units into the skin 3 (three) times daily.    lancets 33 gauge Misc use as directed    lisinopril (PRINIVIL,ZESTRIL) 40 MG tablet Take 1 tablet (40 mg total) by mouth once daily.    OPW TEST CLAIM - DO NOT FILL Inject into the skin. OPW test claim. Do not fill.    pen needle, diabetic 32 gauge x 5/32" Ndle 1 each by Misc.(Non-Drug; Combo Route) route once daily.    lancing device Misc 1 Device by Misc.(Non-Drug; Combo Route) route 2 (two) times daily with meals.     No current facility-administered medications for this visit.        Review of Systems   Constitution: Negative.   HENT: Negative.    Eyes: Negative.    Cardiovascular: Negative.  Negative for chest pain, dyspnea on exertion, near-syncope, orthopnea and palpitations.   Respiratory: Negative.  Negative for cough, hemoptysis and shortness of breath.    Endocrine: Negative.    Hematologic/Lymphatic: Negative.    Skin: Negative.    Musculoskeletal: Negative.    Gastrointestinal: Negative.    Genitourinary: Negative.    Neurological: Negative.    Psychiatric/Behavioral: Negative.      Objective:   Physical Exam   Constitutional: He is oriented to person, place, and time. He appears well-developed and well-nourished.   HENT:   Head: Normocephalic and atraumatic.   Mouth/Throat: Oropharynx is clear and moist.   Eyes: Conjunctivae and EOM are normal. No scleral icterus.   Neck: Normal range of motion. Neck supple. No JVD present.   Cardiovascular: Normal " rate, regular rhythm, normal heart sounds and intact distal pulses. Exam reveals no gallop and no friction rub.   No murmur heard.  Pulmonary/Chest: Effort normal and breath sounds normal. He has no wheezes. He has no rales.   Abdominal: Soft. Bowel sounds are normal. He exhibits no distension. There is no tenderness.   Musculoskeletal: Normal range of motion. He exhibits no edema.   Neurological: He is alert and oriented to person, place, and time.   Skin: Skin is warm and dry. No rash noted. No erythema.   Psychiatric: He has a normal mood and affect. His behavior is normal. Judgment and thought content normal.   Vitals reviewed.      Lab Results   Component Value Date    WBC 7.73 03/12/2018    HGB 13.5 (L) 03/12/2018    HCT 40.0 03/12/2018    MCV 89 03/12/2018     03/12/2018         Chemistry        Component Value Date/Time     (L) 11/21/2018 1015    K 4.5 11/21/2018 1015    CL 97 11/21/2018 1015    CO2 32 (H) 11/21/2018 1015    BUN 15 11/21/2018 1015    CREATININE 1.2 11/21/2018 1015     (H) 11/21/2018 1015        Component Value Date/Time    CALCIUM 10.6 (H) 11/21/2018 1015    ALKPHOS 79 11/21/2018 1015    AST 34 11/21/2018 1015    ALT 61 (H) 11/21/2018 1015    BILITOT 0.7 11/21/2018 1015    ESTGFRAFRICA >60.0 11/21/2018 1015    EGFRNONAA >60.0 11/21/2018 1015            Lab Results   Component Value Date    CHOL 128 03/12/2018    CHOL 161 12/12/2017    CHOL 180 12/12/2017     Lab Results   Component Value Date    HDL 40 03/12/2018    HDL 39 (L) 12/12/2017    HDL 41 12/12/2017     Lab Results   Component Value Date    LDLCALC 69.4 03/12/2018    LDLCALC 106.6 12/12/2017    LDLCALC 119.6 12/12/2017     Lab Results   Component Value Date    TRIG 93 03/12/2018    TRIG 77 12/12/2017    TRIG 97 12/12/2017     Lab Results   Component Value Date    CHOLHDL 31.3 03/12/2018    CHOLHDL 24.2 12/12/2017    CHOLHDL 22.8 12/12/2017       Lab Results   Component Value Date    TSH 1.033 11/13/2017       Lab  Results   Component Value Date    HGBA1C 12.5 (H) 11/21/2018       Assessment:     1. Cardiomyopathy, unspecified type    2. Chronic combined systolic and diastolic congestive heart failure, NYHA class 2    3. Chronic systolic heart failure    4. Hypertension, essential    5. Pure hypercholesterolemia    6. Proteinuria due to type 2 diabetes mellitus    7. Type 2 diabetes mellitus with diabetic nephropathy, with long-term current use of insulin        Plan:     DM control is his biggest issue as he is well compensated with improving LV systolic function.  Cost of medications is definitely an issue here.    Continue current medicines.    Diet/exercise goals reinforced.    F/U 12 months

## 2019-01-17 ENCOUNTER — PROCEDURE VISIT (OUTPATIENT)
Dept: OPHTHALMOLOGY | Facility: CLINIC | Age: 69
End: 2019-01-17
Payer: MEDICARE

## 2019-01-17 VITALS — HEART RATE: 85 BPM | DIASTOLIC BLOOD PRESSURE: 63 MMHG | SYSTOLIC BLOOD PRESSURE: 95 MMHG

## 2019-01-17 DIAGNOSIS — E11.3313 TYPE 2 DIABETES MELLITUS WITH BOTH EYES AFFECTED BY MODERATE NONPROLIFERATIVE RETINOPATHY AND MACULAR EDEMA, WITH LONG-TERM CURRENT USE OF INSULIN: Primary | ICD-10-CM

## 2019-01-17 DIAGNOSIS — Z79.4 TYPE 2 DIABETES MELLITUS WITH BOTH EYES AFFECTED BY MODERATE NONPROLIFERATIVE RETINOPATHY AND MACULAR EDEMA, WITH LONG-TERM CURRENT USE OF INSULIN: Primary | ICD-10-CM

## 2019-01-17 PROCEDURE — 67028 INJECTION EYE DRUG: CPT | Mod: S$PBB,LT,, | Performed by: OPHTHALMOLOGY

## 2019-01-17 PROCEDURE — C9257 BEVACIZUMAB INJECTION: HCPCS | Mod: PBBFAC,JG | Performed by: OPHTHALMOLOGY

## 2019-01-17 PROCEDURE — 99499 NO LOS: ICD-10-PCS | Mod: S$PBB,,, | Performed by: OPHTHALMOLOGY

## 2019-01-17 PROCEDURE — 99499 UNLISTED E&M SERVICE: CPT | Mod: S$PBB,,, | Performed by: OPHTHALMOLOGY

## 2019-01-17 PROCEDURE — 67028 PR INJECT INTRAVITREAL PHARMCOLOGIC: ICD-10-PCS | Mod: S$PBB,LT,, | Performed by: OPHTHALMOLOGY

## 2019-01-17 PROCEDURE — 67028 INJECTION EYE DRUG: CPT | Mod: PBBFAC,LT | Performed by: OPHTHALMOLOGY

## 2019-01-17 RX ADMIN — BEVACIZUMAB 1.25 MG: 100 INJECTION, SOLUTION INTRAVENOUS at 01:01

## 2019-01-17 NOTE — PROGRESS NOTES
HPI     DLS 1/4/18 by Dr. Garcia      Pt here for Avastin injection OS as planned.    -no vision change  -no pains  -no flashes or floater    -no eye drops        POHx:   1) NPDR OU   Macular edema OS     2) NS OU     IDDM  Hemoglobin A1C       Date                     Value               Ref Range             Status                11/21/2018               12.5 (H)            4.0 - 5.6 %           Final                      08/31/2018               12.2 (H)            4.0 - 5.6 %           Final                      06/08/2018               11.5 (H)            4.0 - 5.6 %           Final                 Last edited by Lars Garcia MD on 1/17/2019  5:07 PM. (History)            Assessment /Plan     For exam results, see Encounter Report.    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  Timeout performed.    Risks, benefits, and alternatives to treatment were discussed in detail with the patient, including bleeding/infection (endophthalmitis)/etc.  The patient voiced understanding and wished to proceed with the procedure.  See separate consent form.    Injection Procedure Note:  Diagnosis: CSME Left Eye    Topical Proparacaine drop placed then topical 5% Betadine  Sterile gloves used, and sterile lid speculum placed.  5% Betadine placed at injection site again prior to injection.  Avastin 1.25mg in 0.05cc Injected at 5:00 position 3.5-4mm posterior to the limbus.  Complications: None  Va at least CF at 5 feet post injection.  Retina, ONH, IOP normal after injection.    Followup as above.  Patient should return immediately PRN.  Retinal Detachment and Endophthalmitis precautions  given.

## 2019-01-31 ENCOUNTER — TELEPHONE (OUTPATIENT)
Dept: ENDOCRINOLOGY | Facility: CLINIC | Age: 69
End: 2019-01-31

## 2019-01-31 NOTE — TELEPHONE ENCOUNTER
Called a pt and left brief message that medicare has not specific brand is cover, contact no was provided.

## 2019-01-31 NOTE — TELEPHONE ENCOUNTER
----- Message from Charlotte Madrigal sent at 1/31/2019 10:31 AM CST -----  Contact: Self [phone]  PT can not afford $48 for  the one touch ultra test strips. He is not sure what strips are covered by Medicare.

## 2019-03-22 DIAGNOSIS — E11.9 TYPE 2 DIABETES MELLITUS WITHOUT COMPLICATION: ICD-10-CM

## 2019-04-01 ENCOUNTER — PROCEDURE VISIT (OUTPATIENT)
Dept: OPHTHALMOLOGY | Facility: CLINIC | Age: 69
End: 2019-04-01
Attending: OPHTHALMOLOGY
Payer: MEDICARE

## 2019-04-01 VITALS — DIASTOLIC BLOOD PRESSURE: 81 MMHG | SYSTOLIC BLOOD PRESSURE: 139 MMHG | HEART RATE: 85 BPM

## 2019-04-01 DIAGNOSIS — Z79.4 TYPE 2 DIABETES MELLITUS WITH BOTH EYES AFFECTED BY MODERATE NONPROLIFERATIVE RETINOPATHY AND MACULAR EDEMA, WITH LONG-TERM CURRENT USE OF INSULIN: ICD-10-CM

## 2019-04-01 DIAGNOSIS — E11.3313 TYPE 2 DIABETES MELLITUS WITH BOTH EYES AFFECTED BY MODERATE NONPROLIFERATIVE RETINOPATHY AND MACULAR EDEMA, WITH LONG-TERM CURRENT USE OF INSULIN: ICD-10-CM

## 2019-04-01 PROCEDURE — 92226 PR SPECIAL EYE EXAM, SUBSEQUENT: ICD-10-PCS | Mod: 50,S$PBB,, | Performed by: OPHTHALMOLOGY

## 2019-04-01 PROCEDURE — 92226 PR SPECIAL EYE EXAM, SUBSEQUENT: CPT | Mod: 50,PBBFAC | Performed by: OPHTHALMOLOGY

## 2019-04-01 PROCEDURE — 92012 INTRM OPH EXAM EST PATIENT: CPT | Mod: S$PBB,,, | Performed by: OPHTHALMOLOGY

## 2019-04-01 PROCEDURE — 92226 PR SPECIAL EYE EXAM, SUBSEQUENT: CPT | Mod: 50,S$PBB,, | Performed by: OPHTHALMOLOGY

## 2019-04-01 PROCEDURE — 92134 POSTERIOR SEGMENT OCT RETINA (OCULAR COHERENCE TOMOGRAPHY)-BOTH EYES: ICD-10-PCS | Mod: 26,S$PBB,, | Performed by: OPHTHALMOLOGY

## 2019-04-01 PROCEDURE — 92134 CPTRZ OPH DX IMG PST SGM RTA: CPT | Mod: PBBFAC | Performed by: OPHTHALMOLOGY

## 2019-04-01 PROCEDURE — 92012 PR EYE EXAM, EST PATIENT,INTERMED: ICD-10-PCS | Mod: S$PBB,,, | Performed by: OPHTHALMOLOGY

## 2019-04-01 NOTE — PROGRESS NOTES
HPI     DLS 1/17/19 by Dr. Garcia   here for 2 mos ck/ Avastin injection OS     Pt states that eyes and vision are about the same as last visit, no   noticeable changes.  No pains,flashes,floaters or double vision.    Eye Med(s) - none    Avastin - OS x 2  (01/17/19)    POHx:   1) NPDR OU   Macular edema OS     2) NS OU     Hemoglobin A1C       Date                     Value               Ref Range             Status                11/21/2018               12.5 (H)            4.0 - 5.6 %           Final                      08/31/2018               12.2 (H)            4.0 - 5.6 %           Final                      06/08/2018               11.5 (H)            4.0 - 5.6 %           Final                 Last edited by Lars Garcia MD on 4/1/2019  1:21 PM. (History)         [place] SDOCT:   OD: good quality, good contour, thickening and IRF sl worse since 1/4/19 scan  OS: good quality, exudates, IRF, thickening, sl worse since 1/4/19 scan        Assessment /Plan     For exam results, see Encounter Report.    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  Recommend Avastin OS  OD with min inc ME (has been fluctuating without Rx).  No exudate OD.  Rec obs OD for now    Pt agrees to Avastin OS but needs to have  next week    Diabetic Retinopathy discussed in detail, all questions answered  Stressed importance of good BS/BP/Chol Control  RTC 1 wk for Avastin OS, sooner PRN

## 2019-04-05 DIAGNOSIS — E11.9 TYPE 2 DIABETES MELLITUS WITHOUT COMPLICATION: ICD-10-CM

## 2019-04-11 ENCOUNTER — PROCEDURE VISIT (OUTPATIENT)
Dept: OPHTHALMOLOGY | Facility: CLINIC | Age: 69
End: 2019-04-11
Payer: MEDICARE

## 2019-04-11 DIAGNOSIS — Z12.11 SPECIAL SCREENING FOR MALIGNANT NEOPLASMS, COLON: Primary | ICD-10-CM

## 2019-04-11 DIAGNOSIS — E11.3313 TYPE 2 DIABETES MELLITUS WITH BOTH EYES AFFECTED BY MODERATE NONPROLIFERATIVE RETINOPATHY AND MACULAR EDEMA, WITH LONG-TERM CURRENT USE OF INSULIN: Primary | ICD-10-CM

## 2019-04-11 DIAGNOSIS — Z79.4 TYPE 2 DIABETES MELLITUS WITH BOTH EYES AFFECTED BY MODERATE NONPROLIFERATIVE RETINOPATHY AND MACULAR EDEMA, WITH LONG-TERM CURRENT USE OF INSULIN: Primary | ICD-10-CM

## 2019-04-11 DIAGNOSIS — E78.00 PURE HYPERCHOLESTEROLEMIA: ICD-10-CM

## 2019-04-11 PROCEDURE — 67028 PR INJECT INTRAVITREAL PHARMCOLOGIC: ICD-10-PCS | Mod: S$PBB,LT,, | Performed by: OPHTHALMOLOGY

## 2019-04-11 PROCEDURE — 99499 UNLISTED E&M SERVICE: CPT | Mod: S$PBB,,, | Performed by: OPHTHALMOLOGY

## 2019-04-11 PROCEDURE — 99499 NO LOS: ICD-10-PCS | Mod: S$PBB,,, | Performed by: OPHTHALMOLOGY

## 2019-04-11 PROCEDURE — 67028 INJECTION EYE DRUG: CPT | Mod: PBBFAC,LT | Performed by: OPHTHALMOLOGY

## 2019-04-11 PROCEDURE — C9257 BEVACIZUMAB INJECTION: HCPCS | Mod: PBBFAC,JG | Performed by: OPHTHALMOLOGY

## 2019-04-11 PROCEDURE — 67028 INJECTION EYE DRUG: CPT | Mod: S$PBB,LT,, | Performed by: OPHTHALMOLOGY

## 2019-04-11 RX ORDER — POLYETHYLENE GLYCOL 3350, SODIUM SULFATE ANHYDROUS, SODIUM BICARBONATE, SODIUM CHLORIDE, POTASSIUM CHLORIDE 236; 22.74; 6.74; 5.86; 2.97 G/4L; G/4L; G/4L; G/4L; G/4L
4 POWDER, FOR SOLUTION ORAL ONCE
Qty: 4000 ML | Refills: 0 | Status: SHIPPED | OUTPATIENT
Start: 2019-04-11 | End: 2019-04-12

## 2019-04-11 RX ORDER — ATORVASTATIN CALCIUM 40 MG/1
40 TABLET, FILM COATED ORAL DAILY
Qty: 90 TABLET | Refills: 3 | Status: SHIPPED | OUTPATIENT
Start: 2019-04-11 | End: 2020-04-30

## 2019-04-11 RX ORDER — FUROSEMIDE 40 MG/1
40 TABLET ORAL DAILY
Qty: 30 TABLET | Refills: 11 | Status: SHIPPED | OUTPATIENT
Start: 2019-04-11 | End: 2020-04-30

## 2019-04-11 RX ADMIN — BEVACIZUMAB 1.25 MG: 100 INJECTION, SOLUTION INTRAVENOUS at 01:04

## 2019-04-11 NOTE — PROGRESS NOTES
HPI        Here today  for Avastin injection OS     Pt states that eyes and vision are about the same as last visit, no   noticeable changes.  No pains,flashes,floaters or double vision.    Eye Med(s) - none    Avastin - OS   (01/17/19)    POHx:   1) NPDR OU   Macular edema OS     2) NS OU         Last edited by Dixie Tabares MA on 4/11/2019 12:30 PM. (History)            Assessment /Plan     For exam results, see Encounter Report.    Type 2 diabetes mellitus with both eyes affected by moderate nonproliferative retinopathy and macular edema, with long-term current use of insulin  -     Prior Authorization Order    Other orders  -     bevacizumab (AVASTIN) 2.5 mg/0.10 mL injection 1.25 mg      Avastin OS today as planned  RTC 6wks sooner PRN    Patient identified.  Timeout performed.    Risks, benefits, and alternatives to treatment were discussed in detail with the patient, including bleeding/infection (endophthalmitis)/etc.  The patient voiced understanding and wished to proceed with the procedure.  See separate consent form.    Injection Procedure Note:  Diagnosis: CSME Left Eye    Topical Proparacaine drop placed then topical 5% Betadine  Sterile gloves used, and sterile lid speculum placed.  5% Betadine placed at injection site again prior to injection.  Avastin 1.25mg in 0.05cc Injected inferotemporally 3.5-4mm posterior to the limbus.  Complications: None  Va at least CF at 5 feet post injection.  Retina, ONH, IOP normal after injection.    Followup as above.  Patient should return immediately PRN.  Retinal Detachment and Endophthalmitis precautions given.

## 2019-05-02 NOTE — PROGRESS NOTES
Subjective:      Patient ID: Jose Pete is a 68 y.o. male.    Chief Complaint:  Diabetes    History of Present Illness  Jose Pete presents today for follow up of T2DM. Last seen 11/28/18.     With regards to the diabetes:    Diagnosed: 2005  Known complications:  DKA-  RN-  PN-  Nephropathy +  CV Disease +    Current regimen:  Levemir 32 units nightly (pens)   Humalog 10 units AC + correction scale 180/50  Trulicity 1.5 mg weekly     Other medications tried:  Metformin    Occ will miss humalog dose     Glucose Monitor:  3-5 times a day testing  Log reviewed:   This Am 103  Avg 120    Diet/Exercise:  Eats 3 meals a day.   Snacking in between some meals on 2 small jovon snaps   Drinks- water occ cranberry juice diluted with water     Exercise - tries to stay active     Hypoglycemia:  Hypoglycemic event? None   Knows how to correct with 15 grams of carbs- juice, coke, or a peppermint.      Education - last visit: none    Diabetes Management Status  Statin: Taking  ACE/ARB: Taking  Screening or Prevention Patient's value Goal Complete/Controlled?   HgA1C Testing and Control   Lab Results   Component Value Date    HGBA1C 12.5 (H) 11/21/2018      Annually/Less than 8% No   Lipid profile : 03/12/2018 Annually No   LDL control Lab Results   Component Value Date    LDLCALC 69.4 03/12/2018    Annually/Less than 100 mg/dl  No   Nephropathy screening Lab Results   Component Value Date    LABMICR 11.0 08/31/2018     Lab Results   Component Value Date    PROTEINUA 2+ (A) 11/12/2017    Annually Yes   Blood pressure BP Readings from Last 1 Encounters:   05/06/19 (!) 144/84    Less than 140/90 Yes   Dilated retinal exam : 04/01/2019 Annually Yes   Foot exam   : 12/12/2017 Annually No     Review of Systems   Constitutional: Negative for unexpected weight change.   Eyes: Negative for visual disturbance.   Respiratory: Negative for shortness of breath.    Cardiovascular: Negative for chest pain.   Gastrointestinal: Negative for  abdominal pain.   Endocrine: Negative for cold intolerance, heat intolerance, polydipsia, polyphagia and polyuria.   Musculoskeletal: Negative for arthralgias.   Skin: Negative for wound.   Neurological: Negative for headaches.   Hematological: Does not bruise/bleed easily.   Psychiatric/Behavioral: Negative for sleep disturbance.     Objective:   Physical Exam   Neck: No thyromegaly present.   Cardiovascular: Normal rate.   No edema present   Pulmonary/Chest: Effort normal.   Abdominal: Soft.   Vitals reviewed.  injection sites are ok. No lipo hypertropthy or atrophy  Appropriate footwear, Foot exam deferred per patient.   No ulcers or wounds.     Body mass index is 22.98 kg/m².    Lab Review:   Lab Results   Component Value Date    HGBA1C 12.5 (H) 11/21/2018     Lab Results   Component Value Date    CHOL 128 03/12/2018    HDL 40 03/12/2018    LDLCALC 69.4 03/12/2018    TRIG 93 03/12/2018    CHOLHDL 31.3 03/12/2018     Lab Results   Component Value Date     (L) 11/21/2018    K 4.5 11/21/2018    CL 97 11/21/2018    CO2 32 (H) 11/21/2018     (H) 11/21/2018    BUN 15 11/21/2018    CREATININE 1.2 11/21/2018    CALCIUM 10.6 (H) 11/21/2018    PROT 7.7 11/21/2018    ALBUMIN 4.0 11/21/2018    BILITOT 0.7 11/21/2018    ALKPHOS 79 11/21/2018    AST 34 11/21/2018    ALT 61 (H) 11/21/2018    ANIONGAP 6 (L) 11/21/2018    ESTGFRAFRICA >60.0 11/21/2018    EGFRNONAA >60.0 11/21/2018    TSH 1.033 11/13/2017     Assessment and Plan     1. Type 2 diabetes mellitus with diabetic nephropathy, with long-term current use of insulin  insulin aspart U-100 (NOVOLOG FLEXPEN U-100 INSULIN) 100 unit/mL (3 mL) InPn pen    Hemoglobin A1c    Comprehensive metabolic panel    Microalbumin/creatinine urine ratio   2. Proteinuria due to type 2 diabetes mellitus     3. Hypertension, essential     4. Pure hypercholesterolemia     5. Chronic systolic heart failure     6. Type 2 diabetes mellitus with both eyes affected by moderate  nonproliferative retinopathy and macular edema, with long-term current use of insulin       Type 2 diabetes mellitus with diabetic nephropathy, with long-term current use of insulin  -- Labs today, insufficient data to make medication changes today but based on patient BG oral recall BG are doing well.   -- Reviewed goals of therapy are to get the best control we can without hypoglycemia  Continue current medication doses.   -- Reviewed patient's current insulin regimen. Clarified proper insulin dose and timing in relation to meals, etc. Insulin injection sites and proper rotation instructed.    -- Advised frequent self blood glucose monitoring.  Patient encouraged to document glucose results and bring them to every clinic visit    -- Hypoglycemia precautions discussed. Instructed on precautions before driving.    -- Call for Bg repeatedly < 90 or > 180.   -- Close adherence to lifestyle changes recommended.   -- Periodic follow ups for eye evaluations, foot care and dental care suggested.    -- foot exam @ next visit     Type 2 diabetes mellitus with both eyes affected by moderate nonproliferative retinopathy and macular edema, with long-term current use of insulin  -- Continue following with ophthalmology     Pure hypercholesterolemia  Controlled   On statin per ADA recommendations    Proteinuria due to type 2 diabetes mellitus  Urine today    Hypertension, essential  -- on ACEI  -- Controlled  -- Blood pressure goals discussed with patient    Heart failure with reduced ejection fraction  -- Continue following with cardiology    Follow up in about 4 months (around 9/6/2019).  Labs today

## 2019-05-06 ENCOUNTER — LAB VISIT (OUTPATIENT)
Dept: LAB | Facility: HOSPITAL | Age: 69
End: 2019-05-06
Payer: MEDICARE

## 2019-05-06 ENCOUNTER — OFFICE VISIT (OUTPATIENT)
Dept: ENDOCRINOLOGY | Facility: CLINIC | Age: 69
End: 2019-05-06
Payer: MEDICARE

## 2019-05-06 VITALS
DIASTOLIC BLOOD PRESSURE: 84 MMHG | BODY MASS INDEX: 22.97 KG/M2 | HEIGHT: 74 IN | HEART RATE: 69 BPM | WEIGHT: 179 LBS | SYSTOLIC BLOOD PRESSURE: 144 MMHG

## 2019-05-06 DIAGNOSIS — E11.3313 TYPE 2 DIABETES MELLITUS WITH BOTH EYES AFFECTED BY MODERATE NONPROLIFERATIVE RETINOPATHY AND MACULAR EDEMA, WITH LONG-TERM CURRENT USE OF INSULIN: ICD-10-CM

## 2019-05-06 DIAGNOSIS — E11.21 TYPE 2 DIABETES MELLITUS WITH DIABETIC NEPHROPATHY, WITH LONG-TERM CURRENT USE OF INSULIN: ICD-10-CM

## 2019-05-06 DIAGNOSIS — R80.9 PROTEINURIA DUE TO TYPE 2 DIABETES MELLITUS: ICD-10-CM

## 2019-05-06 DIAGNOSIS — Z79.4 TYPE 2 DIABETES MELLITUS WITH BOTH EYES AFFECTED BY MODERATE NONPROLIFERATIVE RETINOPATHY AND MACULAR EDEMA, WITH LONG-TERM CURRENT USE OF INSULIN: ICD-10-CM

## 2019-05-06 DIAGNOSIS — I10 HYPERTENSION, ESSENTIAL: ICD-10-CM

## 2019-05-06 DIAGNOSIS — E11.29 PROTEINURIA DUE TO TYPE 2 DIABETES MELLITUS: ICD-10-CM

## 2019-05-06 DIAGNOSIS — I50.22 CHRONIC SYSTOLIC HEART FAILURE: ICD-10-CM

## 2019-05-06 DIAGNOSIS — E78.00 PURE HYPERCHOLESTEROLEMIA: ICD-10-CM

## 2019-05-06 DIAGNOSIS — Z79.4 TYPE 2 DIABETES MELLITUS WITH DIABETIC NEPHROPATHY, WITH LONG-TERM CURRENT USE OF INSULIN: Primary | ICD-10-CM

## 2019-05-06 DIAGNOSIS — E11.21 TYPE 2 DIABETES MELLITUS WITH DIABETIC NEPHROPATHY, WITH LONG-TERM CURRENT USE OF INSULIN: Primary | ICD-10-CM

## 2019-05-06 DIAGNOSIS — Z79.4 TYPE 2 DIABETES MELLITUS WITH DIABETIC NEPHROPATHY, WITH LONG-TERM CURRENT USE OF INSULIN: ICD-10-CM

## 2019-05-06 LAB
ALBUMIN SERPL BCP-MCNC: 4.1 G/DL (ref 3.5–5.2)
ALP SERPL-CCNC: 66 U/L (ref 55–135)
ALT SERPL W/O P-5'-P-CCNC: 29 U/L (ref 10–44)
ANION GAP SERPL CALC-SCNC: 5 MMOL/L (ref 8–16)
AST SERPL-CCNC: 18 U/L (ref 10–40)
BILIRUB SERPL-MCNC: 0.7 MG/DL (ref 0.1–1)
BUN SERPL-MCNC: 17 MG/DL (ref 8–23)
CALCIUM SERPL-MCNC: 11.2 MG/DL (ref 8.7–10.5)
CHLORIDE SERPL-SCNC: 100 MMOL/L (ref 95–110)
CO2 SERPL-SCNC: 34 MMOL/L (ref 23–29)
CREAT SERPL-MCNC: 1 MG/DL (ref 0.5–1.4)
EST. GFR  (AFRICAN AMERICAN): >60 ML/MIN/1.73 M^2
EST. GFR  (NON AFRICAN AMERICAN): >60 ML/MIN/1.73 M^2
ESTIMATED AVG GLUCOSE: 197 MG/DL (ref 68–131)
GLUCOSE SERPL-MCNC: 169 MG/DL (ref 70–110)
HBA1C MFR BLD HPLC: 8.5 % (ref 4–5.6)
POTASSIUM SERPL-SCNC: 4.8 MMOL/L (ref 3.5–5.1)
PROT SERPL-MCNC: 7.6 G/DL (ref 6–8.4)
SODIUM SERPL-SCNC: 139 MMOL/L (ref 136–145)

## 2019-05-06 PROCEDURE — 99214 OFFICE O/P EST MOD 30 MIN: CPT | Mod: S$PBB,,, | Performed by: NURSE PRACTITIONER

## 2019-05-06 PROCEDURE — 99999 PR PBB SHADOW E&M-EST. PATIENT-LVL III: CPT | Mod: PBBFAC,,, | Performed by: NURSE PRACTITIONER

## 2019-05-06 PROCEDURE — 36415 COLL VENOUS BLD VENIPUNCTURE: CPT

## 2019-05-06 PROCEDURE — 99213 OFFICE O/P EST LOW 20 MIN: CPT | Mod: PBBFAC | Performed by: NURSE PRACTITIONER

## 2019-05-06 PROCEDURE — 99999 PR PBB SHADOW E&M-EST. PATIENT-LVL III: ICD-10-PCS | Mod: PBBFAC,,, | Performed by: NURSE PRACTITIONER

## 2019-05-06 PROCEDURE — 83036 HEMOGLOBIN GLYCOSYLATED A1C: CPT

## 2019-05-06 PROCEDURE — 80053 COMPREHEN METABOLIC PANEL: CPT

## 2019-05-06 PROCEDURE — 99214 PR OFFICE/OUTPT VISIT, EST, LEVL IV, 30-39 MIN: ICD-10-PCS | Mod: S$PBB,,, | Performed by: NURSE PRACTITIONER

## 2019-05-06 RX ORDER — INSULIN ASPART 100 [IU]/ML
6 INJECTION, SOLUTION INTRAVENOUS; SUBCUTANEOUS
Qty: 15 ML | Refills: 11 | Status: SHIPPED | OUTPATIENT
Start: 2019-05-06 | End: 2019-12-26

## 2019-05-17 DIAGNOSIS — I50.22 CHRONIC SYSTOLIC CONGESTIVE HEART FAILURE, NYHA CLASS 3: ICD-10-CM

## 2019-05-17 DIAGNOSIS — I42.9 CARDIOMYOPATHY, UNSPECIFIED TYPE: ICD-10-CM

## 2019-05-17 RX ORDER — LISINOPRIL 40 MG/1
40 TABLET ORAL DAILY
Qty: 90 TABLET | Refills: 3 | Status: SHIPPED | OUTPATIENT
Start: 2019-05-17 | End: 2020-06-08 | Stop reason: SDUPTHER

## 2019-07-12 ENCOUNTER — PROCEDURE VISIT (OUTPATIENT)
Dept: OPHTHALMOLOGY | Facility: CLINIC | Age: 69
End: 2019-07-12
Attending: OPHTHALMOLOGY
Payer: MEDICARE

## 2019-07-12 VITALS — HEART RATE: 81 BPM | SYSTOLIC BLOOD PRESSURE: 131 MMHG | DIASTOLIC BLOOD PRESSURE: 77 MMHG

## 2019-07-12 DIAGNOSIS — Z79.4 TYPE 2 DIABETES MELLITUS WITH BOTH EYES AFFECTED BY MODERATE NONPROLIFERATIVE RETINOPATHY AND MACULAR EDEMA, WITH LONG-TERM CURRENT USE OF INSULIN: Primary | ICD-10-CM

## 2019-07-12 DIAGNOSIS — E11.3313 TYPE 2 DIABETES MELLITUS WITH BOTH EYES AFFECTED BY MODERATE NONPROLIFERATIVE RETINOPATHY AND MACULAR EDEMA, WITH LONG-TERM CURRENT USE OF INSULIN: Primary | ICD-10-CM

## 2019-07-12 DIAGNOSIS — H25.13 NUCLEAR SCLEROSIS OF BOTH EYES: ICD-10-CM

## 2019-07-12 PROCEDURE — C9257 BEVACIZUMAB INJECTION: HCPCS | Mod: PBBFAC,JG | Performed by: OPHTHALMOLOGY

## 2019-07-12 PROCEDURE — 92134 CPTRZ OPH DX IMG PST SGM RTA: CPT | Mod: PBBFAC | Performed by: OPHTHALMOLOGY

## 2019-07-12 PROCEDURE — 67028 INJECTION EYE DRUG: CPT | Mod: S$PBB,LT,, | Performed by: OPHTHALMOLOGY

## 2019-07-12 PROCEDURE — 67028 INJECTION EYE DRUG: CPT | Mod: PBBFAC,LT | Performed by: OPHTHALMOLOGY

## 2019-07-12 PROCEDURE — 92012 PR EYE EXAM, EST PATIENT,INTERMED: ICD-10-PCS | Mod: 25,S$PBB,, | Performed by: OPHTHALMOLOGY

## 2019-07-12 PROCEDURE — 67028 PR INJECT INTRAVITREAL PHARMCOLOGIC: ICD-10-PCS | Mod: S$PBB,LT,, | Performed by: OPHTHALMOLOGY

## 2019-07-12 PROCEDURE — 92012 INTRM OPH EXAM EST PATIENT: CPT | Mod: 25,S$PBB,, | Performed by: OPHTHALMOLOGY

## 2019-07-12 PROCEDURE — 92134 POSTERIOR SEGMENT OCT RETINA (OCULAR COHERENCE TOMOGRAPHY)-BOTH EYES: ICD-10-PCS | Mod: 26,S$PBB,, | Performed by: OPHTHALMOLOGY

## 2019-07-12 RX ADMIN — BEVACIZUMAB 1.25 MG: 100 INJECTION, SOLUTION INTRAVENOUS at 01:07

## 2019-07-12 NOTE — PROGRESS NOTES
Subjective:       Patient ID: Jose Pete is a 68 y.o. male      Chief Complaint   Patient presents with    Diabetic Eye Exam     History of Present Illness  HPI     3 month follow up  Type 2 Diabetes OU    Pt deny any changes or problems since last visit.  Va overall OK but   wishes he could see better.  No f/f/pain OU.      Hemoglobin A1C       Date                     Value               Ref Range             Status                05/06/2019               8.5 (H)             4.0 - 5.6 %           Final                  Last edited by Lars Garcia MD on 7/12/2019  1:12 PM. (History)        Imaging:    See report    Assessment/Plan:     1. Type 2 diabetes mellitus with both eyes affected by moderate nonproliferative retinopathy and macular edema, with long-term current use of insulin  ME OU.  OS 3 mo s/p Avastin with min imp  Has ME OU with exudate  Rec Avastin OU.  Pt wishes OS today and OD next avai    - Prior Authorization Order  - Posterior Segment OCT Retina-Both eyes  - Posterior Segment OCT Retina-Both eyes; Future    2. Nuclear sclerosis of both eyes  Recommend refraction Dr Cedillo.  Even though ME under Rx, should get reasonably good manifest improvement    Follow up in about 2 weeks (around 7/26/2019), or if symptoms worsen or fail to improve, for Injection Right eye, Avastin  INJECTION ONLY.       Patient identified.  Timeout performed.    Risks, benefits, and alternatives to treatment were discussed in detail with the patient, including bleeding/infection (endophthalmitis)/etc.  The patient voiced understanding and wished to proceed with the procedure.  See separate consent form.    Injection Procedure Note:  Diagnosis: CSME Left Eye    Topical Proparacaine drop placed then topical 5% Betadine  Sterile gloves used, and sterile lid speculum placed.  5% Betadine placed at injection site again prior to injection.  Avastin 1.25mg in 0.05cc Injected inferotemporally 3.5-4mm posterior to the  limbus.  Complications: None  Va at least CF at 5 feet post injection.  Retina, ONH, IOP normal after injection.    Followup as above.  Patient should return immediately PRN.  Retinal Detachment and Endophthalmitis precautions given.

## 2019-07-12 NOTE — PATIENT INSTRUCTIONS

## 2019-07-20 ENCOUNTER — OFFICE VISIT (OUTPATIENT)
Dept: OPTOMETRY | Facility: CLINIC | Age: 69
End: 2019-07-20
Payer: MEDICARE

## 2019-07-20 DIAGNOSIS — H52.4 HYPEROPIA WITH PRESBYOPIA OF BOTH EYES: ICD-10-CM

## 2019-07-20 DIAGNOSIS — H52.03 HYPEROPIA WITH PRESBYOPIA OF BOTH EYES: ICD-10-CM

## 2019-07-20 DIAGNOSIS — H25.13 NUCLEAR SCLEROSIS, BILATERAL: Primary | ICD-10-CM

## 2019-07-20 PROCEDURE — 99212 OFFICE O/P EST SF 10 MIN: CPT | Mod: PBBFAC | Performed by: OPTOMETRIST

## 2019-07-20 PROCEDURE — 99999 PR PBB SHADOW E&M-EST. PATIENT-LVL II: CPT | Mod: PBBFAC,,, | Performed by: OPTOMETRIST

## 2019-07-20 PROCEDURE — 99999 PR PBB SHADOW E&M-EST. PATIENT-LVL II: ICD-10-PCS | Mod: PBBFAC,,, | Performed by: OPTOMETRIST

## 2019-07-20 PROCEDURE — 92014 COMPRE OPH EXAM EST PT 1/>: CPT | Mod: S$PBB,,, | Performed by: OPTOMETRIST

## 2019-07-20 PROCEDURE — 92014 PR EYE EXAM, EST PATIENT,COMPREHESV: ICD-10-PCS | Mod: S$PBB,,, | Performed by: OPTOMETRIST

## 2019-07-20 PROCEDURE — 92015 DETERMINE REFRACTIVE STATE: CPT | Mod: ,,, | Performed by: OPTOMETRIST

## 2019-07-20 PROCEDURE — 92015 PR REFRACTION: ICD-10-PCS | Mod: ,,, | Performed by: OPTOMETRIST

## 2019-07-20 NOTE — PROGRESS NOTES
HPI     Patient is here for Refraction only.  Blurry vision OU     Last edited by Maxim King, PCT on 7/20/2019  9:24 AM. (History)            Assessment /Plan     For exam results, see Encounter Report.    Nuclear sclerosis, bilateral  -Not visually significant  -monitor annual    Hyperopia with presbyopia of both eyes  Eyeglass Final Rx     Eyeglass Final Rx       Sphere Cylinder Axis Dist VA Add    Right +1.25 +0.75 175 20/20 +2.50    Left +1.50 Sphere  20/30 +2.50    Expiration Date:  7/20/2020                  RTC 1 yr

## 2019-07-22 RX ORDER — AMLODIPINE BESYLATE 5 MG/1
5 TABLET ORAL DAILY
Qty: 30 TABLET | Refills: 11 | Status: SHIPPED | OUTPATIENT
Start: 2019-07-22 | End: 2020-09-14 | Stop reason: SDUPTHER

## 2019-08-22 ENCOUNTER — PROCEDURE VISIT (OUTPATIENT)
Dept: OPHTHALMOLOGY | Facility: CLINIC | Age: 69
End: 2019-08-22
Attending: OPHTHALMOLOGY
Payer: MEDICARE

## 2019-08-22 DIAGNOSIS — E11.3313 TYPE 2 DIABETES MELLITUS WITH BOTH EYES AFFECTED BY MODERATE NONPROLIFERATIVE RETINOPATHY AND MACULAR EDEMA, WITH LONG-TERM CURRENT USE OF INSULIN: ICD-10-CM

## 2019-08-22 DIAGNOSIS — Z79.4 TYPE 2 DIABETES MELLITUS WITH BOTH EYES AFFECTED BY MODERATE NONPROLIFERATIVE RETINOPATHY AND MACULAR EDEMA, WITH LONG-TERM CURRENT USE OF INSULIN: ICD-10-CM

## 2019-08-22 PROCEDURE — 99499 NO LOS: ICD-10-PCS | Mod: S$PBB,,, | Performed by: OPHTHALMOLOGY

## 2019-08-22 PROCEDURE — 67028 PR INJECT INTRAVITREAL PHARMCOLOGIC: ICD-10-PCS | Mod: S$PBB,79,RT, | Performed by: OPHTHALMOLOGY

## 2019-08-22 PROCEDURE — 67028 INJECTION EYE DRUG: CPT | Mod: S$PBB,79,RT, | Performed by: OPHTHALMOLOGY

## 2019-08-22 PROCEDURE — C9257 BEVACIZUMAB INJECTION: HCPCS | Mod: PBBFAC,JG | Performed by: OPHTHALMOLOGY

## 2019-08-22 PROCEDURE — 99499 UNLISTED E&M SERVICE: CPT | Mod: S$PBB,,, | Performed by: OPHTHALMOLOGY

## 2019-08-22 PROCEDURE — 67028 INJECTION EYE DRUG: CPT | Mod: PBBFAC,RT | Performed by: OPHTHALMOLOGY

## 2019-08-22 RX ADMIN — BEVACIZUMAB 1.25 MG: 100 INJECTION, SOLUTION INTRAVENOUS at 09:08

## 2019-08-22 NOTE — PATIENT INSTRUCTIONS

## 2019-08-23 NOTE — PROGRESS NOTES
Subjective:       Patient ID: Jose Pete is a 68 y.o. male      Chief Complaint   Patient presents with    1 mo Type II DM w/ Moderate NPDR and ME OU     History of Present Illness  HPI     DLS 07/12/19 by Dr. LUCIANO Garcia MD    69 YO M here today for Avastin OD injection for the treatment of CSME OS.   Va stable OU    LBS: 106 at 7:15a today 08/22/19 [facility]       Eye Med(s) - none     Avastin - OS   (01/17/19)     POHx:   1) NPDR OU   Macular edema OS     2) NS OU     Last edited by Lars Garcia MD on 8/22/2019  9:40 PM. (History)          Assessment/Plan:     1. Type 2 diabetes mellitus with both eyes affected by moderate nonproliferative retinopathy and macular edema, with long-term current use of insulin  Proceed with Avastin OD today as planned  S/p Avastin OS last month  - Prior Authorization Order  - Posterior Segment OCT Retina-Both eyes; Future    Follow up in about 1 month (around 9/22/2019), or if symptoms worsen or fail to improve, for Dilated examination, OCT Mac.     Patient identified.  Timeout performed.    Risks, benefits, and alternatives to treatment were discussed in detail with the patient, including bleeding/infection (endophthalmitis)/etc.  The patient voiced understanding and wished to proceed with the procedure.  See separate consent form.    Injection Procedure Note:  Diagnosis: CSME Right Eye    Topical Proparacaine drop placed then topical 5% Betadine  Sterile gloves used, and sterile lid speculum placed.  5% Betadine placed at injection site again prior to injection.  Avastin 1.25mg in 0.05cc Injected inferotemporally 3.5-4mm posterior to the limbus.  Complications: None  Va at least CF at 5 feet post injection.  Retina, ONH, IOP normal after injection.    Followup as above.  Patient should return immediately PRN.  Retinal Detachment and Endophthalmitis precautions given.

## 2019-08-31 ENCOUNTER — EXTERNAL CHRONIC CARE MANAGEMENT (OUTPATIENT)
Dept: PRIMARY CARE CLINIC | Facility: CLINIC | Age: 69
End: 2019-08-31
Payer: MEDICARE

## 2019-08-31 PROCEDURE — 99490 PR CHRONIC CARE MGMT, 1ST 20 MIN: ICD-10-PCS | Mod: S$PBB,,, | Performed by: INTERNAL MEDICINE

## 2019-08-31 PROCEDURE — 99490 CHRNC CARE MGMT STAFF 1ST 20: CPT | Mod: PBBFAC | Performed by: INTERNAL MEDICINE

## 2019-08-31 PROCEDURE — 99490 CHRNC CARE MGMT STAFF 1ST 20: CPT | Mod: S$PBB,,, | Performed by: INTERNAL MEDICINE

## 2019-09-25 ENCOUNTER — PROCEDURE VISIT (OUTPATIENT)
Dept: OPHTHALMOLOGY | Facility: CLINIC | Age: 69
End: 2019-09-25
Payer: MEDICARE

## 2019-09-25 VITALS — DIASTOLIC BLOOD PRESSURE: 72 MMHG | SYSTOLIC BLOOD PRESSURE: 119 MMHG | HEART RATE: 87 BPM

## 2019-09-25 DIAGNOSIS — H43.813 PVD (POSTERIOR VITREOUS DETACHMENT), BOTH EYES: ICD-10-CM

## 2019-09-25 DIAGNOSIS — Z79.4 TYPE 2 DIABETES MELLITUS WITH BOTH EYES AFFECTED BY MODERATE NONPROLIFERATIVE RETINOPATHY AND MACULAR EDEMA, WITH LONG-TERM CURRENT USE OF INSULIN: ICD-10-CM

## 2019-09-25 DIAGNOSIS — E11.3313 TYPE 2 DIABETES MELLITUS WITH BOTH EYES AFFECTED BY MODERATE NONPROLIFERATIVE RETINOPATHY AND MACULAR EDEMA, WITH LONG-TERM CURRENT USE OF INSULIN: ICD-10-CM

## 2019-09-25 DIAGNOSIS — H25.13 NUCLEAR SCLEROSIS OF BOTH EYES: Primary | ICD-10-CM

## 2019-09-25 PROCEDURE — 92134 CPTRZ OPH DX IMG PST SGM RTA: CPT | Mod: PBBFAC | Performed by: OPHTHALMOLOGY

## 2019-09-25 PROCEDURE — 92014 COMPRE OPH EXAM EST PT 1/>: CPT | Mod: S$PBB,,, | Performed by: OPHTHALMOLOGY

## 2019-09-25 PROCEDURE — 92226 PR SPECIAL EYE EXAM, SUBSEQUENT: CPT | Mod: 50,PBBFAC | Performed by: OPHTHALMOLOGY

## 2019-09-25 PROCEDURE — 92226 PR SPECIAL EYE EXAM, SUBSEQUENT: CPT | Mod: 50,S$PBB,, | Performed by: OPHTHALMOLOGY

## 2019-09-25 PROCEDURE — 92226 PR SPECIAL EYE EXAM, SUBSEQUENT: ICD-10-PCS | Mod: 50,S$PBB,, | Performed by: OPHTHALMOLOGY

## 2019-09-25 PROCEDURE — 92014 PR EYE EXAM, EST PATIENT,COMPREHESV: ICD-10-PCS | Mod: S$PBB,,, | Performed by: OPHTHALMOLOGY

## 2019-09-25 PROCEDURE — 92134 POSTERIOR SEGMENT OCT RETINA (OCULAR COHERENCE TOMOGRAPHY)-BOTH EYES: ICD-10-PCS | Mod: 26,S$PBB,, | Performed by: OPHTHALMOLOGY

## 2019-09-25 NOTE — PROGRESS NOTES
Subjective:       Patient ID: Jose Pete is a 68 y.o. male      Chief Complaint   Patient presents with    Diabetic Eye Exam     History of Present Illness  HPI     DLS 08/22/19     Pt here for DR examination    Va seems stable OU today, happy with current vision  -eye pain   -f/f      Eye Med(s) - none     Avastin  OD (8/22/19)  Avastin - OS (1/17/19)    POHx:   1) NPDR OU   Macular edema OS     2) NS OU     Last edited by Lars Garcia MD on 9/25/2019 10:18 PM. (History)        Imaging:    See report    Assessment/Plan:     1. Type 2 diabetes mellitus with both eyes affected by moderate nonproliferative retinopathy and macular edema, with long-term current use of insulin  ME inc OU.  Still with good Va  Recommend Avastin OU (OD needs more than OS).  Pt cannot have inj today.  Says he needs ride home    RTC for Avastin OD.  Injection only      Diabetic Retinopathy discussed in detail, all questions answered  Stressed importance of good BS/BP/Chol Control  RTC immediately PRN any vision changes, oswald blurry vision, missing vision, floaters, distortions, etc    - Posterior Segment OCT Retina-Both eyes    - Posterior Segment OCT Retina-Both eyes    2. Nuclear sclerosis of both eyes  Pt happy with vision.  Recommend obs    3. PVD (posterior vitreous detachment), both eyes  Stable. RD precautions    Follow up in about 2 weeks (around 10/9/2019), or if symptoms worsen or fail to improve, for Injection Right eye, Avastin.

## 2019-09-30 ENCOUNTER — EXTERNAL CHRONIC CARE MANAGEMENT (OUTPATIENT)
Dept: PRIMARY CARE CLINIC | Facility: CLINIC | Age: 69
End: 2019-09-30
Payer: MEDICARE

## 2019-09-30 PROCEDURE — 99490 CHRNC CARE MGMT STAFF 1ST 20: CPT | Mod: S$PBB,,, | Performed by: INTERNAL MEDICINE

## 2019-09-30 PROCEDURE — 99490 CHRNC CARE MGMT STAFF 1ST 20: CPT | Mod: PBBFAC | Performed by: INTERNAL MEDICINE

## 2019-09-30 PROCEDURE — 99490 PR CHRONIC CARE MGMT, 1ST 20 MIN: ICD-10-PCS | Mod: S$PBB,,, | Performed by: INTERNAL MEDICINE

## 2019-10-09 ENCOUNTER — PROCEDURE VISIT (OUTPATIENT)
Dept: OPHTHALMOLOGY | Facility: CLINIC | Age: 69
End: 2019-10-09
Payer: MEDICARE

## 2019-10-09 VITALS — HEART RATE: 88 BPM | DIASTOLIC BLOOD PRESSURE: 69 MMHG | SYSTOLIC BLOOD PRESSURE: 112 MMHG

## 2019-10-09 DIAGNOSIS — Z79.4 TYPE 2 DIABETES MELLITUS WITH BOTH EYES AFFECTED BY MODERATE NONPROLIFERATIVE RETINOPATHY AND MACULAR EDEMA, WITH LONG-TERM CURRENT USE OF INSULIN: Primary | ICD-10-CM

## 2019-10-09 DIAGNOSIS — E11.3313 TYPE 2 DIABETES MELLITUS WITH BOTH EYES AFFECTED BY MODERATE NONPROLIFERATIVE RETINOPATHY AND MACULAR EDEMA, WITH LONG-TERM CURRENT USE OF INSULIN: Primary | ICD-10-CM

## 2019-10-09 PROCEDURE — C9257 BEVACIZUMAB INJECTION: HCPCS | Mod: PBBFAC,JG | Performed by: OPHTHALMOLOGY

## 2019-10-09 PROCEDURE — 67028 PR INJECT INTRAVITREAL PHARMCOLOGIC: ICD-10-PCS | Mod: S$PBB,RT,, | Performed by: OPHTHALMOLOGY

## 2019-10-09 PROCEDURE — 67028 INJECTION EYE DRUG: CPT | Mod: PBBFAC,RT | Performed by: OPHTHALMOLOGY

## 2019-10-09 PROCEDURE — 67028 INJECTION EYE DRUG: CPT | Mod: S$PBB,RT,, | Performed by: OPHTHALMOLOGY

## 2019-10-09 PROCEDURE — 99499 UNLISTED E&M SERVICE: CPT | Mod: S$PBB,,, | Performed by: OPHTHALMOLOGY

## 2019-10-09 PROCEDURE — 99499 NO LOS: ICD-10-PCS | Mod: S$PBB,,, | Performed by: OPHTHALMOLOGY

## 2019-10-09 RX ADMIN — BEVACIZUMAB 1.25 MG: 100 INJECTION, SOLUTION INTRAVENOUS at 01:10

## 2019-10-09 NOTE — PROGRESS NOTES
Subjective:       Patient ID: Jose Pete is a 68 y.o. male      Chief Complaint   Patient presents with    Diabetic Eye Exam     History of Present Illness  HPI     DLS 09/25/19    Pt here for Avastin inj OD today.  Va stable OU.  No f/f/pain    Here for injection to right eye     POHx:   1) NPDR OU   Macular edema OS     2) NS OU     Avastin  OD (8/22/19)  Avastin - OS (1/17/19)    Pt states that VA is stable OU  -eye pain   -f/f     Last edited by Lars Garcia MD on 10/9/2019  1:37 PM. (History)          Assessment/Plan:     1. Type 2 diabetes mellitus with both eyes affected by moderate nonproliferative retinopathy and macular edema, with long-term current use of insulin  Proceed with Avastin OD today as planned      Follow up in about 1 month (around 11/9/2019), or if symptoms worsen or fail to improve, for Dilated examination, OCT Mac.     Patient identified.  Timeout performed.    Risks, benefits, and alternatives to treatment were discussed in detail with the patient, including bleeding/infection (endophthalmitis)/etc.  The patient voiced understanding and wished to proceed with the procedure.  See separate consent form.    Injection Procedure Note:  Diagnosis: CSME Right Eye    Topical Proparacaine drop placed then topical 5% Betadine  Sterile gloves used, and sterile lid speculum placed.  5% Betadine placed at injection site again prior to injection.  Avastin 1.25mg in 0.05cc Injected inferotemporally 3.5-4mm posterior to the limbus.  Complications: None  Va at least CF at 5 feet post injection.  Retina, ONH, IOP normal after injection.    Followup as above.  Patient should return immediately PRN.  Retinal Detachment and Endophthalmitis precautions given.

## 2019-10-09 NOTE — PATIENT INSTRUCTIONS
.POST INTRAVITREAL INJECTION PATIENT INSTRUCTIONS   Below are some guidelines for what to expect after your treatment and additional care instructions.   * you may experience mild discomfort in your eye after the treatment. Your eye usually feels better within 24 to 48 hours after the procedure.   * you have been given drops that numb the surface of your eye.   DO NOT rub or touch your eye, DO NOT wear contacts until numbness goes away.   * you may experience small spots that appear in your field of vision, these are usually caused by an air bubble or from the medication. It taked a few hours or days for these to go away.   * use of sunglasses will help reduce light sensitivity and glare.   * DO NOT swim   for at least 3 hours after the injection   * If you have a gritty, burning, irritating or stinging feeling in the injected eye. This may be a result of the antiseptic used. Use artifical tears or eye lubricant ( from over- the- counter from your local pharmacy ). If you have some at home already please check the expiration date, so not to use anything contaminated in your eye. A cool pack over your eye brow above the injected eye may also relieve discomfort.   Call us right away or go to the Emergency Department if you have a dramatic decrease in vision or extreme pain in the eye.   [facility] [phone]    Bevacizumab injection  What is this medicine?  BEVACIZUMAB (be ginger fox mab) is a monoclonal antibody. It is used to treat cervical cancer, colorectal cancer, glioblastoma multiforme, non-small cell lung cancer (NSCLC), ovarian cancer, and renal cell cancer.  How should I use this medicine?  This medicine is for infusion into a vein. It is given by a health care professional in a hospital or clinic setting.  Talk to your pediatrician regarding the use of this medicine in children. Special care may be needed.  What side effects may I notice from receiving this medicine?  Side effects that  you should report to your doctor or health care professional as soon as possible:  · allergic reactions like skin rash, itching or hives, swelling of the face, lips, or tongue  · signs of infection - fever or chills, cough, sore throat, pain or trouble passing urine  · signs of decreased platelets or bleeding - bruising, pinpoint red spots on the skin, black, tarry stools, nosebleeds, blood in the urine  · breathing problems  · changes in vision  · chest pain  · confusion  · jaw pain, especially after dental work  · mouth sores  · seizures  · severe abdominal pain  · severe headache  · sudden numbness or weakness of the face, arm or leg  · swelling of legs or ankles  · symptoms of a stroke: change in mental awareness, inability to talk or move one side of the body (especially in patients with lung cancer)  · trouble passing urine or change in the amount of urine  · trouble speaking or understanding  · trouble walking, dizziness, loss of balance or coordination  Side effects that usually do not require medical attention (report to your doctor or health care professional if they continue or are bothersome):  · constipation  · diarrhea  · dry skin  · headache  · loss of appetite  · nausea, vomiting  What may interact with this medicine?  Interactions are not expected.  What if I miss a dose?  It is important not to miss your dose. Call your doctor or health care professional if you are unable to keep an appointment.  Where should I keep my medicine?  This drug is given in a hospital or clinic and will not be stored at home.  What should I tell my health care provider before I take this medicine?  They need to know if you have any of these conditions:  · blood clots  · heart disease, including heart failure, heart attack, or chest pain (angina)  · high blood pressure  · infection (especially a virus infection such as chickenpox, cold sores, or herpes)  · kidney disease  · lung disease  · prior chemotherapy with  doxorubicin, daunorubicin, epirubicin, or other anthracycline type chemotherapy agents  · recent or ongoing radiation therapy  · recent surgery  · stroke  · an unusual or allergic reaction to bevacizumab, hamster proteins, mouse proteins, other medicines, foods, dyes, or preservatives  · pregnant or trying to get pregnant  · breast-feeding  What should I watch for while using this medicine?  Your condition will be monitored carefully while you are receiving this medicine. You will need important blood work and urine testing done while you are taking this medicine.  During your treatment, let your health care professional know if you have any unusual symptoms, such as difficulty breathing.  This medicine may rarely cause 'gastrointestinal perforation' (holes in the stomach, intestines or colon), a serious side effect requiring surgery to repair.  This medicine should be started at least 28 days following major surgery and the site of the surgery should be totally healed. Check with your doctor before scheduling dental work or surgery while you are receiving this treatment. Talk to your doctor if you have recently had surgery or if you have a wound that has not healed.  Do not become pregnant while taking this medicine or for 6 months after stopping it. Women should inform their doctor if they wish to become pregnant or think they might be pregnant. There is a potential for serious side effects to an unborn child. Talk to your health care professional or pharmacist for more information. Do not breast-feed an infant while taking this medicine.  This medicine has caused ovarian failure in some women. This medicine may interfere with the ability to have a child. You should talk to your doctor or health care professional if you are concerned about your fertility.  NOTE:This sheet is a summary. It may not cover all possible information. If you have questions about this medicine, talk to your doctor, pharmacist, or health  care provider. Copyright© 2017 [employer]

## 2019-10-23 RX ORDER — INSULIN LISPRO 100 [IU]/ML
6 INJECTION, SOLUTION INTRAVENOUS; SUBCUTANEOUS 3 TIMES DAILY
Qty: 15 ML | Refills: 6 | Status: SHIPPED | OUTPATIENT
Start: 2019-10-23 | End: 2019-12-26 | Stop reason: SDUPTHER

## 2019-10-31 ENCOUNTER — EXTERNAL CHRONIC CARE MANAGEMENT (OUTPATIENT)
Dept: PRIMARY CARE CLINIC | Facility: CLINIC | Age: 69
End: 2019-10-31
Payer: MEDICARE

## 2019-10-31 PROCEDURE — 99490 CHRNC CARE MGMT STAFF 1ST 20: CPT | Mod: S$PBB,,, | Performed by: INTERNAL MEDICINE

## 2019-10-31 PROCEDURE — 99490 PR CHRONIC CARE MGMT, 1ST 20 MIN: ICD-10-PCS | Mod: S$PBB,,, | Performed by: INTERNAL MEDICINE

## 2019-10-31 PROCEDURE — 99490 CHRNC CARE MGMT STAFF 1ST 20: CPT | Mod: PBBFAC | Performed by: INTERNAL MEDICINE

## 2019-11-15 NOTE — ASSESSMENT & PLAN NOTE
-- Continue following with cardiology  
-- Continue following with ophthalmology   
-- Labs today, insufficient data to make medication changes today but based on patient BG oral recall BG are doing well.   -- Reviewed goals of therapy are to get the best control we can without hypoglycemia  Continue current medication doses.   -- Reviewed patient's current insulin regimen. Clarified proper insulin dose and timing in relation to meals, etc. Insulin injection sites and proper rotation instructed.    -- Advised frequent self blood glucose monitoring.  Patient encouraged to document glucose results and bring them to every clinic visit    -- Hypoglycemia precautions discussed. Instructed on precautions before driving.    -- Call for Bg repeatedly < 90 or > 180.   -- Close adherence to lifestyle changes recommended.   -- Periodic follow ups for eye evaluations, foot care and dental care suggested.    -- foot exam @ next visit   
-- on ACEI  -- Controlled  -- Blood pressure goals discussed with patient  
Controlled   On statin per ADA recommendations  
Urine today  
"7 weeks pregnant"

## 2019-11-30 ENCOUNTER — EXTERNAL CHRONIC CARE MANAGEMENT (OUTPATIENT)
Dept: PRIMARY CARE CLINIC | Facility: CLINIC | Age: 69
End: 2019-11-30
Payer: MEDICARE

## 2019-11-30 PROCEDURE — 99490 CHRNC CARE MGMT STAFF 1ST 20: CPT | Mod: PBBFAC | Performed by: INTERNAL MEDICINE

## 2019-11-30 PROCEDURE — 99490 PR CHRONIC CARE MGMT, 1ST 20 MIN: ICD-10-PCS | Mod: S$PBB,,, | Performed by: INTERNAL MEDICINE

## 2019-11-30 PROCEDURE — 99490 CHRNC CARE MGMT STAFF 1ST 20: CPT | Mod: S$PBB,,, | Performed by: INTERNAL MEDICINE

## 2019-12-09 ENCOUNTER — PATIENT OUTREACH (OUTPATIENT)
Dept: ADMINISTRATIVE | Facility: OTHER | Age: 69
End: 2019-12-09

## 2019-12-11 ENCOUNTER — OFFICE VISIT (OUTPATIENT)
Dept: CARDIOLOGY | Facility: CLINIC | Age: 69
End: 2019-12-11
Payer: MEDICARE

## 2019-12-11 ENCOUNTER — CLINICAL SUPPORT (OUTPATIENT)
Dept: INTERNAL MEDICINE | Facility: CLINIC | Age: 69
End: 2019-12-11
Payer: MEDICARE

## 2019-12-11 VITALS — DIASTOLIC BLOOD PRESSURE: 70 MMHG | SYSTOLIC BLOOD PRESSURE: 118 MMHG

## 2019-12-11 DIAGNOSIS — I10 HYPERTENSION, ESSENTIAL: ICD-10-CM

## 2019-12-11 DIAGNOSIS — E11.21 TYPE 2 DIABETES MELLITUS WITH DIABETIC NEPHROPATHY, WITH LONG-TERM CURRENT USE OF INSULIN: ICD-10-CM

## 2019-12-11 DIAGNOSIS — I50.22 CHRONIC SYSTOLIC HEART FAILURE: ICD-10-CM

## 2019-12-11 DIAGNOSIS — E78.00 PURE HYPERCHOLESTEROLEMIA: ICD-10-CM

## 2019-12-11 DIAGNOSIS — N52.9 ERECTILE DYSFUNCTION, UNSPECIFIED ERECTILE DYSFUNCTION TYPE: ICD-10-CM

## 2019-12-11 DIAGNOSIS — Z79.4 TYPE 2 DIABETES MELLITUS WITH DIABETIC NEPHROPATHY, WITH LONG-TERM CURRENT USE OF INSULIN: ICD-10-CM

## 2019-12-11 DIAGNOSIS — I42.9 CARDIOMYOPATHY, UNSPECIFIED TYPE: Primary | ICD-10-CM

## 2019-12-11 DIAGNOSIS — I50.42 CHRONIC COMBINED SYSTOLIC AND DIASTOLIC CONGESTIVE HEART FAILURE, NYHA CLASS 2: ICD-10-CM

## 2019-12-11 PROCEDURE — 99999 PR PBB SHADOW E&M-EST. PATIENT-LVL I: ICD-10-PCS | Mod: PBBFAC,,,

## 2019-12-11 PROCEDURE — 99211 OFF/OP EST MAY X REQ PHY/QHP: CPT | Mod: PBBFAC,27,25

## 2019-12-11 PROCEDURE — 99214 PR OFFICE/OUTPT VISIT, EST, LEVL IV, 30-39 MIN: ICD-10-PCS | Mod: S$PBB,,, | Performed by: INTERNAL MEDICINE

## 2019-12-11 PROCEDURE — 1159F MED LIST DOCD IN RCRD: CPT | Mod: ,,, | Performed by: INTERNAL MEDICINE

## 2019-12-11 PROCEDURE — 99999 PR PBB SHADOW E&M-EST. PATIENT-LVL III: ICD-10-PCS | Mod: PBBFAC,,, | Performed by: INTERNAL MEDICINE

## 2019-12-11 PROCEDURE — 1159F PR MEDICATION LIST DOCUMENTED IN MEDICAL RECORD: ICD-10-PCS | Mod: ,,, | Performed by: INTERNAL MEDICINE

## 2019-12-11 PROCEDURE — 99999 PR PBB SHADOW E&M-EST. PATIENT-LVL III: CPT | Mod: PBBFAC,,, | Performed by: INTERNAL MEDICINE

## 2019-12-11 PROCEDURE — 99999 PR PBB SHADOW E&M-EST. PATIENT-LVL I: CPT | Mod: PBBFAC,,,

## 2019-12-11 PROCEDURE — 99214 OFFICE O/P EST MOD 30 MIN: CPT | Mod: S$PBB,,, | Performed by: INTERNAL MEDICINE

## 2019-12-11 PROCEDURE — 99213 OFFICE O/P EST LOW 20 MIN: CPT | Mod: PBBFAC | Performed by: INTERNAL MEDICINE

## 2019-12-11 PROCEDURE — 90662 IIV NO PRSV INCREASED AG IM: CPT | Mod: PBBFAC

## 2019-12-11 RX ORDER — TADALAFIL 20 MG/1
20 TABLET ORAL DAILY PRN
Qty: 10 TABLET | Refills: 5 | Status: SHIPPED | OUTPATIENT
Start: 2019-12-11 | End: 2021-05-18 | Stop reason: SDUPTHER

## 2019-12-11 NOTE — PROGRESS NOTES
Subjective:   Patient ID:  Jose Pete is a 69 y.o. male who presents for follow up of Hypertension and Cardiomyopathy      HPI: Back for routine yearly f/u and doing well except for being out of a job and low on funds.  He's asking about patient assistance programs and Ms. Henry is going to help him with that.    He continues to walk 15 blocks at a time 3 times per week without stopping.  No new symptoms with this and he denies chest discomfort, LYONS, palpitations, PND/orthopnea, lightheadedness and syncope.      Jan 2019 HPI: Very pleasant man with a cardiomyopathy here to establish care with me.  He previously saw Dr. Velez.  He's on Coreg 25 bid and lisinopril 40 daily for his myopathy.  He states that he gets a bit fatigued after walking 15 blocks, which he does many days, but he doesn't have to stop for dyspnea.     He denies chest discomfort, palpitations, PND/orthopnea, lightheadedness and syncope.     2018 Echo   CONCLUSIONS     1 - Eccentric LVH with mildly to moderately depressed left ventricular systolic function (EF 40-45%).     2 - Moderate left atrial enlargement.     3 - Impaired LV relaxation, normal LAP (grade 1 diastolic dysfunction).     4 - Normal right ventricular systolic function .     5 - Mild aortic regurgitation.      SIGNED BY: Ami Lai MD On: 09/10/2018 15:44    Patient Active Problem List   Diagnosis    Type 2 diabetes mellitus with diabetic nephropathy, with long-term current use of insulin    Hypertension, essential    Chronic combined systolic and diastolic congestive heart failure, NYHA class 2    Heart failure with reduced ejection fraction    Proteinuria due to type 2 diabetes mellitus    Pure hypercholesterolemia    Type 2 diabetes mellitus with both eyes affected by moderate nonproliferative retinopathy and macular edema, with long-term current use of insulin    Cardiomyopathy       Current Outpatient Medications   Medication Sig    amLODIPine (NORVASC) 5 MG  "tablet Take 1 tablet (5 mg total) by mouth once daily.    amLODIPine (NORVASC) 5 MG tablet take 1 tablet by mouth once daily    atorvastatin (LIPITOR) 40 MG tablet Take 1 tablet (40 mg total) by mouth once daily.    carvedilol (COREG) 25 MG tablet Take 1 tablet (25 mg total) by mouth 2 (two) times daily.    dulaglutide (TRULICITY) 1.5 mg/0.5 mL PnIj Inject 1.5 mg into the skin once a week.    furosemide (LASIX) 40 MG tablet Take 1 tablet (40 mg total) by mouth once daily.    insulin aspart U-100 (NOVOLOG FLEXPEN U-100 INSULIN) 100 unit/mL (3 mL) InPn pen Inject 6 Units into the skin 3 (three) times daily with meals.    lisinopril (PRINIVIL,ZESTRIL) 40 MG tablet Take 1 tablet (40 mg total) by mouth once daily.    aspirin (ECOTRIN) 81 MG EC tablet Take 1 tablet (81 mg total) by mouth once daily.    blood sugar diagnostic Strp To check BG 5 times daily, to use with insurance preferred meter    blood-glucose meter kit To check BG 5 times daily, to use with insurance preferred meter    insulin detemir U-100 (LEVEMIR FLEXTOUCH) 100 unit/mL (3 mL) SubQ InPn pen Inject 32 Units into the skin every evening.    insulin lispro (HUMALOG KWIKPEN INSULIN) 100 unit/mL pen Inject 6 Units into the skin 3 (three) times daily.    lancets 33 gauge Misc use as directed    lancing device Misc 1 Device by Misc.(Non-Drug; Combo Route) route 2 (two) times daily with meals.    OPW TEST CLAIM - DO NOT FILL Inject into the skin. OPW test claim. Do not fill.    pen needle, diabetic 32 gauge x 5/32" Ndle 1 each by Misc.(Non-Drug; Combo Route) route once daily.     No current facility-administered medications for this visit.        Review of Systems   Constitution: Negative.   HENT: Negative.    Eyes: Negative.    Cardiovascular: Negative.  Negative for chest pain, dyspnea on exertion, near-syncope, orthopnea and palpitations.   Respiratory: Negative.  Negative for cough and shortness of breath.    Endocrine: Negative.  "   Hematologic/Lymphatic: Negative.    Skin: Negative.    Musculoskeletal: Negative.    Gastrointestinal: Negative.    Genitourinary: Negative.    Neurological: Negative.    Psychiatric/Behavioral: Negative.      Objective:   Physical Exam   Constitutional: He is oriented to person, place, and time. He appears well-developed and well-nourished.   HENT:   Head: Normocephalic and atraumatic.   Mouth/Throat: Oropharynx is clear and moist.   Eyes: Conjunctivae and EOM are normal. No scleral icterus.   Neck: Normal range of motion. Neck supple. No JVD present.   Cardiovascular: Normal rate, regular rhythm, normal heart sounds and intact distal pulses. Exam reveals no gallop and no friction rub.   No murmur heard.  Pulmonary/Chest: Effort normal and breath sounds normal. He has no wheezes. He has no rales.   Abdominal: Soft. Bowel sounds are normal. He exhibits no distension. There is no tenderness.   Musculoskeletal: Normal range of motion. He exhibits no edema.   Neurological: He is alert and oriented to person, place, and time.   Skin: Skin is warm and dry. No rash noted. No erythema.   Psychiatric: He has a normal mood and affect. His behavior is normal. Judgment and thought content normal.   Vitals reviewed.      Lab Results   Component Value Date    WBC 7.73 03/12/2018    HGB 13.5 (L) 03/12/2018    HCT 40.0 03/12/2018    MCV 89 03/12/2018     03/12/2018         Chemistry        Component Value Date/Time     05/06/2019 1407    K 4.8 05/06/2019 1407     05/06/2019 1407    CO2 34 (H) 05/06/2019 1407    BUN 17 05/06/2019 1407    CREATININE 1.0 05/06/2019 1407     (H) 05/06/2019 1407        Component Value Date/Time    CALCIUM 11.2 (H) 05/06/2019 1407    ALKPHOS 66 05/06/2019 1407    AST 18 05/06/2019 1407    ALT 29 05/06/2019 1407    BILITOT 0.7 05/06/2019 1407    ESTGFRAFRICA >60.0 05/06/2019 1407    EGFRNONAA >60.0 05/06/2019 1407            Lab Results   Component Value Date    CHOL 128  03/12/2018    CHOL 161 12/12/2017    CHOL 180 12/12/2017     Lab Results   Component Value Date    HDL 40 03/12/2018    HDL 39 (L) 12/12/2017    HDL 41 12/12/2017     Lab Results   Component Value Date    LDLCALC 69.4 03/12/2018    LDLCALC 106.6 12/12/2017    LDLCALC 119.6 12/12/2017     Lab Results   Component Value Date    TRIG 93 03/12/2018    TRIG 77 12/12/2017    TRIG 97 12/12/2017     Lab Results   Component Value Date    CHOLHDL 31.3 03/12/2018    CHOLHDL 24.2 12/12/2017    CHOLHDL 22.8 12/12/2017       Lab Results   Component Value Date    TSH 1.033 11/13/2017       Lab Results   Component Value Date    HGBA1C 8.5 (H) 05/06/2019       Assessment:     1. Cardiomyopathy, unspecified type    2. Chronic systolic heart failure    3. Hypertension, essential    4. Chronic combined systolic and diastolic congestive heart failure, NYHA class 2    5. Pure hypercholesterolemia    6. Type 2 diabetes mellitus with diabetic nephropathy, with long-term current use of insulin        Plan:     Continue current medicines.  OK to try PRN Cialis for erectile dysfunction.    Advised to get flu shot today.    Diet/exercise goals reinforced.    F/U 12 months

## 2019-12-16 ENCOUNTER — TELEPHONE (OUTPATIENT)
Dept: INTERNAL MEDICINE | Facility: CLINIC | Age: 69
End: 2019-12-16

## 2019-12-16 NOTE — TELEPHONE ENCOUNTER
Spoke to patient. Patient initial appointment was rescheduled from 3 pm to 1030 am, morning of 12/26. Will have Aani book.

## 2019-12-24 ENCOUNTER — LAB VISIT (OUTPATIENT)
Dept: LAB | Facility: HOSPITAL | Age: 69
End: 2019-12-24
Attending: INTERNAL MEDICINE
Payer: MEDICARE

## 2019-12-24 DIAGNOSIS — E11.9 TYPE 2 DIABETES MELLITUS WITHOUT COMPLICATION: ICD-10-CM

## 2019-12-24 LAB
ESTIMATED AVG GLUCOSE: 283 MG/DL (ref 68–131)
HBA1C MFR BLD HPLC: 11.5 % (ref 4–5.6)

## 2019-12-24 PROCEDURE — 36415 COLL VENOUS BLD VENIPUNCTURE: CPT

## 2019-12-24 PROCEDURE — 83036 HEMOGLOBIN GLYCOSYLATED A1C: CPT

## 2019-12-26 ENCOUNTER — IMMUNIZATION (OUTPATIENT)
Dept: PHARMACY | Facility: CLINIC | Age: 69
End: 2019-12-26
Payer: MEDICARE

## 2019-12-26 ENCOUNTER — OFFICE VISIT (OUTPATIENT)
Dept: INTERNAL MEDICINE | Facility: CLINIC | Age: 69
End: 2019-12-26
Payer: MEDICARE

## 2019-12-26 ENCOUNTER — DOCUMENTATION ONLY (OUTPATIENT)
Dept: INTERNAL MEDICINE | Facility: CLINIC | Age: 69
End: 2019-12-26

## 2019-12-26 VITALS
DIASTOLIC BLOOD PRESSURE: 78 MMHG | SYSTOLIC BLOOD PRESSURE: 128 MMHG | HEART RATE: 73 BPM | WEIGHT: 178.38 LBS | OXYGEN SATURATION: 98 % | BODY MASS INDEX: 22.9 KG/M2

## 2019-12-26 DIAGNOSIS — Z79.4 TYPE 2 DIABETES MELLITUS WITH DIABETIC NEPHROPATHY, WITH LONG-TERM CURRENT USE OF INSULIN: ICD-10-CM

## 2019-12-26 DIAGNOSIS — Z12.11 SCREEN FOR COLON CANCER: ICD-10-CM

## 2019-12-26 DIAGNOSIS — E11.3313 TYPE 2 DIABETES MELLITUS WITH BOTH EYES AFFECTED BY MODERATE NONPROLIFERATIVE RETINOPATHY AND MACULAR EDEMA, WITH LONG-TERM CURRENT USE OF INSULIN: Primary | ICD-10-CM

## 2019-12-26 DIAGNOSIS — Z79.4 TYPE 2 DIABETES MELLITUS WITH BOTH EYES AFFECTED BY MODERATE NONPROLIFERATIVE RETINOPATHY AND MACULAR EDEMA, WITH LONG-TERM CURRENT USE OF INSULIN: Primary | ICD-10-CM

## 2019-12-26 DIAGNOSIS — E11.21 TYPE 2 DIABETES MELLITUS WITH DIABETIC NEPHROPATHY, WITH LONG-TERM CURRENT USE OF INSULIN: ICD-10-CM

## 2019-12-26 PROCEDURE — 99214 PR OFFICE/OUTPT VISIT, EST, LEVL IV, 30-39 MIN: ICD-10-PCS | Mod: S$PBB,,, | Performed by: INTERNAL MEDICINE

## 2019-12-26 PROCEDURE — 1159F MED LIST DOCD IN RCRD: CPT | Mod: ,,, | Performed by: INTERNAL MEDICINE

## 2019-12-26 PROCEDURE — 99214 OFFICE O/P EST MOD 30 MIN: CPT | Mod: PBBFAC | Performed by: INTERNAL MEDICINE

## 2019-12-26 PROCEDURE — 99999 PR PBB SHADOW E&M-EST. PATIENT-LVL IV: CPT | Mod: PBBFAC,,, | Performed by: INTERNAL MEDICINE

## 2019-12-26 PROCEDURE — 99999 PR PBB SHADOW E&M-EST. PATIENT-LVL IV: ICD-10-PCS | Mod: PBBFAC,,, | Performed by: INTERNAL MEDICINE

## 2019-12-26 PROCEDURE — 99214 OFFICE O/P EST MOD 30 MIN: CPT | Mod: S$PBB,,, | Performed by: INTERNAL MEDICINE

## 2019-12-26 PROCEDURE — 1126F PR PAIN SEVERITY QUANTIFIED, NO PAIN PRESENT: ICD-10-PCS | Mod: ,,, | Performed by: INTERNAL MEDICINE

## 2019-12-26 PROCEDURE — 1126F AMNT PAIN NOTED NONE PRSNT: CPT | Mod: ,,, | Performed by: INTERNAL MEDICINE

## 2019-12-26 PROCEDURE — 1159F PR MEDICATION LIST DOCUMENTED IN MEDICAL RECORD: ICD-10-PCS | Mod: ,,, | Performed by: INTERNAL MEDICINE

## 2019-12-26 RX ORDER — INSULIN LISPRO 100 [IU]/ML
6 INJECTION, SOLUTION INTRAVENOUS; SUBCUTANEOUS 3 TIMES DAILY
Qty: 15 ML | Refills: 6 | Status: SHIPPED | OUTPATIENT
Start: 2019-12-26 | End: 2021-01-04 | Stop reason: SDUPTHER
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
## 2019-12-27 DIAGNOSIS — E11.21 TYPE 2 DIABETES MELLITUS WITH DIABETIC NEPHROPATHY, WITH LONG-TERM CURRENT USE OF INSULIN: ICD-10-CM

## 2019-12-27 DIAGNOSIS — Z79.4 TYPE 2 DIABETES MELLITUS WITH DIABETIC NEPHROPATHY, WITH LONG-TERM CURRENT USE OF INSULIN: ICD-10-CM

## 2019-12-31 ENCOUNTER — EXTERNAL CHRONIC CARE MANAGEMENT (OUTPATIENT)
Dept: PRIMARY CARE CLINIC | Facility: CLINIC | Age: 69
End: 2019-12-31
Payer: MEDICARE

## 2019-12-31 PROCEDURE — 99490 CHRNC CARE MGMT STAFF 1ST 20: CPT | Mod: S$PBB,,, | Performed by: INTERNAL MEDICINE

## 2019-12-31 PROCEDURE — 99490 PR CHRONIC CARE MGMT, 1ST 20 MIN: ICD-10-PCS | Mod: S$PBB,,, | Performed by: INTERNAL MEDICINE

## 2019-12-31 PROCEDURE — 99490 CHRNC CARE MGMT STAFF 1ST 20: CPT | Mod: PBBFAC | Performed by: INTERNAL MEDICINE

## 2020-01-07 ENCOUNTER — TELEPHONE (OUTPATIENT)
Dept: PHARMACY | Facility: CLINIC | Age: 70
End: 2020-01-07

## 2020-01-07 NOTE — LETTER
[facility]  [address] [place] [address]  Phone:[phone]  Fax:[phone]         Jose Pete  [address] [place] [address]      Re: Pharmacy Patient Assistance Program        Dear :  Juan R,             Thank you for choosing [facility]. Pharmacy Patient Assistance Technician, Natalia Pugh attempted to contact you to offer assistance with the cost of your prescriptions. If you are still in need of assistance, please give us a call at [phone].Our hours of operation are: Monday through Friday, 8:00am to 4:30pm.             NOTE: The Pharmacy Patient Assistance Program will require the following information to apply:       Proof of Income (tax return, Social Security award letter or W-2).   Print out from your Pharmacy and/or Insurance Company that shows your out-of-pocket cost (co-pays- for this year).          Thank you for allowing us the opportunity to assist you with your medications. We look forward to hearing from you soon.      Warmest Regards,  Natalia Pugh [facility]

## 2020-01-07 NOTE — TELEPHONE ENCOUNTER
Unable to leave message on patients phone and caregiver Dixon so I mailed a letter offering assistance with Pharmacy Patient Assistance

## 2020-01-07 NOTE — TELEPHONE ENCOUNTER
Unable to leave a message on Mr. Pete phone or his caregiver Pallavi.  I mailed him a letter offering assistance with Pharmacy Patient Assistance.

## 2020-01-24 ENCOUNTER — PATIENT OUTREACH (OUTPATIENT)
Dept: ADMINISTRATIVE | Facility: HOSPITAL | Age: 70
End: 2020-01-24

## 2020-01-29 ENCOUNTER — TELEPHONE (OUTPATIENT)
Dept: PHARMACY | Facility: CLINIC | Age: 70
End: 2020-01-29

## 2020-01-29 NOTE — TELEPHONE ENCOUNTER
Patient will bring in his Social Security Award Letter and a print out from his Pharmacy showing how much he has spent on medication.

## 2020-01-31 ENCOUNTER — EXTERNAL CHRONIC CARE MANAGEMENT (OUTPATIENT)
Dept: PRIMARY CARE CLINIC | Facility: CLINIC | Age: 70
End: 2020-01-31
Payer: MEDICARE

## 2020-01-31 PROCEDURE — 99490 CHRNC CARE MGMT STAFF 1ST 20: CPT | Mod: PBBFAC | Performed by: INTERNAL MEDICINE

## 2020-01-31 PROCEDURE — 99490 PR CHRONIC CARE MGMT, 1ST 20 MIN: ICD-10-PCS | Mod: S$PBB,,, | Performed by: INTERNAL MEDICINE

## 2020-01-31 PROCEDURE — 99490 CHRNC CARE MGMT STAFF 1ST 20: CPT | Mod: S$PBB,,, | Performed by: INTERNAL MEDICINE

## 2020-02-03 ENCOUNTER — PROCEDURE VISIT (OUTPATIENT)
Dept: OPHTHALMOLOGY | Facility: CLINIC | Age: 70
End: 2020-02-03
Attending: OPHTHALMOLOGY
Payer: MEDICARE

## 2020-02-03 VITALS — SYSTOLIC BLOOD PRESSURE: 140 MMHG | DIASTOLIC BLOOD PRESSURE: 81 MMHG | HEART RATE: 90 BPM

## 2020-02-03 DIAGNOSIS — Z79.4 TYPE 2 DIABETES MELLITUS WITH BOTH EYES AFFECTED BY MODERATE NONPROLIFERATIVE RETINOPATHY AND MACULAR EDEMA, WITH LONG-TERM CURRENT USE OF INSULIN: Primary | ICD-10-CM

## 2020-02-03 DIAGNOSIS — H25.13 NUCLEAR SCLEROSIS OF BOTH EYES: ICD-10-CM

## 2020-02-03 DIAGNOSIS — E11.3313 TYPE 2 DIABETES MELLITUS WITH BOTH EYES AFFECTED BY MODERATE NONPROLIFERATIVE RETINOPATHY AND MACULAR EDEMA, WITH LONG-TERM CURRENT USE OF INSULIN: Primary | ICD-10-CM

## 2020-02-03 PROCEDURE — C9257 BEVACIZUMAB INJECTION: HCPCS | Mod: PBBFAC,JG | Performed by: OPHTHALMOLOGY

## 2020-02-03 PROCEDURE — 67028 INJECTION EYE DRUG: CPT | Mod: PBBFAC,LT | Performed by: OPHTHALMOLOGY

## 2020-02-03 PROCEDURE — 92134 POSTERIOR SEGMENT OCT RETINA (OCULAR COHERENCE TOMOGRAPHY)-BOTH EYES: ICD-10-PCS | Mod: 26,S$PBB,, | Performed by: OPHTHALMOLOGY

## 2020-02-03 PROCEDURE — 67028 PR INJECT INTRAVITREAL PHARMCOLOGIC: ICD-10-PCS | Mod: S$PBB,LT,, | Performed by: OPHTHALMOLOGY

## 2020-02-03 PROCEDURE — 67028 INJECTION EYE DRUG: CPT | Mod: S$PBB,LT,, | Performed by: OPHTHALMOLOGY

## 2020-02-03 PROCEDURE — 92134 CPTRZ OPH DX IMG PST SGM RTA: CPT | Mod: PBBFAC | Performed by: OPHTHALMOLOGY

## 2020-02-03 PROCEDURE — 92012 INTRM OPH EXAM EST PATIENT: CPT | Mod: 25,S$PBB,, | Performed by: OPHTHALMOLOGY

## 2020-02-03 PROCEDURE — 92012 PR EYE EXAM, EST PATIENT,INTERMED: ICD-10-PCS | Mod: 25,S$PBB,, | Performed by: OPHTHALMOLOGY

## 2020-02-03 RX ADMIN — BEVACIZUMAB 1.25 MG: 100 INJECTION, SOLUTION INTRAVENOUS at 02:02

## 2020-02-03 NOTE — PROGRESS NOTES
Subjective:       Patient ID: Jose Pete is a 69 y.o. male      Chief Complaint   Patient presents with    Diabetic Eye Exam     History of Present Illness  HPI     DLS 10/9/19 dr don  Pt overdue for f/u and examination of DR and ME OU      POHx:   1) NPDR OU   Macular edema OS     2) NS OU     Avastin  OD (10/9/19)  Avastin - OS (1/17/19)    Pt c/o difficulty driving at night, a lot of glare.  -no noticeable vision changes  -no pains  -no flashes or floaters        Last edited by Lars Don MD on 2/3/2020  2:07 PM. (History)        Imaging:    See report    Assessment/Plan:     1. Type 2 diabetes mellitus with both eyes affected by moderate nonproliferative retinopathy and macular edema, with long-term current use of insulin  Pt lost to f/u since inj  OD ME impr after Avastin 4 mo ago  OS with worse ME.  Rec Avastin OS today  Pt agrees    - Posterior Segment OCT Retina-Both eyes  - Prior Authorization Order  - Posterior Segment OCT Retina-Both eyes; Future    2. Nuclear sclerosis of both eyes  Offered cat eval but pt refuses at this time    OAG suspect.  Need to keep f/u's with Dr Cedillo for eval and management also      Follow up in about 1 month (around 3/3/2020), or if symptoms worsen or fail to improve, for Dilated examination, OCT Mac.     Patient identified.  Timeout performed.    Risks, benefits, and alternatives to treatment were discussed in detail with the patient, including bleeding/infection (endophthalmitis)/etc.  The patient voiced understanding and wished to proceed with the procedure.  See separate consent form.    Injection Procedure Note:  Diagnosis: CSME Left Eye    Topical Proparacaine drop placed then topical 5% Betadine  Sterile gloves used, and sterile lid speculum placed.  5% Betadine placed at injection site again prior to injection.  Avastin 1.25mg in 0.05cc Injected inferotemporally 3.5-4mm posterior to the limbus.  Complications: None  Va at least CF at 5 feet post  injection.  Retina, ONH, IOP normal after injection.    Followup as above.  Patient should return immediately PRN.  Retinal Detachment and Endophthalmitis precautions given.

## 2020-02-17 ENCOUNTER — TELEPHONE (OUTPATIENT)
Dept: PHARMACY | Facility: CLINIC | Age: 70
End: 2020-02-17

## 2020-02-17 NOTE — TELEPHONE ENCOUNTER
I was unable to leave a message for the patient because his mail box was asking for his password.  I mailed him a reminder letter requesting his Social Security Award Letter.

## 2020-02-18 ENCOUNTER — PATIENT OUTREACH (OUTPATIENT)
Dept: ADMINISTRATIVE | Facility: OTHER | Age: 70
End: 2020-02-18

## 2020-02-19 ENCOUNTER — CLINICAL SUPPORT (OUTPATIENT)
Dept: DIABETES | Facility: CLINIC | Age: 70
End: 2020-02-19
Payer: MEDICARE

## 2020-02-19 VITALS — BODY MASS INDEX: 23.69 KG/M2 | WEIGHT: 184.5 LBS

## 2020-02-19 DIAGNOSIS — E11.21 TYPE 2 DIABETES MELLITUS WITH DIABETIC NEPHROPATHY, WITH LONG-TERM CURRENT USE OF INSULIN: ICD-10-CM

## 2020-02-19 DIAGNOSIS — Z79.4 TYPE 2 DIABETES MELLITUS WITH DIABETIC NEPHROPATHY, WITH LONG-TERM CURRENT USE OF INSULIN: ICD-10-CM

## 2020-02-19 PROCEDURE — 99213 OFFICE O/P EST LOW 20 MIN: CPT | Mod: PBBFAC | Performed by: DIETITIAN, REGISTERED

## 2020-02-19 PROCEDURE — 99999 PR PBB SHADOW E&M-EST. PATIENT-LVL III: CPT | Mod: PBBFAC,,, | Performed by: DIETITIAN, REGISTERED

## 2020-02-19 PROCEDURE — 99999 PR PBB SHADOW E&M-EST. PATIENT-LVL III: ICD-10-PCS | Mod: PBBFAC,,, | Performed by: DIETITIAN, REGISTERED

## 2020-02-19 PROCEDURE — G0108 DIAB MANAGE TRN  PER INDIV: HCPCS | Mod: PBBFAC | Performed by: DIETITIAN, REGISTERED

## 2020-02-19 NOTE — PROGRESS NOTES
Diabetes Education  Author: Deepa Pierre RD, CDE  Date: 2/19/2020    Diabetes Care Management Summary  Diabetes Education Record Assessment/Progress: Initial     Diabetes Type  Diabetes Type : Type II    Diabetes History  Diabetes Diagnosis: 1-3 years  Current Treatment: Insulin(Novolog 6 units AC and Levemir 32 units HS; unable to afford Trulicity so patient has not been taking that; also with difficulty affording Humalog and Levemir; spoke to pharmacy - $30 for Humalog, $50 for Levemir and $50 for Trulicity )    Health Maintenance was reviewed today with patient. Discussed with patient importance of routine eye exams, foot exams/foot care, blood work (i.e.: A1c, microalbumin, and lipid), dental visits, yearly flu vaccine, and pneumonia vaccine as indicated by PCP. Patient verbalized understanding.     Health Maintenance Topics with due status: Not Due       Topic Last Completion Date    TETANUS VACCINE 12/28/2017    Low Dose Statin 12/11/2019    Hemoglobin A1c 12/24/2019    Shingles Vaccine 12/26/2019    Foot Exam 12/26/2019    Eye Exam 02/03/2020     Health Maintenance Due   Topic Date Due    Fecal Occult Blood Test (FOBT)/FitKit  1950    Lipid Panel  03/12/2019       Nutrition  Meal Planning: skipping meals, snacks between meal, water, diet drinks, eats out seldom(Eats 2 meals a day; snacks on fruit; sprite zero)  What type of sweetener do you use?: none  What type of beverages do you drink?: water, other (see comments), milk(Decaff coffee)    Monitoring   Self Monitoring : (Checks TID)  Blood Glucose Logs: No  Do you use a personal continuous glucose monitor?: No  In the last month, how often have you had a low blood sugar reaction?: never  What are your symptoms of low blood sugar?: (N/A)  How do you treat low blood sugar?: (N/A)  Can you tell when your blood sugar is too high?: no  How do you treat high blood sugar?: (None)    Exercise   Exercise Type: walking(Tries to walk during the day if the  weather is good - 15 blocks)    Current Diabetes Treatment   Current Treatment: Insulin(Novolog 6 units AC and Levemir 32 units HS; unable to afford Trulicity so patient has not been taking that; also with difficulty affording Humalog and Levemir; spoke to pharmacy - $30 for Humalog, $50 for Levemir and $50 for Trulicity )    Social History  Preferred Learning Method: Face to Face  Primary Support: Self  Smoking Status: Never a Smoker  Alcohol Use: Rarely    Barriers to Change  Barriers to Change: Financial  Learning Challenges : None    Readiness to Learn   Readiness to Learn : Acceptance    Cultural Influences  Cultural Influences: No    Diabetes Education Assessment/Progress  Diabetes Disease Process (diabetes disease process and treatment options): Instructed, Discussion, Individual Session, Written Materials Provided, Demonstrates Understanding/Competency(verbalizes/demonstrates)  Nutrition (Incorporating nutritional management into one's lifestyle): Instructed, Discussion, Individual Session, Written Materials Provided, Demonstrates Understanding/Competency (verbalizes/demonstrates); reviewed CHO counting, label reading and addt'l resources to assist w/ CHO counting; plate method reviewed  Physical Activity (incorporating physical activity into one's lifestyle): Instructed, Discussion, Individual Session, Written Materials Provided, Demonstrates Understanding/Competency (verbalizes/demonstrates); reviewed goals and benefits  Medications (states correct name, dose, onset, peak, duration, side effects & timing of meds): Instructed, Discussion, Individual Session, Written Materials Provided, Demonstrates Understanding/Competency(verbalizes/demonstrates); spoke to SAMANTHA Olivas NP and informed her that patient is unable to  his Humalog and Levemir today due to cost. He has some Humalog and Levemir left. Will reach out to patient assistance, but will plan to have patient switch to Relion 70/30 - 25 units  before breakfast and 20 units before dinner once he runs out of his Humalog and Levemir. Patient has used vial/syringe in the past. Reviewed use of vial/syringe and mixing of insulin before drawing it up into syringe. Provided patient with name of insulin - Relion 70/30 to request at [facility] and the syringes -1/2cc he would need.  Monitoring (monitoring blood glucose/other parameters & using results): Instructed, Discussion, Individual Session, Written Materials Provided, Demonstrates Understanding/Competency (verbalizes/demonstrates); reviewed SMBG schedule and BG goals  Acute Complications (preventing, detecting, and treating acute complications): Instructed, Discussion, Individual Session, Written Materials Provided, Demonstrates Understanding/Competency (verbalizes/demonstrates); reviewed s/s and treatment of hypoglycemia  Chronic Complications (preventing, detecting, and treating chronic complications): Instructed, Discussion, Individual Session, Written Materials Provided, Demonstrates Understanding/Competency (verbalizes/demonstrates)  Clinical (diabetes, other pertinent medical history, and relevant comorbidities reviewed during visit): Discussion, Comprehends Key Points  Cognitive (knowledge of self-management skills, functional health literacy): Discussion, Comprehends Key Points  Psychosocial (emotional response to diabetes): Discussion, Comprehends Key Points  Diabetes Distress and Support Systems: Not Covered/Deferred(Time)  Behavioral (readiness for change, lifestyle practices, self-care behaviors): Discussion, Comprehends Key Points    Goals  Patient has selected/evaluated goals during today's session: Yes, selected  Medications: Set(Take medications as prescribed)    Diabetes Care Plan/Intervention  Education Plan/Intervention: Individual Follow-Up DSMT    Diabetes Meal Plan  Carbohydrate Per Meal: 30-45g  Carbohydrate Per Snack : 15-20g    Today's Self-Management Care Plan was developed with the  patient's input and is based on barriers identified during today's assessment.    The long and short-term goals in the care plan were written with the patient/caregiver's input. The patient has agreed to work toward these goals to improve his overall diabetes control.      The patient received a copy of today's self-management plan and verbalized understanding of the care plan, goals, and all of today's instructions.      The patient was encouraged to communicate with his physician and care team regarding his condition(s) and treatment.  I provided the patient with my contact information today and encouraged him to contact me via phone or patient portal as needed.     Education Units of Time   Time Spent: 60 min

## 2020-02-24 ENCOUNTER — TELEPHONE (OUTPATIENT)
Dept: INTERNAL MEDICINE | Facility: CLINIC | Age: 70
End: 2020-02-24

## 2020-02-24 NOTE — TELEPHONE ENCOUNTER
a1c is way above goal. Rest of labs look okay.   It is important we improve these levels because high blood sugar can lead to heart disease, strokes, kidney disease and amputations along with numerous other complications. I would like to have you come in to see Dawna Ernandez. Can you send note to her staff to schedule if he agrees? Keep appointment with me.

## 2020-02-27 ENCOUNTER — TELEPHONE (OUTPATIENT)
Dept: INTERNAL MEDICINE | Facility: CLINIC | Age: 70
End: 2020-02-27

## 2020-02-27 NOTE — TELEPHONE ENCOUNTER
Spoke with pt, notified of results and message below. Pt says she is okay with seeing elli Ernandez and will keep appt with PCP

## 2020-02-29 ENCOUNTER — EXTERNAL CHRONIC CARE MANAGEMENT (OUTPATIENT)
Dept: PRIMARY CARE CLINIC | Facility: CLINIC | Age: 70
End: 2020-02-29
Payer: MEDICARE

## 2020-02-29 PROCEDURE — 99490 PR CHRONIC CARE MGMT, 1ST 20 MIN: ICD-10-PCS | Mod: S$PBB,,, | Performed by: INTERNAL MEDICINE

## 2020-02-29 PROCEDURE — G2058 CCM ADD 20MIN: HCPCS | Mod: PBBFAC | Performed by: INTERNAL MEDICINE

## 2020-02-29 PROCEDURE — 99490 CHRNC CARE MGMT STAFF 1ST 20: CPT | Mod: S$PBB,,, | Performed by: INTERNAL MEDICINE

## 2020-02-29 PROCEDURE — 99490 CHRNC CARE MGMT STAFF 1ST 20: CPT | Mod: PBBFAC | Performed by: INTERNAL MEDICINE

## 2020-02-29 PROCEDURE — G2058 CCM ADD 20MIN: HCPCS | Mod: S$PBB,,, | Performed by: INTERNAL MEDICINE

## 2020-02-29 PROCEDURE — G2058 PR CHRON CARE MGMT, EA ADDTL 20 MINS: ICD-10-PCS | Mod: S$PBB,,, | Performed by: INTERNAL MEDICINE

## 2020-03-02 ENCOUNTER — TELEPHONE (OUTPATIENT)
Dept: INTERNAL MEDICINE | Facility: CLINIC | Age: 70
End: 2020-03-02

## 2020-03-02 DIAGNOSIS — Z79.4 TYPE 2 DIABETES MELLITUS WITH BOTH EYES AFFECTED BY MODERATE NONPROLIFERATIVE RETINOPATHY AND MACULAR EDEMA, WITH LONG-TERM CURRENT USE OF INSULIN: Primary | ICD-10-CM

## 2020-03-02 DIAGNOSIS — E11.3313 TYPE 2 DIABETES MELLITUS WITH BOTH EYES AFFECTED BY MODERATE NONPROLIFERATIVE RETINOPATHY AND MACULAR EDEMA, WITH LONG-TERM CURRENT USE OF INSULIN: Primary | ICD-10-CM

## 2020-03-02 RX ORDER — INSULIN GLARGINE 100 [IU]/ML
32 INJECTION, SOLUTION SUBCUTANEOUS NIGHTLY
Qty: 9 ML | Refills: 12 | Status: SHIPPED | OUTPATIENT
Start: 2020-03-02 | End: 2021-12-01 | Stop reason: SDUPTHER

## 2020-03-02 NOTE — TELEPHONE ENCOUNTER
Levemir no longer covered by insurance. We can switch to Basaglar insulin. Sent to [facility].   Paperwork for pharmacy assistance signed and on my desk for .

## 2020-03-05 ENCOUNTER — TELEPHONE (OUTPATIENT)
Dept: OPHTHALMOLOGY | Facility: CLINIC | Age: 70
End: 2020-03-05

## 2020-03-05 NOTE — TELEPHONE ENCOUNTER
3./.20 RS TODAY APT TO 3/19/2020  [facility]    ----- Message from Kiersten Pedroza sent at 3/5/2020 10:11 AM CST -----  Contact: Pt  Pt not feeling good today and need to reschedule appt set for 03/05/2020.    Pt# [phone]

## 2020-03-12 ENCOUNTER — PATIENT OUTREACH (OUTPATIENT)
Dept: ADMINISTRATIVE | Facility: HOSPITAL | Age: 70
End: 2020-03-12

## 2020-03-25 ENCOUNTER — PATIENT OUTREACH (OUTPATIENT)
Dept: ADMINISTRATIVE | Facility: OTHER | Age: 70
End: 2020-03-25

## 2020-03-26 ENCOUNTER — CLINICAL SUPPORT (OUTPATIENT)
Dept: DIABETES | Facility: CLINIC | Age: 70
End: 2020-03-26
Payer: MEDICARE

## 2020-03-26 DIAGNOSIS — I50.22 CHRONIC SYSTOLIC CONGESTIVE HEART FAILURE, NYHA CLASS 3: ICD-10-CM

## 2020-03-26 DIAGNOSIS — E11.21 TYPE 2 DIABETES MELLITUS WITH DIABETIC NEPHROPATHY, WITH LONG-TERM CURRENT USE OF INSULIN: ICD-10-CM

## 2020-03-26 DIAGNOSIS — I42.9 CARDIOMYOPATHY, UNSPECIFIED TYPE: ICD-10-CM

## 2020-03-26 DIAGNOSIS — Z79.4 TYPE 2 DIABETES MELLITUS WITH DIABETIC NEPHROPATHY, WITH LONG-TERM CURRENT USE OF INSULIN: ICD-10-CM

## 2020-03-26 PROCEDURE — G0108 DIAB MANAGE TRN  PER INDIV: HCPCS | Mod: PBBFAC | Performed by: DIETITIAN, REGISTERED

## 2020-03-26 RX ORDER — CARVEDILOL 25 MG/1
25 TABLET ORAL 2 TIMES DAILY
Qty: 180 TABLET | Refills: 3 | Status: SHIPPED | OUTPATIENT
Start: 2020-03-26 | End: 2021-06-01 | Stop reason: SDUPTHER

## 2020-03-26 NOTE — PROGRESS NOTES
Diabetes Education  Author: Deepa Pierre RD, CDE  Date: 3/26/2020    Diabetes Care Management Summary  Diabetes Education Record Assessment/Progress: Comprehensive/Group(Patient last seen for DE 2/19/20 - phone follow up due to COVID 19)     Diabetes Type  Diabetes Type : Type II    Diabetes History  Diabetes Diagnosis: 1-3 years  Current Treatment: Insulin, Injectable(Since patient was last seen, he was able to get his medication and is now taking it consistently); he reports taking Humalog 15 units before bfast and dinner (does not eat lunch) and Levemir 32 units daily; Trulicity once a week on Saturdays    Health Maintenance was reviewed today with patient. Discussed with patient importance of routine eye exams, foot exams/foot care, blood work (i.e.: A1c, microalbumin, and lipid), dental visits, yearly flu vaccine, and pneumonia vaccine as indicated by PCP. Patient verbalized understanding.     Health Maintenance Topics with due status: Not Due       Topic Last Completion Date    TETANUS VACCINE 12/28/2017    Low Dose Statin 12/11/2019    Foot Exam 12/26/2019    Eye Exam 02/03/2020    Lipid Panel 02/19/2020    Hemoglobin A1c 02/19/2020     Health Maintenance Due   Topic Date Due    Fecal Occult Blood Test (FOBT)/[employer]  1950     Nutrition  Meal Planning: (No significant changes in diet at this time)    Monitoring   Self Monitoring : (Checks BID; reports fasting readings are below 130 and HS readings are below 150)  In the last month, how often have you had a low blood sugar reaction?: never  What are your symptoms of low blood sugar?: (N/A)  How do you treat low blood sugar?: (N/A)  Can you tell when your blood sugar is too high?: no  How do you treat high blood sugar?: (None)    Exercise   Exercise Type: walking(Still continues to walk - 15-20 min 3 days a week)    Current Diabetes Treatment   Current Treatment: Insulin, Injectable(Since patient was last seen, he was able to get his medication and is now  taking it consistently)    Diabetes Education Assessment/Progress  Diabetes Disease Process (diabetes disease process and treatment options): Demonstrates Understanding/Competency(verbalizes/demonstrates)  Nutrition (Incorporating nutritional management into one's lifestyle): Demonstrates Understanding/Competency (verbalizes/demonstrates)(No questions or concerns at this time; reviewed some general nutrition info)  Physical Activity (incorporating physical activity into one's lifestyle): Demonstrates Understanding/Competency (verbalizes/demonstrates)(Discussed trying to increase PA to 150 min/week; discussed use of foot/arm cycle to assist w/ addt'l PA)  Medications (states correct name, dose, onset, peak, duration, side effects & timing of meds): Demonstrates Understanding/Competency(verbalizes/demonstrates)(Was able to get his medication and is now consistently taking it)  Monitoring (monitoring blood glucose/other parameters & using results): Demonstrates Understanding/Competency (verbalizes/demonstrates)(Reviewed SMBG schedule and BG goals)  Acute Complications (preventing, detecting, and treating acute complications): Demonstrates Understanding/Competency (verbalizes/demonstrates)  Chronic Complications (preventing, detecting, and treating chronic complications): Demonstrates Understanding/Competency (verbalizes/demonstrates)  Clinical (diabetes, other pertinent medical history, and relevant comorbidities reviewed during visit): Demonstrates Understanding/Competency (verbalizes/demonstrates)  Cognitive (knowledge of self-management skills, functional health literacy): Demonstrates Understanding/Competency (verbalizes/demonstrates)  Psychosocial (emotional response to diabetes): Demonstrates Understanding/Competency (verbalizes/demonstrates)  Diabetes Distress and Support Systems: Not Covered/Deferred  Behavioral (readiness for change, lifestyle practices, self-care behaviors): Demonstrates  Understanding/Competency (verbalizes/demonstrates)    Goals  Patient has selected/evaluated goals during today's session: Yes, evaluated  Medications: % Met(Taking meds as prescribed)  Met Percentage : 100%     Diabetes Care Plan/Intervention  Education Plan/Intervention: Individual Follow-Up DSMT    Diabetes Meal Plan  Restrictions: Restricted Carbohydrate    Today's Self-Management Care Plan was developed with the patient's input and is based on barriers identified during today's assessment.    The long and short-term goals in the care plan were written with the patient/caregiver's input. The patient has agreed to work toward these goals to improve his overall diabetes control.      The patient received a copy of today's self-management plan and verbalized understanding of the care plan, goals, and all of today's instructions.      The patient was encouraged to communicate with his physician and care team regarding his condition(s) and treatment.  I provided the patient with my contact information today and encouraged him to contact me via phone or patient portal as needed.     Education Units of Time   Time Spent: 30 min

## 2020-03-31 ENCOUNTER — EXTERNAL CHRONIC CARE MANAGEMENT (OUTPATIENT)
Dept: PRIMARY CARE CLINIC | Facility: CLINIC | Age: 70
End: 2020-03-31
Payer: MEDICARE

## 2020-03-31 PROCEDURE — 99490 CHRNC CARE MGMT STAFF 1ST 20: CPT | Mod: S$PBB,,, | Performed by: INTERNAL MEDICINE

## 2020-03-31 PROCEDURE — 99490 CHRNC CARE MGMT STAFF 1ST 20: CPT | Mod: PBBFAC | Performed by: INTERNAL MEDICINE

## 2020-03-31 PROCEDURE — 99490 PR CHRONIC CARE MGMT, 1ST 20 MIN: ICD-10-PCS | Mod: S$PBB,,, | Performed by: INTERNAL MEDICINE

## 2020-04-30 ENCOUNTER — EXTERNAL CHRONIC CARE MANAGEMENT (OUTPATIENT)
Dept: PRIMARY CARE CLINIC | Facility: CLINIC | Age: 70
End: 2020-04-30
Payer: MEDICARE

## 2020-04-30 DIAGNOSIS — E78.00 PURE HYPERCHOLESTEROLEMIA: ICD-10-CM

## 2020-04-30 PROCEDURE — 99490 CHRNC CARE MGMT STAFF 1ST 20: CPT | Mod: PBBFAC | Performed by: INTERNAL MEDICINE

## 2020-04-30 PROCEDURE — 99490 CHRNC CARE MGMT STAFF 1ST 20: CPT | Mod: S$PBB,,, | Performed by: INTERNAL MEDICINE

## 2020-04-30 PROCEDURE — 99490 PR CHRONIC CARE MGMT, 1ST 20 MIN: ICD-10-PCS | Mod: S$PBB,,, | Performed by: INTERNAL MEDICINE

## 2020-04-30 RX ORDER — FUROSEMIDE 40 MG/1
40 TABLET ORAL DAILY
Qty: 30 TABLET | Refills: 11 | Status: SHIPPED | OUTPATIENT
Start: 2020-04-30 | End: 2021-06-01 | Stop reason: SDUPTHER

## 2020-04-30 RX ORDER — ATORVASTATIN CALCIUM 40 MG/1
40 TABLET, FILM COATED ORAL DAILY
Qty: 90 TABLET | Refills: 3 | Status: SHIPPED | OUTPATIENT
Start: 2020-04-30 | End: 2021-06-01 | Stop reason: SDUPTHER

## 2020-04-30 RX ORDER — FUROSEMIDE 40 MG/1
40 TABLET ORAL DAILY
Qty: 30 TABLET | Refills: 11 | Status: CANCELLED | OUTPATIENT
Start: 2020-04-30 | End: 2021-04-30

## 2020-04-30 RX ORDER — ATORVASTATIN CALCIUM 40 MG/1
40 TABLET, FILM COATED ORAL DAILY
Qty: 90 TABLET | Refills: 3 | Status: CANCELLED | OUTPATIENT
Start: 2020-04-30 | End: 2021-04-30

## 2020-05-04 ENCOUNTER — TELEPHONE (OUTPATIENT)
Dept: ENDOCRINOLOGY | Facility: CLINIC | Age: 70
End: 2020-05-04

## 2020-05-25 ENCOUNTER — PATIENT OUTREACH (OUTPATIENT)
Dept: ADMINISTRATIVE | Facility: HOSPITAL | Age: 70
End: 2020-05-25

## 2020-05-27 ENCOUNTER — PROCEDURE VISIT (OUTPATIENT)
Dept: OPHTHALMOLOGY | Facility: CLINIC | Age: 70
End: 2020-05-27
Payer: MEDICARE

## 2020-05-27 VITALS — SYSTOLIC BLOOD PRESSURE: 151 MMHG | HEART RATE: 84 BPM | DIASTOLIC BLOOD PRESSURE: 89 MMHG

## 2020-05-27 DIAGNOSIS — E11.3313 TYPE 2 DIABETES MELLITUS WITH BOTH EYES AFFECTED BY MODERATE NONPROLIFERATIVE RETINOPATHY AND MACULAR EDEMA, WITH LONG-TERM CURRENT USE OF INSULIN: Primary | ICD-10-CM

## 2020-05-27 DIAGNOSIS — Z79.4 TYPE 2 DIABETES MELLITUS WITH BOTH EYES AFFECTED BY MODERATE NONPROLIFERATIVE RETINOPATHY AND MACULAR EDEMA, WITH LONG-TERM CURRENT USE OF INSULIN: Primary | ICD-10-CM

## 2020-05-27 DIAGNOSIS — H43.813 PVD (POSTERIOR VITREOUS DETACHMENT), BOTH EYES: ICD-10-CM

## 2020-05-27 DIAGNOSIS — H25.13 NUCLEAR SCLEROSIS OF BOTH EYES: ICD-10-CM

## 2020-05-27 PROCEDURE — 92202 PR OPHTHALMOSCOPY, EXT, W/DRAW OPTIC NERVE/MACULA, I&R, UNI/BI: ICD-10-PCS | Mod: ,,, | Performed by: OPHTHALMOLOGY

## 2020-05-27 PROCEDURE — 92134 CPTRZ OPH DX IMG PST SGM RTA: CPT | Mod: PBBFAC | Performed by: OPHTHALMOLOGY

## 2020-05-27 PROCEDURE — 92134 POSTERIOR SEGMENT OCT RETINA (OCULAR COHERENCE TOMOGRAPHY)-BOTH EYES: ICD-10-PCS | Mod: 26,S$PBB,, | Performed by: OPHTHALMOLOGY

## 2020-05-27 PROCEDURE — 92202 OPSCPY EXTND ON/MAC DRAW: CPT | Mod: ,,, | Performed by: OPHTHALMOLOGY

## 2020-05-27 PROCEDURE — 92014 COMPRE OPH EXAM EST PT 1/>: CPT | Mod: S$PBB,,, | Performed by: OPHTHALMOLOGY

## 2020-05-27 PROCEDURE — 92014 PR EYE EXAM, EST PATIENT,COMPREHESV: ICD-10-PCS | Mod: S$PBB,,, | Performed by: OPHTHALMOLOGY

## 2020-05-27 NOTE — PROGRESS NOTES
"Subjective:       Patient ID: Jose Pete is a 69 y.o. male      Chief Complaint   Patient presents with    Diabetic Eye Exam     History of Present Illness  HPI     DLS 02/03/2020 Dr Garcai     70 yo male here today for DFE and OCT    S/p Avastin for CSME OS 02/03/2020    Pt states: vision is "about the same"    New floaters OU   No flashes OU   No eye pain OU    Gtts:   None     Hemoglobin A1C       Date                     Value               Ref Range             Status                02/19/2020               10.2 (H)            4.0 - 5.6 %           Final              Pt states he has been trying to eat better to help lower A 1C        POHx:   1) NPDR OU   Macular edema OS     2) NS OU     Avastin  OD (10/9/19)  Avastin - OS (1/17/19) (02/03/2020        Last edited by Lizbeth Valera on 5/27/2020  2:38 PM. (History)        Imaging:    See report    Assessment/Plan:     1. Type 2 diabetes mellitus with both eyes affected by moderate nonproliferative retinopathy and macular edema, with long-term current use of insulin  Last inj OD 10/2019  Last inj OS 2/2020  OS improved and OD with min inc eccentric thickening on OCT  Excellent pinhole vision  Discussed importance of f/u, need to watch exudates    Diabetic Retinopathy discussed in detail, all questions answered  Stressed importance of good BS/BP/Chol Control  RTC immediately PRN any vision changes, oswald blurry vision, missing vision, floaters, distortions, etc    - Posterior Segment OCT Retina-Both eyes  - Posterior Segment OCT Retina-Both eyes; Future    2. Nuclear sclerosis of both eyes  Excellent pinhole vision.    Recommend refraction (pt may change insurance to try to get glasses)    3. PVD (posterior vitreous detachment), both eyes  Stable.  Observe    Follow up in about 2 months (around 7/27/2020), or if symptoms worsen or fail to improve, for Dilated examination, OCT Mac.   "

## 2020-05-28 ENCOUNTER — PATIENT OUTREACH (OUTPATIENT)
Dept: ADMINISTRATIVE | Facility: OTHER | Age: 70
End: 2020-05-28

## 2020-05-28 DIAGNOSIS — E11.21 TYPE 2 DIABETES MELLITUS WITH DIABETIC NEPHROPATHY, WITH LONG-TERM CURRENT USE OF INSULIN: Primary | ICD-10-CM

## 2020-05-28 DIAGNOSIS — Z79.4 TYPE 2 DIABETES MELLITUS WITH DIABETIC NEPHROPATHY, WITH LONG-TERM CURRENT USE OF INSULIN: Primary | ICD-10-CM

## 2020-05-29 ENCOUNTER — TELEPHONE (OUTPATIENT)
Dept: ENDOCRINOLOGY | Facility: CLINIC | Age: 70
End: 2020-05-29

## 2020-05-31 ENCOUNTER — EXTERNAL CHRONIC CARE MANAGEMENT (OUTPATIENT)
Dept: PRIMARY CARE CLINIC | Facility: CLINIC | Age: 70
End: 2020-05-31
Payer: MEDICARE

## 2020-05-31 PROCEDURE — 99490 PR CHRONIC CARE MGMT, 1ST 20 MIN: ICD-10-PCS | Mod: S$PBB,,, | Performed by: INTERNAL MEDICINE

## 2020-05-31 PROCEDURE — 99490 CHRNC CARE MGMT STAFF 1ST 20: CPT | Mod: S$PBB,,, | Performed by: INTERNAL MEDICINE

## 2020-05-31 PROCEDURE — 99490 CHRNC CARE MGMT STAFF 1ST 20: CPT | Mod: PBBFAC | Performed by: INTERNAL MEDICINE

## 2020-06-08 ENCOUNTER — OFFICE VISIT (OUTPATIENT)
Dept: INTERNAL MEDICINE | Facility: CLINIC | Age: 70
End: 2020-06-08
Payer: MEDICARE

## 2020-06-08 DIAGNOSIS — I42.9 CARDIOMYOPATHY, UNSPECIFIED TYPE: ICD-10-CM

## 2020-06-08 DIAGNOSIS — I10 HYPERTENSION, ESSENTIAL: ICD-10-CM

## 2020-06-08 DIAGNOSIS — I50.22 CHRONIC SYSTOLIC CONGESTIVE HEART FAILURE, NYHA CLASS 3: ICD-10-CM

## 2020-06-08 DIAGNOSIS — E11.3313 TYPE 2 DIABETES MELLITUS WITH BOTH EYES AFFECTED BY MODERATE NONPROLIFERATIVE RETINOPATHY AND MACULAR EDEMA, WITH LONG-TERM CURRENT USE OF INSULIN: Primary | ICD-10-CM

## 2020-06-08 DIAGNOSIS — Z79.4 TYPE 2 DIABETES MELLITUS WITH BOTH EYES AFFECTED BY MODERATE NONPROLIFERATIVE RETINOPATHY AND MACULAR EDEMA, WITH LONG-TERM CURRENT USE OF INSULIN: Primary | ICD-10-CM

## 2020-06-08 PROCEDURE — 99441 PR PHYSICIAN TELEPHONE EVALUATION 5-10 MIN: ICD-10-PCS | Mod: 95,,, | Performed by: INTERNAL MEDICINE

## 2020-06-08 PROCEDURE — 99441 PR PHYSICIAN TELEPHONE EVALUATION 5-10 MIN: CPT | Mod: 95,,, | Performed by: INTERNAL MEDICINE

## 2020-06-08 RX ORDER — LISINOPRIL 40 MG/1
40 TABLET ORAL DAILY
Qty: 90 TABLET | Refills: 3 | Status: SHIPPED | OUTPATIENT
Start: 2020-06-08 | End: 2021-12-01 | Stop reason: SDUPTHER

## 2020-06-08 NOTE — PROGRESS NOTES
Established Patient - Audio Only Telehealth Visit     The patient location is: patient's home  The chief complaint leading to consultation is: HTN, DM  Visit type: Virtual visit with audio only (telephone)  Total time spent with patient: 5 minutes       The reason for the audio only service rather than synchronous audio and video virtual visit was related to technical difficulties or patient preference/necessity.     Each patient to whom I provide medical services by telemedicine is:  (1) informed of the relationship between the physician and patient and the respective role of any other health care provider with respect to management of the patient; and (2) notified that they may decline to receive medical services by telemedicine and may withdraw from such care at any time. Patient verbally consented to receive this service via voice-only telephone call.       HPI:      70 yo M here for follow up of HTN and DM.  a1c in feb was 10.2%    trulicity 1.5mg weekly  basaglar 32 units qhs  humalog 6 units tid   this am and running in this range.     /80  No fluid retention      Assessment and plan:      Type 2 diabetes mellitus with both eyes affected by moderate nonproliferative retinopathy and macular edema, with long-term current use of insulin  -     Comprehensive metabolic panel; Future; Expected date: 06/08/2020  -     Hemoglobin A1C; Future; Expected date: 06/08/2020    Chronic systolic congestive heart failure, NYHA class 3  -     Comprehensive metabolic panel; Future; Expected date: 06/08/2020  -     lisinopriL (PRINIVIL,ZESTRIL) 40 MG tablet; Take 1 tablet (40 mg total) by mouth once daily.  Dispense: 90 tablet; Refill: 3    Cardiomyopathy, unspecified type  -     lisinopriL (PRINIVIL,ZESTRIL) 40 MG tablet; Take 1 tablet (40 mg total) by mouth once daily.  Dispense: 90 tablet; Refill: 3    Hypertension, essential  -     Comprehensive metabolic panel; Future; Expected date: 06/08/2020    appt scheduled  with Michaelle Loco later this month. Will get labs prior.        This service was not originating from a related E/M service provided within the previous 7 days nor will  to an E/M service or procedure within the next 24 hours or my soonest available appointment.  Prevailing standard of care was able to be met in this audio-only visit.

## 2020-06-09 ENCOUNTER — TELEPHONE (OUTPATIENT)
Dept: INTERNAL MEDICINE | Facility: CLINIC | Age: 70
End: 2020-06-09

## 2020-06-11 ENCOUNTER — TELEPHONE (OUTPATIENT)
Dept: PHARMACY | Facility: CLINIC | Age: 70
End: 2020-06-11

## 2020-06-11 NOTE — TELEPHONE ENCOUNTER
Ordered refills with [facility](Humalog,Trulicity and Basaglar) Medication will be shipped within 7-10 business days.

## 2020-06-17 ENCOUNTER — PATIENT OUTREACH (OUTPATIENT)
Dept: ADMINISTRATIVE | Facility: OTHER | Age: 70
End: 2020-06-17

## 2020-06-30 ENCOUNTER — EXTERNAL CHRONIC CARE MANAGEMENT (OUTPATIENT)
Dept: PRIMARY CARE CLINIC | Facility: CLINIC | Age: 70
End: 2020-06-30
Payer: MEDICARE

## 2020-06-30 PROCEDURE — 99490 PR CHRONIC CARE MGMT, 1ST 20 MIN: ICD-10-PCS | Mod: S$GLB,,, | Performed by: INTERNAL MEDICINE

## 2020-06-30 PROCEDURE — 99490 CHRNC CARE MGMT STAFF 1ST 20: CPT | Mod: S$GLB,,, | Performed by: INTERNAL MEDICINE

## 2020-07-31 ENCOUNTER — EXTERNAL CHRONIC CARE MANAGEMENT (OUTPATIENT)
Dept: PRIMARY CARE CLINIC | Facility: CLINIC | Age: 70
End: 2020-07-31
Payer: MEDICARE

## 2020-07-31 PROCEDURE — 99490 PR CHRONIC CARE MGMT, 1ST 20 MIN: ICD-10-PCS | Mod: S$GLB,,, | Performed by: INTERNAL MEDICINE

## 2020-07-31 PROCEDURE — 99490 CHRNC CARE MGMT STAFF 1ST 20: CPT | Mod: S$GLB,,, | Performed by: INTERNAL MEDICINE

## 2020-08-19 ENCOUNTER — PATIENT OUTREACH (OUTPATIENT)
Dept: ADMINISTRATIVE | Facility: OTHER | Age: 70
End: 2020-08-19

## 2020-08-19 NOTE — PROGRESS NOTES
Patient's chart was reviewed for overdue SONG topics.  Immunizations reconciled.    Orders placed:n/a  Tasked appts:n/a  Labs Linked:n/a

## 2020-08-31 ENCOUNTER — EXTERNAL CHRONIC CARE MANAGEMENT (OUTPATIENT)
Dept: PRIMARY CARE CLINIC | Facility: CLINIC | Age: 70
End: 2020-08-31
Payer: MEDICARE

## 2020-08-31 PROCEDURE — 99490 CHRNC CARE MGMT STAFF 1ST 20: CPT | Mod: S$GLB,,, | Performed by: INTERNAL MEDICINE

## 2020-08-31 PROCEDURE — 99490 PR CHRONIC CARE MGMT, 1ST 20 MIN: ICD-10-PCS | Mod: S$GLB,,, | Performed by: INTERNAL MEDICINE

## 2020-09-09 ENCOUNTER — TELEPHONE (OUTPATIENT)
Dept: PHARMACY | Facility: CLINIC | Age: 70
End: 2020-09-09

## 2020-09-09 NOTE — PROGRESS NOTES
Ordered refills with [facility](Trulicity,Basaglar & Humalog).  Medication will be shipped within 7-10 business days.

## 2020-09-14 RX ORDER — AMLODIPINE BESYLATE 5 MG/1
5 TABLET ORAL DAILY
Qty: 30 TABLET | Refills: 0 | OUTPATIENT
Start: 2020-09-14 | End: 2020-12-14 | Stop reason: SDUPTHER

## 2020-09-20 ENCOUNTER — PATIENT OUTREACH (OUTPATIENT)
Dept: ADMINISTRATIVE | Facility: OTHER | Age: 70
End: 2020-09-20

## 2020-09-30 ENCOUNTER — EXTERNAL CHRONIC CARE MANAGEMENT (OUTPATIENT)
Dept: PRIMARY CARE CLINIC | Facility: CLINIC | Age: 70
End: 2020-09-30
Payer: MEDICARE

## 2020-09-30 PROCEDURE — 99490 CHRNC CARE MGMT STAFF 1ST 20: CPT | Mod: S$GLB,,, | Performed by: INTERNAL MEDICINE

## 2020-09-30 PROCEDURE — 99490 PR CHRONIC CARE MGMT, 1ST 20 MIN: ICD-10-PCS | Mod: S$GLB,,, | Performed by: INTERNAL MEDICINE

## 2020-10-08 ENCOUNTER — TELEPHONE (OUTPATIENT)
Dept: OPHTHALMOLOGY | Facility: CLINIC | Age: 70
End: 2020-10-08

## 2020-10-08 NOTE — TELEPHONE ENCOUNTER
----- Message from Kiersten Pedroza sent at 10/8/2020 10:55 AM CDT -----  Regarding: Appt  Contact: Jose Torres need to reschedule appt that is for today.      [phone]

## 2020-10-27 ENCOUNTER — PATIENT OUTREACH (OUTPATIENT)
Dept: ADMINISTRATIVE | Facility: OTHER | Age: 70
End: 2020-10-27

## 2020-10-31 ENCOUNTER — EXTERNAL CHRONIC CARE MANAGEMENT (OUTPATIENT)
Dept: PRIMARY CARE CLINIC | Facility: CLINIC | Age: 70
End: 2020-10-31
Payer: MEDICARE

## 2020-10-31 PROCEDURE — 99490 PR CHRONIC CARE MGMT, 1ST 20 MIN: ICD-10-PCS | Mod: S$GLB,,, | Performed by: INTERNAL MEDICINE

## 2020-10-31 PROCEDURE — 99490 CHRNC CARE MGMT STAFF 1ST 20: CPT | Mod: S$GLB,,, | Performed by: INTERNAL MEDICINE

## 2020-11-05 ENCOUNTER — TELEPHONE (OUTPATIENT)
Dept: OPHTHALMOLOGY | Facility: CLINIC | Age: 70
End: 2020-11-05

## 2020-11-05 NOTE — TELEPHONE ENCOUNTER
Spoke with pt regarding message received, in [facility], still don't have lights R/S to 11/12/02 @ 2:45.

## 2020-11-05 NOTE — TELEPHONE ENCOUNTER
----- Message from Kiersten Pedroza sent at 11/5/2020 10:21 AM CST -----  Regarding: Appt  Contact: Jose Torres is calling to see if he can reschedule appt to next week if available.      [phone]

## 2020-11-30 ENCOUNTER — EXTERNAL CHRONIC CARE MANAGEMENT (OUTPATIENT)
Dept: PRIMARY CARE CLINIC | Facility: CLINIC | Age: 70
End: 2020-11-30
Payer: MEDICARE

## 2020-11-30 ENCOUNTER — PATIENT OUTREACH (OUTPATIENT)
Dept: ADMINISTRATIVE | Facility: OTHER | Age: 70
End: 2020-11-30

## 2020-11-30 PROCEDURE — 99490 PR CHRONIC CARE MGMT, 1ST 20 MIN: ICD-10-PCS | Mod: S$GLB,,, | Performed by: INTERNAL MEDICINE

## 2020-11-30 PROCEDURE — 99490 CHRNC CARE MGMT STAFF 1ST 20: CPT | Mod: S$GLB,,, | Performed by: INTERNAL MEDICINE

## 2020-12-14 RX ORDER — AMLODIPINE BESYLATE 5 MG/1
5 TABLET ORAL DAILY
Qty: 90 TABLET | Refills: 3 | Status: SHIPPED | OUTPATIENT
Start: 2020-12-14 | End: 2023-04-24 | Stop reason: SDUPTHER

## 2021-01-13 ENCOUNTER — PATIENT OUTREACH (OUTPATIENT)
Dept: ADMINISTRATIVE | Facility: OTHER | Age: 71
End: 2021-01-13

## 2021-02-18 ENCOUNTER — PATIENT OUTREACH (OUTPATIENT)
Dept: ADMINISTRATIVE | Facility: OTHER | Age: 71
End: 2021-02-18

## 2021-02-19 ENCOUNTER — TELEPHONE (OUTPATIENT)
Dept: OPHTHALMOLOGY | Facility: CLINIC | Age: 71
End: 2021-02-19

## 2021-03-31 ENCOUNTER — PATIENT OUTREACH (OUTPATIENT)
Dept: ADMINISTRATIVE | Facility: OTHER | Age: 71
End: 2021-03-31

## 2021-05-05 DIAGNOSIS — E11.9 TYPE 2 DIABETES MELLITUS WITHOUT COMPLICATION: ICD-10-CM

## 2021-05-12 DIAGNOSIS — E11.9 TYPE 2 DIABETES MELLITUS WITHOUT COMPLICATION: ICD-10-CM

## 2021-05-18 ENCOUNTER — PATIENT OUTREACH (OUTPATIENT)
Dept: ADMINISTRATIVE | Facility: OTHER | Age: 71
End: 2021-05-18

## 2021-05-18 ENCOUNTER — IMMUNIZATION (OUTPATIENT)
Dept: PHARMACY | Facility: CLINIC | Age: 71
End: 2021-05-18
Payer: MEDICARE

## 2021-05-18 ENCOUNTER — OFFICE VISIT (OUTPATIENT)
Dept: INTERNAL MEDICINE | Facility: CLINIC | Age: 71
End: 2021-05-18
Payer: MEDICARE

## 2021-05-18 VITALS
TEMPERATURE: 97 F | BODY MASS INDEX: 23.37 KG/M2 | SYSTOLIC BLOOD PRESSURE: 130 MMHG | RESPIRATION RATE: 18 BRPM | WEIGHT: 182.13 LBS | OXYGEN SATURATION: 99 % | DIASTOLIC BLOOD PRESSURE: 76 MMHG | HEART RATE: 76 BPM | HEIGHT: 74 IN

## 2021-05-18 DIAGNOSIS — Z23 NEED FOR VACCINATION: Primary | ICD-10-CM

## 2021-05-18 DIAGNOSIS — E78.5 HYPERLIPIDEMIA, UNSPECIFIED HYPERLIPIDEMIA TYPE: ICD-10-CM

## 2021-05-18 DIAGNOSIS — Z12.11 SCREENING FOR COLON CANCER: ICD-10-CM

## 2021-05-18 DIAGNOSIS — N52.9 ERECTILE DYSFUNCTION, UNSPECIFIED ERECTILE DYSFUNCTION TYPE: ICD-10-CM

## 2021-05-18 DIAGNOSIS — E11.69 TYPE 2 DIABETES MELLITUS WITH OTHER SPECIFIED COMPLICATION, WITH LONG-TERM CURRENT USE OF INSULIN: ICD-10-CM

## 2021-05-18 DIAGNOSIS — I10 HYPERTENSION, ESSENTIAL: Primary | ICD-10-CM

## 2021-05-18 DIAGNOSIS — Z79.4 TYPE 2 DIABETES MELLITUS WITH OTHER SPECIFIED COMPLICATION, WITH LONG-TERM CURRENT USE OF INSULIN: ICD-10-CM

## 2021-05-18 DIAGNOSIS — I42.9 CARDIOMYOPATHY, UNSPECIFIED TYPE: ICD-10-CM

## 2021-05-18 LAB
ALBUMIN/CREAT UR: 182 UG/MG (ref 0–30)
CREAT UR-MCNC: 139 MG/DL (ref 23–375)
MICROALBUMIN UR DL<=1MG/L-MCNC: 253 UG/ML

## 2021-05-18 PROCEDURE — 1126F PR PAIN SEVERITY QUANTIFIED, NO PAIN PRESENT: ICD-10-PCS | Mod: S$GLB,,, | Performed by: PHYSICIAN ASSISTANT

## 2021-05-18 PROCEDURE — 1159F PR MEDICATION LIST DOCUMENTED IN MEDICAL RECORD: ICD-10-PCS | Mod: S$GLB,,, | Performed by: PHYSICIAN ASSISTANT

## 2021-05-18 PROCEDURE — 1101F PR PT FALLS ASSESS DOC 0-1 FALLS W/OUT INJ PAST YR: ICD-10-PCS | Mod: CPTII,S$GLB,, | Performed by: PHYSICIAN ASSISTANT

## 2021-05-18 PROCEDURE — 1159F MED LIST DOCD IN RCRD: CPT | Mod: S$GLB,,, | Performed by: PHYSICIAN ASSISTANT

## 2021-05-18 PROCEDURE — 82043 UR ALBUMIN QUANTITATIVE: CPT | Performed by: PHYSICIAN ASSISTANT

## 2021-05-18 PROCEDURE — 99999 PR PBB SHADOW E&M-EST. PATIENT-LVL V: ICD-10-PCS | Mod: PBBFAC,,, | Performed by: PHYSICIAN ASSISTANT

## 2021-05-18 PROCEDURE — 3075F PR MOST RECENT SYSTOLIC BLOOD PRESS GE 130-139MM HG: ICD-10-PCS | Mod: CPTII,S$GLB,, | Performed by: PHYSICIAN ASSISTANT

## 2021-05-18 PROCEDURE — 3288F PR FALLS RISK ASSESSMENT DOCUMENTED: ICD-10-PCS | Mod: CPTII,S$GLB,, | Performed by: PHYSICIAN ASSISTANT

## 2021-05-18 PROCEDURE — 99499 RISK ADDL DX/OHS AUDIT: ICD-10-PCS | Mod: S$GLB,,, | Performed by: PHYSICIAN ASSISTANT

## 2021-05-18 PROCEDURE — 3078F PR MOST RECENT DIASTOLIC BLOOD PRESSURE < 80 MM HG: ICD-10-PCS | Mod: CPTII,S$GLB,, | Performed by: PHYSICIAN ASSISTANT

## 2021-05-18 PROCEDURE — 3075F SYST BP GE 130 - 139MM HG: CPT | Mod: CPTII,S$GLB,, | Performed by: PHYSICIAN ASSISTANT

## 2021-05-18 PROCEDURE — 3288F FALL RISK ASSESSMENT DOCD: CPT | Mod: CPTII,S$GLB,, | Performed by: PHYSICIAN ASSISTANT

## 2021-05-18 PROCEDURE — 99214 OFFICE O/P EST MOD 30 MIN: CPT | Mod: S$GLB,,, | Performed by: PHYSICIAN ASSISTANT

## 2021-05-18 PROCEDURE — 3008F BODY MASS INDEX DOCD: CPT | Mod: CPTII,S$GLB,, | Performed by: PHYSICIAN ASSISTANT

## 2021-05-18 PROCEDURE — 1126F AMNT PAIN NOTED NONE PRSNT: CPT | Mod: S$GLB,,, | Performed by: PHYSICIAN ASSISTANT

## 2021-05-18 PROCEDURE — 3078F DIAST BP <80 MM HG: CPT | Mod: CPTII,S$GLB,, | Performed by: PHYSICIAN ASSISTANT

## 2021-05-18 PROCEDURE — 99214 PR OFFICE/OUTPT VISIT, EST, LEVL IV, 30-39 MIN: ICD-10-PCS | Mod: S$GLB,,, | Performed by: PHYSICIAN ASSISTANT

## 2021-05-18 PROCEDURE — 99999 PR PBB SHADOW E&M-EST. PATIENT-LVL V: CPT | Mod: PBBFAC,,, | Performed by: PHYSICIAN ASSISTANT

## 2021-05-18 PROCEDURE — 99499 UNLISTED E&M SERVICE: CPT | Mod: S$GLB,,, | Performed by: PHYSICIAN ASSISTANT

## 2021-05-18 PROCEDURE — 82570 ASSAY OF URINE CREATININE: CPT | Performed by: PHYSICIAN ASSISTANT

## 2021-05-18 PROCEDURE — 3008F PR BODY MASS INDEX (BMI) DOCUMENTED: ICD-10-PCS | Mod: CPTII,S$GLB,, | Performed by: PHYSICIAN ASSISTANT

## 2021-05-18 PROCEDURE — 1101F PT FALLS ASSESS-DOCD LE1/YR: CPT | Mod: CPTII,S$GLB,, | Performed by: PHYSICIAN ASSISTANT

## 2021-05-18 RX ORDER — TADALAFIL 20 MG/1
20 TABLET ORAL DAILY PRN
Qty: 10 TABLET | Refills: 5 | Status: SHIPPED | OUTPATIENT
Start: 2021-05-18 | End: 2023-04-24 | Stop reason: SDUPTHER

## 2021-05-21 ENCOUNTER — TELEPHONE (OUTPATIENT)
Dept: INTERNAL MEDICINE | Facility: CLINIC | Age: 71
End: 2021-05-21

## 2021-06-01 DIAGNOSIS — E78.00 PURE HYPERCHOLESTEROLEMIA: ICD-10-CM

## 2021-06-01 DIAGNOSIS — I50.22 CHRONIC SYSTOLIC CONGESTIVE HEART FAILURE, NYHA CLASS 3: ICD-10-CM

## 2021-06-01 DIAGNOSIS — I42.9 CARDIOMYOPATHY, UNSPECIFIED TYPE: ICD-10-CM

## 2021-06-01 RX ORDER — ATORVASTATIN CALCIUM 40 MG/1
40 TABLET, FILM COATED ORAL DAILY
Qty: 90 TABLET | Refills: 3 | Status: SHIPPED | OUTPATIENT
Start: 2021-06-01 | End: 2022-08-24 | Stop reason: SDUPTHER

## 2021-06-01 RX ORDER — FUROSEMIDE 40 MG/1
40 TABLET ORAL DAILY
Qty: 30 TABLET | Refills: 0 | Status: SHIPPED | OUTPATIENT
Start: 2021-06-01 | End: 2022-06-01

## 2021-06-02 ENCOUNTER — LAB VISIT (OUTPATIENT)
Dept: LAB | Facility: HOSPITAL | Age: 71
End: 2021-06-02
Attending: INTERNAL MEDICINE
Payer: MEDICARE

## 2021-06-02 DIAGNOSIS — I10 HYPERTENSION, ESSENTIAL: ICD-10-CM

## 2021-06-02 DIAGNOSIS — E78.5 HYPERLIPIDEMIA, UNSPECIFIED HYPERLIPIDEMIA TYPE: ICD-10-CM

## 2021-06-02 DIAGNOSIS — E11.69 TYPE 2 DIABETES MELLITUS WITH OTHER SPECIFIED COMPLICATION, WITH LONG-TERM CURRENT USE OF INSULIN: ICD-10-CM

## 2021-06-02 DIAGNOSIS — Z79.4 TYPE 2 DIABETES MELLITUS WITH OTHER SPECIFIED COMPLICATION, WITH LONG-TERM CURRENT USE OF INSULIN: ICD-10-CM

## 2021-06-02 LAB
ALBUMIN SERPL BCP-MCNC: 3.7 G/DL (ref 3.5–5.2)
ALP SERPL-CCNC: 70 U/L (ref 55–135)
ALT SERPL W/O P-5'-P-CCNC: 20 U/L (ref 10–44)
ANION GAP SERPL CALC-SCNC: 7 MMOL/L (ref 8–16)
AST SERPL-CCNC: 18 U/L (ref 10–40)
BASOPHILS # BLD AUTO: 0.05 K/UL (ref 0–0.2)
BASOPHILS NFR BLD: 0.8 % (ref 0–1.9)
BILIRUB SERPL-MCNC: 0.6 MG/DL (ref 0.1–1)
BUN SERPL-MCNC: 17 MG/DL (ref 8–23)
CALCIUM SERPL-MCNC: 10.4 MG/DL (ref 8.7–10.5)
CHLORIDE SERPL-SCNC: 102 MMOL/L (ref 95–110)
CHOLEST SERPL-MCNC: 159 MG/DL (ref 120–199)
CHOLEST/HDLC SERPL: 3.2 {RATIO} (ref 2–5)
CO2 SERPL-SCNC: 28 MMOL/L (ref 23–29)
CREAT SERPL-MCNC: 1.2 MG/DL (ref 0.5–1.4)
DIFFERENTIAL METHOD: ABNORMAL
EOSINOPHIL # BLD AUTO: 0.3 K/UL (ref 0–0.5)
EOSINOPHIL NFR BLD: 4.5 % (ref 0–8)
ERYTHROCYTE [DISTWIDTH] IN BLOOD BY AUTOMATED COUNT: 12.5 % (ref 11.5–14.5)
EST. GFR  (AFRICAN AMERICAN): >60 ML/MIN/1.73 M^2
EST. GFR  (NON AFRICAN AMERICAN): >60 ML/MIN/1.73 M^2
ESTIMATED AVG GLUCOSE: 298 MG/DL (ref 68–131)
GLUCOSE SERPL-MCNC: 360 MG/DL (ref 70–110)
HBA1C MFR BLD: 12 % (ref 4–5.6)
HCT VFR BLD AUTO: 40.3 % (ref 40–54)
HDLC SERPL-MCNC: 49 MG/DL (ref 40–75)
HDLC SERPL: 30.8 % (ref 20–50)
HGB BLD-MCNC: 13.3 G/DL (ref 14–18)
IMM GRANULOCYTES # BLD AUTO: 0.02 K/UL (ref 0–0.04)
IMM GRANULOCYTES NFR BLD AUTO: 0.3 % (ref 0–0.5)
LDLC SERPL CALC-MCNC: 87.4 MG/DL (ref 63–159)
LYMPHOCYTES # BLD AUTO: 1.6 K/UL (ref 1–4.8)
LYMPHOCYTES NFR BLD: 23.7 % (ref 18–48)
MCH RBC QN AUTO: 30.2 PG (ref 27–31)
MCHC RBC AUTO-ENTMCNC: 33 G/DL (ref 32–36)
MCV RBC AUTO: 92 FL (ref 82–98)
MONOCYTES # BLD AUTO: 0.7 K/UL (ref 0.3–1)
MONOCYTES NFR BLD: 10.3 % (ref 4–15)
NEUTROPHILS # BLD AUTO: 4 K/UL (ref 1.8–7.7)
NEUTROPHILS NFR BLD: 60.4 % (ref 38–73)
NONHDLC SERPL-MCNC: 110 MG/DL
NRBC BLD-RTO: 0 /100 WBC
PLATELET # BLD AUTO: 224 K/UL (ref 150–450)
PMV BLD AUTO: 11.6 FL (ref 9.2–12.9)
POTASSIUM SERPL-SCNC: 5.6 MMOL/L (ref 3.5–5.1)
PROT SERPL-MCNC: 7.2 G/DL (ref 6–8.4)
RBC # BLD AUTO: 4.4 M/UL (ref 4.6–6.2)
SODIUM SERPL-SCNC: 137 MMOL/L (ref 136–145)
TRIGL SERPL-MCNC: 113 MG/DL (ref 30–150)
TSH SERPL DL<=0.005 MIU/L-ACNC: 2 UIU/ML (ref 0.4–4)
WBC # BLD AUTO: 6.62 K/UL (ref 3.9–12.7)

## 2021-06-02 PROCEDURE — 80061 LIPID PANEL: CPT | Performed by: PHYSICIAN ASSISTANT

## 2021-06-02 PROCEDURE — 83036 HEMOGLOBIN GLYCOSYLATED A1C: CPT | Performed by: PHYSICIAN ASSISTANT

## 2021-06-02 PROCEDURE — 85025 COMPLETE CBC W/AUTO DIFF WBC: CPT | Performed by: PHYSICIAN ASSISTANT

## 2021-06-02 PROCEDURE — 36415 COLL VENOUS BLD VENIPUNCTURE: CPT | Performed by: PHYSICIAN ASSISTANT

## 2021-06-02 PROCEDURE — 84443 ASSAY THYROID STIM HORMONE: CPT | Performed by: PHYSICIAN ASSISTANT

## 2021-06-02 PROCEDURE — 80053 COMPREHEN METABOLIC PANEL: CPT | Performed by: PHYSICIAN ASSISTANT

## 2021-06-02 RX ORDER — CARVEDILOL 25 MG/1
25 TABLET ORAL 2 TIMES DAILY
Qty: 180 TABLET | Refills: 3 | Status: SHIPPED | OUTPATIENT
Start: 2021-06-02 | End: 2023-04-24 | Stop reason: SDUPTHER

## 2021-06-03 ENCOUNTER — TELEPHONE (OUTPATIENT)
Dept: INTERNAL MEDICINE | Facility: CLINIC | Age: 71
End: 2021-06-03

## 2021-06-03 DIAGNOSIS — E87.5 HYPERKALEMIA: Primary | ICD-10-CM

## 2021-06-16 ENCOUNTER — OFFICE VISIT (OUTPATIENT)
Dept: OPHTHALMOLOGY | Facility: CLINIC | Age: 71
End: 2021-06-16
Payer: MEDICARE

## 2021-06-16 ENCOUNTER — IMMUNIZATION (OUTPATIENT)
Dept: PHARMACY | Facility: CLINIC | Age: 71
End: 2021-06-16
Payer: MEDICARE

## 2021-06-16 DIAGNOSIS — H43.813 PVD (POSTERIOR VITREOUS DETACHMENT), BOTH EYES: ICD-10-CM

## 2021-06-16 DIAGNOSIS — E11.3313 TYPE 2 DIABETES MELLITUS WITH BOTH EYES AFFECTED BY MODERATE NONPROLIFERATIVE RETINOPATHY AND MACULAR EDEMA, WITH LONG-TERM CURRENT USE OF INSULIN: Primary | ICD-10-CM

## 2021-06-16 DIAGNOSIS — Z23 NEED FOR VACCINATION: Primary | ICD-10-CM

## 2021-06-16 DIAGNOSIS — E11.36 DIABETIC CATARACT OF BOTH EYES: ICD-10-CM

## 2021-06-16 DIAGNOSIS — Z79.4 TYPE 2 DIABETES MELLITUS WITH BOTH EYES AFFECTED BY MODERATE NONPROLIFERATIVE RETINOPATHY AND MACULAR EDEMA, WITH LONG-TERM CURRENT USE OF INSULIN: Primary | ICD-10-CM

## 2021-06-16 PROCEDURE — 1101F PT FALLS ASSESS-DOCD LE1/YR: CPT | Mod: CPTII,S$GLB,, | Performed by: OPHTHALMOLOGY

## 2021-06-16 PROCEDURE — 1101F PR PT FALLS ASSESS DOC 0-1 FALLS W/OUT INJ PAST YR: ICD-10-PCS | Mod: CPTII,S$GLB,, | Performed by: OPHTHALMOLOGY

## 2021-06-16 PROCEDURE — 1159F PR MEDICATION LIST DOCUMENTED IN MEDICAL RECORD: ICD-10-PCS | Mod: S$GLB,,, | Performed by: OPHTHALMOLOGY

## 2021-06-16 PROCEDURE — 99999 PR PBB SHADOW E&M-EST. PATIENT-LVL III: ICD-10-PCS | Mod: PBBFAC,,, | Performed by: OPHTHALMOLOGY

## 2021-06-16 PROCEDURE — 3288F PR FALLS RISK ASSESSMENT DOCUMENTED: ICD-10-PCS | Mod: CPTII,S$GLB,, | Performed by: OPHTHALMOLOGY

## 2021-06-16 PROCEDURE — 1126F PR PAIN SEVERITY QUANTIFIED, NO PAIN PRESENT: ICD-10-PCS | Mod: S$GLB,,, | Performed by: OPHTHALMOLOGY

## 2021-06-16 PROCEDURE — 99499 RISK ADDL DX/OHS AUDIT: ICD-10-PCS | Mod: S$GLB,,, | Performed by: OPHTHALMOLOGY

## 2021-06-16 PROCEDURE — 99214 OFFICE O/P EST MOD 30 MIN: CPT | Mod: S$GLB,,, | Performed by: OPHTHALMOLOGY

## 2021-06-16 PROCEDURE — 3046F HEMOGLOBIN A1C LEVEL >9.0%: CPT | Mod: CPTII,S$GLB,, | Performed by: OPHTHALMOLOGY

## 2021-06-16 PROCEDURE — 99499 UNLISTED E&M SERVICE: CPT | Mod: S$GLB,,, | Performed by: OPHTHALMOLOGY

## 2021-06-16 PROCEDURE — 1159F MED LIST DOCD IN RCRD: CPT | Mod: S$GLB,,, | Performed by: OPHTHALMOLOGY

## 2021-06-16 PROCEDURE — 3046F PR MOST RECENT HEMOGLOBIN A1C LEVEL > 9.0%: ICD-10-PCS | Mod: CPTII,S$GLB,, | Performed by: OPHTHALMOLOGY

## 2021-06-16 PROCEDURE — 99999 PR PBB SHADOW E&M-EST. PATIENT-LVL III: CPT | Mod: PBBFAC,,, | Performed by: OPHTHALMOLOGY

## 2021-06-16 PROCEDURE — 92134 CPTRZ OPH DX IMG PST SGM RTA: CPT | Mod: S$GLB,,, | Performed by: OPHTHALMOLOGY

## 2021-06-16 PROCEDURE — 99214 PR OFFICE/OUTPT VISIT, EST, LEVL IV, 30-39 MIN: ICD-10-PCS | Mod: S$GLB,,, | Performed by: OPHTHALMOLOGY

## 2021-06-16 PROCEDURE — 3288F FALL RISK ASSESSMENT DOCD: CPT | Mod: CPTII,S$GLB,, | Performed by: OPHTHALMOLOGY

## 2021-06-16 PROCEDURE — 92134 POSTERIOR SEGMENT OCT RETINA (OCULAR COHERENCE TOMOGRAPHY)-BOTH EYES: ICD-10-PCS | Mod: S$GLB,,, | Performed by: OPHTHALMOLOGY

## 2021-06-16 PROCEDURE — 1126F AMNT PAIN NOTED NONE PRSNT: CPT | Mod: S$GLB,,, | Performed by: OPHTHALMOLOGY

## 2021-06-28 ENCOUNTER — PATIENT OUTREACH (OUTPATIENT)
Dept: ADMINISTRATIVE | Facility: OTHER | Age: 71
End: 2021-06-28

## 2021-06-30 ENCOUNTER — OFFICE VISIT (OUTPATIENT)
Dept: CARDIOLOGY | Facility: CLINIC | Age: 71
End: 2021-06-30
Payer: MEDICARE

## 2021-06-30 VITALS
BODY MASS INDEX: 22.6 KG/M2 | WEIGHT: 176.13 LBS | HEART RATE: 92 BPM | SYSTOLIC BLOOD PRESSURE: 140 MMHG | DIASTOLIC BLOOD PRESSURE: 67 MMHG | HEIGHT: 74 IN

## 2021-06-30 DIAGNOSIS — E78.00 PURE HYPERCHOLESTEROLEMIA: ICD-10-CM

## 2021-06-30 DIAGNOSIS — I42.9 CARDIOMYOPATHY, UNSPECIFIED TYPE: ICD-10-CM

## 2021-06-30 DIAGNOSIS — Z79.4 TYPE 2 DIABETES MELLITUS WITH DIABETIC NEPHROPATHY, WITH LONG-TERM CURRENT USE OF INSULIN: ICD-10-CM

## 2021-06-30 DIAGNOSIS — E11.21 TYPE 2 DIABETES MELLITUS WITH DIABETIC NEPHROPATHY, WITH LONG-TERM CURRENT USE OF INSULIN: ICD-10-CM

## 2021-06-30 DIAGNOSIS — I50.42 CHRONIC COMBINED SYSTOLIC AND DIASTOLIC CONGESTIVE HEART FAILURE, NYHA CLASS 2: Primary | ICD-10-CM

## 2021-06-30 DIAGNOSIS — I50.20 HEART FAILURE WITH REDUCED EJECTION FRACTION: ICD-10-CM

## 2021-06-30 DIAGNOSIS — I10 HYPERTENSION, ESSENTIAL: ICD-10-CM

## 2021-06-30 PROCEDURE — 99999 PR PBB SHADOW E&M-EST. PATIENT-LVL IV: ICD-10-PCS | Mod: PBBFAC,,, | Performed by: INTERNAL MEDICINE

## 2021-06-30 PROCEDURE — 3008F PR BODY MASS INDEX (BMI) DOCUMENTED: ICD-10-PCS | Mod: CPTII,S$GLB,, | Performed by: INTERNAL MEDICINE

## 2021-06-30 PROCEDURE — 1126F PR PAIN SEVERITY QUANTIFIED, NO PAIN PRESENT: ICD-10-PCS | Mod: S$GLB,,, | Performed by: INTERNAL MEDICINE

## 2021-06-30 PROCEDURE — 3008F BODY MASS INDEX DOCD: CPT | Mod: CPTII,S$GLB,, | Performed by: INTERNAL MEDICINE

## 2021-06-30 PROCEDURE — 1159F PR MEDICATION LIST DOCUMENTED IN MEDICAL RECORD: ICD-10-PCS | Mod: S$GLB,,, | Performed by: INTERNAL MEDICINE

## 2021-06-30 PROCEDURE — 3046F HEMOGLOBIN A1C LEVEL >9.0%: CPT | Mod: CPTII,S$GLB,, | Performed by: INTERNAL MEDICINE

## 2021-06-30 PROCEDURE — 1159F MED LIST DOCD IN RCRD: CPT | Mod: S$GLB,,, | Performed by: INTERNAL MEDICINE

## 2021-06-30 PROCEDURE — 3046F PR MOST RECENT HEMOGLOBIN A1C LEVEL > 9.0%: ICD-10-PCS | Mod: CPTII,S$GLB,, | Performed by: INTERNAL MEDICINE

## 2021-06-30 PROCEDURE — 99214 OFFICE O/P EST MOD 30 MIN: CPT | Mod: S$GLB,,, | Performed by: INTERNAL MEDICINE

## 2021-06-30 PROCEDURE — 99214 PR OFFICE/OUTPT VISIT, EST, LEVL IV, 30-39 MIN: ICD-10-PCS | Mod: S$GLB,,, | Performed by: INTERNAL MEDICINE

## 2021-06-30 PROCEDURE — 1126F AMNT PAIN NOTED NONE PRSNT: CPT | Mod: S$GLB,,, | Performed by: INTERNAL MEDICINE

## 2021-06-30 PROCEDURE — 99999 PR PBB SHADOW E&M-EST. PATIENT-LVL IV: CPT | Mod: PBBFAC,,, | Performed by: INTERNAL MEDICINE

## 2021-07-29 ENCOUNTER — PATIENT OUTREACH (OUTPATIENT)
Dept: ADMINISTRATIVE | Facility: OTHER | Age: 71
End: 2021-07-29

## 2021-08-05 ENCOUNTER — TELEPHONE (OUTPATIENT)
Dept: INTERNAL MEDICINE | Facility: CLINIC | Age: 71
End: 2021-08-05

## 2021-08-25 RX ORDER — FUROSEMIDE 40 MG/1
40 TABLET ORAL DAILY
Qty: 30 TABLET | Refills: 11 | Status: SHIPPED | OUTPATIENT
Start: 2021-08-25 | End: 2022-09-29 | Stop reason: SDUPTHER

## 2021-10-12 ENCOUNTER — PATIENT OUTREACH (OUTPATIENT)
Dept: ADMINISTRATIVE | Facility: OTHER | Age: 71
End: 2021-10-12

## 2021-10-13 ENCOUNTER — PES CALL (OUTPATIENT)
Dept: ADMINISTRATIVE | Facility: CLINIC | Age: 71
End: 2021-10-13

## 2021-10-21 ENCOUNTER — OFFICE VISIT (OUTPATIENT)
Dept: OPHTHALMOLOGY | Facility: CLINIC | Age: 71
End: 2021-10-21
Payer: MEDICARE

## 2021-10-21 DIAGNOSIS — E11.3313 TYPE 2 DIABETES MELLITUS WITH BOTH EYES AFFECTED BY MODERATE NONPROLIFERATIVE RETINOPATHY AND MACULAR EDEMA, WITH LONG-TERM CURRENT USE OF INSULIN: Primary | ICD-10-CM

## 2021-10-21 DIAGNOSIS — Z79.4 TYPE 2 DIABETES MELLITUS WITH BOTH EYES AFFECTED BY MODERATE NONPROLIFERATIVE RETINOPATHY AND MACULAR EDEMA, WITH LONG-TERM CURRENT USE OF INSULIN: Primary | ICD-10-CM

## 2021-10-21 PROCEDURE — 67028 PR INJECT INTRAVITREAL PHARMCOLOGIC: ICD-10-PCS | Mod: LT,S$GLB,, | Performed by: OPHTHALMOLOGY

## 2021-10-21 PROCEDURE — 92134 CPTRZ OPH DX IMG PST SGM RTA: CPT | Mod: S$GLB,,, | Performed by: OPHTHALMOLOGY

## 2021-10-21 PROCEDURE — 3288F PR FALLS RISK ASSESSMENT DOCUMENTED: ICD-10-PCS | Mod: CPTII,S$GLB,, | Performed by: OPHTHALMOLOGY

## 2021-10-21 PROCEDURE — 99214 OFFICE O/P EST MOD 30 MIN: CPT | Mod: 25,S$GLB,, | Performed by: OPHTHALMOLOGY

## 2021-10-21 PROCEDURE — 3066F NEPHROPATHY DOC TX: CPT | Mod: CPTII,S$GLB,, | Performed by: OPHTHALMOLOGY

## 2021-10-21 PROCEDURE — 99499 UNLISTED E&M SERVICE: CPT | Mod: S$GLB,,, | Performed by: OPHTHALMOLOGY

## 2021-10-21 PROCEDURE — 1160F PR REVIEW ALL MEDS BY PRESCRIBER/CLIN PHARMACIST DOCUMENTED: ICD-10-PCS | Mod: CPTII,S$GLB,, | Performed by: OPHTHALMOLOGY

## 2021-10-21 PROCEDURE — 92134 POSTERIOR SEGMENT OCT RETINA (OCULAR COHERENCE TOMOGRAPHY)-BOTH EYES: ICD-10-PCS | Mod: S$GLB,,, | Performed by: OPHTHALMOLOGY

## 2021-10-21 PROCEDURE — 1126F PR PAIN SEVERITY QUANTIFIED, NO PAIN PRESENT: ICD-10-PCS | Mod: CPTII,S$GLB,, | Performed by: OPHTHALMOLOGY

## 2021-10-21 PROCEDURE — 3288F FALL RISK ASSESSMENT DOCD: CPT | Mod: CPTII,S$GLB,, | Performed by: OPHTHALMOLOGY

## 2021-10-21 PROCEDURE — 1160F RVW MEDS BY RX/DR IN RCRD: CPT | Mod: CPTII,S$GLB,, | Performed by: OPHTHALMOLOGY

## 2021-10-21 PROCEDURE — 99999 PR PBB SHADOW E&M-EST. PATIENT-LVL III: ICD-10-PCS | Mod: PBBFAC,,, | Performed by: OPHTHALMOLOGY

## 2021-10-21 PROCEDURE — 1159F MED LIST DOCD IN RCRD: CPT | Mod: CPTII,S$GLB,, | Performed by: OPHTHALMOLOGY

## 2021-10-21 PROCEDURE — 99999 PR PBB SHADOW E&M-EST. PATIENT-LVL III: CPT | Mod: PBBFAC,,, | Performed by: OPHTHALMOLOGY

## 2021-10-21 PROCEDURE — 4010F PR ACE/ARB THEARPY RXD/TAKEN: ICD-10-PCS | Mod: CPTII,S$GLB,, | Performed by: OPHTHALMOLOGY

## 2021-10-21 PROCEDURE — 67028 INJECTION EYE DRUG: CPT | Mod: LT,S$GLB,, | Performed by: OPHTHALMOLOGY

## 2021-10-21 PROCEDURE — 3046F PR MOST RECENT HEMOGLOBIN A1C LEVEL > 9.0%: ICD-10-PCS | Mod: CPTII,S$GLB,, | Performed by: OPHTHALMOLOGY

## 2021-10-21 PROCEDURE — 99214 PR OFFICE/OUTPT VISIT, EST, LEVL IV, 30-39 MIN: ICD-10-PCS | Mod: 25,S$GLB,, | Performed by: OPHTHALMOLOGY

## 2021-10-21 PROCEDURE — 3060F POS MICROALBUMINURIA REV: CPT | Mod: CPTII,S$GLB,, | Performed by: OPHTHALMOLOGY

## 2021-10-21 PROCEDURE — 99499 RISK ADDL DX/OHS AUDIT: ICD-10-PCS | Mod: S$GLB,,, | Performed by: OPHTHALMOLOGY

## 2021-10-21 PROCEDURE — 1126F AMNT PAIN NOTED NONE PRSNT: CPT | Mod: CPTII,S$GLB,, | Performed by: OPHTHALMOLOGY

## 2021-10-21 PROCEDURE — 4010F ACE/ARB THERAPY RXD/TAKEN: CPT | Mod: CPTII,S$GLB,, | Performed by: OPHTHALMOLOGY

## 2021-10-21 PROCEDURE — 3046F HEMOGLOBIN A1C LEVEL >9.0%: CPT | Mod: CPTII,S$GLB,, | Performed by: OPHTHALMOLOGY

## 2021-10-21 PROCEDURE — 1101F PT FALLS ASSESS-DOCD LE1/YR: CPT | Mod: CPTII,S$GLB,, | Performed by: OPHTHALMOLOGY

## 2021-10-21 PROCEDURE — 1159F PR MEDICATION LIST DOCUMENTED IN MEDICAL RECORD: ICD-10-PCS | Mod: CPTII,S$GLB,, | Performed by: OPHTHALMOLOGY

## 2021-10-21 PROCEDURE — 3060F PR POS MICROALBUMINURIA RESULT DOCUMENTED/REVIEW: ICD-10-PCS | Mod: CPTII,S$GLB,, | Performed by: OPHTHALMOLOGY

## 2021-10-21 PROCEDURE — 1101F PR PT FALLS ASSESS DOC 0-1 FALLS W/OUT INJ PAST YR: ICD-10-PCS | Mod: CPTII,S$GLB,, | Performed by: OPHTHALMOLOGY

## 2021-10-21 PROCEDURE — 3066F PR DOCUMENTATION OF TREATMENT FOR NEPHROPATHY: ICD-10-PCS | Mod: CPTII,S$GLB,, | Performed by: OPHTHALMOLOGY

## 2021-10-21 RX ADMIN — Medication 1.25 MG: at 04:10

## 2021-11-19 ENCOUNTER — PATIENT OUTREACH (OUTPATIENT)
Dept: ADMINISTRATIVE | Facility: HOSPITAL | Age: 71
End: 2021-11-19
Payer: MEDICARE

## 2021-12-01 ENCOUNTER — OFFICE VISIT (OUTPATIENT)
Dept: INTERNAL MEDICINE | Facility: CLINIC | Age: 71
End: 2021-12-01
Payer: MEDICARE

## 2021-12-01 ENCOUNTER — LAB VISIT (OUTPATIENT)
Dept: LAB | Facility: HOSPITAL | Age: 71
End: 2021-12-01
Attending: INTERNAL MEDICINE
Payer: MEDICARE

## 2021-12-01 ENCOUNTER — IMMUNIZATION (OUTPATIENT)
Dept: INTERNAL MEDICINE | Facility: CLINIC | Age: 71
End: 2021-12-01
Payer: MEDICARE

## 2021-12-01 VITALS
SYSTOLIC BLOOD PRESSURE: 172 MMHG | TEMPERATURE: 97 F | OXYGEN SATURATION: 98 % | HEART RATE: 97 BPM | DIASTOLIC BLOOD PRESSURE: 90 MMHG | RESPIRATION RATE: 16 BRPM | WEIGHT: 183.31 LBS | BODY MASS INDEX: 23.53 KG/M2 | HEIGHT: 74 IN

## 2021-12-01 DIAGNOSIS — Z79.4 TYPE 2 DIABETES MELLITUS WITH DIABETIC NEPHROPATHY, WITH LONG-TERM CURRENT USE OF INSULIN: ICD-10-CM

## 2021-12-01 DIAGNOSIS — I50.22 CHRONIC SYSTOLIC CONGESTIVE HEART FAILURE, NYHA CLASS 3: ICD-10-CM

## 2021-12-01 DIAGNOSIS — G89.29 CHRONIC LEFT SHOULDER PAIN: ICD-10-CM

## 2021-12-01 DIAGNOSIS — E11.21 TYPE 2 DIABETES MELLITUS WITH DIABETIC NEPHROPATHY, WITH LONG-TERM CURRENT USE OF INSULIN: ICD-10-CM

## 2021-12-01 DIAGNOSIS — I50.42 CHRONIC COMBINED SYSTOLIC AND DIASTOLIC CONGESTIVE HEART FAILURE, NYHA CLASS 2: ICD-10-CM

## 2021-12-01 DIAGNOSIS — I42.9 CARDIOMYOPATHY, UNSPECIFIED TYPE: ICD-10-CM

## 2021-12-01 DIAGNOSIS — E11.3313 TYPE 2 DIABETES MELLITUS WITH BOTH EYES AFFECTED BY MODERATE NONPROLIFERATIVE RETINOPATHY AND MACULAR EDEMA, WITH LONG-TERM CURRENT USE OF INSULIN: Primary | ICD-10-CM

## 2021-12-01 DIAGNOSIS — I10 HYPERTENSION, ESSENTIAL: ICD-10-CM

## 2021-12-01 DIAGNOSIS — M25.512 CHRONIC LEFT SHOULDER PAIN: ICD-10-CM

## 2021-12-01 DIAGNOSIS — Z79.4 TYPE 2 DIABETES MELLITUS WITH BOTH EYES AFFECTED BY MODERATE NONPROLIFERATIVE RETINOPATHY AND MACULAR EDEMA, WITH LONG-TERM CURRENT USE OF INSULIN: Primary | ICD-10-CM

## 2021-12-01 LAB
ALBUMIN SERPL BCP-MCNC: 3.9 G/DL (ref 3.5–5.2)
ALP SERPL-CCNC: 72 U/L (ref 55–135)
ALT SERPL W/O P-5'-P-CCNC: 25 U/L (ref 10–44)
ANION GAP SERPL CALC-SCNC: 8 MMOL/L (ref 8–16)
AST SERPL-CCNC: 18 U/L (ref 10–40)
BILIRUB SERPL-MCNC: 0.4 MG/DL (ref 0.1–1)
BUN SERPL-MCNC: 24 MG/DL (ref 8–23)
CALCIUM SERPL-MCNC: 11.4 MG/DL (ref 8.7–10.5)
CHLORIDE SERPL-SCNC: 103 MMOL/L (ref 95–110)
CO2 SERPL-SCNC: 29 MMOL/L (ref 23–29)
CREAT SERPL-MCNC: 1.1 MG/DL (ref 0.5–1.4)
EST. GFR  (AFRICAN AMERICAN): >60 ML/MIN/1.73 M^2
EST. GFR  (NON AFRICAN AMERICAN): >60 ML/MIN/1.73 M^2
ESTIMATED AVG GLUCOSE: 278 MG/DL (ref 68–131)
GLUCOSE SERPL-MCNC: 204 MG/DL (ref 70–110)
HBA1C MFR BLD: 11.3 % (ref 4–5.6)
POTASSIUM SERPL-SCNC: 5.6 MMOL/L (ref 3.5–5.1)
PROT SERPL-MCNC: 7.6 G/DL (ref 6–8.4)
SODIUM SERPL-SCNC: 140 MMOL/L (ref 136–145)

## 2021-12-01 PROCEDURE — 99999 PR PBB SHADOW E&M-EST. PATIENT-LVL V: ICD-10-PCS | Mod: PBBFAC,,, | Performed by: INTERNAL MEDICINE

## 2021-12-01 PROCEDURE — 4010F PR ACE/ARB THEARPY RXD/TAKEN: ICD-10-PCS | Mod: CPTII,S$GLB,, | Performed by: INTERNAL MEDICINE

## 2021-12-01 PROCEDURE — 4010F ACE/ARB THERAPY RXD/TAKEN: CPT | Mod: CPTII,S$GLB,, | Performed by: INTERNAL MEDICINE

## 2021-12-01 PROCEDURE — 99214 OFFICE O/P EST MOD 30 MIN: CPT | Mod: S$GLB,,, | Performed by: INTERNAL MEDICINE

## 2021-12-01 PROCEDURE — 90694 VACC AIIV4 NO PRSRV 0.5ML IM: CPT | Mod: S$GLB,,, | Performed by: INTERNAL MEDICINE

## 2021-12-01 PROCEDURE — 3060F POS MICROALBUMINURIA REV: CPT | Mod: CPTII,S$GLB,, | Performed by: INTERNAL MEDICINE

## 2021-12-01 PROCEDURE — 3066F PR DOCUMENTATION OF TREATMENT FOR NEPHROPATHY: ICD-10-PCS | Mod: CPTII,S$GLB,, | Performed by: INTERNAL MEDICINE

## 2021-12-01 PROCEDURE — G0008 FLU VACCINE - QUADRIVALENT - ADJUVANTED: ICD-10-PCS | Mod: S$GLB,,, | Performed by: INTERNAL MEDICINE

## 2021-12-01 PROCEDURE — 99999 PR PBB SHADOW E&M-EST. PATIENT-LVL V: CPT | Mod: PBBFAC,,, | Performed by: INTERNAL MEDICINE

## 2021-12-01 PROCEDURE — 36415 COLL VENOUS BLD VENIPUNCTURE: CPT | Performed by: INTERNAL MEDICINE

## 2021-12-01 PROCEDURE — 80053 COMPREHEN METABOLIC PANEL: CPT | Performed by: INTERNAL MEDICINE

## 2021-12-01 PROCEDURE — 83036 HEMOGLOBIN GLYCOSYLATED A1C: CPT | Performed by: INTERNAL MEDICINE

## 2021-12-01 PROCEDURE — G0008 ADMIN INFLUENZA VIRUS VAC: HCPCS | Mod: S$GLB,,, | Performed by: INTERNAL MEDICINE

## 2021-12-01 PROCEDURE — 3060F PR POS MICROALBUMINURIA RESULT DOCUMENTED/REVIEW: ICD-10-PCS | Mod: CPTII,S$GLB,, | Performed by: INTERNAL MEDICINE

## 2021-12-01 PROCEDURE — 90694 FLU VACCINE - QUADRIVALENT - ADJUVANTED: ICD-10-PCS | Mod: S$GLB,,, | Performed by: INTERNAL MEDICINE

## 2021-12-01 PROCEDURE — 3066F NEPHROPATHY DOC TX: CPT | Mod: CPTII,S$GLB,, | Performed by: INTERNAL MEDICINE

## 2021-12-01 PROCEDURE — 99214 PR OFFICE/OUTPT VISIT, EST, LEVL IV, 30-39 MIN: ICD-10-PCS | Mod: S$GLB,,, | Performed by: INTERNAL MEDICINE

## 2021-12-01 RX ORDER — LISINOPRIL 40 MG/1
40 TABLET ORAL DAILY
Qty: 90 TABLET | Refills: 3 | Status: SHIPPED | OUTPATIENT
Start: 2021-12-01 | End: 2023-02-02 | Stop reason: SDUPTHER

## 2021-12-01 RX ORDER — INSULIN DETEMIR 100 [IU]/ML
15 INJECTION, SOLUTION SUBCUTANEOUS NIGHTLY
Qty: 6 ML | Refills: 12 | Status: SHIPPED | OUTPATIENT
Start: 2021-12-01 | End: 2023-04-24

## 2021-12-02 ENCOUNTER — PROCEDURE VISIT (OUTPATIENT)
Dept: OPHTHALMOLOGY | Facility: CLINIC | Age: 71
End: 2021-12-02
Payer: MEDICARE

## 2021-12-02 ENCOUNTER — HOSPITAL ENCOUNTER (OUTPATIENT)
Dept: RADIOLOGY | Facility: HOSPITAL | Age: 71
Discharge: HOME | End: 2021-12-02
Attending: INTERNAL MEDICINE
Payer: MEDICARE

## 2021-12-02 DIAGNOSIS — E11.3313 TYPE 2 DIABETES MELLITUS WITH BOTH EYES AFFECTED BY MODERATE NONPROLIFERATIVE RETINOPATHY AND MACULAR EDEMA, WITH LONG-TERM CURRENT USE OF INSULIN: Primary | ICD-10-CM

## 2021-12-02 DIAGNOSIS — Z79.4 TYPE 2 DIABETES MELLITUS WITH BOTH EYES AFFECTED BY MODERATE NONPROLIFERATIVE RETINOPATHY AND MACULAR EDEMA, WITH LONG-TERM CURRENT USE OF INSULIN: Primary | ICD-10-CM

## 2021-12-02 DIAGNOSIS — G89.29 CHRONIC LEFT SHOULDER PAIN: ICD-10-CM

## 2021-12-02 DIAGNOSIS — M25.512 CHRONIC LEFT SHOULDER PAIN: ICD-10-CM

## 2021-12-02 PROCEDURE — 92134 CPTRZ OPH DX IMG PST SGM RTA: CPT | Mod: S$GLB,,, | Performed by: OPHTHALMOLOGY

## 2021-12-02 PROCEDURE — 73030 X-RAY EXAM OF SHOULDER: CPT | Mod: TC,LT

## 2021-12-02 PROCEDURE — 67028 PR INJECT INTRAVITREAL PHARMCOLOGIC: ICD-10-PCS | Mod: RT,S$GLB,, | Performed by: OPHTHALMOLOGY

## 2021-12-02 PROCEDURE — 73030 X-RAY EXAM OF SHOULDER: CPT | Mod: 26,LT,, | Performed by: RADIOLOGY

## 2021-12-02 PROCEDURE — 99499 NO LOS: ICD-10-PCS | Mod: S$GLB,,, | Performed by: OPHTHALMOLOGY

## 2021-12-02 PROCEDURE — 67028 INJECTION EYE DRUG: CPT | Mod: RT,S$GLB,, | Performed by: OPHTHALMOLOGY

## 2021-12-02 PROCEDURE — 92134 POSTERIOR SEGMENT OCT RETINA (OCULAR COHERENCE TOMOGRAPHY)-BOTH EYES: ICD-10-PCS | Mod: S$GLB,,, | Performed by: OPHTHALMOLOGY

## 2021-12-02 PROCEDURE — 73030 XR SHOULDER COMPLETE 2 OR MORE VIEWS LEFT: ICD-10-PCS | Mod: 26,LT,, | Performed by: RADIOLOGY

## 2021-12-02 PROCEDURE — 99499 UNLISTED E&M SERVICE: CPT | Mod: S$GLB,,, | Performed by: OPHTHALMOLOGY

## 2021-12-02 RX ADMIN — Medication 1.25 MG: at 03:12

## 2021-12-03 ENCOUNTER — IMMUNIZATION (OUTPATIENT)
Dept: INTERNAL MEDICINE | Facility: CLINIC | Age: 71
End: 2021-12-03
Payer: MEDICARE

## 2021-12-03 DIAGNOSIS — Z23 NEED FOR VACCINATION: Primary | ICD-10-CM

## 2021-12-03 PROCEDURE — 0064A COVID-19, MRNA, LNP-S, PF, 100 MCG/0.25 ML DOSE VACCINE (MODERNA BOOSTER): CPT | Mod: CV19,PBBFAC | Performed by: INTERNAL MEDICINE

## 2021-12-07 ENCOUNTER — PATIENT OUTREACH (OUTPATIENT)
Dept: ADMINISTRATIVE | Facility: OTHER | Age: 71
End: 2021-12-07
Payer: MEDICARE

## 2021-12-08 ENCOUNTER — OFFICE VISIT (OUTPATIENT)
Dept: ORTHOPEDICS | Facility: CLINIC | Age: 71
End: 2021-12-08
Payer: MEDICARE

## 2021-12-08 ENCOUNTER — TELEPHONE (OUTPATIENT)
Dept: INTERNAL MEDICINE | Facility: CLINIC | Age: 71
End: 2021-12-08
Payer: MEDICARE

## 2021-12-08 ENCOUNTER — PATIENT OUTREACH (OUTPATIENT)
Dept: ADMINISTRATIVE | Facility: HOSPITAL | Age: 71
End: 2021-12-08
Payer: MEDICARE

## 2021-12-08 VITALS
BODY MASS INDEX: 22.86 KG/M2 | HEART RATE: 90 BPM | DIASTOLIC BLOOD PRESSURE: 87 MMHG | WEIGHT: 178.13 LBS | HEIGHT: 74 IN | SYSTOLIC BLOOD PRESSURE: 138 MMHG

## 2021-12-08 DIAGNOSIS — M25.512 CHRONIC LEFT SHOULDER PAIN: ICD-10-CM

## 2021-12-08 DIAGNOSIS — M25.312 INSTABILITY OF LEFT SHOULDER JOINT: ICD-10-CM

## 2021-12-08 DIAGNOSIS — G89.29 CHRONIC LEFT SHOULDER PAIN: ICD-10-CM

## 2021-12-08 DIAGNOSIS — M19.012 PRIMARY OSTEOARTHRITIS OF LEFT SHOULDER: Primary | ICD-10-CM

## 2021-12-08 PROCEDURE — 4010F ACE/ARB THERAPY RXD/TAKEN: CPT | Mod: CPTII,S$GLB,, | Performed by: PHYSICIAN ASSISTANT

## 2021-12-08 PROCEDURE — 3060F POS MICROALBUMINURIA REV: CPT | Mod: CPTII,S$GLB,, | Performed by: PHYSICIAN ASSISTANT

## 2021-12-08 PROCEDURE — 3060F PR POS MICROALBUMINURIA RESULT DOCUMENTED/REVIEW: ICD-10-PCS | Mod: CPTII,S$GLB,, | Performed by: PHYSICIAN ASSISTANT

## 2021-12-08 PROCEDURE — 3066F NEPHROPATHY DOC TX: CPT | Mod: CPTII,S$GLB,, | Performed by: PHYSICIAN ASSISTANT

## 2021-12-08 PROCEDURE — 99203 PR OFFICE/OUTPT VISIT, NEW, LEVL III, 30-44 MIN: ICD-10-PCS | Mod: S$GLB,,, | Performed by: PHYSICIAN ASSISTANT

## 2021-12-08 PROCEDURE — 3066F PR DOCUMENTATION OF TREATMENT FOR NEPHROPATHY: ICD-10-PCS | Mod: CPTII,S$GLB,, | Performed by: PHYSICIAN ASSISTANT

## 2021-12-08 PROCEDURE — 99999 PR PBB SHADOW E&M-EST. PATIENT-LVL V: CPT | Mod: PBBFAC,,, | Performed by: PHYSICIAN ASSISTANT

## 2021-12-08 PROCEDURE — 99203 OFFICE O/P NEW LOW 30 MIN: CPT | Mod: S$GLB,,, | Performed by: PHYSICIAN ASSISTANT

## 2021-12-08 PROCEDURE — 4010F PR ACE/ARB THEARPY RXD/TAKEN: ICD-10-PCS | Mod: CPTII,S$GLB,, | Performed by: PHYSICIAN ASSISTANT

## 2021-12-08 PROCEDURE — 99999 PR PBB SHADOW E&M-EST. PATIENT-LVL V: ICD-10-PCS | Mod: PBBFAC,,, | Performed by: PHYSICIAN ASSISTANT

## 2021-12-08 RX ORDER — MELOXICAM 15 MG/1
15 TABLET ORAL DAILY
Qty: 30 TABLET | Refills: 1 | Status: SHIPPED | OUTPATIENT
Start: 2021-12-08 | End: 2022-01-07

## 2021-12-15 DIAGNOSIS — E11.3313 TYPE 2 DIABETES MELLITUS WITH BOTH EYES AFFECTED BY MODERATE NONPROLIFERATIVE RETINOPATHY AND MACULAR EDEMA, WITH LONG-TERM CURRENT USE OF INSULIN: Primary | ICD-10-CM

## 2021-12-15 DIAGNOSIS — Z79.4 TYPE 2 DIABETES MELLITUS WITH BOTH EYES AFFECTED BY MODERATE NONPROLIFERATIVE RETINOPATHY AND MACULAR EDEMA, WITH LONG-TERM CURRENT USE OF INSULIN: Primary | ICD-10-CM

## 2022-01-05 ENCOUNTER — TELEPHONE (OUTPATIENT)
Dept: ORTHOPEDICS | Facility: CLINIC | Age: 72
End: 2022-01-05
Payer: MEDICARE

## 2022-01-05 NOTE — TELEPHONE ENCOUNTER
Spoke with patient regarding rescheduling his orthopedics appointment , patient stated no his feeling much better

## 2022-01-18 ENCOUNTER — PES CALL (OUTPATIENT)
Dept: ADMINISTRATIVE | Facility: CLINIC | Age: 72
End: 2022-01-18
Payer: MEDICARE

## 2022-02-02 DIAGNOSIS — Z79.4 TYPE 2 DIABETES MELLITUS WITH BOTH EYES AFFECTED BY MODERATE NONPROLIFERATIVE RETINOPATHY AND MACULAR EDEMA, WITH LONG-TERM CURRENT USE OF INSULIN: ICD-10-CM

## 2022-02-02 DIAGNOSIS — E11.3313 TYPE 2 DIABETES MELLITUS WITH BOTH EYES AFFECTED BY MODERATE NONPROLIFERATIVE RETINOPATHY AND MACULAR EDEMA, WITH LONG-TERM CURRENT USE OF INSULIN: ICD-10-CM

## 2022-02-02 DIAGNOSIS — E11.21 TYPE 2 DIABETES MELLITUS WITH DIABETIC NEPHROPATHY, WITH LONG-TERM CURRENT USE OF INSULIN: ICD-10-CM

## 2022-02-02 DIAGNOSIS — Z79.4 TYPE 2 DIABETES MELLITUS WITH DIABETIC NEPHROPATHY, WITH LONG-TERM CURRENT USE OF INSULIN: ICD-10-CM

## 2022-02-02 RX ORDER — INSULIN LISPRO 100 [IU]/ML
6 INJECTION, SOLUTION INTRAVENOUS; SUBCUTANEOUS 3 TIMES DAILY
Qty: 15 ML | Refills: 6 | Status: SHIPPED | OUTPATIENT
Start: 2022-02-02 | End: 2023-03-23 | Stop reason: SDUPTHER

## 2022-02-02 NOTE — TELEPHONE ENCOUNTER
No new care gaps identified.  Powered by [employer]. Reference number: [id number].   2/02/2022 10:42:21 AM CST

## 2022-05-19 ENCOUNTER — PROCEDURE VISIT (OUTPATIENT)
Dept: OPHTHALMOLOGY | Facility: CLINIC | Age: 72
End: 2022-05-19
Payer: MEDICARE

## 2022-05-19 DIAGNOSIS — Z79.4 TYPE 2 DIABETES MELLITUS WITH BOTH EYES AFFECTED BY MODERATE NONPROLIFERATIVE RETINOPATHY AND MACULAR EDEMA, WITH LONG-TERM CURRENT USE OF INSULIN: Primary | ICD-10-CM

## 2022-05-19 DIAGNOSIS — E11.36 DIABETIC CATARACT OF BOTH EYES: ICD-10-CM

## 2022-05-19 DIAGNOSIS — E11.3313 TYPE 2 DIABETES MELLITUS WITH BOTH EYES AFFECTED BY MODERATE NONPROLIFERATIVE RETINOPATHY AND MACULAR EDEMA, WITH LONG-TERM CURRENT USE OF INSULIN: Primary | ICD-10-CM

## 2022-05-19 PROCEDURE — 99214 OFFICE O/P EST MOD 30 MIN: CPT | Mod: 25,S$GLB,, | Performed by: OPHTHALMOLOGY

## 2022-05-19 PROCEDURE — 99499 RISK ADDL DX/OHS AUDIT: ICD-10-PCS | Mod: S$GLB,,, | Performed by: OPHTHALMOLOGY

## 2022-05-19 PROCEDURE — 92134 POSTERIOR SEGMENT OCT RETINA (OCULAR COHERENCE TOMOGRAPHY)-BOTH EYES: ICD-10-PCS | Mod: S$GLB,,, | Performed by: OPHTHALMOLOGY

## 2022-05-19 PROCEDURE — 67028 INJECTION EYE DRUG: CPT | Mod: RT,S$GLB,, | Performed by: OPHTHALMOLOGY

## 2022-05-19 PROCEDURE — 92134 CPTRZ OPH DX IMG PST SGM RTA: CPT | Mod: S$GLB,,, | Performed by: OPHTHALMOLOGY

## 2022-05-19 PROCEDURE — 99214 PR OFFICE/OUTPT VISIT, EST, LEVL IV, 30-39 MIN: ICD-10-PCS | Mod: 25,S$GLB,, | Performed by: OPHTHALMOLOGY

## 2022-05-19 PROCEDURE — 67028 PR INJECT INTRAVITREAL PHARMCOLOGIC: ICD-10-PCS | Mod: RT,S$GLB,, | Performed by: OPHTHALMOLOGY

## 2022-05-19 PROCEDURE — 99499 UNLISTED E&M SERVICE: CPT | Mod: S$GLB,,, | Performed by: OPHTHALMOLOGY

## 2022-05-19 RX ADMIN — Medication 1.25 MG: at 02:05

## 2022-05-19 NOTE — PROGRESS NOTES
Subjective:       Patient ID: Jose Pete is a 71 y.o. male      Chief Complaint   Patient presents with    Follow-up     History of Present Illness  HPI     DLS 12/02/2021 by Dr Radha MD-- overdue for planned f/u    71 year old male is here today for OCT / DFE / RON OS.Patient stated he   not having any new vision complaints.   Vision seems stable OU.  Feels like vision OD was helped by inj in   December        NO Eye Pain  NO  Blurred Va  NO flashes, or floaters.   NO headaches       Gtts:   None             Last edited by Lars Garcia MD on 5/19/2022  2:33 PM. (History)        Imaging:    See report    Assessment/Plan:     1. Type 2 diabetes mellitus with both eyes affected by moderate nonproliferative retinopathy and macular edema, with long-term current use of insulin  Last inj OD 12/2021  Last inj OS 10/2021    Today with center inv DME OD.  Focal no longer appropriate plan    OS with ME but regressing and not involving fovea    Has been diff to obtain planned f/u for injections    Recommend Av OD today.  Will watch OS.       Discussed importance of f/u, need to watch exudates.  Has been difficult to attain timely f/u    Diabetic Retinopathy discussed in detail, all questions answered  Stressed importance of good BS/BP/Chol Control  RTC immediately PRN any vision changes, oswald blurry vision, missing vision, floaters, distortions, etc    - Posterior Segment OCT Retina-Both eyes  - Posterior Segment OCT Retina-Both eyes; Future    2. Diabetic cataract both eyes  Recommend refraction  Discussed cataract surgery but pt not interested      Follow up in about 1 month (around 6/19/2022), or if symptoms worsen or fail to improve, for Dilated examination, OCT Mac, poss AV OD or OS.      Patient identified.  Timeout performed.    Risks, benefits, and alternatives to treatment were discussed in detail with the patient, including bleeding/infection (endophthalmitis)/etc.  The patient voiced  understanding and wished to proceed with the procedure.  See separate consent form.    Injection Procedure Note:  Diagnosis: CSME Right Eye    Topical Proparacaine drop placed then topical 5% Betadine  Sterile gloves used, and sterile lid speculum placed.  5% Betadine placed at injection site again prior to injection.  Avastin 1.25mg in 0.05cc Injected inferotemporally 3.5-4mm posterior to the limbus.  Complications: None  Va at least CF at 5 feet post injection.  Retina, ONH, IOP normal after injection.    Followup as above.  Patient should return immediately PRN.  Retinal Detachment and Endophthalmitis precautions given.

## 2022-06-08 DIAGNOSIS — E11.9 TYPE 2 DIABETES MELLITUS WITHOUT COMPLICATION: ICD-10-CM

## 2022-06-23 ENCOUNTER — PROCEDURE VISIT (OUTPATIENT)
Dept: OPHTHALMOLOGY | Facility: CLINIC | Age: 72
End: 2022-06-23
Payer: MEDICARE

## 2022-06-23 DIAGNOSIS — Z79.4 TYPE 2 DIABETES MELLITUS WITH BOTH EYES AFFECTED BY MODERATE NONPROLIFERATIVE RETINOPATHY AND MACULAR EDEMA, WITH LONG-TERM CURRENT USE OF INSULIN: Primary | ICD-10-CM

## 2022-06-23 DIAGNOSIS — E11.3313 TYPE 2 DIABETES MELLITUS WITH BOTH EYES AFFECTED BY MODERATE NONPROLIFERATIVE RETINOPATHY AND MACULAR EDEMA, WITH LONG-TERM CURRENT USE OF INSULIN: Primary | ICD-10-CM

## 2022-06-23 PROCEDURE — 99499 UNLISTED E&M SERVICE: CPT | Mod: S$GLB,,, | Performed by: OPHTHALMOLOGY

## 2022-06-23 PROCEDURE — 92134 CPTRZ OPH DX IMG PST SGM RTA: CPT | Mod: S$GLB,,, | Performed by: OPHTHALMOLOGY

## 2022-06-23 PROCEDURE — 67028 INJECTION EYE DRUG: CPT | Mod: RT,S$GLB,, | Performed by: OPHTHALMOLOGY

## 2022-06-23 PROCEDURE — 92134 POSTERIOR SEGMENT OCT RETINA (OCULAR COHERENCE TOMOGRAPHY)-BOTH EYES: ICD-10-PCS | Mod: S$GLB,,, | Performed by: OPHTHALMOLOGY

## 2022-06-23 PROCEDURE — 99499 RISK ADDL DX/OHS AUDIT: ICD-10-PCS | Mod: S$GLB,,, | Performed by: OPHTHALMOLOGY

## 2022-06-23 PROCEDURE — 67028 PR INJECT INTRAVITREAL PHARMCOLOGIC: ICD-10-PCS | Mod: RT,S$GLB,, | Performed by: OPHTHALMOLOGY

## 2022-06-23 RX ADMIN — Medication 1.25 MG: at 03:06

## 2022-06-23 NOTE — PROGRESS NOTES
Subjective:       Patient ID: Jose Pete is a 71 y.o. male      Chief Complaint   Patient presents with    Diabetic Eye Exam     History of Present Illness  HPI     DLS: 5/19/22    Pt here for 1 month OCT, DFE and Avastin.  Pt denies changes since last   visit OU.  Pt denies flashes, floaters, headaches or eye pain OU.     No eyedrops    POHx:   1) NPDR OU   S/p Avastin for CSME OS 02/03/2020   S/P Avastin  OD (5/19/22)     2). Macular edema OS     3) NS OU     Avastin - OS (1/17/19) (02/03/2020    Last edited by Helena Thompson MA on 6/23/2022  2:18 PM.   (History)        Imaging:    See report    Assessment/Plan:     1. Type 2 diabetes mellitus with both eyes affected by moderate nonproliferative retinopathy and macular edema, with long-term current use of insulin  Last inj OD 5/2022  Last inj OS 10/2021    Today with center inv DME OD improving s/p Avastin last month.  Focal no longer appropriate plan.  Will assess focal vs Avastin next visit    OS with ME but regressing and not involving fovea    Has been diff to obtain planned f/u for injections    Recommend Av OD today.  Will watch OS.       Discussed importance of f/u, need to watch exudates.  Has been difficult to attain timely f/u    Diabetic Retinopathy discussed in detail, all questions answered  Stressed importance of good BS/BP/Chol Control  RTC immediately PRN any vision changes, oswald blurry vision, missing vision, floaters, distortions, etc    - Posterior Segment OCT Retina-Both eyes  - Posterior Segment OCT Retina-Both eyes; Future    2. Diabetic cataract both eyes  Recommend refraction  Discussed cataract surgery but pt not interested      Follow up in about 1 month (around 7/23/2022), or if symptoms worsen or fail to improve, for Dilated examination, OCT Mac, possible Av OD or OS.      Patient identified.  Timeout performed.    Risks, benefits, and alternatives to treatment were discussed in detail with the patient, including  bleeding/infection (endophthalmitis)/etc.  The patient voiced understanding and wished to proceed with the procedure.  See separate consent form.    Injection Procedure Note:  Diagnosis: CSME Right Eye    Topical Proparacaine drop placed then topical 5% Betadine  Sterile gloves used, and sterile lid speculum placed.  5% Betadine placed at injection site again prior to injection.  Avastin 1.25mg in 0.05cc Injected inferotemporally 3.5-4mm posterior to the limbus.  Complications: None  Va at least CF at 5 feet post injection.  Retina, ONH, IOP normal after injection.    Followup as above.  Patient should return immediately PRN.  Retinal Detachment and Endophthalmitis precautions given.

## 2022-07-25 ENCOUNTER — PATIENT OUTREACH (OUTPATIENT)
Dept: ADMINISTRATIVE | Facility: HOSPITAL | Age: 72
End: 2022-07-25
Payer: MEDICARE

## 2022-07-27 DIAGNOSIS — E11.9 TYPE 2 DIABETES MELLITUS WITHOUT COMPLICATION: ICD-10-CM

## 2022-08-10 DIAGNOSIS — E11.9 TYPE 2 DIABETES MELLITUS WITHOUT COMPLICATION: ICD-10-CM

## 2022-08-24 DIAGNOSIS — E78.00 PURE HYPERCHOLESTEROLEMIA: ICD-10-CM

## 2022-08-24 RX ORDER — ATORVASTATIN CALCIUM 40 MG/1
40 TABLET, FILM COATED ORAL DAILY
Qty: 90 TABLET | Refills: 3 | Status: SHIPPED | OUTPATIENT
Start: 2022-08-24 | End: 2023-12-12 | Stop reason: SDUPTHER

## 2022-08-24 NOTE — TELEPHONE ENCOUNTER
Care Due:                  Date            Visit Type   Department     Provider  --------------------------------------------------------------------------------                                EP -                              PRIMARY      [facility] INTERNAL  Last Visit: 12-      CARE ([facility])   MEDICINE       LEONARDO MOREIRA  Next Visit: None Scheduled  None         None Found                                                            Last  Test          Frequency    Reason                     Performed    Due Date  --------------------------------------------------------------------------------    HBA1C.......  6 months...  dulaglutide, insulin.....  12- 05-    Lipid Panel.  12 months..  atorvastatin.............  06- 05-    [facility] Embedded Care Gaps. Reference number: [id number]. 8/24/2022   3:25:21 PM CDT

## 2022-09-01 ENCOUNTER — TELEPHONE (OUTPATIENT)
Dept: OPHTHALMOLOGY | Facility: CLINIC | Age: 72
End: 2022-09-01
Payer: MEDICARE

## 2022-09-29 RX ORDER — FUROSEMIDE 40 MG/1
40 TABLET ORAL DAILY
Qty: 30 TABLET | Refills: 11 | Status: SHIPPED | OUTPATIENT
Start: 2022-09-29 | End: 2024-03-04

## 2022-09-29 NOTE — TELEPHONE ENCOUNTER
Care Due:                  Date            Visit Type   Department     Provider  --------------------------------------------------------------------------------                                EP -                              PRIMARY      [facility] INTERNAL  Last Visit: 12-      CARE ([facility])   MEDICINE       LEONARDO MOREIRA  Next Visit: None Scheduled  None         None Found                                                            Last  Test          Frequency    Reason                     Performed    Due Date  --------------------------------------------------------------------------------    Office Visit  12 months..  atorvastatin, carvediloL,   12- 11-                             dulaglutide, furosemide,                             insulin, lisinopriL......    CMP.........  12 months..  atorvastatin, furosemide,   12- 11-                             insulin, lisinopriL......    [employer] Embedded Care Gaps. Reference number: [id number]. 9/29/2022   9:46:54 AM CDT

## 2022-09-29 NOTE — TELEPHONE ENCOUNTER
Refill Routing Note   Medication(s) are not appropriate for processing by [facility] for the following reason(s):      - Required laboratory values are abnormal    [facility] action(s):  Defer          Medication reconciliation completed: No     Appointments  past 12m or future 3m with PCP    Date Provider   Last Visit   12/1/2021 Milagros Lebron MD   Next Visit   Visit date not found Milagros Lebron MD   ED visits in past 90 days: 0        Note composed:10:04 AM 09/29/2022

## 2022-10-20 ENCOUNTER — PROCEDURE VISIT (OUTPATIENT)
Dept: OPHTHALMOLOGY | Facility: CLINIC | Age: 72
End: 2022-10-20
Payer: MEDICARE

## 2022-10-20 DIAGNOSIS — E11.36 DIABETIC CATARACT OF BOTH EYES: ICD-10-CM

## 2022-10-20 DIAGNOSIS — Z79.4 TYPE 2 DIABETES MELLITUS WITH BOTH EYES AFFECTED BY MODERATE NONPROLIFERATIVE RETINOPATHY AND MACULAR EDEMA, WITH LONG-TERM CURRENT USE OF INSULIN: Primary | ICD-10-CM

## 2022-10-20 DIAGNOSIS — E11.3313 TYPE 2 DIABETES MELLITUS WITH BOTH EYES AFFECTED BY MODERATE NONPROLIFERATIVE RETINOPATHY AND MACULAR EDEMA, WITH LONG-TERM CURRENT USE OF INSULIN: Primary | ICD-10-CM

## 2022-10-20 PROCEDURE — 99214 OFFICE O/P EST MOD 30 MIN: CPT | Mod: 25,S$GLB,, | Performed by: OPHTHALMOLOGY

## 2022-10-20 PROCEDURE — 99499 UNLISTED E&M SERVICE: CPT | Mod: S$GLB,,, | Performed by: OPHTHALMOLOGY

## 2022-10-20 PROCEDURE — 67028 PR INJECT INTRAVITREAL PHARMCOLOGIC: ICD-10-PCS | Mod: RT,S$GLB,, | Performed by: OPHTHALMOLOGY

## 2022-10-20 PROCEDURE — 99214 PR OFFICE/OUTPT VISIT, EST, LEVL IV, 30-39 MIN: ICD-10-PCS | Mod: 25,S$GLB,, | Performed by: OPHTHALMOLOGY

## 2022-10-20 PROCEDURE — 92134 POSTERIOR SEGMENT OCT RETINA (OCULAR COHERENCE TOMOGRAPHY)-BOTH EYES: ICD-10-PCS | Mod: S$GLB,,, | Performed by: OPHTHALMOLOGY

## 2022-10-20 PROCEDURE — 92134 CPTRZ OPH DX IMG PST SGM RTA: CPT | Mod: S$GLB,,, | Performed by: OPHTHALMOLOGY

## 2022-10-20 PROCEDURE — 67028 INJECTION EYE DRUG: CPT | Mod: RT,S$GLB,, | Performed by: OPHTHALMOLOGY

## 2022-10-20 RX ADMIN — Medication 1.25 MG: at 02:10

## 2022-10-20 NOTE — PROGRESS NOTES
Subjective:       Patient ID: Jose Pete is a 71 y.o. male      Chief Complaint   Patient presents with    Diabetic Eye Exam     Overdue for injections.  Has not been seen in 4 months     History of Present Illness  HPI     Diabetic Eye Exam     Additional comments: Overdue for injections.  Has not been seen in 4   months           Comments    DFE / OCT / KIT OD vs OS     CC: Pt states Va stable but very blurry. No flashes. No floaters. No eye   pain. No photophobia. Pt wants to try to get new Mrx.      GTTS: NONE          Last edited by Lars Garcia MD on 10/20/2022  2:08 PM.        Imaging:    See report    Assessment/Plan:     1. Type 2 diabetes mellitus with both eyes affected by moderate nonproliferative retinopathy and macular edema, with long-term current use of insulin  Last inj OD 6/2022  Last inj OS 10/2021    Today with center inv DME OU, worsened OU.  Not good plan for CI DME  Recommend Av OU.  Pt only want one eye.  Will inj OD today and OS in 1-2 wks  Will try to prior auth Ey and enroll in good days after 1/1/2023  Has disc cupping.  Need gl eval before trial of any steroids    Has been diff to obtain planned f/u for injections    Discussed importance of f/u, need to watch exudates.  Has been difficult to attain timely f/u    Diabetic Retinopathy discussed in detail, all questions answered  Stressed importance of good BS/BP/Chol Control  RTC immediately PRN any vision changes, oswald blurry vision, missing vision, floaters, distortions, etc    - Posterior Segment OCT Retina-Both eyes  - Posterior Segment OCT Retina-Both eyes; Future    2. Diabetic cataract both eyes  Recommend refraction  Discussed cataract surgery but pt not interested      Follow up in about 2 weeks (around 11/3/2022), or if symptoms worsen or fail to improve, for Injection Left eye, Avastin.      Patient identified.  Timeout performed.    Risks, benefits, and alternatives to treatment were discussed in detail with the  patient, including bleeding/infection (endophthalmitis)/etc.  The patient voiced understanding and wished to proceed with the procedure.  See separate consent form.    Injection Procedure Note:  Diagnosis: CSME Right Eye    Topical Proparacaine drop placed then topical 5% Betadine  Sterile gloves used, and sterile lid speculum placed.  5% Betadine placed at injection site again prior to injection.  Avastin 1.25mg in 0.05cc Injected inferotemporally 3.5-4mm posterior to the limbus.  Complications: None  Va at least CF at 5 feet post injection.  Retina, ONH, IOP normal after injection.    Followup as above.  Patient should return immediately PRN.  Retinal Detachment and Endophthalmitis precautions given.

## 2022-10-20 NOTE — PATIENT INSTRUCTIONS

## 2022-12-20 ENCOUNTER — PATIENT OUTREACH (OUTPATIENT)
Dept: ADMINISTRATIVE | Facility: HOSPITAL | Age: 72
End: 2022-12-20
Payer: MEDICARE

## 2023-01-05 ENCOUNTER — TELEPHONE (OUTPATIENT)
Dept: OPHTHALMOLOGY | Facility: CLINIC | Age: 73
End: 2023-01-05
Payer: MEDICARE

## 2023-02-02 DIAGNOSIS — I42.9 CARDIOMYOPATHY, UNSPECIFIED TYPE: ICD-10-CM

## 2023-02-02 DIAGNOSIS — I50.22 CHRONIC SYSTOLIC CONGESTIVE HEART FAILURE, NYHA CLASS 3: ICD-10-CM

## 2023-02-02 RX ORDER — LISINOPRIL 40 MG/1
40 TABLET ORAL DAILY
Qty: 90 TABLET | Refills: 0 | Status: SHIPPED | OUTPATIENT
Start: 2023-02-02 | End: 2023-05-15 | Stop reason: SDUPTHER

## 2023-02-02 NOTE — TELEPHONE ENCOUNTER
Care Due:                  Date            Visit Type   Department     Provider  --------------------------------------------------------------------------------                                EP -                              PRIMARY      [facility]  Last Visit: 12-      CARE ([facility])   MEDICINE       LEONARDO MOREIRA  Next Visit: None Scheduled  None         None Found                                                            Last  Test          Frequency    Reason                     Performed    Due Date  --------------------------------------------------------------------------------    Office Visit  12 months..  atorvastatin, carvediloL,   12- 11-                             dulaglutide, furosemide,                             insulin, lisinopriL......    CMP.........  12 months..  atorvastatin, furosemide,   12- 11-                             insulin, lisinopriL......    HBA1C.......  6 months...  dulaglutide, insulin.....  12- 05-    Lipid Panel.  12 months..  atorvastatin.............  06- 05-    [facility] Embedded Care Gaps. Reference number: [id number]. 2/02/2023   9:21:06 AM CST

## 2023-02-02 NOTE — TELEPHONE ENCOUNTER
Refill Routing Note   Medication(s) are not appropriate for processing by [facility] for the following reason(s):         Requires lab  Requires vitals    [facility] action(s):  Defer   Care gaps identified: Yes                      Appointments  past 12m or future 3m with PCP    Date Provider   Last Visit   12/1/2021 Milagros Lebron MD   Next Visit   Visit date not found Milagros Lebron MD   ED visits in past 90 days: 0        Note composed:3:10 PM 02/02/2023

## 2023-03-01 ENCOUNTER — PATIENT OUTREACH (OUTPATIENT)
Dept: ADMINISTRATIVE | Facility: HOSPITAL | Age: 73
End: 2023-03-01
Payer: MEDICARE

## 2023-03-01 NOTE — PROGRESS NOTES
Health Maintenance Due   Topic Date Due    Shingles Vaccine (2 of 2) 02/20/2020    COVID-19 Vaccine (3 - Moderna risk series) 12/31/2021    Diabetes Urine Screening  05/18/2022    Foot Exam  05/18/2022    Lipid Panel  06/02/2022    Influenza Vaccine (1) 09/01/2022    Hemoglobin A1c  10/07/2022       HM updated. Triggered LINKS and Care Everywhere. Chart reviewed for HTN outreach.    Simona Negron LPN   Clinical Care Coordinator  Primary Care and Wellness

## 2023-03-23 DIAGNOSIS — Z79.4 TYPE 2 DIABETES MELLITUS WITH DIABETIC NEPHROPATHY, WITH LONG-TERM CURRENT USE OF INSULIN: ICD-10-CM

## 2023-03-23 DIAGNOSIS — E11.21 TYPE 2 DIABETES MELLITUS WITH DIABETIC NEPHROPATHY, WITH LONG-TERM CURRENT USE OF INSULIN: ICD-10-CM

## 2023-03-23 DIAGNOSIS — E11.3313 TYPE 2 DIABETES MELLITUS WITH BOTH EYES AFFECTED BY MODERATE NONPROLIFERATIVE RETINOPATHY AND MACULAR EDEMA, WITH LONG-TERM CURRENT USE OF INSULIN: ICD-10-CM

## 2023-03-23 DIAGNOSIS — Z79.4 TYPE 2 DIABETES MELLITUS WITH BOTH EYES AFFECTED BY MODERATE NONPROLIFERATIVE RETINOPATHY AND MACULAR EDEMA, WITH LONG-TERM CURRENT USE OF INSULIN: ICD-10-CM

## 2023-03-23 RX ORDER — INSULIN LISPRO 100 [IU]/ML
6 INJECTION, SOLUTION INTRAVENOUS; SUBCUTANEOUS 3 TIMES DAILY
Qty: 15 ML | Refills: 6 | Status: SHIPPED | OUTPATIENT
Start: 2023-03-23 | End: 2023-12-12 | Stop reason: SDUPTHER

## 2023-03-23 NOTE — TELEPHONE ENCOUNTER
No new care gaps identified.  [facility] Embedded Care Gaps. Reference number: [id number]. 3/23/2023   10:47:48 AM JULIOT

## 2023-03-24 DIAGNOSIS — E11.65 TYPE 2 DIABETES MELLITUS WITH HYPERGLYCEMIA, UNSPECIFIED LONG TERM INSULIN USE STATUS: ICD-10-CM

## 2023-03-27 RX ORDER — PEN NEEDLE, DIABETIC 30 GX3/16"
1 NEEDLE, DISPOSABLE MISCELLANEOUS DAILY
Qty: 100 EACH | Refills: 11 | Status: SHIPPED | OUTPATIENT
Start: 2023-03-27

## 2023-03-29 DIAGNOSIS — Z79.4 TYPE 2 DIABETES MELLITUS WITH BOTH EYES AFFECTED BY MODERATE NONPROLIFERATIVE RETINOPATHY AND MACULAR EDEMA, WITH LONG-TERM CURRENT USE OF INSULIN: ICD-10-CM

## 2023-03-29 DIAGNOSIS — E11.3313 TYPE 2 DIABETES MELLITUS WITH BOTH EYES AFFECTED BY MODERATE NONPROLIFERATIVE RETINOPATHY AND MACULAR EDEMA, WITH LONG-TERM CURRENT USE OF INSULIN: ICD-10-CM

## 2023-04-06 ENCOUNTER — PROCEDURE VISIT (OUTPATIENT)
Dept: OPHTHALMOLOGY | Facility: CLINIC | Age: 73
End: 2023-04-06
Payer: MEDICARE

## 2023-04-06 DIAGNOSIS — H43.813 PVD (POSTERIOR VITREOUS DETACHMENT), BOTH EYES: ICD-10-CM

## 2023-04-06 DIAGNOSIS — H11.431: ICD-10-CM

## 2023-04-06 DIAGNOSIS — E11.3313 TYPE 2 DIABETES MELLITUS WITH BOTH EYES AFFECTED BY MODERATE NONPROLIFERATIVE RETINOPATHY AND MACULAR EDEMA, WITH LONG-TERM CURRENT USE OF INSULIN: Primary | ICD-10-CM

## 2023-04-06 DIAGNOSIS — Z79.4 TYPE 2 DIABETES MELLITUS WITH BOTH EYES AFFECTED BY MODERATE NONPROLIFERATIVE RETINOPATHY AND MACULAR EDEMA, WITH LONG-TERM CURRENT USE OF INSULIN: Primary | ICD-10-CM

## 2023-04-06 DIAGNOSIS — E11.36 DIABETIC CATARACT OF BOTH EYES: ICD-10-CM

## 2023-04-06 PROCEDURE — 92134 POSTERIOR SEGMENT OCT RETINA (OCULAR COHERENCE TOMOGRAPHY)-BOTH EYES: ICD-10-PCS | Mod: S$GLB,,, | Performed by: OPHTHALMOLOGY

## 2023-04-06 PROCEDURE — 92134 CPTRZ OPH DX IMG PST SGM RTA: CPT | Mod: S$GLB,,, | Performed by: OPHTHALMOLOGY

## 2023-04-06 PROCEDURE — 99214 OFFICE O/P EST MOD 30 MIN: CPT | Mod: 25,S$GLB,, | Performed by: OPHTHALMOLOGY

## 2023-04-06 PROCEDURE — 67028 PR INJECT INTRAVITREAL PHARMCOLOGIC: ICD-10-PCS | Mod: LT,S$GLB,, | Performed by: OPHTHALMOLOGY

## 2023-04-06 PROCEDURE — 99214 PR OFFICE/OUTPT VISIT, EST, LEVL IV, 30-39 MIN: ICD-10-PCS | Mod: 25,S$GLB,, | Performed by: OPHTHALMOLOGY

## 2023-04-06 PROCEDURE — 67028 INJECTION EYE DRUG: CPT | Mod: LT,S$GLB,, | Performed by: OPHTHALMOLOGY

## 2023-04-06 RX ORDER — LATANOPROST 50 UG/ML
1 SOLUTION/ DROPS OPHTHALMIC NIGHTLY
Qty: 2.5 ML | Refills: 1 | Status: SHIPPED | OUTPATIENT
Start: 2023-04-06 | End: 2023-05-18 | Stop reason: SDUPTHER

## 2023-04-06 RX ADMIN — Medication 1.25 MG: at 04:04

## 2023-04-07 NOTE — PROGRESS NOTES
Subjective:       Patient ID: Jose Pete is a 72 y.o. male      Chief Complaint   Patient presents with    Diabetic Eye Exam     Overdue for planned DR delatorre and treatment     History of Present Illness  HPI     Diabetic Eye Exam     Additional comments: Overdue for planned DR delatorre and treatment           Comments    Dls: 10/20/2022: Dr. Garcia     Pt here to reestablish care for DR   Pt states he missed a few appts because he had to wait on approval to get   his glasses.     Vision seems better since he got new glasses from Dr Gallagher    -Eye Pain   -Flashes/Floaters   -Glare   -Diplopia   -Headaches   -Veils/Curtains     Eye Meds:   None             Last edited by Lars Garcia MD on 4/7/2023 10:55 AM.        Imaging:    See report    Assessment/Plan:     1. Type 2 diabetes mellitus with both eyes affected by moderate nonproliferative retinopathy and macular edema, with long-term current use of insulin  Sig worsening ME OU after lost to f/u again  Discussed pathology and need for much more frequent f/u and Rx  Pt expressed understanding    Rec Av OU.  Pt only likes one eye at a time  Av OS today and OD 2-3 wks    Diabetic Retinopathy discussed in detail, all questions answered  Stressed importance of good BS/BP/Chol Control  RTC immediately PRN any vision changes, oswald blurry vision, missing vision, floaters, distortions, etc    - Prior authorization Order  - Posterior Segment OCT Retina-Both eyes    2. Diabetic cataract of both eyes  Had recent refraction  BCVA still unknown b/c of DME    3. PVD (posterior vitreous detachment), both eyes  No breaks  RD precautions    4. Ocular hyperemia, right  Etiology unclear.  No NVI noted  Will Rx until can get gl eval  Rx sent: Xal HS/0    Follow up in about 3 weeks (around 4/27/2023), or if symptoms worsen or fail to improve, for Injection Right eye, Avastin.     Patient identified.  Timeout performed.    Risks, benefits, and alternatives to treatment were  discussed in detail with the patient, including bleeding/infection (endophthalmitis)/etc.  The patient voiced understanding and wished to proceed with the procedure.  See separate consent form.    Injection Procedure Note:  Diagnosis: CSME Left Eye    Topical Proparacaine drop placed then topical 5% Betadine  Sterile gloves used, and sterile lid speculum placed.  5% Betadine placed at injection site again prior to injection.  Avastin 1.25mg in 0.05cc Injected inferotemporally 3.5-4mm posterior to the limbus.  Complications: None  Va at least CF at 5 feet post injection.  Retina, ONH, IOP normal after injection.    Followup as above.  Patient should return immediately PRN.  Retinal Detachment and Endophthalmitis precautions given.

## 2023-04-24 ENCOUNTER — OFFICE VISIT (OUTPATIENT)
Dept: INTERNAL MEDICINE | Facility: CLINIC | Age: 73
End: 2023-04-24
Payer: MEDICARE

## 2023-04-24 ENCOUNTER — PATIENT OUTREACH (OUTPATIENT)
Dept: ADMINISTRATIVE | Facility: HOSPITAL | Age: 73
End: 2023-04-24
Payer: MEDICARE

## 2023-04-24 ENCOUNTER — LAB VISIT (OUTPATIENT)
Dept: LAB | Facility: HOSPITAL | Age: 73
End: 2023-04-24
Attending: INTERNAL MEDICINE
Payer: MEDICARE

## 2023-04-24 VITALS
DIASTOLIC BLOOD PRESSURE: 96 MMHG | OXYGEN SATURATION: 98 % | HEART RATE: 69 BPM | BODY MASS INDEX: 24.12 KG/M2 | HEIGHT: 74 IN | SYSTOLIC BLOOD PRESSURE: 182 MMHG | WEIGHT: 187.94 LBS

## 2023-04-24 DIAGNOSIS — E11.3313 TYPE 2 DIABETES MELLITUS WITH BOTH EYES AFFECTED BY MODERATE NONPROLIFERATIVE RETINOPATHY AND MACULAR EDEMA, WITH LONG-TERM CURRENT USE OF INSULIN: ICD-10-CM

## 2023-04-24 DIAGNOSIS — I50.22 CHRONIC SYSTOLIC CONGESTIVE HEART FAILURE, NYHA CLASS 3: ICD-10-CM

## 2023-04-24 DIAGNOSIS — N52.9 ERECTILE DYSFUNCTION, UNSPECIFIED ERECTILE DYSFUNCTION TYPE: ICD-10-CM

## 2023-04-24 DIAGNOSIS — E11.21 TYPE 2 DIABETES MELLITUS WITH DIABETIC NEPHROPATHY, WITH LONG-TERM CURRENT USE OF INSULIN: ICD-10-CM

## 2023-04-24 DIAGNOSIS — Z79.4 TYPE 2 DIABETES MELLITUS WITH BOTH EYES AFFECTED BY MODERATE NONPROLIFERATIVE RETINOPATHY AND MACULAR EDEMA, WITH LONG-TERM CURRENT USE OF INSULIN: ICD-10-CM

## 2023-04-24 DIAGNOSIS — I42.9 CARDIOMYOPATHY, UNSPECIFIED TYPE: ICD-10-CM

## 2023-04-24 DIAGNOSIS — Z79.4 TYPE 2 DIABETES MELLITUS WITH DIABETIC NEPHROPATHY, WITH LONG-TERM CURRENT USE OF INSULIN: ICD-10-CM

## 2023-04-24 DIAGNOSIS — I10 HYPERTENSION, ESSENTIAL: Primary | ICD-10-CM

## 2023-04-24 DIAGNOSIS — I50.42 CHRONIC COMBINED SYSTOLIC AND DIASTOLIC CONGESTIVE HEART FAILURE, NYHA CLASS 2: ICD-10-CM

## 2023-04-24 LAB
ALBUMIN SERPL BCP-MCNC: 3.9 G/DL (ref 3.5–5.2)
ALP SERPL-CCNC: 67 U/L (ref 55–135)
ALT SERPL W/O P-5'-P-CCNC: 21 U/L (ref 10–44)
ANION GAP SERPL CALC-SCNC: 4 MMOL/L (ref 8–16)
AST SERPL-CCNC: 19 U/L (ref 10–40)
BASOPHILS # BLD AUTO: 0.07 K/UL (ref 0–0.2)
BASOPHILS NFR BLD: 0.9 % (ref 0–1.9)
BILIRUB SERPL-MCNC: 0.6 MG/DL (ref 0.1–1)
BUN SERPL-MCNC: 29 MG/DL (ref 8–23)
CALCIUM SERPL-MCNC: 11.1 MG/DL (ref 8.7–10.5)
CHLORIDE SERPL-SCNC: 103 MMOL/L (ref 95–110)
CHOLEST SERPL-MCNC: 155 MG/DL (ref 120–199)
CHOLEST/HDLC SERPL: 3.2 {RATIO} (ref 2–5)
CO2 SERPL-SCNC: 34 MMOL/L (ref 23–29)
CREAT SERPL-MCNC: 1.3 MG/DL (ref 0.5–1.4)
DIFFERENTIAL METHOD: ABNORMAL
EOSINOPHIL # BLD AUTO: 0.3 K/UL (ref 0–0.5)
EOSINOPHIL NFR BLD: 3.5 % (ref 0–8)
ERYTHROCYTE [DISTWIDTH] IN BLOOD BY AUTOMATED COUNT: 12.4 % (ref 11.5–14.5)
EST. GFR  (NO RACE VARIABLE): 58.4 ML/MIN/1.73 M^2
ESTIMATED AVG GLUCOSE: 237 MG/DL (ref 68–131)
GLUCOSE SERPL-MCNC: 111 MG/DL (ref 70–110)
HBA1C MFR BLD: 9.9 % (ref 4–5.6)
HCT VFR BLD AUTO: 38.5 % (ref 40–54)
HDLC SERPL-MCNC: 49 MG/DL (ref 40–75)
HDLC SERPL: 31.6 % (ref 20–50)
HGB BLD-MCNC: 12.5 G/DL (ref 14–18)
IMM GRANULOCYTES # BLD AUTO: 0.02 K/UL (ref 0–0.04)
IMM GRANULOCYTES NFR BLD AUTO: 0.2 % (ref 0–0.5)
LDLC SERPL CALC-MCNC: 86.6 MG/DL (ref 63–159)
LYMPHOCYTES # BLD AUTO: 2 K/UL (ref 1–4.8)
LYMPHOCYTES NFR BLD: 24.1 % (ref 18–48)
MCH RBC QN AUTO: 29.8 PG (ref 27–31)
MCHC RBC AUTO-ENTMCNC: 32.5 G/DL (ref 32–36)
MCV RBC AUTO: 92 FL (ref 82–98)
MONOCYTES # BLD AUTO: 0.8 K/UL (ref 0.3–1)
MONOCYTES NFR BLD: 9.4 % (ref 4–15)
NEUTROPHILS # BLD AUTO: 5.1 K/UL (ref 1.8–7.7)
NEUTROPHILS NFR BLD: 61.9 % (ref 38–73)
NONHDLC SERPL-MCNC: 106 MG/DL
NRBC BLD-RTO: 0 /100 WBC
PLATELET # BLD AUTO: 207 K/UL (ref 150–450)
PMV BLD AUTO: 11.5 FL (ref 9.2–12.9)
POTASSIUM SERPL-SCNC: 4.6 MMOL/L (ref 3.5–5.1)
PROT SERPL-MCNC: 7.3 G/DL (ref 6–8.4)
RBC # BLD AUTO: 4.19 M/UL (ref 4.6–6.2)
SODIUM SERPL-SCNC: 141 MMOL/L (ref 136–145)
TRIGL SERPL-MCNC: 97 MG/DL (ref 30–150)
TSH SERPL DL<=0.005 MIU/L-ACNC: 2.39 UIU/ML (ref 0.4–4)
WBC # BLD AUTO: 8.19 K/UL (ref 3.9–12.7)

## 2023-04-24 PROCEDURE — 99214 OFFICE O/P EST MOD 30 MIN: CPT | Mod: S$GLB,,, | Performed by: INTERNAL MEDICINE

## 2023-04-24 PROCEDURE — 1101F PT FALLS ASSESS-DOCD LE1/YR: CPT | Mod: CPTII,S$GLB,, | Performed by: INTERNAL MEDICINE

## 2023-04-24 PROCEDURE — 84443 ASSAY THYROID STIM HORMONE: CPT | Performed by: INTERNAL MEDICINE

## 2023-04-24 PROCEDURE — 3008F BODY MASS INDEX DOCD: CPT | Mod: CPTII,S$GLB,, | Performed by: INTERNAL MEDICINE

## 2023-04-24 PROCEDURE — 99999 PR PBB SHADOW E&M-EST. PATIENT-LVL III: CPT | Mod: PBBFAC,,, | Performed by: INTERNAL MEDICINE

## 2023-04-24 PROCEDURE — 3288F FALL RISK ASSESSMENT DOCD: CPT | Mod: CPTII,S$GLB,, | Performed by: INTERNAL MEDICINE

## 2023-04-24 PROCEDURE — 85025 COMPLETE CBC W/AUTO DIFF WBC: CPT | Performed by: INTERNAL MEDICINE

## 2023-04-24 PROCEDURE — 3077F SYST BP >= 140 MM HG: CPT | Mod: CPTII,S$GLB,, | Performed by: INTERNAL MEDICINE

## 2023-04-24 PROCEDURE — 80061 LIPID PANEL: CPT | Performed by: INTERNAL MEDICINE

## 2023-04-24 PROCEDURE — 80053 COMPREHEN METABOLIC PANEL: CPT | Performed by: INTERNAL MEDICINE

## 2023-04-24 PROCEDURE — 3008F PR BODY MASS INDEX (BMI) DOCUMENTED: ICD-10-PCS | Mod: CPTII,S$GLB,, | Performed by: INTERNAL MEDICINE

## 2023-04-24 PROCEDURE — 3077F PR MOST RECENT SYSTOLIC BLOOD PRESSURE >= 140 MM HG: ICD-10-PCS | Mod: CPTII,S$GLB,, | Performed by: INTERNAL MEDICINE

## 2023-04-24 PROCEDURE — 1160F RVW MEDS BY RX/DR IN RCRD: CPT | Mod: CPTII,S$GLB,, | Performed by: INTERNAL MEDICINE

## 2023-04-24 PROCEDURE — 1126F AMNT PAIN NOTED NONE PRSNT: CPT | Mod: CPTII,S$GLB,, | Performed by: INTERNAL MEDICINE

## 2023-04-24 PROCEDURE — 1101F PR PT FALLS ASSESS DOC 0-1 FALLS W/OUT INJ PAST YR: ICD-10-PCS | Mod: CPTII,S$GLB,, | Performed by: INTERNAL MEDICINE

## 2023-04-24 PROCEDURE — 1159F PR MEDICATION LIST DOCUMENTED IN MEDICAL RECORD: ICD-10-PCS | Mod: CPTII,S$GLB,, | Performed by: INTERNAL MEDICINE

## 2023-04-24 PROCEDURE — 3080F PR MOST RECENT DIASTOLIC BLOOD PRESSURE >= 90 MM HG: ICD-10-PCS | Mod: CPTII,S$GLB,, | Performed by: INTERNAL MEDICINE

## 2023-04-24 PROCEDURE — 3288F PR FALLS RISK ASSESSMENT DOCUMENTED: ICD-10-PCS | Mod: CPTII,S$GLB,, | Performed by: INTERNAL MEDICINE

## 2023-04-24 PROCEDURE — 1126F PR PAIN SEVERITY QUANTIFIED, NO PAIN PRESENT: ICD-10-PCS | Mod: CPTII,S$GLB,, | Performed by: INTERNAL MEDICINE

## 2023-04-24 PROCEDURE — 99214 PR OFFICE/OUTPT VISIT, EST, LEVL IV, 30-39 MIN: ICD-10-PCS | Mod: S$GLB,,, | Performed by: INTERNAL MEDICINE

## 2023-04-24 PROCEDURE — 1159F MED LIST DOCD IN RCRD: CPT | Mod: CPTII,S$GLB,, | Performed by: INTERNAL MEDICINE

## 2023-04-24 PROCEDURE — 83036 HEMOGLOBIN GLYCOSYLATED A1C: CPT | Performed by: INTERNAL MEDICINE

## 2023-04-24 PROCEDURE — 3080F DIAST BP >= 90 MM HG: CPT | Mod: CPTII,S$GLB,, | Performed by: INTERNAL MEDICINE

## 2023-04-24 PROCEDURE — 4010F ACE/ARB THERAPY RXD/TAKEN: CPT | Mod: CPTII,S$GLB,, | Performed by: INTERNAL MEDICINE

## 2023-04-24 PROCEDURE — 1160F PR REVIEW ALL MEDS BY PRESCRIBER/CLIN PHARMACIST DOCUMENTED: ICD-10-PCS | Mod: CPTII,S$GLB,, | Performed by: INTERNAL MEDICINE

## 2023-04-24 PROCEDURE — 4010F PR ACE/ARB THEARPY RXD/TAKEN: ICD-10-PCS | Mod: CPTII,S$GLB,, | Performed by: INTERNAL MEDICINE

## 2023-04-24 PROCEDURE — 99999 PR PBB SHADOW E&M-EST. PATIENT-LVL III: ICD-10-PCS | Mod: PBBFAC,,, | Performed by: INTERNAL MEDICINE

## 2023-04-24 PROCEDURE — 36415 COLL VENOUS BLD VENIPUNCTURE: CPT | Performed by: INTERNAL MEDICINE

## 2023-04-24 RX ORDER — INSULIN GLARGINE 300 [IU]/ML
15 INJECTION, SOLUTION SUBCUTANEOUS NIGHTLY
Qty: 1.5 ML | Refills: 11 | Status: SHIPPED | OUTPATIENT
Start: 2023-04-24 | End: 2023-08-22 | Stop reason: SDUPTHER

## 2023-04-24 RX ORDER — AMLODIPINE BESYLATE 5 MG/1
5 TABLET ORAL DAILY
Qty: 90 TABLET | Refills: 3 | Status: SHIPPED | OUTPATIENT
Start: 2023-04-24 | End: 2023-08-14

## 2023-04-24 RX ORDER — CARVEDILOL 25 MG/1
25 TABLET ORAL 2 TIMES DAILY
Qty: 180 TABLET | Refills: 3 | Status: SHIPPED | OUTPATIENT
Start: 2023-04-24 | End: 2024-04-23

## 2023-04-24 RX ORDER — TADALAFIL 20 MG/1
20 TABLET ORAL DAILY PRN
Qty: 10 TABLET | Refills: 5 | Status: SHIPPED | OUTPATIENT
Start: 2023-04-24 | End: 2024-04-23

## 2023-04-24 NOTE — PROGRESS NOTES
"Subjective:       Patient ID: Jose Pete is a 72 y.o. male.    Chief Complaint: Annual Exam    HPI  Jose Pete is a 72 y.o. male here for routine follow up of the following chronic issues:      DM2  Last a1c in  was 11.3%.  Labs ordered but not done.   Trulicity 1.5mg weekly  Levemir 15 units qhs - reports he can't take it because it makes him itchy  Humalog 6 units tid (he is unsure of his dosing though reports is taking it)  Reports BG around 113 at home.    HTN  Amlodipine 5mg?   Coreg 25mg bid  Lisinopril 40mg daily  Reports around 140s systolics yesterday.    Had Croat[place] soup yesterday.     HLD  Atorva 40mg    HFrEF; combined systolic and diastolic  Lasix 40mg daily  Denies chest pain or shortness of breath.     Review of Systems   Constitutional:  Negative for fever.   HENT: Negative.  Negative for hearing loss.    Eyes: Negative.  Negative for visual disturbance.   Respiratory:  Negative for shortness of breath.    Cardiovascular:  Negative for chest pain and leg swelling.   Gastrointestinal:  Negative for abdominal pain, diarrhea, nausea and vomiting.   Genitourinary: Negative.    Musculoskeletal:  Negative for arthralgias and myalgias.   Skin:  Negative for rash.   Neurological:  Negative for weakness and numbness.   Psychiatric/Behavioral: Negative.       Objective:   BP (!) 182/96 (BP Location: Left arm, Patient Position: Sitting, BP Method: Medium (Manual))   Pulse 69   Ht 6' 2" (1.88 m)   Wt 85.2 kg (187 lb 15.1 oz)   SpO2 98%   BMI 24.13 kg/m²      Physical Exam  Constitutional:       General: He is not in acute distress.     Appearance: He is well-developed. He is not diaphoretic.   HENT:      Head: Normocephalic and atraumatic.   Cardiovascular:      Rate and Rhythm: Normal rate and regular rhythm.      Heart sounds: No murmur heard.  Pulmonary:      Effort: Pulmonary effort is normal. No respiratory distress.      Breath sounds: No wheezing or rales.   Skin:     General: Skin is " warm and dry.   Neurological:      Mental Status: He is alert and oriented to person, place, and time.   Psychiatric:         Behavior: Behavior normal.       Protective Sensation (w/ 10 gram monofilament):  Right: Intact  Left: Intact    Visual Inspection:  Normal -  Bilateral  Hyperpigmentation patches over bilateral feet    Pedal Pulses:   Right: Present  Left: Present    Posterior tibialis:   Right:Present  Left: Present    Assessment:       1. Hypertension, essential    2. Chronic combined systolic and diastolic congestive heart failure, NYHA class 2    3. Type 2 diabetes mellitus with diabetic nephropathy, with long-term current use of insulin    4. Chronic systolic congestive heart failure, NYHA class 3    5. Cardiomyopathy, unspecified type    6. Erectile dysfunction, unspecified erectile dysfunction type    7. Type 2 diabetes mellitus with both eyes affected by moderate nonproliferative retinopathy and macular edema, with long-term current use of insulin        Plan:       Jose was seen today for annual exam.    Diagnoses and all orders for this visit:    Hypertension, essential - above goal, not taking all medications as prescribed - amlodipine and carvedilol both .   -     restart amLODIPine (NORVASC) 5 MG tablet; Take 1 tablet (5 mg total) by mouth once daily.  -     refill carvediloL (COREG) 25 MG tablet; Take 1 tablet (25 mg total) by mouth 2 (two) times daily.   Continue Lisinopril 40mg daily  Return in 1 month for follow up    Type 2 diabetes mellitus with diabetic nephropathy, with long-term current use of insulin  -     CBC Auto Differential; Future  -     Hemoglobin A1C; Future  -     Comprehensive Metabolic Panel; Future  -     Lipid Panel; Future  -     TSH; Future  -     START insulin glargine, TOUJEO, (TOUJEO) 300 unit/mL (1.5 mL) InPn pen; Inject 15 Units into the skin every evening. Long acting insulin  -     RESUME dulaglutide (TRULICITY) 1.5 mg/0.5 mL pen injector; Inject 1.5 mg  (one pen) into the skin once a week.   Continue humalog 6 units with meals  BG log  Return to office in 4 weeks for review of BG log and BP check    Chronic combined systolic and diastolic congestive heart failure, NYHA class 2  Continue current regimen as above    Erectile dysfunction, unspecified erectile dysfunction type  -     tadalafiL (CIALIS) 20 MG Tab; Take 1 tablet (20 mg total) by mouth daily as needed.

## 2023-04-24 NOTE — Clinical Note
This gentleman has high BP and probably high A1C (sending to labs today.) I just made several medication adjustments. His medication compliance has been historically poor. Can you reach out to him later this week or next week to review med list and make sure he comes to follow up appt next month? Thanks!

## 2023-04-25 ENCOUNTER — TELEPHONE (OUTPATIENT)
Dept: INTERNAL MEDICINE | Facility: CLINIC | Age: 73
End: 2023-04-25
Payer: MEDICARE

## 2023-04-25 NOTE — TELEPHONE ENCOUNTER
Called and discussed test results and recommendations with the pt. The pt expressed understanding.      PT stated that he is taking 30u of Humalog nightly. The levimer breaks him out so he was not taking it.     I informed the pt that you have sent over a new Insulin as well as Trulicity.     Pt scheduled to see Mya in 4 weeks.

## 2023-04-25 NOTE — TELEPHONE ENCOUNTER
----- Message from Milagros Lebron MD sent at 4/25/2023  3:19 PM CDT -----  Please call pt. His a1c is still high at 9.9%. has he taken the new insulin yet? Please review med list and ensure he is taking as prescribed. Follow up in 4 weeks with me or Mya in 4 weeks please.  Sincerely,  Milagros Lebron MD

## 2023-04-25 NOTE — PROGRESS NOTES
Please call pt. His a1c is still high at 9.9%. has he taken the new insulin yet? Please review med list and ensure he is taking as prescribed. Follow up in 4 weeks with me or Mya in 4 weeks please.  Sincerely,  Milagros Lebron MD

## 2023-05-04 ENCOUNTER — PROCEDURE VISIT (OUTPATIENT)
Dept: OPHTHALMOLOGY | Facility: CLINIC | Age: 73
End: 2023-05-04
Payer: MEDICARE

## 2023-05-04 DIAGNOSIS — H40.051 OCULAR HYPERTENSION, RIGHT EYE: ICD-10-CM

## 2023-05-04 DIAGNOSIS — Z79.4 TYPE 2 DIABETES MELLITUS WITH BOTH EYES AFFECTED BY MODERATE NONPROLIFERATIVE RETINOPATHY AND MACULAR EDEMA, WITH LONG-TERM CURRENT USE OF INSULIN: Primary | ICD-10-CM

## 2023-05-04 DIAGNOSIS — E11.3313 TYPE 2 DIABETES MELLITUS WITH BOTH EYES AFFECTED BY MODERATE NONPROLIFERATIVE RETINOPATHY AND MACULAR EDEMA, WITH LONG-TERM CURRENT USE OF INSULIN: Primary | ICD-10-CM

## 2023-05-04 PROCEDURE — 67028 PR INJECT INTRAVITREAL PHARMCOLOGIC: ICD-10-PCS | Mod: RT,S$GLB,, | Performed by: OPHTHALMOLOGY

## 2023-05-04 PROCEDURE — 99499 NO LOS: ICD-10-PCS | Mod: S$GLB,,, | Performed by: OPHTHALMOLOGY

## 2023-05-04 PROCEDURE — 67028 INJECTION EYE DRUG: CPT | Mod: RT,S$GLB,, | Performed by: OPHTHALMOLOGY

## 2023-05-04 PROCEDURE — 99499 UNLISTED E&M SERVICE: CPT | Mod: S$GLB,,, | Performed by: OPHTHALMOLOGY

## 2023-05-04 RX ADMIN — Medication 1.25 MG: at 02:05

## 2023-05-04 NOTE — PROGRESS NOTES
Subjective:       Patient ID: Jose Pete is a 72 y.o. male      Chief Complaint   Patient presents with    Injections     History of Present Illness  HPI    Dls: 04/06/2023 Dr. Garcia   Avastin OD     Pt states he is doing well not having any new complaints. Va improved OS    No Fl/Ritesh/Pain    Eye Meds:   Latanaprost Od Qhs     Last edited by Lars Garcia MD on 5/4/2023  2:37 PM.          Assessment/Plan:     1. Type 2 diabetes mellitus with both eyes affected by moderate nonproliferative retinopathy and macular edema, with long-term current use of insulin  Av OD today as planned    OS 1 mo s/p Av.    Given appearance of ME OU will likely need mult injections OU  Often diff to get proper f/u interval with pt    Av OS 1 wk    2. Ocular hypertension, right eye  Cont Xal HS/0  Reassess next visit    Follow up in about 1 week (around 5/11/2023), or if symptoms worsen or fail to improve, for Dilated examination, OCT Mac.     Patient identified.  Timeout performed.    Risks, benefits, and alternatives to treatment were discussed in detail with the patient, including bleeding/infection (endophthalmitis)/etc.  The patient voiced understanding and wished to proceed with the procedure.  See separate consent form.    Injection Procedure Note:  Diagnosis: CSME Right Eye    Topical Proparacaine drop placed then topical 5% Betadine  Sterile gloves used, and sterile lid speculum placed.  5% Betadine placed at injection site again prior to injection.  Avastin 1.25mg in 0.05cc Injected inferotemporally 3.5-4mm posterior to the limbus.  Complications: None  Va at least CF at 5 feet post injection.  Retina, ONH, IOP normal after injection.    Followup as above.  Patient should return immediately PRN.  Retinal Detachment and Endophthalmitis precautions given.

## 2023-05-05 NOTE — PATIENT INSTRUCTIONS

## 2023-05-15 DIAGNOSIS — I50.22 CHRONIC SYSTOLIC CONGESTIVE HEART FAILURE, NYHA CLASS 3: ICD-10-CM

## 2023-05-15 DIAGNOSIS — I42.9 CARDIOMYOPATHY, UNSPECIFIED TYPE: ICD-10-CM

## 2023-05-15 RX ORDER — LISINOPRIL 40 MG/1
40 TABLET ORAL DAILY
Qty: 90 TABLET | Refills: 3 | Status: SHIPPED | OUTPATIENT
Start: 2023-05-15 | End: 2024-05-14

## 2023-05-15 NOTE — TELEPHONE ENCOUNTER
Refill Routing Note   Medication(s) are not appropriate for processing by [facility] for the following reason(s):      Required vitals abnormal    [facility] action(s):  Defer None identified          Appointments  past 12m or future 3m with PCP    Date Provider   Last Visit   4/24/2023 Milagros Lebron MD   Next Visit   5/22/2023 Milagros Lebron MD   ED visits in past 90 days: 0        Note composed:11:55 AM 05/15/2023

## 2023-05-18 ENCOUNTER — PROCEDURE VISIT (OUTPATIENT)
Dept: OPHTHALMOLOGY | Facility: CLINIC | Age: 73
End: 2023-05-18
Payer: MEDICARE

## 2023-05-18 DIAGNOSIS — E11.3313 TYPE 2 DIABETES MELLITUS WITH BOTH EYES AFFECTED BY MODERATE NONPROLIFERATIVE RETINOPATHY AND MACULAR EDEMA, WITH LONG-TERM CURRENT USE OF INSULIN: Primary | ICD-10-CM

## 2023-05-18 DIAGNOSIS — Z79.4 TYPE 2 DIABETES MELLITUS WITH BOTH EYES AFFECTED BY MODERATE NONPROLIFERATIVE RETINOPATHY AND MACULAR EDEMA, WITH LONG-TERM CURRENT USE OF INSULIN: Primary | ICD-10-CM

## 2023-05-18 PROCEDURE — 92134 CPTRZ OPH DX IMG PST SGM RTA: CPT | Mod: S$GLB,,, | Performed by: OPHTHALMOLOGY

## 2023-05-18 PROCEDURE — 99499 UNLISTED E&M SERVICE: CPT | Mod: S$GLB,,, | Performed by: OPHTHALMOLOGY

## 2023-05-18 PROCEDURE — 92134 POSTERIOR SEGMENT OCT RETINA (OCULAR COHERENCE TOMOGRAPHY)-BOTH EYES: ICD-10-PCS | Mod: S$GLB,,, | Performed by: OPHTHALMOLOGY

## 2023-05-18 PROCEDURE — 67028 PR INJECT INTRAVITREAL PHARMCOLOGIC: ICD-10-PCS | Mod: LT,S$GLB,, | Performed by: OPHTHALMOLOGY

## 2023-05-18 PROCEDURE — 99499 NO LOS: ICD-10-PCS | Mod: S$GLB,,, | Performed by: OPHTHALMOLOGY

## 2023-05-18 PROCEDURE — 67028 INJECTION EYE DRUG: CPT | Mod: LT,S$GLB,, | Performed by: OPHTHALMOLOGY

## 2023-05-18 RX ORDER — LATANOPROST 50 UG/ML
1 SOLUTION/ DROPS OPHTHALMIC NIGHTLY
Qty: 2.5 ML | Refills: 1 | Status: SHIPPED | OUTPATIENT
Start: 2023-05-18 | End: 2023-11-02 | Stop reason: SDUPTHER

## 2023-05-18 RX ADMIN — Medication 1.25 MG: at 03:05

## 2023-05-18 NOTE — PROGRESS NOTES
Subjective:       Patient ID: Jose Pete is a 72 y.o. male      Chief Complaint   Patient presents with    Follow-up     F/u for likely inj     History of Present Illness  HPI     Follow-up     Additional comments: F/u for likely inj           Comments    Pt feels that vision is better OU.      -Eye Pain   -Flashes/Floaters   -Glare   -Diplopia   -Headaches   -Veils/Curtains     Eye Meds:   Xal HS/0    POHx:    1. Type II DM OU w/ Mod. NPDR and ME      S/P Avastin OD (05/04/2023)    2. Ocular HTN OD                Last edited by Lars Garcia MD on 5/20/2023  5:27 PM.          Assessment/Plan:     1. Type 2 diabetes mellitus with both eyes affected by moderate nonproliferative retinopathy and macular edema, with long-term current use of insulin  6 wks s/p Av OS  2 wks s/p Av OD  OS 1 mo s/p Av.    Given appearance of ME OU will likely need mult injections OU  Often diff to get proper f/u interval with pt    Av ODS 2 wks    2. Ocular hypertension, right eye  Etiology unclear.  No NVI noted  Will Rx until can get gl eval  IOP better on Xalatan today 6 wks after starting    Need to watch IOP OS but IOP good today and will hold off on treating for now    Rec cont Xal HS/0- refill Rx sent to pharmacy today      Follow up in about 2 weeks (around 6/1/2023), or if symptoms worsen or fail to improve, for OCT and INJECTION ONLY, Injection Right eye, Avastin.     Patient identified.  Timeout performed.    Risks, benefits, and alternatives to treatment were discussed in detail with the patient, including bleeding/infection (endophthalmitis)/etc.  The patient voiced understanding and wished to proceed with the procedure.  See separate consent form.    Injection Procedure Note:  Diagnosis: CSME Left Eye    Topical Proparacaine drop placed then topical 5% Betadine  Sterile gloves used, and sterile lid speculum placed.  5% Betadine placed at injection site again prior to injection.  Avastin 1.25mg in 0.05cc Injected  inferotemporally 3.5-4mm posterior to the limbus.  Complications: None  Va at least CF at 5 feet post injection.  Retina, ONH, IOP normal after injection.    Followup as above.  Patient should return immediately PRN.  Retinal Detachment and Endophthalmitis precautions given.

## 2023-06-22 ENCOUNTER — PROCEDURE VISIT (OUTPATIENT)
Dept: OPHTHALMOLOGY | Facility: CLINIC | Age: 73
End: 2023-06-22
Payer: MEDICARE

## 2023-06-22 DIAGNOSIS — Z79.4 TYPE 2 DIABETES MELLITUS WITH BOTH EYES AFFECTED BY MODERATE NONPROLIFERATIVE RETINOPATHY AND MACULAR EDEMA, WITH LONG-TERM CURRENT USE OF INSULIN: Primary | ICD-10-CM

## 2023-06-22 DIAGNOSIS — E11.3313 TYPE 2 DIABETES MELLITUS WITH BOTH EYES AFFECTED BY MODERATE NONPROLIFERATIVE RETINOPATHY AND MACULAR EDEMA, WITH LONG-TERM CURRENT USE OF INSULIN: Primary | ICD-10-CM

## 2023-06-22 PROCEDURE — 92134 POSTERIOR SEGMENT OCT RETINA (OCULAR COHERENCE TOMOGRAPHY)-BOTH EYES: ICD-10-PCS | Mod: S$GLB,,, | Performed by: OPHTHALMOLOGY

## 2023-06-22 PROCEDURE — 92134 CPTRZ OPH DX IMG PST SGM RTA: CPT | Mod: S$GLB,,, | Performed by: OPHTHALMOLOGY

## 2023-06-22 PROCEDURE — 67028 INJECTION EYE DRUG: CPT | Mod: RT,S$GLB,, | Performed by: OPHTHALMOLOGY

## 2023-06-22 PROCEDURE — 67028 PR INJECT INTRAVITREAL PHARMCOLOGIC: ICD-10-PCS | Mod: RT,S$GLB,, | Performed by: OPHTHALMOLOGY

## 2023-06-22 PROCEDURE — 99499 NO LOS: ICD-10-PCS | Mod: S$GLB,,, | Performed by: OPHTHALMOLOGY

## 2023-06-22 PROCEDURE — 99499 UNLISTED E&M SERVICE: CPT | Mod: S$GLB,,, | Performed by: OPHTHALMOLOGY

## 2023-06-22 RX ADMIN — Medication 1.25 MG: at 03:06

## 2023-06-22 NOTE — PROGRESS NOTES
Subjective:       Patient ID: Jose Pete is a 72 y.o. male      Chief Complaint   Patient presents with    Injections     History of Present Illness  HPI    2 Week Oct.Avastin OD only   Dls: 05/18/2023 Dr. Garcia     Pt states he has noticed an improvement in his vision since his injection.     He denies any flashes, floaters or pain. He States compliance with his   drops     Eye meds:   Xalatan- Qhs OD     Last edited by Milagros Adame on 6/22/2023  1:50 PM.          Assessment/Plan:     1. Type 2 diabetes mellitus with both eyes affected by moderate nonproliferative retinopathy and macular edema, with long-term current use of insulin  5 wks s/p Av OS  7 wks s/p Av OD    Improving OU    CPM with monthly Av until dry    Given appearance of ME OU will likely need mult injections OU  Often diff to get proper f/u interval with pt    Av OS 2 wks    2. Ocular hypertension, right eye  Etiology unclear.  No NVI noted  Will Rx until can get gl eval  IOP better on Xalatan     Need to watch IOP OS but IOP good today and will hold off on treating for now    Rec cont Xal HS/0- refill Rx sent to pharmacy today    Refer for gl eval      Follow up in about 2 weeks (around 7/6/2023), or if symptoms worsen or fail to improve, for Injection Left eye, Avastin.     Patient identified.  Timeout performed.    Risks, benefits, and alternatives to treatment were discussed in detail with the patient, including bleeding/infection (endophthalmitis)/etc.  The patient voiced understanding and wished to proceed with the procedure.  See separate consent form.    Injection Procedure Note:  Diagnosis: CSME Right Eye    Topical Proparacaine drop placed then topical 5% Betadine  Sterile gloves used, and sterile lid speculum placed.  5% Betadine placed at injection site again prior to injection.  Avastin 1.25mg in 0.05cc Injected inferotemporally 3.5-4mm posterior to the limbus.  Complications: None  Va at least CF at 5 feet post injection.   Retina, ONH, IOP normal after injection.    Followup as above.  Patient should return immediately PRN.  Retinal Detachment and Endophthalmitis precautions given.

## 2023-07-20 ENCOUNTER — PROCEDURE VISIT (OUTPATIENT)
Dept: OPHTHALMOLOGY | Facility: CLINIC | Age: 73
End: 2023-07-20
Payer: MEDICARE

## 2023-07-20 DIAGNOSIS — Z79.4 TYPE 2 DIABETES MELLITUS WITH BOTH EYES AFFECTED BY MODERATE NONPROLIFERATIVE RETINOPATHY AND MACULAR EDEMA, WITH LONG-TERM CURRENT USE OF INSULIN: Primary | ICD-10-CM

## 2023-07-20 DIAGNOSIS — E11.3313 TYPE 2 DIABETES MELLITUS WITH BOTH EYES AFFECTED BY MODERATE NONPROLIFERATIVE RETINOPATHY AND MACULAR EDEMA, WITH LONG-TERM CURRENT USE OF INSULIN: Primary | ICD-10-CM

## 2023-07-20 PROCEDURE — 67028 INJECTION EYE DRUG: CPT | Mod: LT,S$GLB,, | Performed by: OPHTHALMOLOGY

## 2023-07-20 PROCEDURE — 67028 PR INJECT INTRAVITREAL PHARMCOLOGIC: ICD-10-PCS | Mod: LT,S$GLB,, | Performed by: OPHTHALMOLOGY

## 2023-07-20 PROCEDURE — 99499 UNLISTED E&M SERVICE: CPT | Mod: S$GLB,,, | Performed by: OPHTHALMOLOGY

## 2023-07-20 PROCEDURE — 99499 NO LOS: ICD-10-PCS | Mod: S$GLB,,, | Performed by: OPHTHALMOLOGY

## 2023-07-20 RX ADMIN — Medication 1.25 MG: at 02:07

## 2023-07-20 NOTE — PROGRESS NOTES
Subjective:       Patient ID: Jose Pete is a 72 y.o. male      Chief Complaint   Patient presents with    Injections     History of Present Illness  HPI    2 week Elicia Os only   Dls: 06/22/2023 Dr. Garcia     Pt states VA is doing well he has noticed an improvement in vision and is   happy with results.       -Flashes   -Floaters   -Pain     Eye meds:   Xalatan - Qhs Od - Needs Refills   Last edited by Lars Garcia MD on 7/20/2023  2:42 PM.          Assessment/Plan:     1. Type 2 diabetes mellitus with both eyes affected by moderate nonproliferative retinopathy and macular edema, with long-term current use of insulin  9 wks s/p Av OS  4 wks s/p Av OD    Improving OU    CPM with monthly Av until dry    Given appearance of ME OU will likely need mult injections OU  Often diff to get proper f/u interval with pt    Av OS today  Av OD next avail    2. Ocular hypertension, right eye  Etiology unclear.  No NVI noted  Will Rx until can get gl eval  IOP better on Xalatan     Need to watch IOP OS but IOP good today and will hold off on treating for now    Rec cont Xal HS/0- refill Rx sent to pharmacy today    Refer for gl eval      Follow up in about 2 weeks (around 8/3/2023), or if symptoms worsen or fail to improve, for OCT and INJECTION ONLY, Injection Right eye, Avastin.     Patient identified.  Timeout performed.    Risks, benefits, and alternatives to treatment were discussed in detail with the patient, including bleeding/infection (endophthalmitis)/etc.  The patient voiced understanding and wished to proceed with the procedure.  See separate consent form.    Injection Procedure Note:  Diagnosis: CSME Left Eye    Topical Proparacaine drop placed then topical 5% Betadine  Sterile gloves used, and sterile lid speculum placed.  5% Betadine placed at injection site again prior to injection.  Avastin 1.25mg in 0.05cc Injected inferotemporally 3.5-4mm posterior to the limbus.  Complications: None  Va at least CF  at 5 feet post injection.  Retina, ONH, IOP normal after injection.    Followup as above.  Patient should return immediately PRN.  Retinal Detachment and Endophthalmitis precautions given.

## 2023-08-09 ENCOUNTER — HOSPITAL ENCOUNTER (OUTPATIENT)
Facility: HOSPITAL | Age: 73
Discharge: HOME | End: 2023-08-10
Attending: EMERGENCY MEDICINE | Admitting: HOSPITALIST
Payer: MEDICARE

## 2023-08-09 DIAGNOSIS — N17.9 AKI (ACUTE KIDNEY INJURY): Primary | ICD-10-CM

## 2023-08-09 DIAGNOSIS — R55 SYNCOPE: ICD-10-CM

## 2023-08-09 DIAGNOSIS — I48.0 PAROXYSMAL ATRIAL FIBRILLATION: ICD-10-CM

## 2023-08-09 DIAGNOSIS — E11.9 TYPE 2 DIABETES MELLITUS WITHOUT COMPLICATION: ICD-10-CM

## 2023-08-09 DIAGNOSIS — I48.91 NEW ONSET ATRIAL FIBRILLATION: ICD-10-CM

## 2023-08-09 DIAGNOSIS — I95.1 ORTHOSTATIC HYPOTENSION: ICD-10-CM

## 2023-08-09 PROBLEM — I48.20 CHRONIC ATRIAL FIBRILLATION: Status: ACTIVE | Noted: 2023-08-09

## 2023-08-09 LAB
ALBUMIN SERPL BCP-MCNC: 3.4 G/DL (ref 3.5–5.2)
ALP SERPL-CCNC: 53 U/L (ref 55–135)
ALT SERPL W/O P-5'-P-CCNC: 18 U/L (ref 10–44)
ANION GAP SERPL CALC-SCNC: 10 MMOL/L (ref 8–16)
APTT PPP: <21 SEC (ref 21–32)
AST SERPL-CCNC: 17 U/L (ref 10–40)
BASOPHILS # BLD AUTO: 0.06 K/UL (ref 0–0.2)
BASOPHILS NFR BLD: 0.8 % (ref 0–1.9)
BILIRUB SERPL-MCNC: 0.6 MG/DL (ref 0.1–1)
BUN SERPL-MCNC: 41 MG/DL (ref 8–23)
CALCIUM SERPL-MCNC: 10.8 MG/DL (ref 8.7–10.5)
CHLORIDE SERPL-SCNC: 103 MMOL/L (ref 95–110)
CO2 SERPL-SCNC: 24 MMOL/L (ref 23–29)
CREAT SERPL-MCNC: 2 MG/DL (ref 0.5–1.4)
DIFFERENTIAL METHOD: ABNORMAL
EOSINOPHIL # BLD AUTO: 0.2 K/UL (ref 0–0.5)
EOSINOPHIL NFR BLD: 3.1 % (ref 0–8)
ERYTHROCYTE [DISTWIDTH] IN BLOOD BY AUTOMATED COUNT: 12.2 % (ref 11.5–14.5)
EST. GFR  (NO RACE VARIABLE): 34.8 ML/MIN/1.73 M^2
GLUCOSE SERPL-MCNC: 264 MG/DL (ref 70–110)
HCT VFR BLD AUTO: 32.9 % (ref 40–54)
HCV AB SERPL QL IA: NORMAL
HGB BLD-MCNC: 11 G/DL (ref 14–18)
HIV 1+2 AB+HIV1 P24 AG SERPL QL IA: NORMAL
IMM GRANULOCYTES # BLD AUTO: 0.02 K/UL (ref 0–0.04)
IMM GRANULOCYTES NFR BLD AUTO: 0.3 % (ref 0–0.5)
INR PPP: 1 (ref 0.8–1.2)
LYMPHOCYTES # BLD AUTO: 1.3 K/UL (ref 1–4.8)
LYMPHOCYTES NFR BLD: 17 % (ref 18–48)
MAGNESIUM SERPL-MCNC: 1.6 MG/DL (ref 1.6–2.6)
MCH RBC QN AUTO: 30.1 PG (ref 27–31)
MCHC RBC AUTO-ENTMCNC: 33.4 G/DL (ref 32–36)
MCV RBC AUTO: 90 FL (ref 82–98)
MONOCYTES # BLD AUTO: 0.8 K/UL (ref 0.3–1)
MONOCYTES NFR BLD: 10 % (ref 4–15)
NEUTROPHILS # BLD AUTO: 5.2 K/UL (ref 1.8–7.7)
NEUTROPHILS NFR BLD: 68.8 % (ref 38–73)
NRBC BLD-RTO: 0 /100 WBC
PLATELET # BLD AUTO: 174 K/UL (ref 150–450)
PMV BLD AUTO: 11.5 FL (ref 9.2–12.9)
POCT GLUCOSE: 210 MG/DL (ref 70–110)
POTASSIUM SERPL-SCNC: 4.5 MMOL/L (ref 3.5–5.1)
PROT SERPL-MCNC: 6.3 G/DL (ref 6–8.4)
PROTHROMBIN TIME: 11 SEC (ref 9–12.5)
RBC # BLD AUTO: 3.65 M/UL (ref 4.6–6.2)
SODIUM SERPL-SCNC: 137 MMOL/L (ref 136–145)
TROPONIN I SERPL DL<=0.01 NG/ML-MCNC: 0.02 NG/ML (ref 0–0.03)
TROPONIN I SERPL DL<=0.01 NG/ML-MCNC: 0.02 NG/ML (ref 0–0.03)
TSH SERPL DL<=0.005 MIU/L-ACNC: 1.77 UIU/ML (ref 0.4–4)
WBC # BLD AUTO: 7.53 K/UL (ref 3.9–12.7)

## 2023-08-09 PROCEDURE — G0378 HOSPITAL OBSERVATION PER HR: HCPCS

## 2023-08-09 PROCEDURE — 87389 HIV-1 AG W/HIV-1&-2 AB AG IA: CPT | Performed by: EMERGENCY MEDICINE

## 2023-08-09 PROCEDURE — 84484 ASSAY OF TROPONIN QUANT: CPT | Mod: 91 | Performed by: HOSPITALIST

## 2023-08-09 PROCEDURE — 96361 HYDRATE IV INFUSION ADD-ON: CPT

## 2023-08-09 PROCEDURE — 96360 HYDRATION IV INFUSION INIT: CPT

## 2023-08-09 PROCEDURE — 84484 ASSAY OF TROPONIN QUANT: CPT | Performed by: EMERGENCY MEDICINE

## 2023-08-09 PROCEDURE — 99285 EMERGENCY DEPT VISIT HI MDM: CPT | Mod: 25

## 2023-08-09 PROCEDURE — 25000003 PHARM REV CODE 250: Performed by: EMERGENCY MEDICINE

## 2023-08-09 PROCEDURE — 85610 PROTHROMBIN TIME: CPT | Performed by: EMERGENCY MEDICINE

## 2023-08-09 PROCEDURE — 25000003 PHARM REV CODE 250: Performed by: HOSPITALIST

## 2023-08-09 PROCEDURE — 93010 ELECTROCARDIOGRAM REPORT: CPT | Mod: ,,, | Performed by: INTERNAL MEDICINE

## 2023-08-09 PROCEDURE — 93005 ELECTROCARDIOGRAM TRACING: CPT

## 2023-08-09 PROCEDURE — 93010 EKG 12-LEAD: ICD-10-PCS | Mod: ,,, | Performed by: INTERNAL MEDICINE

## 2023-08-09 PROCEDURE — 99222 1ST HOSP IP/OBS MODERATE 55: CPT | Mod: AI,,, | Performed by: HOSPITALIST

## 2023-08-09 PROCEDURE — 36415 COLL VENOUS BLD VENIPUNCTURE: CPT | Performed by: HOSPITALIST

## 2023-08-09 PROCEDURE — 83735 ASSAY OF MAGNESIUM: CPT | Performed by: EMERGENCY MEDICINE

## 2023-08-09 PROCEDURE — 85730 THROMBOPLASTIN TIME PARTIAL: CPT | Performed by: EMERGENCY MEDICINE

## 2023-08-09 PROCEDURE — 86803 HEPATITIS C AB TEST: CPT | Performed by: EMERGENCY MEDICINE

## 2023-08-09 PROCEDURE — 80053 COMPREHEN METABOLIC PANEL: CPT | Performed by: EMERGENCY MEDICINE

## 2023-08-09 PROCEDURE — 99222 PR INITIAL HOSPITAL CARE,LEVL II: ICD-10-PCS | Mod: AI,,, | Performed by: HOSPITALIST

## 2023-08-09 PROCEDURE — 84443 ASSAY THYROID STIM HORMONE: CPT | Performed by: EMERGENCY MEDICINE

## 2023-08-09 PROCEDURE — 85025 COMPLETE CBC W/AUTO DIFF WBC: CPT | Performed by: EMERGENCY MEDICINE

## 2023-08-09 PROCEDURE — 82962 GLUCOSE BLOOD TEST: CPT

## 2023-08-09 RX ORDER — SODIUM CHLORIDE 0.9 % (FLUSH) 0.9 %
10 SYRINGE (ML) INJECTION EVERY 12 HOURS PRN
Status: DISCONTINUED | OUTPATIENT
Start: 2023-08-09 | End: 2023-08-10 | Stop reason: HOSPADM

## 2023-08-09 RX ORDER — SODIUM CHLORIDE 9 MG/ML
INJECTION, SOLUTION INTRAVENOUS CONTINUOUS
Status: ACTIVE | OUTPATIENT
Start: 2023-08-09 | End: 2023-08-10

## 2023-08-09 RX ORDER — GLUCAGON 1 MG
1 KIT INJECTION
Status: DISCONTINUED | OUTPATIENT
Start: 2023-08-09 | End: 2023-08-10 | Stop reason: HOSPADM

## 2023-08-09 RX ORDER — IBUPROFEN 200 MG
24 TABLET ORAL
Status: DISCONTINUED | OUTPATIENT
Start: 2023-08-09 | End: 2023-08-10 | Stop reason: HOSPADM

## 2023-08-09 RX ORDER — NALOXONE HCL 0.4 MG/ML
0.02 VIAL (ML) INJECTION
Status: DISCONTINUED | OUTPATIENT
Start: 2023-08-09 | End: 2023-08-10 | Stop reason: HOSPADM

## 2023-08-09 RX ORDER — IBUPROFEN 200 MG
16 TABLET ORAL
Status: DISCONTINUED | OUTPATIENT
Start: 2023-08-09 | End: 2023-08-10 | Stop reason: HOSPADM

## 2023-08-09 RX ADMIN — SODIUM CHLORIDE: 9 INJECTION, SOLUTION INTRAVENOUS at 08:08

## 2023-08-09 RX ADMIN — SODIUM CHLORIDE 500 ML: 9 INJECTION, SOLUTION INTRAVENOUS at 12:08

## 2023-08-09 RX ADMIN — APIXABAN 5 MG: 5 TABLET, FILM COATED ORAL at 10:08

## 2023-08-09 NOTE — ASSESSMENT & PLAN NOTE
New onset; unspecified chronicity  chads vasc at least 2  Stable rate; Starting eliquis  outpt EP followup

## 2023-08-09 NOTE — ED TRIAGE NOTES
Jose Pete, a 72 y.o. male presents to the ED w/ complaint of near syncopal episode at home.  Per friend he suddenly got pale and diaphoretic. Friend thought he had low blood sugar and was unable obtain.  Then tried to obtain BP and was also unsuccessful.  Per EMS in afib. Patient states he feels fine now.    Triage note:  Chief Complaint   Patient presents with    Loss of Consciousness     Arrives via EMS for near syncope episode. Was sitting on couch, got pale and diaphoretic. Received 400 NS with EMS.      Review of patient's allergies indicates:   Allergen Reactions    Metformin Diarrhea     On 500mg he had such GI issues he lost weight     Past Medical History:   Diagnosis Date    CHF (congestive heart failure)     Combined systolic and diastolic cardiac dysfunction 2005    Hypertension, essential 11/12/2017    Pleural effusion 11/12/2017    treated with thoracentesis, appeared to be due to CHF    Proteinuria due to type 2 diabetes mellitus 2017    SOB (shortness of breath) 11/14/2017    Type 2 diabetes mellitus 2005    Type 2 diabetes mellitus with diabetic polyneuropathy, without long-term current use of insulin 12/12/2017

## 2023-08-09 NOTE — ASSESSMENT & PLAN NOTE
Last EF back in 2021 at 45%  Asymptomatic nowadays  Unsure of home meds unfortunately  Holding most meds today due to DANA and hypotension  Pharm med rec pending, anticipate ACE, loop diuretic, and bb to be restarted pending stable BPs

## 2023-08-09 NOTE — ASSESSMENT & PLAN NOTE
Likely 2/2 dehydration and BP meds; possibly due to afib? But chronicity unknown  Hold home BP meds  Fluid resuscitation; recheck in AM  Trend troponins given upper neck pain - now resolved

## 2023-08-09 NOTE — ED NOTES
POCT glucose: 210    Okay to eat per Dr. Hernandez    Patient provided with urinal for urine sample

## 2023-08-09 NOTE — H&P
[facility]  [facility]  History & Physical    Patient Name: Jose Pete  MRN: [id number]  Patient Class: OP- Observation  Admission Date: 8/9/2023  Attending Physician: Javed Glaser MD   Primary Care Provider: Milagros Lebron MD         Patient information was obtained from patient, past medical records and ER records.     Subjective:     Principal Problem:Orthostatic hypotension    Chief Complaint:   Chief Complaint   Patient presents with    Loss of Consciousness     Arrives via EMS for near syncope episode. Was sitting on couch, got pale and diaphoretic. Received 400 NS with EMS.         HPI: 72-year-old  male being admitted for DANA and orthostatic hypotension.  Patient reports being in his usual state of health until sometime earlier today when he had stood up suddenly felt lightheaded with some upper back pains, clammy sensation, some diaphoresis.  Denies experiencing any chest pains or shortness a breath.Denies any focal motor deficits or facial droop or slurred speech suggestive of a stroke.  Felt unsteady on his feet at which point he sat down and felt better.  EMS was contacted, reportedly this systolic blood pressures in the 90s, patient was started on IV fluids and started to feel even better.        Patient reports taken for antihypertensive blood pressure medications at night regularly with his last dose being yesterday night.  He does not know the names of his medications unfortunately.  He does report taking an aspirin.  Denies any history of cardiac stenting or open bypass surgery or atrial fibrillation.  He does have charted diagnosis of CHF with an EF of about 45% back in 2021 with no significant exertional dyspnea, good endurance capacity of walking 15 blocks without issue at that time as well as now.    In the ED patient's blood pressure was already in the 100s systolic.  Blood work demonstrated DANA with creatinine up to 2.  EKG which I personally  reviewed showed AFib with a stable rate, no acute ischemic changes.      No new subjective & objective note has been filed under this hospital service since the last note was generated.    Assessment/Plan:     * Orthostatic hypotension  Likely 2/2 dehydration and BP meds; possibly due to afib? But chronicity unknown  Hold home BP meds  Fluid resuscitation; recheck in AM  Trend troponins given upper neck pain - now resolved      DANA (acute kidney injury)  Likely 2/2 dehydration and BP meds  Fluid resuscitation; recheck in AM    Paroxysmal atrial fibrillation  New onset; unspecified chronicity  chads vasc at least 2  Stable rate; Starting eliquis  outpt EP followup    Chronic combined systolic and diastolic congestive heart failure, NYHA class 2  Last EF back in 2021 at 45%  Asymptomatic nowadays  Unsure of home meds unfortunately  Holding most meds today due to DANA and hypotension  Pharm med rec pending, anticipate ACE, loop diuretic, and bb to be restarted pending stable BPs    Type 2 diabetes mellitus with diabetic nephropathy, with long-term current use of insulin  Uncontrolled; unsure of home regimen; pharm consult placed for home med rec  LDSS while hospitalized       VTE Risk Mitigation (From admission, onward)         Ordered     apixaban tablet 5 mg  2 times daily         08/09/23 1724     IP VTE HIGH RISK PATIENT  Once         08/09/23 1724     Place sequential compression device  Until discontinued         08/09/23 1724               pharm consult for med rec    On 08/09/2023, patient should be placed in hospital observation services under my care.        Javed Glaser MD  Department of Hospital Medicine  [facility]

## 2023-08-09 NOTE — ED PROVIDER NOTES
Encounter Date: 8/9/2023       History     Chief Complaint   Patient presents with    Loss of Consciousness     Arrives via EMS for near syncope episode. Was sitting on couch, got pale and diaphoretic. Received 400 NS with EMS.      Pt is a 73 yo M with PMH of CHF, DM, HTN, HIV who presents with by EMS after a near syncopal event at home.  Pt states that he was sitting and drinking a cup of coffee when he got weak and dizzy. He didn't feel like he may pass out. He denies CP or SOB or HA. He reports he is currently feeling well. He denies any pain.  No recent illness, no recent hospitalizations. He reports this has never happened to him before. His lady friend thought perhaps his sugar was low and she couldn't get the blood glucose machine to work so she gave him a cookie. She also tried to check his BP. EMS reports sbp was 90 when they sat him up.   Pt reports eating and drinking normally. He has also been urinating normally.  He has not been out in the heat.  EMS noted patient was in atrial fibrillation with a normal rate. Pt denies history of atrial fibrillation    The history is provided by the patient.     Review of patient's allergies indicates:   Allergen Reactions    Metformin Diarrhea     On 500mg he had such GI issues he lost weight     Past Medical History:   Diagnosis Date    CHF (congestive heart failure)     Combined systolic and diastolic cardiac dysfunction 2005    Hypertension, essential 11/12/2017    Pleural effusion 11/12/2017    treated with thoracentesis, appeared to be due to CHF    Proteinuria due to type 2 diabetes mellitus 2017    SOB (shortness of breath) 11/14/2017    Type 2 diabetes mellitus 2005    Type 2 diabetes mellitus with diabetic polyneuropathy, without long-term current use of insulin 12/12/2017     Past Surgical History:   Procedure Laterality Date    BACK SURGERY  1990    COLONOSCOPY N/A 5/1/2019    Procedure: COLONOSCOPY;  Surgeon: Neo Cole MD;  Location: [facility]  (4TH FLR);  Service: Endoscopy;  Laterality: N/A;     Family History   Problem Relation Age of Onset    Heart failure Mother     Emphysema Father     HIV Brother     Cancer Neg Hx      Social History     Tobacco Use    Smoking status: Never    Smokeless tobacco: Never   Substance Use Topics    Alcohol use: No     Alcohol/week: 6.0 standard drinks of alcohol     Types: 6 Cans of beer per week     Comment: hasn't drank since October 2017, previous 1-2 beers/day    Drug use: No         Physical Exam     Initial Vitals [08/09/23 1156]   BP Pulse Resp Temp SpO2   (!) 113/57 70 16 97.8 °F (36.6 °C) 100 %      MAP       --         Physical Exam    Nursing note and vitals reviewed.  Constitutional: He appears well-developed and well-nourished. He is not diaphoretic. No distress.   HENT:   Head: Normocephalic and atraumatic.   Eyes: EOM are normal.   Neck: Neck supple.   Normal range of motion.  Cardiovascular:  Normal rate, regular rhythm and intact distal pulses.           Pulmonary/Chest: No respiratory distress.   Abdominal: Abdomen is soft. He exhibits no distension.   Musculoskeletal:         General: Normal range of motion.      Cervical back: Normal range of motion and neck supple.     Neurological: He is alert and oriented to person, place, and time. GCS score is 15. GCS eye subscore is 4. GCS verbal subscore is 5. GCS motor subscore is 6.   Skin: Skin is warm and dry.   Psychiatric: He has a normal mood and affect. His behavior is normal. Judgment and thought content normal.         ED Course   Procedures  Labs Reviewed   CBC W/ AUTO DIFFERENTIAL - Abnormal; Notable for the following components:       Result Value    RBC 3.65 (*)     Hemoglobin 11.0 (*)     Hematocrit 32.9 (*)     Lymph % 17.0 (*)     All other components within normal limits    Narrative:     Release to patient->Immediate   COMPREHENSIVE METABOLIC PANEL - Abnormal; Notable for the following components:    Glucose 264 (*)     BUN 41 (*)      Creatinine 2.0 (*)     Calcium 10.8 (*)     Albumin 3.4 (*)     Alkaline Phosphatase 53 (*)     eGFR 34.8 (*)     All other components within normal limits    Narrative:     Release to patient->Immediate   POCT GLUCOSE - Abnormal; Notable for the following components:    POCT Glucose 210 (*)     All other components within normal limits   HIV 1 / 2 ANTIBODY    Narrative:     Release to patient->Immediate   HEPATITIS C ANTIBODY    Narrative:     Release to patient->Immediate   TROPONIN I    Narrative:     Release to patient->Immediate   TSH    Narrative:     Release to patient->Immediate   MAGNESIUM    Narrative:     Release to patient->Immediate   PROTIME-INR    Narrative:     Release to patient->Immediate   APTT    Narrative:     Release to patient->Immediate   B-TYPE NATRIURETIC PEPTIDE     EKG Readings: (Independently Interpreted)   Initial Reading: No STEMI. Previous EKG: Compared with most recent EKG Previous EKG Date: 1/29/18.   Pre-hospital EKG done at 11:32 a.m. atrial fibrillation rate of about 66    EKG done at 12:03 p.m. atrial fibrillation rate of 71 normal axis nonspecific ST abnormality  Compared to EKG from 01/29/2018 atrial fibrillation is new     ECG Results              EKG 12-lead (Final result)  Result time 08/09/23 12:50:23      Final result by Interface, Lab In [facility] (08/09/23 12:50:23)                   Narrative:    Test Reason : R55,    Vent. Rate : 071 BPM     Atrial Rate : 300 BPM     P-R Int : 000 ms          QRS Dur : 090 ms      QT Int : 396 ms       P-R-T Axes : 000 -06 137 degrees     QTc Int : 430 ms    Program found technically poor ECG  Atrial fibrillation  Nonspecific T wave abnormality  Cannot rule out Anterior infarct ,age undetermined  Abnormal ECG  When compared with ECG of 29-JAN-2018 08:59,  Atrial fibrillation has replaced Sinus rhythm  T wave inversion less evident in Lateral leads  Confirmed by DARIEL DURAND MD ([id number]) on 8/9/2023 12:50:15 PM    Referred By:              Confirmed By:DARIEL DURAND MD                                  Imaging Results              X-Ray Chest AP Portable (Final result)  Result time 08/09/23 14:12:37      Final result by Ned Frey MD (08/09/23 14:12:37)                   Impression:      See above      Electronically signed by: Ned Frey MD  Date:    08/09/2023  Time:    14:12               Narrative:    EXAMINATION:  XR CHEST AP PORTABLE    CLINICAL HISTORY:  near syncope;    TECHNIQUE:  Single frontal view of the chest was performed.    COMPARISON:  No 11/14/2017 ne    FINDINGS:  Heart size normal.  The lungs are clear.  The left costophrenic angle is slightly blunted laterally.  No significant pleural effusion identified                                       Medications   0.9%  NaCl infusion (0 mL/hr Intravenous Stopped 8/10/23 0636)   sodium chloride 0.9% bolus 500 mL 500 mL (0 mLs Intravenous Stopped 8/9/23 1410)     Medical Decision Making:   History:   Old Medical Records: I decided to obtain old medical records.  Old Records Summarized: records from clinic visits.       <> Summary of Records: ECHO 9/10/18  - Eccentric LVH with mildly to moderately depressed left ventricular systolic function (EF 40-45%).     2 - Moderate left atrial enlargement.     3 - Impaired LV relaxation, normal LAP (grade 1 diastolic dysfunction).     4 - Normal right ventricular systolic function .     5 - Mild aortic regurgitation.   Differential Diagnosis:   Anemia, CHF, PE, arrhythmia, electrolyte abnormality  Independently Interpreted Test(s):   I have ordered and independently interpreted EKG Reading(s) - see prior notes  Clinical Tests:   Lab Tests: Ordered and Reviewed  Radiological Study: Reviewed and Ordered  Medical Tests: Ordered and Reviewed  ED Management:  Pt with new onset atrial fibrillation and syncope prior to arrival  Back to mental baseline  Afib with normal rate  Pt was admitted to             Attending Attestation:         Attending  Critical Care:   Critical Care Times:   ==============================================================  Total Critical Care Time - exclusive of procedural time: 35 minutes.  ==============================================================  Critical care was time spent personally by me on the following activities: examination of patient, obtaining history from patient or relative, review of old charts, ordering lab, x-rays, and/or EKG, ordering and performing treatments and interventions, evaluation of patient's response to treatment and re-evaluation of patient's conition.   Critical Care Condition: potentially life-threatening           ED Course as of 08/10/23 2300   Wed Aug 09, 2023   1520 Creatinine(!): 2.0  Increased from baseline [GK]      ED Course User Index  [GK] Martha Hernandez MD                 Clinical Impression:   Final diagnoses:  [R55] Syncope  [N17.9] DANA (acute kidney injury) (Primary)  [I48.91] New onset atrial fibrillation        ED Disposition Condition    Observation                 Martha Hernandez MD  08/10/23 2300

## 2023-08-09 NOTE — HPI
72-year-old  male being admitted for DANA and orthostatic hypotension.  Patient reports being in his usual state of health until sometime earlier today when he had stood up suddenly felt lightheaded with some upper back pains, clammy sensation, some diaphoresis.  Denies experiencing any chest pains or shortness a breath.Denies any focal motor deficits or facial droop or slurred speech suggestive of a stroke.  Felt unsteady on his feet at which point he sat down and felt better.  EMS was contacted, reportedly this systolic blood pressures in the 90s, patient was started on IV fluids and started to feel even better.        Patient reports taken for antihypertensive blood pressure medications at night regularly with his last dose being yesterday night.  He does not know the names of his medications unfortunately.  He does report taking an aspirin.  Denies any history of cardiac stenting or open bypass surgery or atrial fibrillation.  He does have charted diagnosis of CHF with an EF of about 45% back in 2021 with no significant exertional dyspnea, good endurance capacity of walking 15 blocks without issue at that time as well as now.    In the ED patient's blood pressure was already in the 100s systolic.  Blood work demonstrated DANA with creatinine up to 2.  EKG which I personally reviewed showed AFib with a stable rate, no acute ischemic changes.

## 2023-08-09 NOTE — ASSESSMENT & PLAN NOTE
Uncontrolled; unsure of home regimen; pharm consult placed for home med rec  LDSS while hospitalized

## 2023-08-10 VITALS
WEIGHT: 179.69 LBS | HEART RATE: 73 BPM | BODY MASS INDEX: 23.06 KG/M2 | RESPIRATION RATE: 17 BRPM | SYSTOLIC BLOOD PRESSURE: 139 MMHG | HEIGHT: 74 IN | OXYGEN SATURATION: 98 % | TEMPERATURE: 99 F | DIASTOLIC BLOOD PRESSURE: 77 MMHG

## 2023-08-10 LAB
ANION GAP SERPL CALC-SCNC: 10 MMOL/L (ref 8–16)
BASOPHILS # BLD AUTO: 0.05 K/UL (ref 0–0.2)
BASOPHILS NFR BLD: 0.6 % (ref 0–1.9)
BUN SERPL-MCNC: 35 MG/DL (ref 8–23)
CALCIUM SERPL-MCNC: 10.3 MG/DL (ref 8.7–10.5)
CHLORIDE SERPL-SCNC: 105 MMOL/L (ref 95–110)
CO2 SERPL-SCNC: 25 MMOL/L (ref 23–29)
CREAT SERPL-MCNC: 1.4 MG/DL (ref 0.5–1.4)
DIFFERENTIAL METHOD: ABNORMAL
EOSINOPHIL # BLD AUTO: 0.4 K/UL (ref 0–0.5)
EOSINOPHIL NFR BLD: 4 % (ref 0–8)
ERYTHROCYTE [DISTWIDTH] IN BLOOD BY AUTOMATED COUNT: 12.4 % (ref 11.5–14.5)
EST. GFR  (NO RACE VARIABLE): 53.4 ML/MIN/1.73 M^2
ESTIMATED AVG GLUCOSE: 214 MG/DL (ref 68–131)
GLUCOSE SERPL-MCNC: 210 MG/DL (ref 70–110)
HBA1C MFR BLD: 9.1 % (ref 4–5.6)
HCT VFR BLD AUTO: 33 % (ref 40–54)
HGB BLD-MCNC: 11.1 G/DL (ref 14–18)
IMM GRANULOCYTES # BLD AUTO: 0.02 K/UL (ref 0–0.04)
IMM GRANULOCYTES NFR BLD AUTO: 0.2 % (ref 0–0.5)
LYMPHOCYTES # BLD AUTO: 2.1 K/UL (ref 1–4.8)
LYMPHOCYTES NFR BLD: 24 % (ref 18–48)
MCH RBC QN AUTO: 29.7 PG (ref 27–31)
MCHC RBC AUTO-ENTMCNC: 33.6 G/DL (ref 32–36)
MCV RBC AUTO: 88 FL (ref 82–98)
MONOCYTES # BLD AUTO: 0.9 K/UL (ref 0.3–1)
MONOCYTES NFR BLD: 10 % (ref 4–15)
NEUTROPHILS # BLD AUTO: 5.4 K/UL (ref 1.8–7.7)
NEUTROPHILS NFR BLD: 61.2 % (ref 38–73)
NRBC BLD-RTO: 0 /100 WBC
PLATELET # BLD AUTO: 177 K/UL (ref 150–450)
PMV BLD AUTO: 11.3 FL (ref 9.2–12.9)
POCT GLUCOSE: 239 MG/DL (ref 70–110)
POCT GLUCOSE: 306 MG/DL (ref 70–110)
POTASSIUM SERPL-SCNC: 4.5 MMOL/L (ref 3.5–5.1)
RBC # BLD AUTO: 3.74 M/UL (ref 4.6–6.2)
SODIUM SERPL-SCNC: 140 MMOL/L (ref 136–145)
TROPONIN I SERPL DL<=0.01 NG/ML-MCNC: 0.02 NG/ML (ref 0–0.03)
WBC # BLD AUTO: 8.74 K/UL (ref 3.9–12.7)

## 2023-08-10 PROCEDURE — 99239 HOSP IP/OBS DSCHRG MGMT >30: CPT | Mod: ,,, | Performed by: STUDENT IN AN ORGANIZED HEALTH CARE EDUCATION/TRAINING PROGRAM

## 2023-08-10 PROCEDURE — 85025 COMPLETE CBC W/AUTO DIFF WBC: CPT | Performed by: HOSPITALIST

## 2023-08-10 PROCEDURE — G0378 HOSPITAL OBSERVATION PER HR: HCPCS

## 2023-08-10 PROCEDURE — 99239 PR HOSPITAL DISCHARGE DAY,>30 MIN: ICD-10-PCS | Mod: ,,, | Performed by: STUDENT IN AN ORGANIZED HEALTH CARE EDUCATION/TRAINING PROGRAM

## 2023-08-10 PROCEDURE — 36415 COLL VENOUS BLD VENIPUNCTURE: CPT | Performed by: HOSPITALIST

## 2023-08-10 PROCEDURE — 36415 COLL VENOUS BLD VENIPUNCTURE: CPT | Performed by: STUDENT IN AN ORGANIZED HEALTH CARE EDUCATION/TRAINING PROGRAM

## 2023-08-10 PROCEDURE — 96361 HYDRATE IV INFUSION ADD-ON: CPT

## 2023-08-10 PROCEDURE — 63600175 PHARM REV CODE 636 W HCPCS: Performed by: STUDENT IN AN ORGANIZED HEALTH CARE EDUCATION/TRAINING PROGRAM

## 2023-08-10 PROCEDURE — 80048 BASIC METABOLIC PNL TOTAL CA: CPT | Performed by: HOSPITALIST

## 2023-08-10 PROCEDURE — 84484 ASSAY OF TROPONIN QUANT: CPT | Performed by: HOSPITALIST

## 2023-08-10 PROCEDURE — 25000003 PHARM REV CODE 250: Performed by: HOSPITALIST

## 2023-08-10 PROCEDURE — 96372 THER/PROPH/DIAG INJ SC/IM: CPT | Performed by: STUDENT IN AN ORGANIZED HEALTH CARE EDUCATION/TRAINING PROGRAM

## 2023-08-10 PROCEDURE — 83036 HEMOGLOBIN GLYCOSYLATED A1C: CPT | Performed by: STUDENT IN AN ORGANIZED HEALTH CARE EDUCATION/TRAINING PROGRAM

## 2023-08-10 RX ORDER — CARVEDILOL 12.5 MG/1
12.5 TABLET ORAL 2 TIMES DAILY
Status: DISCONTINUED | OUTPATIENT
Start: 2023-08-10 | End: 2023-08-10 | Stop reason: HOSPADM

## 2023-08-10 RX ORDER — INSULIN ASPART 100 [IU]/ML
1-10 INJECTION, SOLUTION INTRAVENOUS; SUBCUTANEOUS
Status: DISCONTINUED | OUTPATIENT
Start: 2023-08-10 | End: 2023-08-10 | Stop reason: HOSPADM

## 2023-08-10 RX ADMIN — APIXABAN 5 MG: 5 TABLET, FILM COATED ORAL at 09:08

## 2023-08-10 RX ADMIN — INSULIN ASPART 8 UNITS: 100 INJECTION, SOLUTION INTRAVENOUS; SUBCUTANEOUS at 11:08

## 2023-08-10 RX ADMIN — CARVEDILOL 12.5 MG: 12.5 TABLET, FILM COATED ORAL at 06:08

## 2023-08-10 RX ADMIN — INSULIN ASPART 4 UNITS: 100 INJECTION, SOLUTION INTRAVENOUS; SUBCUTANEOUS at 09:08

## 2023-08-10 NOTE — PLAN OF CARE
[facility]  Initial Discharge Assessment       Primary Care Provider: Milagros Lebron MD    Admission Diagnosis: Syncope [R55]  New onset atrial fibrillation [I48.91]  DANA (acute kidney injury) [N17.9]    Admission Date: 8/9/2023  Expected Discharge Date: 8/10/2023         Payor: [employer] MEDICARE / Plan: [employer] / Product Type: Medicare Advantage /     Extended Emergency Contact Information  Primary Emergency Contact: Sukhwinder Maria  Address: [address] [place] [address] [facility]  Home Phone: [phone]  Mobile Phone: [phone]  Relation: Relative    Discharge Plan A: (P) Home  Discharge Plan B: (P) Home      [facility]  [address]  [facility] [address]  Phone: [phone] Fax: [phone]               SW completed Discharge Planning Assessment with patient via bedside. Discharge planning booklet given to patient/family and whiteboard updated with MANJEET and phone #. All questions answered.    Patient reported that he will have transportation upon discharge.     Patient reported that he lives alone, and prior to hospitalization he was independent with his ADL's. Patient reported that he is not on dialysis and does not go to a Coumadin clinic.      Patient lives in a [facility] with three steps to enter.       Radha Cha LMSW  [facility]  Ext. [phone]  
  Problem: Adult Inpatient Plan of Care  Goal: Plan of Care Review  Outcome: Met  Goal: Patient-Specific Goal (Individualized)  Outcome: Met  Goal: Absence of Hospital-Acquired Illness or Injury  Outcome: Met  Goal: Optimal Comfort and Wellbeing  Outcome: Met  Goal: Readiness for Transition of Care  Outcome: Met     Problem: Diabetes Comorbidity  Goal: Blood Glucose Level Within Targeted Range  Outcome: Met     Problem: Fluid and Electrolyte Imbalance (Acute Kidney Injury/Impairment)  Goal: Fluid and Electrolyte Balance  Outcome: Met     Problem: Oral Intake Inadequate (Acute Kidney Injury/Impairment)  Goal: Optimal Nutrition Intake  Outcome: Met     Problem: Renal Function Impairment (Acute Kidney Injury/Impairment)  Goal: Effective Renal Function  Outcome: Met     
[facility]  Discharge Final Note    Primary Care Provider: Milagros Lebron MD    Expected Discharge Date: 8/10/2023    Patient discharged to home via personal transportation.     Patient's bedside nurse and patient notified of the above.      Final Discharge Note (most recent)       Final Note - 08/10/23 1214          Final Note    Assessment Type Final Discharge Note (P)      Anticipated Discharge Disposition Home or Self Care (P)         Post-Acute Status    Post-Acute Authorization Other (P)      Other Status No Post-Acute Service Needs (P)                      Important Message from Medicare             Contact Info       Milagros Lebron MD   Specialty: Internal Medicine   Relationship: PCP - General    [address]  [facility] [address]   Phone: [phone]       Next Steps: Schedule an appointment as soon as possible for a visit    Instructions: Message sent for nurse to call and schedule hospital follow up appointment; however, if you do not hear from someone within 48 hours contact the number listed.            Future Appointments   Date Time Provider Department Center   8/10/2023  2:30 PM Lars Garcia MD [facility]   8/14/2023  2:40 PM Mor Huffman MD [facility]   9/12/2023 11:00 AM Milagros Lebron MD [facility] scheduled post-discharge follow-up appointment and information added to AVS.     Radha Cha LMSW  [facility]  Ext. [phone]  
Labs/EKG/Imaging Studies

## 2023-08-10 NOTE — HOSPITAL COURSE
Pt admitted to [facility] and remained stable overnight and into 8/10. Started on eliquis for Afib with outpatient cardiology follow-up, rate controlled. No acute events on tele. DANA resolved with IVF and pt grossly asymptomatic, deemed appropriate for discharge. Plan discussed with pt, who was agreeable and amenable; medications were discussed and reviewed, outpatient follow-up scheduled, ER precautions were given, all questions were answered to the pt's satisfaction, and Mr. Pete was subsequently discharged.

## 2023-08-10 NOTE — ED NOTES
Telemetry Verification    Box Number: [id number]  Rate: 74  Rhythm: Normal Sinus  Monitor Tech: War room

## 2023-08-11 NOTE — PROGRESS NOTES
Subjective:     HPI    I had the pleasure of seeing Jose Pete in consultation at your request for the evaluation of AF. He is a 72M with HTN, HLD, DM2 on insulin, HFrEF based on 9/2018 echo, who was admitted to [facility] in 8/2023 with lightheadedness, near syncope. SBP in the 90s. AF at 71 bpm. DANA with Cr 2. Rehydrated, spontaneously converted to NSR, and started on eliquis.    Echo in 9/2018 showed EF 40-45%.    My interpretation of today's ECG is sinus rhythm at 71 bpm.    Review of Systems   Constitutional: Negative for decreased appetite, malaise/fatigue, weight gain and weight loss.   HENT:  Negative for sore throat.    Eyes:  Negative for blurred vision.   Cardiovascular:  Negative for chest pain, dyspnea on exertion, irregular heartbeat, leg swelling, near-syncope, orthopnea, palpitations, paroxysmal nocturnal dyspnea and syncope.   Respiratory:  Negative for shortness of breath.    Skin:  Negative for rash.   Musculoskeletal:  Negative for arthritis.   Gastrointestinal:  Negative for abdominal pain.   Neurological:  Negative for focal weakness.   Psychiatric/Behavioral:  Negative for altered mental status.        Objective:   Physical Exam  Vitals and nursing note reviewed.   Constitutional:       General: He is not in acute distress.     Appearance: He is well-developed.   HENT:      Head: Normocephalic and atraumatic.   Eyes:      General: No scleral icterus.     Pupils: Pupils are equal, round, and reactive to light.   Neck:      Thyroid: No thyromegaly.   Cardiovascular:      Rate and Rhythm: Regular rhythm.      Pulses: Normal pulses.      Heart sounds: Normal heart sounds. No murmur heard.     No friction rub. No gallop.   Pulmonary:      Effort: Pulmonary effort is normal.      Breath sounds: Normal breath sounds.   Abdominal:      General: Bowel sounds are normal. There is no distension.      Palpations: Abdomen is soft.      Tenderness: There is no abdominal tenderness.   Musculoskeletal:       Cervical back: Neck supple.   Skin:     General: Skin is warm and dry.      Findings: No rash.   Neurological:      Mental Status: He is alert and oriented to person, place, and time.   Psychiatric:         Behavior: Behavior normal.         Assessment:      1. Paroxysmal atrial fibrillation    2. Orthostatic hypotension    3. Pure hypercholesterolemia    4. Heart failure with reduced ejection fraction    5. Type 2 diabetes mellitus with diabetic nephropathy, with long-term current use of insulin    6. DANA (acute kidney injury)        Plan:     In summary, Jose Ruiz is  72M with a history newly diagnosed AF in the setting of dehydration. His [id number] Score is 3 (age, HTN, DM, CHF) so he should continue eliquis.    At this point it is unclear what pts AF burden is. Plan is for 2 week Zio and echo, follow-up 3 months.    Have also stopped norvasc as pt is frequently borderline hypotensive at home with lightheadedness.    Thank you for allowing me to participate in the care of this patient. Please do not hesitate to call me with any questions or concerns.

## 2023-08-14 ENCOUNTER — OFFICE VISIT (OUTPATIENT)
Dept: ELECTROPHYSIOLOGY | Facility: CLINIC | Age: 73
End: 2023-08-14
Payer: MEDICARE

## 2023-08-14 VITALS
WEIGHT: 181 LBS | HEIGHT: 74 IN | DIASTOLIC BLOOD PRESSURE: 77 MMHG | BODY MASS INDEX: 23.23 KG/M2 | HEART RATE: 71 BPM | SYSTOLIC BLOOD PRESSURE: 138 MMHG

## 2023-08-14 DIAGNOSIS — I50.20 HEART FAILURE WITH REDUCED EJECTION FRACTION: ICD-10-CM

## 2023-08-14 DIAGNOSIS — E11.21 TYPE 2 DIABETES MELLITUS WITH DIABETIC NEPHROPATHY, WITH LONG-TERM CURRENT USE OF INSULIN: ICD-10-CM

## 2023-08-14 DIAGNOSIS — N17.9 AKI (ACUTE KIDNEY INJURY): ICD-10-CM

## 2023-08-14 DIAGNOSIS — I50.42 CHRONIC COMBINED SYSTOLIC AND DIASTOLIC CONGESTIVE HEART FAILURE, NYHA CLASS 2: ICD-10-CM

## 2023-08-14 DIAGNOSIS — I48.91 NEW ONSET ATRIAL FIBRILLATION: ICD-10-CM

## 2023-08-14 DIAGNOSIS — E78.00 PURE HYPERCHOLESTEROLEMIA: ICD-10-CM

## 2023-08-14 DIAGNOSIS — I50.42 CHRONIC COMBINED SYSTOLIC AND DIASTOLIC CONGESTIVE HEART FAILURE, NYHA CLASS 2: Primary | ICD-10-CM

## 2023-08-14 DIAGNOSIS — Z79.4 TYPE 2 DIABETES MELLITUS WITH DIABETIC NEPHROPATHY, WITH LONG-TERM CURRENT USE OF INSULIN: ICD-10-CM

## 2023-08-14 DIAGNOSIS — I95.1 ORTHOSTATIC HYPOTENSION: ICD-10-CM

## 2023-08-14 DIAGNOSIS — I48.0 PAROXYSMAL ATRIAL FIBRILLATION: Primary | ICD-10-CM

## 2023-08-14 PROCEDURE — 3008F PR BODY MASS INDEX (BMI) DOCUMENTED: ICD-10-PCS | Mod: CPTII,S$GLB,, | Performed by: INTERNAL MEDICINE

## 2023-08-14 PROCEDURE — 93005 EKG 12-LEAD: ICD-10-PCS | Mod: S$GLB,,, | Performed by: INTERNAL MEDICINE

## 2023-08-14 PROCEDURE — 3288F PR FALLS RISK ASSESSMENT DOCUMENTED: ICD-10-PCS | Mod: CPTII,S$GLB,, | Performed by: INTERNAL MEDICINE

## 2023-08-14 PROCEDURE — 1101F PR PT FALLS ASSESS DOC 0-1 FALLS W/OUT INJ PAST YR: ICD-10-PCS | Mod: CPTII,S$GLB,, | Performed by: INTERNAL MEDICINE

## 2023-08-14 PROCEDURE — 4010F ACE/ARB THERAPY RXD/TAKEN: CPT | Mod: CPTII,S$GLB,, | Performed by: INTERNAL MEDICINE

## 2023-08-14 PROCEDURE — 1159F MED LIST DOCD IN RCRD: CPT | Mod: CPTII,S$GLB,, | Performed by: INTERNAL MEDICINE

## 2023-08-14 PROCEDURE — 99999 PR PBB SHADOW E&M-EST. PATIENT-LVL III: ICD-10-PCS | Mod: PBBFAC,,, | Performed by: INTERNAL MEDICINE

## 2023-08-14 PROCEDURE — 3008F BODY MASS INDEX DOCD: CPT | Mod: CPTII,S$GLB,, | Performed by: INTERNAL MEDICINE

## 2023-08-14 PROCEDURE — 93010 ELECTROCARDIOGRAM REPORT: CPT | Mod: S$GLB,,, | Performed by: INTERNAL MEDICINE

## 2023-08-14 PROCEDURE — 3075F PR MOST RECENT SYSTOLIC BLOOD PRESS GE 130-139MM HG: ICD-10-PCS | Mod: CPTII,S$GLB,, | Performed by: INTERNAL MEDICINE

## 2023-08-14 PROCEDURE — 3288F FALL RISK ASSESSMENT DOCD: CPT | Mod: CPTII,S$GLB,, | Performed by: INTERNAL MEDICINE

## 2023-08-14 PROCEDURE — 93010 EKG 12-LEAD: ICD-10-PCS | Mod: S$GLB,,, | Performed by: INTERNAL MEDICINE

## 2023-08-14 PROCEDURE — 99205 PR OFFICE/OUTPT VISIT, NEW, LEVL V, 60-74 MIN: ICD-10-PCS | Mod: S$GLB,,, | Performed by: INTERNAL MEDICINE

## 2023-08-14 PROCEDURE — 1126F AMNT PAIN NOTED NONE PRSNT: CPT | Mod: CPTII,S$GLB,, | Performed by: INTERNAL MEDICINE

## 2023-08-14 PROCEDURE — 1159F PR MEDICATION LIST DOCUMENTED IN MEDICAL RECORD: ICD-10-PCS | Mod: CPTII,S$GLB,, | Performed by: INTERNAL MEDICINE

## 2023-08-14 PROCEDURE — 3046F HEMOGLOBIN A1C LEVEL >9.0%: CPT | Mod: CPTII,S$GLB,, | Performed by: INTERNAL MEDICINE

## 2023-08-14 PROCEDURE — 1126F PR PAIN SEVERITY QUANTIFIED, NO PAIN PRESENT: ICD-10-PCS | Mod: CPTII,S$GLB,, | Performed by: INTERNAL MEDICINE

## 2023-08-14 PROCEDURE — 3046F PR MOST RECENT HEMOGLOBIN A1C LEVEL > 9.0%: ICD-10-PCS | Mod: CPTII,S$GLB,, | Performed by: INTERNAL MEDICINE

## 2023-08-14 PROCEDURE — 3075F SYST BP GE 130 - 139MM HG: CPT | Mod: CPTII,S$GLB,, | Performed by: INTERNAL MEDICINE

## 2023-08-14 PROCEDURE — 99999 PR PBB SHADOW E&M-EST. PATIENT-LVL III: CPT | Mod: PBBFAC,,, | Performed by: INTERNAL MEDICINE

## 2023-08-14 PROCEDURE — 3078F DIAST BP <80 MM HG: CPT | Mod: CPTII,S$GLB,, | Performed by: INTERNAL MEDICINE

## 2023-08-14 PROCEDURE — 93005 ELECTROCARDIOGRAM TRACING: CPT | Mod: S$GLB,,, | Performed by: INTERNAL MEDICINE

## 2023-08-14 PROCEDURE — 1101F PT FALLS ASSESS-DOCD LE1/YR: CPT | Mod: CPTII,S$GLB,, | Performed by: INTERNAL MEDICINE

## 2023-08-14 PROCEDURE — 3078F PR MOST RECENT DIASTOLIC BLOOD PRESSURE < 80 MM HG: ICD-10-PCS | Mod: CPTII,S$GLB,, | Performed by: INTERNAL MEDICINE

## 2023-08-14 PROCEDURE — 99205 OFFICE O/P NEW HI 60 MIN: CPT | Mod: S$GLB,,, | Performed by: INTERNAL MEDICINE

## 2023-08-14 PROCEDURE — 4010F PR ACE/ARB THEARPY RXD/TAKEN: ICD-10-PCS | Mod: CPTII,S$GLB,, | Performed by: INTERNAL MEDICINE

## 2023-08-15 ENCOUNTER — PATIENT OUTREACH (OUTPATIENT)
Dept: ADMINISTRATIVE | Facility: HOSPITAL | Age: 73
End: 2023-08-15
Payer: MEDICARE

## 2023-08-15 NOTE — PROGRESS NOTES
Health Maintenance Due   Topic Date Due    Shingles Vaccine (2 of 2) 02/20/2020    COVID-19 Vaccine (3 - Moderna risk series) 12/31/2021    Diabetes Urine Screening  05/18/2022       Letter mailed out for lab and appt reminder. Edited upcoming  appt note with lab reminder.    Simona Negron LPN   Clinical Care Coordinator  Primary Care and Wellness

## 2023-08-21 ENCOUNTER — HOSPITAL ENCOUNTER (OUTPATIENT)
Dept: CARDIOLOGY | Facility: HOSPITAL | Age: 73
Discharge: HOME | End: 2023-08-21
Attending: INTERNAL MEDICINE
Payer: MEDICARE

## 2023-08-21 ENCOUNTER — CLINICAL SUPPORT (OUTPATIENT)
Dept: CARDIOLOGY | Facility: HOSPITAL | Age: 73
End: 2023-08-21
Attending: INTERNAL MEDICINE
Payer: MEDICARE

## 2023-08-21 VITALS
SYSTOLIC BLOOD PRESSURE: 130 MMHG | HEIGHT: 74 IN | DIASTOLIC BLOOD PRESSURE: 80 MMHG | BODY MASS INDEX: 23.23 KG/M2 | WEIGHT: 181 LBS | HEART RATE: 63 BPM

## 2023-08-21 DIAGNOSIS — I48.0 PAROXYSMAL ATRIAL FIBRILLATION: ICD-10-CM

## 2023-08-21 LAB
ASCENDING AORTA: 3.56 CM
AV INDEX (PROSTH): 0.69
AV MEAN GRADIENT: 2 MMHG
AV PEAK GRADIENT: 3 MMHG
AV VALVE AREA BY VELOCITY RATIO: 3.05 CM²
AV VALVE AREA: 2.82 CM²
AV VELOCITY RATIO: 0.75
BSA FOR ECHO PROCEDURE: 2.07 M2
CV ECHO LV RWT: 0.35 CM
DOP CALC AO PEAK VEL: 0.91 M/S
DOP CALC AO VTI: 19.01 CM
DOP CALC LVOT AREA: 4.1 CM2
DOP CALC LVOT DIAMETER: 2.28 CM
DOP CALC LVOT PEAK VEL: 0.68 M/S
DOP CALC LVOT STROKE VOLUME: 53.54 CM3
DOP CALCLVOT PEAK VEL VTI: 13.12 CM
E/E' RATIO: 7.64 M/S
ECHO LV POSTERIOR WALL: 0.9 CM (ref 0.6–1.1)
FRACTIONAL SHORTENING: 34 % (ref 28–44)
INTERVENTRICULAR SEPTUM: 0.84 CM (ref 0.6–1.1)
LA MAJOR: 4.23 CM
LA MINOR: 4.17 CM
LA WIDTH: 3.73 CM
LEFT ATRIUM SIZE: 3.45 CM
LEFT ATRIUM VOLUME INDEX MOD: 15.8 ML/M2
LEFT ATRIUM VOLUME INDEX: 22.1 ML/M2
LEFT ATRIUM VOLUME MOD: 32.79 CM3
LEFT ATRIUM VOLUME: 45.94 CM3
LEFT INTERNAL DIMENSION IN SYSTOLE: 3.37 CM (ref 2.1–4)
LEFT VENTRICLE DIASTOLIC VOLUME INDEX: 60.13 ML/M2
LEFT VENTRICLE DIASTOLIC VOLUME: 125.07 ML
LEFT VENTRICLE MASS INDEX: 76 G/M2
LEFT VENTRICLE SYSTOLIC VOLUME INDEX: 22.4 ML/M2
LEFT VENTRICLE SYSTOLIC VOLUME: 46.53 ML
LEFT VENTRICULAR INTERNAL DIMENSION IN DIASTOLE: 5.12 CM (ref 3.5–6)
LEFT VENTRICULAR MASS: 157.52 G
LV LATERAL E/E' RATIO: 7 M/S
LV SEPTAL E/E' RATIO: 8.4 M/S
MV PEAK E VEL: 0.42 M/S
RA MAJOR: 4.5 CM
RA PRESSURE ESTIMATED: 3 MMHG
RA WIDTH: 2.99 CM
RIGHT VENTRICULAR END-DIASTOLIC DIMENSION: 2.97 CM
SINUS: 3.78 CM
STJ: 3.53 CM
TDI LATERAL: 0.06 M/S
TDI SEPTAL: 0.05 M/S
TDI: 0.06 M/S
TRICUSPID ANNULAR PLANE SYSTOLIC EXCURSION: 2.27 CM
Z-SCORE OF LEFT VENTRICULAR DIMENSION IN END DIASTOLE: -2.18
Z-SCORE OF LEFT VENTRICULAR DIMENSION IN END SYSTOLE: -1.15

## 2023-08-21 PROCEDURE — 93306 TTE W/DOPPLER COMPLETE: CPT | Mod: 26,,, | Performed by: INTERNAL MEDICINE

## 2023-08-21 PROCEDURE — 93248 EXT ECG>7D<15D REV&INTERPJ: CPT | Mod: ,,, | Performed by: INTERNAL MEDICINE

## 2023-08-21 PROCEDURE — 93248 CV CARDIAC MONITOR - 3-15 DAY ADULT (CUPID ONLY): ICD-10-PCS | Mod: ,,, | Performed by: INTERNAL MEDICINE

## 2023-08-21 PROCEDURE — 93306 TTE W/DOPPLER COMPLETE: CPT

## 2023-08-21 PROCEDURE — 93306 ECHO (CUPID ONLY): ICD-10-PCS | Mod: 26,,, | Performed by: INTERNAL MEDICINE

## 2023-08-22 ENCOUNTER — OFFICE VISIT (OUTPATIENT)
Dept: INTERNAL MEDICINE | Facility: CLINIC | Age: 73
End: 2023-08-22
Payer: MEDICARE

## 2023-08-22 VITALS
HEIGHT: 74 IN | SYSTOLIC BLOOD PRESSURE: 128 MMHG | HEART RATE: 71 BPM | DIASTOLIC BLOOD PRESSURE: 74 MMHG | WEIGHT: 180 LBS | BODY MASS INDEX: 23.1 KG/M2 | OXYGEN SATURATION: 97 %

## 2023-08-22 DIAGNOSIS — I95.1 ORTHOSTATIC HYPOTENSION: ICD-10-CM

## 2023-08-22 DIAGNOSIS — E11.21 TYPE 2 DIABETES MELLITUS WITH DIABETIC NEPHROPATHY, WITH LONG-TERM CURRENT USE OF INSULIN: Primary | ICD-10-CM

## 2023-08-22 DIAGNOSIS — I50.20 HEART FAILURE WITH REDUCED EJECTION FRACTION: ICD-10-CM

## 2023-08-22 DIAGNOSIS — Z79.4 TYPE 2 DIABETES MELLITUS WITH DIABETIC NEPHROPATHY, WITH LONG-TERM CURRENT USE OF INSULIN: Primary | ICD-10-CM

## 2023-08-22 DIAGNOSIS — I48.0 PAROXYSMAL ATRIAL FIBRILLATION: ICD-10-CM

## 2023-08-22 DIAGNOSIS — I10 HYPERTENSION, ESSENTIAL: ICD-10-CM

## 2023-08-22 LAB
ALBUMIN/CREAT UR: 364.5 UG/MG (ref 0–30)
CREAT UR-MCNC: 107 MG/DL (ref 23–375)
MICROALBUMIN UR DL<=1MG/L-MCNC: 390 UG/ML

## 2023-08-22 PROCEDURE — 1101F PT FALLS ASSESS-DOCD LE1/YR: CPT | Mod: CPTII,S$GLB,, | Performed by: INTERNAL MEDICINE

## 2023-08-22 PROCEDURE — 3008F BODY MASS INDEX DOCD: CPT | Mod: CPTII,S$GLB,, | Performed by: INTERNAL MEDICINE

## 2023-08-22 PROCEDURE — 1101F PR PT FALLS ASSESS DOC 0-1 FALLS W/OUT INJ PAST YR: ICD-10-PCS | Mod: CPTII,S$GLB,, | Performed by: INTERNAL MEDICINE

## 2023-08-22 PROCEDURE — 3074F PR MOST RECENT SYSTOLIC BLOOD PRESSURE < 130 MM HG: ICD-10-PCS | Mod: CPTII,S$GLB,, | Performed by: INTERNAL MEDICINE

## 2023-08-22 PROCEDURE — 99214 PR OFFICE/OUTPT VISIT, EST, LEVL IV, 30-39 MIN: ICD-10-PCS | Mod: S$GLB,,, | Performed by: INTERNAL MEDICINE

## 2023-08-22 PROCEDURE — 1159F PR MEDICATION LIST DOCUMENTED IN MEDICAL RECORD: ICD-10-PCS | Mod: CPTII,S$GLB,, | Performed by: INTERNAL MEDICINE

## 2023-08-22 PROCEDURE — 3046F HEMOGLOBIN A1C LEVEL >9.0%: CPT | Mod: CPTII,S$GLB,, | Performed by: INTERNAL MEDICINE

## 2023-08-22 PROCEDURE — 4010F ACE/ARB THERAPY RXD/TAKEN: CPT | Mod: CPTII,S$GLB,, | Performed by: INTERNAL MEDICINE

## 2023-08-22 PROCEDURE — 99999 PR PBB SHADOW E&M-EST. PATIENT-LVL IV: ICD-10-PCS | Mod: PBBFAC,,, | Performed by: INTERNAL MEDICINE

## 2023-08-22 PROCEDURE — 3074F SYST BP LT 130 MM HG: CPT | Mod: CPTII,S$GLB,, | Performed by: INTERNAL MEDICINE

## 2023-08-22 PROCEDURE — 3008F PR BODY MASS INDEX (BMI) DOCUMENTED: ICD-10-PCS | Mod: CPTII,S$GLB,, | Performed by: INTERNAL MEDICINE

## 2023-08-22 PROCEDURE — 3078F PR MOST RECENT DIASTOLIC BLOOD PRESSURE < 80 MM HG: ICD-10-PCS | Mod: CPTII,S$GLB,, | Performed by: INTERNAL MEDICINE

## 2023-08-22 PROCEDURE — 3078F DIAST BP <80 MM HG: CPT | Mod: CPTII,S$GLB,, | Performed by: INTERNAL MEDICINE

## 2023-08-22 PROCEDURE — 99214 OFFICE O/P EST MOD 30 MIN: CPT | Mod: S$GLB,,, | Performed by: INTERNAL MEDICINE

## 2023-08-22 PROCEDURE — 82570 ASSAY OF URINE CREATININE: CPT | Performed by: INTERNAL MEDICINE

## 2023-08-22 PROCEDURE — 1160F PR REVIEW ALL MEDS BY PRESCRIBER/CLIN PHARMACIST DOCUMENTED: ICD-10-PCS | Mod: CPTII,S$GLB,, | Performed by: INTERNAL MEDICINE

## 2023-08-22 PROCEDURE — 1160F RVW MEDS BY RX/DR IN RCRD: CPT | Mod: CPTII,S$GLB,, | Performed by: INTERNAL MEDICINE

## 2023-08-22 PROCEDURE — 4010F PR ACE/ARB THEARPY RXD/TAKEN: ICD-10-PCS | Mod: CPTII,S$GLB,, | Performed by: INTERNAL MEDICINE

## 2023-08-22 PROCEDURE — 1126F PR PAIN SEVERITY QUANTIFIED, NO PAIN PRESENT: ICD-10-PCS | Mod: CPTII,S$GLB,, | Performed by: INTERNAL MEDICINE

## 2023-08-22 PROCEDURE — 3288F PR FALLS RISK ASSESSMENT DOCUMENTED: ICD-10-PCS | Mod: CPTII,S$GLB,, | Performed by: INTERNAL MEDICINE

## 2023-08-22 PROCEDURE — 3046F PR MOST RECENT HEMOGLOBIN A1C LEVEL > 9.0%: ICD-10-PCS | Mod: CPTII,S$GLB,, | Performed by: INTERNAL MEDICINE

## 2023-08-22 PROCEDURE — 99999 PR PBB SHADOW E&M-EST. PATIENT-LVL IV: CPT | Mod: PBBFAC,,, | Performed by: INTERNAL MEDICINE

## 2023-08-22 PROCEDURE — 1159F MED LIST DOCD IN RCRD: CPT | Mod: CPTII,S$GLB,, | Performed by: INTERNAL MEDICINE

## 2023-08-22 PROCEDURE — 1126F AMNT PAIN NOTED NONE PRSNT: CPT | Mod: CPTII,S$GLB,, | Performed by: INTERNAL MEDICINE

## 2023-08-22 PROCEDURE — 3288F FALL RISK ASSESSMENT DOCD: CPT | Mod: CPTII,S$GLB,, | Performed by: INTERNAL MEDICINE

## 2023-08-22 RX ORDER — INSULIN GLARGINE 300 [IU]/ML
15 INJECTION, SOLUTION SUBCUTANEOUS NIGHTLY
Qty: 1.5 ML | Refills: 11 | Status: SHIPPED | OUTPATIENT
Start: 2023-08-22 | End: 2023-12-12 | Stop reason: SDUPTHER

## 2023-08-22 NOTE — PROGRESS NOTES
"Subjective:       Patient ID: Jose Pete is a 72 y.o. male.    Chief Complaint: Hospital Follow Up    HPI  72 y.o. male presents for a hospital follow up visit. I have reviewed discharge summary as well as all relevant laboratory and pathology results and imaging.     Patient was admitted 8/9/23 to 8/10/23  for DANA and orthostatic hypotension. Started on eliquis for Afib with outpatient cardiology follow-up, rate controlled. No acute events on tele. DANA resolved with IVF        DM2  A1c 9.1%  Trulicity 1.5mg weekly - not taking due to diarrhea  Toujeo 15 units qhs - not taking because he never received it  Humalog 6 units tid       HTN  Amlodipine previously-stopped due to borderline hypotension on 8/14.   Coreg 25mg bid  Lisinopril 40mg daily       HLD  Atorva 40mg     New onset a fib during dehydration episode, converted back to NSR.   Started eliquis aug '23.   Saw Dr. Huffman 8/14/23 and continue eliquis for now.     HFrEF; combined systolic and diastolic  Asa 81  Atorva 40mg  Lisinopril 40mg  Lasix 40mg daily  Denies chest pain or shortness of breath.      Review of Systems   Constitutional:  Negative for fever.   Respiratory:  Negative for shortness of breath.    Cardiovascular:  Negative for chest pain.   Musculoskeletal: Negative.    Skin: Negative.        Objective:   /74 (BP Location: Left arm, Patient Position: Sitting, BP Method: Medium (Manual))   Pulse 71   Ht 6' 2" (1.88 m)   Wt 81.7 kg (180 lb 0.1 oz)   SpO2 97%   BMI 23.11 kg/m²      Physical Exam  Constitutional:       General: He is not in acute distress.     Appearance: He is well-developed. He is not diaphoretic.   HENT:      Head: Normocephalic and atraumatic.   Cardiovascular:      Rate and Rhythm: Normal rate and regular rhythm.      Heart sounds: No murmur heard.  Pulmonary:      Effort: Pulmonary effort is normal. No respiratory distress.      Breath sounds: No wheezing or rales.   Skin:     General: Skin is warm and dry. "   Neurological:      Mental Status: He is alert and oriented to person, place, and time.   Psychiatric:         Behavior: Behavior normal.         Assessment:       1. Type 2 diabetes mellitus with diabetic nephropathy, with long-term current use of insulin    2. Paroxysmal atrial fibrillation    3. Hypertension, essential    4. Heart failure with reduced ejection fraction        Plan:       1. Type 2 diabetes mellitus with diabetic nephropathy, with long-term current use of insulin  -     Microalbumin/Creatinine Ratio, Urine  -     Hemoglobin A1C; Future; Expected date: 11/20/2023  -     COMPREHENSIVE METABOLIC PANEL; Future; Expected date: 08/22/2023  -     insulin glargine, TOUJEO, (TOUJEO) 300 unit/mL (1.5 mL) InPn pen; Inject 15 Units into the skin every evening. Long acting insulin  Dispense: 1.5 mL; Refill: 11    2. Paroxysmal atrial fibrillation  Continue eliquis  Per Ep    3. Hypertension, essential  - stable and controlled  - continue current regimen    4. Heart failure with reduced ejection fraction  - stable and controlled  - continue current regimen           You are up to date for your primary preventive health care, and there are no reminders at this time.       Shingrx  Urine  Labs 3 mo, follow up after  Schedule follow up w Np aranda

## 2023-08-23 ENCOUNTER — TELEPHONE (OUTPATIENT)
Dept: ELECTROPHYSIOLOGY | Facility: CLINIC | Age: 73
End: 2023-08-23
Payer: MEDICARE

## 2023-08-23 NOTE — TELEPHONE ENCOUNTER
----- Message from Mor Huffman MD sent at 8/21/2023  2:26 PM CDT -----  Please let pt know his echocardiogram came back normal. Good news!

## 2023-09-02 ENCOUNTER — TELEPHONE (OUTPATIENT)
Dept: INTERNAL MEDICINE | Facility: CLINIC | Age: 73
End: 2023-09-02
Payer: MEDICARE

## 2023-09-02 NOTE — TELEPHONE ENCOUNTER
----- Message from Milagros Lebron MD sent at 9/1/2023  3:50 PM CDT -----  Your urine showed that the amount of protein is a little elevated. This is an early sign of kidney damage due to diabetes. It is very important to improve your diabetes control to protect your kidneys from further damage.

## 2023-09-14 ENCOUNTER — PROCEDURE VISIT (OUTPATIENT)
Dept: OPHTHALMOLOGY | Facility: CLINIC | Age: 73
End: 2023-09-14
Payer: MEDICARE

## 2023-09-14 DIAGNOSIS — E11.3313 TYPE 2 DIABETES MELLITUS WITH BOTH EYES AFFECTED BY MODERATE NONPROLIFERATIVE RETINOPATHY AND MACULAR EDEMA, WITH LONG-TERM CURRENT USE OF INSULIN: Primary | ICD-10-CM

## 2023-09-14 DIAGNOSIS — Z79.4 TYPE 2 DIABETES MELLITUS WITH BOTH EYES AFFECTED BY MODERATE NONPROLIFERATIVE RETINOPATHY AND MACULAR EDEMA, WITH LONG-TERM CURRENT USE OF INSULIN: Primary | ICD-10-CM

## 2023-09-14 LAB
OHS CV EVENT MONITOR DAY: 11
OHS CV HOLTER AFIB AVERAGE HR: 88 BPM
OHS CV HOLTER AFIB MAX HR: 144 BPM
OHS CV HOLTER AFIB MIN HR: 49 BPM
OHS CV HOLTER HOOKUP DATE: NORMAL
OHS CV HOLTER HOOKUP TIME: NORMAL
OHS CV HOLTER LENGTH DECIMAL HOURS: 270
OHS CV HOLTER LENGTH HOURS: 6
OHS CV HOLTER LENGTH MINUTES: 0
OHS CV HOLTER SCAN DATE: NORMAL
OHS CV HOLTER SINUS AVERAGE HR: 75 BPM
OHS CV HOLTER SINUS MAX HR: 184 BPM
OHS CV HOLTER SINUS MIN HR: 49 BPM
OHS CV HOLTER STUDY END DATE: NORMAL
OHS CV HOLTER STUDY END TIME: NORMAL

## 2023-09-14 PROCEDURE — 92134 CPTRZ OPH DX IMG PST SGM RTA: CPT | Mod: S$GLB,,, | Performed by: OPHTHALMOLOGY

## 2023-09-14 PROCEDURE — 67028 INJECTION EYE DRUG: CPT | Mod: RT,S$GLB,, | Performed by: OPHTHALMOLOGY

## 2023-09-14 PROCEDURE — 99499 UNLISTED E&M SERVICE: CPT | Mod: S$GLB,,, | Performed by: OPHTHALMOLOGY

## 2023-09-14 PROCEDURE — 67028 PR INJECT INTRAVITREAL PHARMCOLOGIC: ICD-10-PCS | Mod: RT,S$GLB,, | Performed by: OPHTHALMOLOGY

## 2023-09-14 PROCEDURE — 99499 NO LOS: ICD-10-PCS | Mod: S$GLB,,, | Performed by: OPHTHALMOLOGY

## 2023-09-14 PROCEDURE — 92134 POSTERIOR SEGMENT OCT RETINA (OCULAR COHERENCE TOMOGRAPHY)-BOTH EYES: ICD-10-PCS | Mod: S$GLB,,, | Performed by: OPHTHALMOLOGY

## 2023-09-14 RX ADMIN — Medication 1.25 MG: at 03:09

## 2023-09-14 NOTE — PROGRESS NOTES
Subjective:       Patient ID: Jose Pete is a 72 y.o. male      Chief Complaint   Patient presents with    Diabetes     History of Present Illness  HPI    2 week/OCT Elicia OD  Dls: 06/22/2023 Dr. Garcia     Pt states was hospitalized last month for heat exhaustion. He has not   noticed any changes in vision or in eyes in general.      Denies any eye pain     (-)Flashes (-)Floaters  (+)Photophobia  (+)Glare at night     EYEMEDS:   Xalatan QHS OD       Last edited by Lars Garcia MD on 9/14/2023  3:10 PM.          Assessment/Plan:     1. Type 2 diabetes mellitus with both eyes affected by moderate nonproliferative retinopathy and macular edema, with long-term current use of insulin  8 wks s/p Av OS  12 wks s/p Av OD    Improving OU with improving vision.  Still with sig ME    CPM with monthly Av until dry    Given appearance of ME OU will likely need mult injections OU  Has not been possible to get proper f/u interval with pt    Av OD today  Av OS next possible day.  Pt says can come in 2 wks      Follow up in about 2 weeks (around 9/28/2023) for Injection Left eye, Avastin.     Patient identified.  Timeout performed.    Risks, benefits, and alternatives to treatment were discussed in detail with the patient, including bleeding/infection (endophthalmitis)/etc.  The patient voiced understanding and wished to proceed with the procedure.  See separate consent form.    Injection Procedure Note:  Diagnosis: CSME Right Eye    Topical Proparacaine drop placed then topical 5% Betadine  Sterile gloves used, and sterile lid speculum placed.  5% Betadine placed at injection site again prior to injection.  Avastin 1.25mg in 0.05cc Injected inferotemporally 3.5-4mm posterior to the limbus.  Complications: None  Va at least CF at 5 feet post injection.  Retina, ONH, IOP normal after injection.    Followup as above.  Patient should return immediately PRN.  Retinal Detachment and Endophthalmitis precautions given.

## 2023-11-02 ENCOUNTER — PROCEDURE VISIT (OUTPATIENT)
Dept: OPHTHALMOLOGY | Facility: CLINIC | Age: 73
End: 2023-11-02
Payer: MEDICARE

## 2023-11-02 DIAGNOSIS — Z79.4 TYPE 2 DIABETES MELLITUS WITH BOTH EYES AFFECTED BY MODERATE NONPROLIFERATIVE RETINOPATHY AND MACULAR EDEMA, WITH LONG-TERM CURRENT USE OF INSULIN: Primary | ICD-10-CM

## 2023-11-02 DIAGNOSIS — E11.3313 TYPE 2 DIABETES MELLITUS WITH BOTH EYES AFFECTED BY MODERATE NONPROLIFERATIVE RETINOPATHY AND MACULAR EDEMA, WITH LONG-TERM CURRENT USE OF INSULIN: Primary | ICD-10-CM

## 2023-11-02 PROCEDURE — 99214 OFFICE O/P EST MOD 30 MIN: CPT | Mod: 25,S$GLB,, | Performed by: OPHTHALMOLOGY

## 2023-11-02 PROCEDURE — 99214 PR OFFICE/OUTPT VISIT, EST, LEVL IV, 30-39 MIN: ICD-10-PCS | Mod: 25,S$GLB,, | Performed by: OPHTHALMOLOGY

## 2023-11-02 PROCEDURE — 92134 POSTERIOR SEGMENT OCT RETINA (OCULAR COHERENCE TOMOGRAPHY)-BOTH EYES: ICD-10-PCS | Mod: S$GLB,,, | Performed by: OPHTHALMOLOGY

## 2023-11-02 PROCEDURE — 92134 CPTRZ OPH DX IMG PST SGM RTA: CPT | Mod: S$GLB,,, | Performed by: OPHTHALMOLOGY

## 2023-11-02 PROCEDURE — 67028 INJECTION EYE DRUG: CPT | Mod: LT,S$GLB,, | Performed by: OPHTHALMOLOGY

## 2023-11-02 PROCEDURE — 67028 PR INJECT INTRAVITREAL PHARMCOLOGIC: ICD-10-PCS | Mod: LT,S$GLB,, | Performed by: OPHTHALMOLOGY

## 2023-11-02 RX ORDER — LATANOPROST 50 UG/ML
1 SOLUTION/ DROPS OPHTHALMIC NIGHTLY
Qty: 2.5 ML | Refills: 1 | Status: SHIPPED | OUTPATIENT
Start: 2023-11-02 | End: 2024-11-01

## 2023-11-02 RX ADMIN — Medication 1.25 MG: at 10:11

## 2023-11-02 NOTE — PROGRESS NOTES
Subjective:       Patient ID: Jose Pete is a 72 y.o. male      Chief Complaint   Patient presents with    Diabetic Eye Exam     Reeval OU DR and OHTN, pt missed planned injections     History of Present Illness  HPI     Diabetic Eye Exam     Additional comments: Reeval OU DR and OHTN, pt missed planned injections           Comments    Pt here for overdue 1-2 week mOCT OU/Avastin injection OS only. Pt states   that VA OU is good since last visit and denies any eye pain. Pt states   that he ran out of Xalatan 2 weeks ago and needs refills.    DLS: 09/14/2023 with Dr. Garcia    Gtts: Xalatan qhs OD, needs refills    POHx:  1. Type 2 diabetes mellitus with both eyes affected by moderate   nonproliferative retinopathy and macular edema, with long-term current use   of insulin  2. Ocular hypertension, right eye    (+)DM  Hemoglobin A1C       Date                     Value               Ref Range             Status                08/10/2023               9.1 (H)             4.0 - 5.6 %           Final                   04/24/2023               9.9 (H)             4.0 - 5.6 %           Final                   12/01/2021               11.3 (H)            4.0 - 5.6 %           Final          Last edited by Lars Garcia MD on 11/2/2023 12:55 PM.        Imaging:    See report    Assessment/Plan:     1. 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  Pt only likes one eye at a time  Av OS today and OD 2 wks    No need to change medication because appears can be controlled with Avastin if can keep f/u  Would hold off on change to steroids until has gl evaluation or pt wants CE/IOL    Diabetic Retinopathy discussed in detail, all questions  answered  Stressed importance of good BS/BP/Chol Control  RTC immediately PRN any vision changes, oswald blurry vision, missing vision, floaters, distortions, etc    - Prior authorization Order  - Posterior Segment OCT Retina-Both eyes    2. Diabetic cataract of both eyes  Can consider CE/IOL if can get control of ME    3. PVD (posterior vitreous detachment), both eyes  No breaks  RD precautions    4. Ocular hypertension, right  Etiology unclear.  No NVI noted  IOP WNL today  Will Rx until can get gl eval-- pt has very diff time keeping appts.  Will schedule  Refill Rx sent today: Xal HS/0    Follow up in about 2 weeks (around 11/16/2023), or if symptoms worsen or fail to improve, for Injection Right eye, Avastin.     Patient identified.  Timeout performed.    Risks, benefits, and alternatives to treatment were discussed in detail with the patient, including bleeding/infection (endophthalmitis)/etc.  The patient voiced understanding and wished to proceed with the procedure.  See separate consent form.    Injection Procedure Note:  Diagnosis: CSME Left Eye    Topical Proparacaine drop placed then topical 5% Betadine  Sterile gloves used, and sterile lid speculum placed.  5% Betadine placed at injection site again prior to injection.  Avastin 1.25mg in 0.05cc Injected inferotemporally 3.5-4mm posterior to the limbus.  Complications: None  Va at least CF at 5 feet post injection.  Retina, ONH, IOP normal after injection.    Followup as above.  Patient should return immediately PRN.  Retinal Detachment and Endophthalmitis precautions given.

## 2023-11-07 NOTE — PROGRESS NOTES
Subjective:     HPI    I had the pleasure of seeing Jose Pete in follow-up for his history of AF. Last seen in 8/2023. He is a 72M with HTN, HLD, DM2 on insulin, HFrEF based on 9/2018 echo, who was admitted to [facility] in 8/2023 with lightheadedness, near syncope. SBP in the 90s. AF at 71 bpm. DANA with Cr 2. Rehydrated, spontaneously converted to NSR, and started on eliquis. Most recent Cr 1.4.    At his initial visit in 8/2023 it was unclear what Mr. Pete's AF burden was. This prompted a 2 week Zio XT monitor (8/2023) showing sinus rhythm average 74 bpm (range  bpm), AF burden 9% with longest episode 13 hours. Asymptomatic.    Echo in 8/2023 showed EF 60-65% (40-45% in 9/2018).    My interpretation of today's ECG is sinus rhythm at 69  bpm.    Review of Systems   Constitutional: Negative for decreased appetite, malaise/fatigue, weight gain and weight loss.   HENT:  Negative for sore throat.    Eyes:  Negative for blurred vision.   Cardiovascular:  Negative for chest pain, dyspnea on exertion, irregular heartbeat, leg swelling, near-syncope, orthopnea, palpitations, paroxysmal nocturnal dyspnea and syncope.   Respiratory:  Negative for shortness of breath.    Skin:  Negative for rash.   Musculoskeletal:  Negative for arthritis.   Gastrointestinal:  Negative for abdominal pain.   Neurological:  Negative for focal weakness.   Psychiatric/Behavioral:  Negative for altered mental status.        Objective:   Physical Exam  Vitals and nursing note reviewed.   Constitutional:       General: He is not in acute distress.     Appearance: He is well-developed.   HENT:      Head: Normocephalic and atraumatic.   Eyes:      General: No scleral icterus.     Pupils: Pupils are equal, round, and reactive to light.   Neck:      Thyroid: No thyromegaly.   Cardiovascular:      Rate and Rhythm: Regular rhythm.      Pulses: Normal pulses.      Heart sounds: Normal heart sounds. No murmur heard.     No friction rub. No gallop.    Pulmonary:      Effort: Pulmonary effort is normal.      Breath sounds: Normal breath sounds.   Abdominal:      General: Bowel sounds are normal. There is no distension.      Palpations: Abdomen is soft.      Tenderness: There is no abdominal tenderness.   Musculoskeletal:      Cervical back: Neck supple.   Skin:     General: Skin is warm and dry.      Findings: No rash.   Neurological:      Mental Status: He is alert and oriented to person, place, and time.   Psychiatric:         Behavior: Behavior normal.       Assessment:      1. Paroxysmal atrial fibrillation    2. Other cardiomyopathy    3. Hypertension, essential    4. Chronic combined systolic and diastolic congestive heart failure, NYHA class 2    5. Heart failure with reduced ejection fraction    6. Type 2 diabetes mellitus with diabetic nephropathy, with long-term current use of insulin        Plan:     In summary, Jose Ruiz is  72M with a history newly diagnosed AF in the setting of dehydration. His [id number] Score is 3 (age, HTN, DM, CHF) so he should continue eliquis.    Pt has a 9% AF burden based on 8/2023 monitor. Discussed options including starting flecainide vs PVI. Plan is for stress test. If negative will start flecainide 100 mg bid. Follow-up 3 months.    Thank you for allowing me to participate in the care of this patient. Please do not hesitate to call me with any questions or concerns.

## 2023-11-08 ENCOUNTER — PATIENT OUTREACH (OUTPATIENT)
Dept: ADMINISTRATIVE | Facility: HOSPITAL | Age: 73
End: 2023-11-08
Payer: MEDICARE

## 2023-11-08 NOTE — PROGRESS NOTES
Health Maintenance Due   Topic Date Due    Low Dose Statin  Never done    RSV Vaccine (Age 60+) (1 - 1-dose 60+ series) Never done    COVID-19 Vaccine (3 - Moderna risk series) 12/31/2021    Influenza Vaccine (1) 09/01/2023    Hemoglobin A1c  11/10/2023       HM updated. Triggered LINKS and Care Everywhere. Chart reviewed.     Simona Negron LPN   Clinical Care Coordinator  Primary Care and Wellness

## 2023-11-13 ENCOUNTER — OFFICE VISIT (OUTPATIENT)
Dept: ELECTROPHYSIOLOGY | Facility: CLINIC | Age: 73
End: 2023-11-13
Payer: MEDICARE

## 2023-11-13 ENCOUNTER — HOSPITAL ENCOUNTER (OUTPATIENT)
Dept: CARDIOLOGY | Facility: CLINIC | Age: 73
Discharge: HOME | End: 2023-11-13
Payer: MEDICARE

## 2023-11-13 VITALS
HEART RATE: 69 BPM | BODY MASS INDEX: 23.49 KG/M2 | WEIGHT: 183 LBS | HEIGHT: 74 IN | SYSTOLIC BLOOD PRESSURE: 140 MMHG | DIASTOLIC BLOOD PRESSURE: 88 MMHG

## 2023-11-13 DIAGNOSIS — I10 HYPERTENSION, ESSENTIAL: ICD-10-CM

## 2023-11-13 DIAGNOSIS — R07.9 CHEST PAIN, UNSPECIFIED TYPE: ICD-10-CM

## 2023-11-13 DIAGNOSIS — I50.42 CHRONIC COMBINED SYSTOLIC AND DIASTOLIC CONGESTIVE HEART FAILURE, NYHA CLASS 2: ICD-10-CM

## 2023-11-13 DIAGNOSIS — I42.8 OTHER CARDIOMYOPATHY: ICD-10-CM

## 2023-11-13 DIAGNOSIS — E11.21 TYPE 2 DIABETES MELLITUS WITH DIABETIC NEPHROPATHY, WITH LONG-TERM CURRENT USE OF INSULIN: ICD-10-CM

## 2023-11-13 DIAGNOSIS — Z79.4 TYPE 2 DIABETES MELLITUS WITH DIABETIC NEPHROPATHY, WITH LONG-TERM CURRENT USE OF INSULIN: ICD-10-CM

## 2023-11-13 DIAGNOSIS — I48.0 PAROXYSMAL ATRIAL FIBRILLATION: ICD-10-CM

## 2023-11-13 DIAGNOSIS — R07.89 ATYPICAL CHEST PAIN: ICD-10-CM

## 2023-11-13 DIAGNOSIS — I48.0 PAROXYSMAL ATRIAL FIBRILLATION: Primary | ICD-10-CM

## 2023-11-13 DIAGNOSIS — I50.20 HEART FAILURE WITH REDUCED EJECTION FRACTION: ICD-10-CM

## 2023-11-13 PROBLEM — N17.9 AKI (ACUTE KIDNEY INJURY): Status: RESOLVED | Noted: 2023-08-09 | Resolved: 2023-11-13

## 2023-11-13 PROCEDURE — 3066F PR DOCUMENTATION OF TREATMENT FOR NEPHROPATHY: ICD-10-PCS | Mod: CPTII,S$GLB,, | Performed by: INTERNAL MEDICINE

## 2023-11-13 PROCEDURE — 3288F PR FALLS RISK ASSESSMENT DOCUMENTED: ICD-10-PCS | Mod: CPTII,S$GLB,, | Performed by: INTERNAL MEDICINE

## 2023-11-13 PROCEDURE — 3066F NEPHROPATHY DOC TX: CPT | Mod: CPTII,S$GLB,, | Performed by: INTERNAL MEDICINE

## 2023-11-13 PROCEDURE — 93010 ELECTROCARDIOGRAM REPORT: CPT | Mod: S$GLB,,, | Performed by: INTERNAL MEDICINE

## 2023-11-13 PROCEDURE — 99999 PR PBB SHADOW E&M-EST. PATIENT-LVL III: CPT | Mod: PBBFAC,,, | Performed by: INTERNAL MEDICINE

## 2023-11-13 PROCEDURE — 4010F ACE/ARB THERAPY RXD/TAKEN: CPT | Mod: CPTII,S$GLB,, | Performed by: INTERNAL MEDICINE

## 2023-11-13 PROCEDURE — 99999 PR PBB SHADOW E&M-EST. PATIENT-LVL III: ICD-10-PCS | Mod: PBBFAC,,, | Performed by: INTERNAL MEDICINE

## 2023-11-13 PROCEDURE — 3077F PR MOST RECENT SYSTOLIC BLOOD PRESSURE >= 140 MM HG: ICD-10-PCS | Mod: CPTII,S$GLB,, | Performed by: INTERNAL MEDICINE

## 2023-11-13 PROCEDURE — 3062F PR POS MACROALBUMINURIA RESULT DOCUMENTED/REVIEW: ICD-10-PCS | Mod: CPTII,S$GLB,, | Performed by: INTERNAL MEDICINE

## 2023-11-13 PROCEDURE — 1159F MED LIST DOCD IN RCRD: CPT | Mod: CPTII,S$GLB,, | Performed by: INTERNAL MEDICINE

## 2023-11-13 PROCEDURE — 3062F POS MACROALBUMINURIA REV: CPT | Mod: CPTII,S$GLB,, | Performed by: INTERNAL MEDICINE

## 2023-11-13 PROCEDURE — 1159F PR MEDICATION LIST DOCUMENTED IN MEDICAL RECORD: ICD-10-PCS | Mod: CPTII,S$GLB,, | Performed by: INTERNAL MEDICINE

## 2023-11-13 PROCEDURE — 93005 ELECTROCARDIOGRAM TRACING: CPT | Mod: S$GLB,,, | Performed by: INTERNAL MEDICINE

## 2023-11-13 PROCEDURE — 1126F AMNT PAIN NOTED NONE PRSNT: CPT | Mod: CPTII,S$GLB,, | Performed by: INTERNAL MEDICINE

## 2023-11-13 PROCEDURE — 3008F BODY MASS INDEX DOCD: CPT | Mod: CPTII,S$GLB,, | Performed by: INTERNAL MEDICINE

## 2023-11-13 PROCEDURE — 1101F PR PT FALLS ASSESS DOC 0-1 FALLS W/OUT INJ PAST YR: ICD-10-PCS | Mod: CPTII,S$GLB,, | Performed by: INTERNAL MEDICINE

## 2023-11-13 PROCEDURE — 3008F PR BODY MASS INDEX (BMI) DOCUMENTED: ICD-10-PCS | Mod: CPTII,S$GLB,, | Performed by: INTERNAL MEDICINE

## 2023-11-13 PROCEDURE — 99214 PR OFFICE/OUTPT VISIT, EST, LEVL IV, 30-39 MIN: ICD-10-PCS | Mod: S$GLB,,, | Performed by: INTERNAL MEDICINE

## 2023-11-13 PROCEDURE — 93005 RHYTHM STRIP: ICD-10-PCS | Mod: S$GLB,,, | Performed by: INTERNAL MEDICINE

## 2023-11-13 PROCEDURE — 1126F PR PAIN SEVERITY QUANTIFIED, NO PAIN PRESENT: ICD-10-PCS | Mod: CPTII,S$GLB,, | Performed by: INTERNAL MEDICINE

## 2023-11-13 PROCEDURE — 3046F HEMOGLOBIN A1C LEVEL >9.0%: CPT | Mod: CPTII,S$GLB,, | Performed by: INTERNAL MEDICINE

## 2023-11-13 PROCEDURE — 93010 RHYTHM STRIP: ICD-10-PCS | Mod: S$GLB,,, | Performed by: INTERNAL MEDICINE

## 2023-11-13 PROCEDURE — 3077F SYST BP >= 140 MM HG: CPT | Mod: CPTII,S$GLB,, | Performed by: INTERNAL MEDICINE

## 2023-11-13 PROCEDURE — 3079F PR MOST RECENT DIASTOLIC BLOOD PRESSURE 80-89 MM HG: ICD-10-PCS | Mod: CPTII,S$GLB,, | Performed by: INTERNAL MEDICINE

## 2023-11-13 PROCEDURE — 4010F PR ACE/ARB THEARPY RXD/TAKEN: ICD-10-PCS | Mod: CPTII,S$GLB,, | Performed by: INTERNAL MEDICINE

## 2023-11-13 PROCEDURE — 99214 OFFICE O/P EST MOD 30 MIN: CPT | Mod: S$GLB,,, | Performed by: INTERNAL MEDICINE

## 2023-11-13 PROCEDURE — 3079F DIAST BP 80-89 MM HG: CPT | Mod: CPTII,S$GLB,, | Performed by: INTERNAL MEDICINE

## 2023-11-13 PROCEDURE — 1101F PT FALLS ASSESS-DOCD LE1/YR: CPT | Mod: CPTII,S$GLB,, | Performed by: INTERNAL MEDICINE

## 2023-11-13 PROCEDURE — 3288F FALL RISK ASSESSMENT DOCD: CPT | Mod: CPTII,S$GLB,, | Performed by: INTERNAL MEDICINE

## 2023-11-13 PROCEDURE — 3046F PR MOST RECENT HEMOGLOBIN A1C LEVEL > 9.0%: ICD-10-PCS | Mod: CPTII,S$GLB,, | Performed by: INTERNAL MEDICINE

## 2023-11-30 ENCOUNTER — TELEPHONE (OUTPATIENT)
Dept: CARDIOLOGY | Facility: HOSPITAL | Age: 73
End: 2023-11-30
Payer: MEDICARE

## 2023-12-04 ENCOUNTER — HOSPITAL ENCOUNTER (OUTPATIENT)
Dept: CARDIOLOGY | Facility: HOSPITAL | Age: 73
Discharge: HOME | End: 2023-12-04
Attending: INTERNAL MEDICINE
Payer: MEDICARE

## 2023-12-04 VITALS
HEART RATE: 71 BPM | SYSTOLIC BLOOD PRESSURE: 197 MMHG | HEIGHT: 74 IN | RESPIRATION RATE: 16 BRPM | BODY MASS INDEX: 23.36 KG/M2 | WEIGHT: 182 LBS | DIASTOLIC BLOOD PRESSURE: 107 MMHG

## 2023-12-04 DIAGNOSIS — R07.89 ATYPICAL CHEST PAIN: ICD-10-CM

## 2023-12-04 DIAGNOSIS — E11.21 TYPE 2 DIABETES MELLITUS WITH DIABETIC NEPHROPATHY, WITH LONG-TERM CURRENT USE OF INSULIN: ICD-10-CM

## 2023-12-04 DIAGNOSIS — I48.0 PAROXYSMAL ATRIAL FIBRILLATION: ICD-10-CM

## 2023-12-04 DIAGNOSIS — Z79.4 TYPE 2 DIABETES MELLITUS WITH DIABETIC NEPHROPATHY, WITH LONG-TERM CURRENT USE OF INSULIN: ICD-10-CM

## 2023-12-04 DIAGNOSIS — R07.9 CHEST PAIN, UNSPECIFIED TYPE: ICD-10-CM

## 2023-12-04 LAB
CV STRESS BASE HR: 65 BPM
DIASTOLIC BLOOD PRESSURE: 85 MMHG
EJECTION FRACTION- HIGH: 59 %
END DIASTOLIC INDEX-HIGH: 155 ML/M2
END DIASTOLIC INDEX-LOW: 91 ML/M2
END SYSTOLIC INDEX-HIGH: 78 ML/M2
END SYSTOLIC INDEX-LOW: 40 ML/M2
NUC REST DIASTOLIC VOLUME INDEX: 96
NUC REST EJECTION FRACTION: 38
NUC REST SYSTOLIC VOLUME INDEX: 60
NUC STRESS DIASTOLIC VOLUME INDEX: 118
NUC STRESS EJECTION FRACTION: 38 %
NUC STRESS SYSTOLIC VOLUME INDEX: 73
OHS CV CPX 1 MINUTE RECOVERY HEART RATE: 96 BPM
OHS CV CPX 85 PERCENT MAX PREDICTED HEART RATE MALE: 125
OHS CV CPX ESTIMATED METS: 7
OHS CV CPX MAX PREDICTED HEART RATE: 147
OHS CV CPX PATIENT IS FEMALE: 0
OHS CV CPX PATIENT IS MALE: 1
OHS CV CPX PEAK DIASTOLIC BLOOD PRESSURE: 93 MMHG
OHS CV CPX PEAK HEAR RATE: 118 BPM
OHS CV CPX PEAK RATE PRESSURE PRODUCT: NORMAL
OHS CV CPX PEAK SYSTOLIC BLOOD PRESSURE: 152 MMHG
OHS CV CPX PERCENT MAX PREDICTED HEART RATE ACHIEVED: 80
OHS CV CPX RATE PRESSURE PRODUCT PRESENTING: 9035
RETIRED EF AND QEF - SEE NOTES: 47 %
STRESS ECHO POST EXERCISE DUR MIN: 4 MINUTES
STRESS ECHO POST EXERCISE DUR SEC: 25 SECONDS
SYSTOLIC BLOOD PRESSURE: 139 MMHG

## 2023-12-04 PROCEDURE — 93018 NUCLEAR STRESS - CARDIOLOGY INTERPRETED (CUPID ONLY): ICD-10-PCS | Mod: ,,, | Performed by: INTERNAL MEDICINE

## 2023-12-04 PROCEDURE — A9502 TC99M TETROFOSMIN: HCPCS

## 2023-12-04 PROCEDURE — 78452 HT MUSCLE IMAGE SPECT MULT: CPT | Mod: 26,,, | Performed by: INTERNAL MEDICINE

## 2023-12-04 PROCEDURE — 93016 CV STRESS TEST SUPVJ ONLY: CPT | Mod: ,,, | Performed by: INTERNAL MEDICINE

## 2023-12-04 PROCEDURE — 78452 NUCLEAR STRESS - CARDIOLOGY INTERPRETED (CUPID ONLY): ICD-10-PCS | Mod: 26,,, | Performed by: INTERNAL MEDICINE

## 2023-12-04 PROCEDURE — 93016 NUCLEAR STRESS - CARDIOLOGY INTERPRETED (CUPID ONLY): ICD-10-PCS | Mod: ,,, | Performed by: INTERNAL MEDICINE

## 2023-12-04 PROCEDURE — 93018 CV STRESS TEST I&R ONLY: CPT | Mod: ,,, | Performed by: INTERNAL MEDICINE

## 2023-12-04 PROCEDURE — 93017 CV STRESS TEST TRACING ONLY: CPT

## 2023-12-05 DIAGNOSIS — I48.0 PAROXYSMAL ATRIAL FIBRILLATION: Primary | ICD-10-CM

## 2023-12-05 RX ORDER — FLECAINIDE ACETATE 100 MG/1
100 TABLET ORAL EVERY 12 HOURS
Qty: 60 TABLET | Refills: 11 | Status: SHIPPED | OUTPATIENT
Start: 2023-12-05 | End: 2024-12-04

## 2023-12-05 NOTE — TELEPHONE ENCOUNTER
Spoke with patient regarding stress test results and plan of care. Pt verb understanding. Will schedule OV for f/u in 3 months.    ----- Message from Mor Huffman MD sent at 12/5/2023  2:03 PM CST -----  Can you let pt know his stress test was normal and we are going to start flecainide 100 mg bid? (See office note plan for details). I already spoke to him about this at the time of office visit.    GP    ----- Message -----  From: Lars Mckeon III, MD  Sent: 12/4/2023   4:14 PM CST  To: Mor Huffman MD

## 2023-12-12 ENCOUNTER — IMMUNIZATION (OUTPATIENT)
Dept: INTERNAL MEDICINE | Facility: CLINIC | Age: 73
End: 2023-12-12
Payer: MEDICARE

## 2023-12-12 ENCOUNTER — OFFICE VISIT (OUTPATIENT)
Dept: INTERNAL MEDICINE | Facility: CLINIC | Age: 73
End: 2023-12-12
Payer: MEDICARE

## 2023-12-12 ENCOUNTER — LAB VISIT (OUTPATIENT)
Dept: LAB | Facility: HOSPITAL | Age: 73
End: 2023-12-12
Attending: INTERNAL MEDICINE
Payer: MEDICARE

## 2023-12-12 VITALS
HEIGHT: 74 IN | DIASTOLIC BLOOD PRESSURE: 84 MMHG | WEIGHT: 186.06 LBS | BODY MASS INDEX: 23.88 KG/M2 | HEART RATE: 72 BPM | SYSTOLIC BLOOD PRESSURE: 138 MMHG | OXYGEN SATURATION: 99 %

## 2023-12-12 DIAGNOSIS — E11.3313 TYPE 2 DIABETES MELLITUS WITH BOTH EYES AFFECTED BY MODERATE NONPROLIFERATIVE RETINOPATHY AND MACULAR EDEMA, WITH LONG-TERM CURRENT USE OF INSULIN: ICD-10-CM

## 2023-12-12 DIAGNOSIS — Z79.4 TYPE 2 DIABETES MELLITUS WITH DIABETIC NEPHROPATHY, WITH LONG-TERM CURRENT USE OF INSULIN: Primary | ICD-10-CM

## 2023-12-12 DIAGNOSIS — Z79.4 TYPE 2 DIABETES MELLITUS WITH BOTH EYES AFFECTED BY MODERATE NONPROLIFERATIVE RETINOPATHY AND MACULAR EDEMA, WITH LONG-TERM CURRENT USE OF INSULIN: ICD-10-CM

## 2023-12-12 DIAGNOSIS — I48.0 PAROXYSMAL ATRIAL FIBRILLATION: ICD-10-CM

## 2023-12-12 DIAGNOSIS — E78.00 PURE HYPERCHOLESTEROLEMIA: ICD-10-CM

## 2023-12-12 DIAGNOSIS — E11.21 TYPE 2 DIABETES MELLITUS WITH DIABETIC NEPHROPATHY, WITH LONG-TERM CURRENT USE OF INSULIN: Primary | ICD-10-CM

## 2023-12-12 DIAGNOSIS — I10 HYPERTENSION, ESSENTIAL: ICD-10-CM

## 2023-12-12 DIAGNOSIS — I50.42 CHRONIC COMBINED SYSTOLIC AND DIASTOLIC CONGESTIVE HEART FAILURE, NYHA CLASS 2: ICD-10-CM

## 2023-12-12 DIAGNOSIS — Z23 NEED FOR VACCINATION: Primary | ICD-10-CM

## 2023-12-12 LAB
ALBUMIN SERPL BCP-MCNC: 3.7 G/DL (ref 3.5–5.2)
ALP SERPL-CCNC: 68 U/L (ref 55–135)
ALT SERPL W/O P-5'-P-CCNC: 21 U/L (ref 10–44)
ANION GAP SERPL CALC-SCNC: 4 MMOL/L (ref 8–16)
AST SERPL-CCNC: 18 U/L (ref 10–40)
BILIRUB SERPL-MCNC: 0.6 MG/DL (ref 0.1–1)
BUN SERPL-MCNC: 27 MG/DL (ref 8–23)
CALCIUM SERPL-MCNC: 10.4 MG/DL (ref 8.7–10.5)
CHLORIDE SERPL-SCNC: 102 MMOL/L (ref 95–110)
CO2 SERPL-SCNC: 32 MMOL/L (ref 23–29)
CREAT SERPL-MCNC: 1.6 MG/DL (ref 0.5–1.4)
EST. GFR  (NO RACE VARIABLE): 45.2 ML/MIN/1.73 M^2
GLUCOSE SERPL-MCNC: 317 MG/DL (ref 70–110)
POTASSIUM SERPL-SCNC: 5.5 MMOL/L (ref 3.5–5.1)
PROT SERPL-MCNC: 7.2 G/DL (ref 6–8.4)
SODIUM SERPL-SCNC: 138 MMOL/L (ref 136–145)

## 2023-12-12 PROCEDURE — 1159F PR MEDICATION LIST DOCUMENTED IN MEDICAL RECORD: ICD-10-PCS | Mod: CPTII,S$GLB,, | Performed by: INTERNAL MEDICINE

## 2023-12-12 PROCEDURE — 3288F PR FALLS RISK ASSESSMENT DOCUMENTED: ICD-10-PCS | Mod: CPTII,S$GLB,, | Performed by: INTERNAL MEDICINE

## 2023-12-12 PROCEDURE — 3079F PR MOST RECENT DIASTOLIC BLOOD PRESSURE 80-89 MM HG: ICD-10-PCS | Mod: CPTII,S$GLB,, | Performed by: INTERNAL MEDICINE

## 2023-12-12 PROCEDURE — 36415 COLL VENOUS BLD VENIPUNCTURE: CPT | Performed by: INTERNAL MEDICINE

## 2023-12-12 PROCEDURE — G0008 FLU VACCINE - QUADRIVALENT - ADJUVANTED: ICD-10-PCS | Mod: S$GLB,,, | Performed by: INTERNAL MEDICINE

## 2023-12-12 PROCEDURE — 80053 COMPREHEN METABOLIC PANEL: CPT | Performed by: INTERNAL MEDICINE

## 2023-12-12 PROCEDURE — 3066F NEPHROPATHY DOC TX: CPT | Mod: CPTII,S$GLB,, | Performed by: INTERNAL MEDICINE

## 2023-12-12 PROCEDURE — 3008F PR BODY MASS INDEX (BMI) DOCUMENTED: ICD-10-PCS | Mod: CPTII,S$GLB,, | Performed by: INTERNAL MEDICINE

## 2023-12-12 PROCEDURE — 3062F POS MACROALBUMINURIA REV: CPT | Mod: CPTII,S$GLB,, | Performed by: INTERNAL MEDICINE

## 2023-12-12 PROCEDURE — 90694 FLU VACCINE - QUADRIVALENT - ADJUVANTED: ICD-10-PCS | Mod: S$GLB,,, | Performed by: INTERNAL MEDICINE

## 2023-12-12 PROCEDURE — 3288F FALL RISK ASSESSMENT DOCD: CPT | Mod: CPTII,S$GLB,, | Performed by: INTERNAL MEDICINE

## 2023-12-12 PROCEDURE — 3046F HEMOGLOBIN A1C LEVEL >9.0%: CPT | Mod: CPTII,S$GLB,, | Performed by: INTERNAL MEDICINE

## 2023-12-12 PROCEDURE — 1160F PR REVIEW ALL MEDS BY PRESCRIBER/CLIN PHARMACIST DOCUMENTED: ICD-10-PCS | Mod: CPTII,S$GLB,, | Performed by: INTERNAL MEDICINE

## 2023-12-12 PROCEDURE — 3066F PR DOCUMENTATION OF TREATMENT FOR NEPHROPATHY: ICD-10-PCS | Mod: CPTII,S$GLB,, | Performed by: INTERNAL MEDICINE

## 2023-12-12 PROCEDURE — 99214 PR OFFICE/OUTPT VISIT, EST, LEVL IV, 30-39 MIN: ICD-10-PCS | Mod: S$GLB,,, | Performed by: INTERNAL MEDICINE

## 2023-12-12 PROCEDURE — 1101F PT FALLS ASSESS-DOCD LE1/YR: CPT | Mod: CPTII,S$GLB,, | Performed by: INTERNAL MEDICINE

## 2023-12-12 PROCEDURE — 3079F DIAST BP 80-89 MM HG: CPT | Mod: CPTII,S$GLB,, | Performed by: INTERNAL MEDICINE

## 2023-12-12 PROCEDURE — 3062F PR POS MACROALBUMINURIA RESULT DOCUMENTED/REVIEW: ICD-10-PCS | Mod: CPTII,S$GLB,, | Performed by: INTERNAL MEDICINE

## 2023-12-12 PROCEDURE — 3008F BODY MASS INDEX DOCD: CPT | Mod: CPTII,S$GLB,, | Performed by: INTERNAL MEDICINE

## 2023-12-12 PROCEDURE — 3075F PR MOST RECENT SYSTOLIC BLOOD PRESS GE 130-139MM HG: ICD-10-PCS | Mod: CPTII,S$GLB,, | Performed by: INTERNAL MEDICINE

## 2023-12-12 PROCEDURE — 1160F RVW MEDS BY RX/DR IN RCRD: CPT | Mod: CPTII,S$GLB,, | Performed by: INTERNAL MEDICINE

## 2023-12-12 PROCEDURE — 99999 PR PBB SHADOW E&M-EST. PATIENT-LVL IV: CPT | Mod: PBBFAC,,, | Performed by: INTERNAL MEDICINE

## 2023-12-12 PROCEDURE — 1159F MED LIST DOCD IN RCRD: CPT | Mod: CPTII,S$GLB,, | Performed by: INTERNAL MEDICINE

## 2023-12-12 PROCEDURE — 83036 HEMOGLOBIN GLYCOSYLATED A1C: CPT | Performed by: INTERNAL MEDICINE

## 2023-12-12 PROCEDURE — 3046F PR MOST RECENT HEMOGLOBIN A1C LEVEL > 9.0%: ICD-10-PCS | Mod: CPTII,S$GLB,, | Performed by: INTERNAL MEDICINE

## 2023-12-12 PROCEDURE — 1126F AMNT PAIN NOTED NONE PRSNT: CPT | Mod: CPTII,S$GLB,, | Performed by: INTERNAL MEDICINE

## 2023-12-12 PROCEDURE — 1101F PR PT FALLS ASSESS DOC 0-1 FALLS W/OUT INJ PAST YR: ICD-10-PCS | Mod: CPTII,S$GLB,, | Performed by: INTERNAL MEDICINE

## 2023-12-12 PROCEDURE — 99214 OFFICE O/P EST MOD 30 MIN: CPT | Mod: S$GLB,,, | Performed by: INTERNAL MEDICINE

## 2023-12-12 PROCEDURE — 4010F PR ACE/ARB THEARPY RXD/TAKEN: ICD-10-PCS | Mod: CPTII,S$GLB,, | Performed by: INTERNAL MEDICINE

## 2023-12-12 PROCEDURE — 1126F PR PAIN SEVERITY QUANTIFIED, NO PAIN PRESENT: ICD-10-PCS | Mod: CPTII,S$GLB,, | Performed by: INTERNAL MEDICINE

## 2023-12-12 PROCEDURE — 3075F SYST BP GE 130 - 139MM HG: CPT | Mod: CPTII,S$GLB,, | Performed by: INTERNAL MEDICINE

## 2023-12-12 PROCEDURE — 99999 PR PBB SHADOW E&M-EST. PATIENT-LVL IV: ICD-10-PCS | Mod: PBBFAC,,, | Performed by: INTERNAL MEDICINE

## 2023-12-12 PROCEDURE — 4010F ACE/ARB THERAPY RXD/TAKEN: CPT | Mod: CPTII,S$GLB,, | Performed by: INTERNAL MEDICINE

## 2023-12-12 PROCEDURE — 90694 VACC AIIV4 NO PRSRV 0.5ML IM: CPT | Mod: S$GLB,,, | Performed by: INTERNAL MEDICINE

## 2023-12-12 PROCEDURE — G0008 ADMIN INFLUENZA VIRUS VAC: HCPCS | Mod: S$GLB,,, | Performed by: INTERNAL MEDICINE

## 2023-12-12 RX ORDER — INSULIN GLARGINE 300 [IU]/ML
15 INJECTION, SOLUTION SUBCUTANEOUS NIGHTLY
Qty: 1.5 ML | Refills: 11 | Status: SHIPPED | OUTPATIENT
Start: 2023-12-12 | End: 2024-12-11

## 2023-12-12 RX ORDER — ATORVASTATIN CALCIUM 40 MG/1
40 TABLET, FILM COATED ORAL DAILY
Qty: 90 TABLET | Refills: 3 | Status: SHIPPED | OUTPATIENT
Start: 2023-12-12 | End: 2024-12-11

## 2023-12-12 RX ORDER — INSULIN LISPRO 100 [IU]/ML
15 INJECTION, SOLUTION INTRAVENOUS; SUBCUTANEOUS 3 TIMES DAILY
Qty: 15 ML | Refills: 6
Start: 2023-12-12

## 2023-12-12 NOTE — PROGRESS NOTES
"Subjective:       Patient ID: Jose Pete is a 73 y.o. male.    Chief Complaint: Follow-up    HPI  73 y.o. male here for follow up of    DM2  Lab Results   Component Value Date    HGBA1C 9.5 (H) 12/12/2023    HGBA1C 9.1 (H) 08/10/2023    HGBA1C 9.9 (H) 04/24/2023   Toujeo 15 units qhs rx last visit but never started  Humalog 6 units tid - taking 20 units TID  Did not tolerate ozempic   Reports home ; did not bring his BG log or machine. Lowest .     HTN  Amlodipine previously-stopped due to borderline hypotension on 8/14.   Coreg 25mg bid  Lisinopril 40mg daily        HLD  Atorva 40mg     New onset a fib during dehydration episode, converted back to NSR.   Started eliquis aug '23.   Saw Dr. Huffman 8/14/23 and continue eliquis for now.      HFrEF; combined systolic and diastolic; paroxysmal a fib  Asa 81  Atorva 40mg  Lisinopril 40mg  Lasix 40mg daily  Flecainide 100mg bid  Carvedilol 25mg bid  Denies chest pain or shortness of breath.      BP Readings from Last 5 Encounters:   12/12/23 138/84   12/04/23 (!) 197/107   11/13/23 (!) 140/88   08/22/23 128/74   08/21/23 130/80        Review of Systems   Constitutional:  Negative for fever.   Respiratory:  Negative for shortness of breath.    Cardiovascular:  Negative for chest pain.   Musculoskeletal: Negative.    Skin: Negative.        Objective:   /84   Pulse 72   Ht 6' 2" (1.88 m)   Wt 84.4 kg (186 lb 1.1 oz)   SpO2 99%   BMI 23.89 kg/m²      Physical Exam  Constitutional:       General: He is not in acute distress.     Appearance: He is well-developed. He is not diaphoretic.   HENT:      Head: Normocephalic and atraumatic.   Cardiovascular:      Rate and Rhythm: Normal rate and regular rhythm.      Heart sounds: No murmur heard.  Pulmonary:      Effort: Pulmonary effort is normal. No respiratory distress.      Breath sounds: No wheezing or rales.   Skin:     General: Skin is warm and dry.   Neurological:      Mental Status: He is alert and " oriented to person, place, and time.   Psychiatric:         Behavior: Behavior normal.         Assessment:       1. Type 2 diabetes mellitus with diabetic nephropathy, with long-term current use of insulin    2. Chronic combined systolic and diastolic congestive heart failure, NYHA class 2    3. Pure hypercholesterolemia    4. Hypertension, essential    5. Paroxysmal atrial fibrillation    6. Type 2 diabetes mellitus with both eyes affected by moderate nonproliferative retinopathy and macular edema, with long-term current use of insulin        Plan:       1. Type 2 diabetes mellitus with diabetic nephropathy, with long-term current use of insulin  -     insulin glargine, TOUJEO, (TOUJEO) 300 unit/mL (1.5 mL) InPn pen; Inject 15 Units into the skin every evening. Long acting insulin  Dispense: 1.5 mL; Refill: 11  -     insulin lispro (HUMALOG KWIKPEN INSULIN) 100 unit/mL pen; Inject 15 Units into the skin 3 (three) times daily.  Dispense: 15 mL; Refill: 6    2. Chronic combined systolic and diastolic congestive heart failure, NYHA class 2    3. Pure hypercholesterolemia  -     atorvastatin (LIPITOR) 40 MG tablet; Take 1 tablet (40 mg total) by mouth once daily.  Dispense: 90 tablet; Refill: 3    4. Hypertension, essential    5. Paroxysmal atrial fibrillation    6. Type 2 diabetes mellitus with both eyes affected by moderate nonproliferative retinopathy and macular edema, with long-term current use of insulin  -     Comprehensive Metabolic Panel; Future; Expected date: 12/12/2023  -     Hemoglobin A1C; Future; Expected date: 12/12/2023  -     insulin lispro (HUMALOG KWIKPEN INSULIN) 100 unit/mL pen; Inject 15 Units into the skin 3 (three) times daily.  Dispense: 15 mL; Refill: 6           You are up to date for your primary preventive health care, and there are no reminders at this time.     BP  Labs  flu

## 2023-12-12 NOTE — PATIENT INSTRUCTIONS
Insulin regimen:  Toujeo long acting insulin 15 units at night (1 time per day)  Humalog short acting insulin 15 units three times per day with food

## 2023-12-13 LAB
ESTIMATED AVG GLUCOSE: 226 MG/DL (ref 68–131)
HBA1C MFR BLD: 9.5 % (ref 4–5.6)

## 2023-12-18 NOTE — DISCHARGE SUMMARY
[facility]ation [address] [place] [address] Medicine  Discharge Summary      Patient Name: Jose Peet  MRN: [id number]  MYLES: [id number]  Patient Class: OP- Observation  Admission Date: 8/9/2023  Hospital Length of Stay: 0 days  Discharge Date and Time:  08/10/2023 9:17 AM  Attending Physician: Tree Goss MD   Discharging Provider: Tree Goss MD  Primary Care Provider: Milagros Lebron MD  [facility] Medicine Team: [facility] G Tree Goss MD  Primary Care Team: [facility]     HPI:   72-year-old  male being admitted for DANA and orthostatic hypotension.  Patient reports being in his usual state of health until sometime earlier today when he had stood up suddenly felt lightheaded with some upper back pains, clammy sensation, some diaphoresis.  Denies experiencing any chest pains or shortness a breath.Denies any focal motor deficits or facial droop or slurred speech suggestive of a stroke.  Felt unsteady on his feet at which point he sat down and felt better.  EMS was contacted, reportedly this systolic blood pressures in the 90s, patient was started on IV fluids and started to feel even better.        Patient reports taken for antihypertensive blood pressure medications at night regularly with his last dose being yesterday night.  He does not know the names of his medications unfortunately.  He does report taking an aspirin.  Denies any history of cardiac stenting or open bypass surgery or atrial fibrillation.  He does have charted diagnosis of CHF with an EF of about 45% back in 2021 with no significant exertional dyspnea, good endurance capacity of walking 15 blocks without issue at that time as well as now.    In the ED patient's blood pressure was already in the 100s systolic.  Blood work demonstrated DANA with creatinine up to 2.  EKG which I personally reviewed showed AFib with a stable rate, no acute ischemic changes.      * No surgery found *      Hospital Course:   Pt admitted to  [facility] and remained stable overnight and into 8/10. Started on eliquis for Afib with outpatient cardiology follow-up, rate controlled. No acute events on tele. DANA resolved with IVF and pt grossly asymptomatic, deemed appropriate for discharge. Plan discussed with pt, who was agreeable and amenable; medications were discussed and reviewed, outpatient follow-up scheduled, ER precautions were given, all questions were answered to the pt's satisfaction, and Mr. Pete was subsequently discharged.         Goals of Care Treatment Preferences:  Code Status: Full Code      Consults:   Consults (From admission, onward)        Status Ordering Provider     Hospital Medicine PharmD Consult  Once        Provider:  (Not yet assigned)    Acknowledged MARIBEL PLUNKETT          No new Assessment & Plan notes have been filed under this hospital service since the last note was generated.  Service: Hospital Medicine    Final Active Diagnoses:    Diagnosis Date Noted POA    PRINCIPAL PROBLEM:  Orthostatic hypotension [I95.1] 08/09/2023 Unknown    DANA (acute kidney injury) [N17.9] 08/09/2023 Unknown    Paroxysmal atrial fibrillation [I48.0] 08/09/2023 Unknown    Chronic combined systolic and diastolic congestive heart failure, NYHA class 2 [I50.42] 11/14/2017 Yes    Type 2 diabetes mellitus with diabetic nephropathy, with long-term current use of insulin [E11.21, Z79.4] 11/12/2017 Not Applicable      Problems Resolved During this Admission:       Discharged Condition: stable    Disposition: Home or Self Care    Follow Up:   Follow-up Information     Milagros Lebron MD. Schedule an appointment as soon as possible for a visit.    Specialty: Internal Medicine  Contact information:  [address]  [place] [id number] [phone]                       Patient Instructions:      Ambulatory referral/consult to Cardiology   Standing Status: Future   Referral Priority: Routine Referral Type: Consultation   Referral Reason:  Specialty Services Required   Requested Specialty: Cardiology   Number of Visits Requested: 1     Ambulatory referral/consult to Internal Medicine   Standing Status: Future   Referral Priority: Routine Referral Type: Consultation   Referral Reason: Specialty Services Required   Requested Specialty: Internal Medicine   Number of Visits Requested: 1     Diet Cardiac     Diet diabetic     Notify your health care provider if you experience any of the following:  increased confusion or weakness     Notify your health care provider if you experience any of the following:  persistent dizziness, light-headedness, or visual disturbances     Notify your health care provider if you experience any of the following:  worsening rash     Notify your health care provider if you experience any of the following:  severe persistent headache     Notify your health care provider if you experience any of the following:  difficulty breathing or increased cough     Notify your health care provider if you experience any of the following:  severe uncontrolled pain     Notify your health care provider if you experience any of the following:  persistent nausea and vomiting or diarrhea     Notify your health care provider if you experience any of the following:  temperature >100.4     Activity as tolerated       Significant Diagnostic Studies: Labs: All labs within the past 24 hours have been reviewed    Pending Diagnostic Studies:     Procedure Component Value Units Date/Time    Brain natriuretic peptide [[id number]] Collected: 08/09/23 1238    Order Status: Sent Lab Status: In process Updated: 08/09/23 1238    Specimen: Blood          Medications:  Reconciled Home Medications:      Medication List      START taking these medications    apixaban 5 mg Tab  Commonly known as: ELIQUIS  Take 1 tablet (5 mg total) by mouth 2 (two) times daily.        CONTINUE taking these medications    amLODIPine 5 MG tablet  Commonly known as: NORVASC  Take 1  "tablet (5 mg total) by mouth once daily.     aspirin 81 MG EC tablet  Commonly known as: ECOTRIN  Take 1 tablet (81 mg total) by mouth once daily.     atorvastatin 40 MG tablet  Commonly known as: LIPITOR  Take 1 tablet (40 mg total) by mouth once daily.     BD ULTRA-FINE TONG PEN NEEDLE 32 gauge x 5/32" Ndle  Generic drug: pen needle, diabetic  1 each by Misc.(Non-Drug; Combo Route) route once daily.     carvediloL 25 MG tablet  Commonly known as: COREG  Take 1 tablet (25 mg total) by mouth 2 (two) times daily.     furosemide 40 MG tablet  Commonly known as: LASIX  Take 1 tablet (40 mg total) by mouth once daily.     insulin lispro 100 unit/mL pen  Commonly known as: HumaLOG KwikPen Insulin  Inject 6 Units into the skin 3 (three) times daily.     lancing device Misc  1 Device by Misc.(Non-Drug; Combo Route) route 2 (two) times daily with meals.     latanoprost 0.005 % ophthalmic solution  Commonly known as: XALATAN  Place 1 drop into the right eye every evening.     lisinopriL 40 MG tablet  Commonly known as: PRINIVIL,ZESTRIL  Take 1 tablet (40 mg total) by mouth once daily.     ONETOUCH DELICA LANCETS 33 gauge Misc  Generic drug: lancets  use as directed     ONETOUCH ULTRA BLUE TEST STRIP Strp  Generic drug: blood sugar diagnostic  To check BG 5 times daily, to use with insurance preferred meter     ONETOUCH ULTRAMINI kit  Generic drug: blood-glucose meter  To check BG 5 times daily, to use with insurance preferred meter     tadalafiL 20 MG Tab  Commonly known as: CIALIS  Take 1 tablet (20 mg total) by mouth daily as needed.     TOUJEO SOLOSTAR U-300 INSULIN 300 unit/mL (1.5 mL) Inpn pen  Generic drug: insulin glargine (TOUJEO)  Inject 15 Units into the skin every evening. Long acting insulin     TRULICITY 1.5 mg/0.5 mL pen injector  Generic drug: dulaglutide  Inject 1.5 mg (one pen) into the skin once a week.            Indwelling Lines/Drains at time of discharge:   Lines/Drains/Airways     None           "       Time spent on the discharge of patient: 35 minutes         Tree Goss MD  Attending Physician  Medical Director - [facility]  Department of [facility] Medicine  8/10/2023     No

## 2024-02-07 DIAGNOSIS — E11.3313 TYPE 2 DIABETES MELLITUS WITH BOTH EYES AFFECTED BY MODERATE NONPROLIFERATIVE RETINOPATHY AND MACULAR EDEMA, WITH LONG-TERM CURRENT USE OF INSULIN: ICD-10-CM

## 2024-02-07 DIAGNOSIS — E11.21 TYPE 2 DIABETES MELLITUS WITH DIABETIC NEPHROPATHY, WITH LONG-TERM CURRENT USE OF INSULIN: ICD-10-CM

## 2024-02-07 DIAGNOSIS — Z79.4 TYPE 2 DIABETES MELLITUS WITH DIABETIC NEPHROPATHY, WITH LONG-TERM CURRENT USE OF INSULIN: ICD-10-CM

## 2024-02-07 DIAGNOSIS — Z79.4 TYPE 2 DIABETES MELLITUS WITH BOTH EYES AFFECTED BY MODERATE NONPROLIFERATIVE RETINOPATHY AND MACULAR EDEMA, WITH LONG-TERM CURRENT USE OF INSULIN: ICD-10-CM

## 2024-02-07 RX ORDER — INSULIN LISPRO 100 [IU]/ML
6 INJECTION, SOLUTION INTRAVENOUS; SUBCUTANEOUS 3 TIMES DAILY
Qty: 15 ML | Refills: 6 | Status: CANCELLED | OUTPATIENT
Start: 2024-02-07

## 2024-02-07 NOTE — TELEPHONE ENCOUNTER
Care Due:                  Date            Visit Type   Department     Provider  --------------------------------------------------------------------------------                                EP -                              PRIMARY      [facility] INTERNAL  Last Visit: 12-      CARE ([facility])   MEDICINE       LEONARDO MOREIRA  Next Visit: None Scheduled  None         None Found                                                            Last  Test          Frequency    Reason                     Performed    Due Date  --------------------------------------------------------------------------------    Lipid Panel.  12 months..  atorvastatin.............  04- 04-    [facility] Embedded Care Due Messages. Reference number: [id number].   2/07/2024 11:34:51 AM CST

## 2024-02-07 NOTE — TELEPHONE ENCOUNTER
No care due was identified.  [facility] Embedded Care Due Messages. Reference number: [id number].   2/07/2024 12:16:34 PM CST

## 2024-02-08 RX ORDER — INSULIN LISPRO 100 [IU]/ML
6 INJECTION, SOLUTION INTRAVENOUS; SUBCUTANEOUS 3 TIMES DAILY
Qty: 15 ML | Refills: 6 | Status: SHIPPED | OUTPATIENT
Start: 2024-02-08

## 2024-02-22 NOTE — PROGRESS NOTES
Health Maintenance Due   Topic Date Due    Shingles Vaccine (2 of 2) 02/20/2020    COVID-19 Vaccine (3 - Moderna risk series) 12/31/2021    Diabetes Urine Screening  05/18/2022    Lipid Panel  06/02/2022    Influenza Vaccine (1) 09/01/2022    Hemoglobin A1c  10/07/2022       Chart reviewed.     Simona Negron LPN   Clinical Care Coordinator  Primary Care and Wellness       Calm

## 2024-02-29 PROBLEM — I77.9 DISORDER OF ARTERIES AND ARTERIOLES, UNSPECIFIED: Status: ACTIVE | Noted: 2024-02-29

## 2024-02-29 NOTE — PROGRESS NOTES
Subjective:     HPI    I had the pleasure of seeing Jose Pete in follow-up for his history of AF. Last seen in 11/2023. He is a 73M with HTN, HLD, DM2 on insulin, HFrEF based on 9/2018 echo, who [place] [facility] [place] with lightheadedness, near syncope. SBP in the 90s. AF at 71 bpm. DANA with Cr 2. Rehydrated, spontaneously converted to NSR, and started on eliquis. Most recent Cr 1.4.    At his initial visit in 8/2023 it was unclear what Mr. Pete's AF burden was. This prompted a 2 week Zio XT monitor (8/2023) showing sinus rhythm average 74 bpm (range  bpm), AF burden 9% with longest episode 13 hours. Asymptomatic.    Echo in 8/2023 showed EF 60-65% (40-45% in 9/2018).    Following a negative stress test in 12/2023, flecainide 100 mg bid was started.    Today in the office Mr. Pete denies recurrent arrhythmias. No side-effects on flecainide.No bleeding on eliquis.    My interpretation of today's ECG is sinus rhythm at 69  bpm with QRSd 90 ms.    Review of Systems   Constitutional: Negative for decreased appetite, malaise/fatigue, weight gain and weight loss.   HENT:  Negative for sore throat.    Eyes:  Negative for blurred vision.   Cardiovascular:  Negative for chest pain, dyspnea on exertion, irregular heartbeat, leg swelling, near-syncope, orthopnea, palpitations, paroxysmal nocturnal dyspnea and syncope.   Respiratory:  Negative for shortness of breath.    Skin:  Negative for rash.   Musculoskeletal:  Negative for arthritis.   Gastrointestinal:  Negative for abdominal pain.   Neurological:  Negative for focal weakness.   Psychiatric/Behavioral:  Negative for altered mental status.        Objective:   Physical Exam  Vitals and nursing note reviewed.   Constitutional:       General: He is not in acute distress.     Appearance: He is well-developed.   HENT:      Head: Normocephalic and atraumatic.   Eyes:      General: No scleral icterus.     Pupils: Pupils are equal, round, and reactive to  light.   Neck:      Thyroid: No thyromegaly.   Cardiovascular:      Rate and Rhythm: Regular rhythm.      Pulses: Normal pulses.      Heart sounds: Normal heart sounds. No murmur heard.     No friction rub. No gallop.   Pulmonary:      Effort: Pulmonary effort is normal.      Breath sounds: Normal breath sounds.   Abdominal:      General: Bowel sounds are normal. There is no distension.      Palpations: Abdomen is soft.      Tenderness: There is no abdominal tenderness.   Musculoskeletal:      Cervical back: Neck supple.   Skin:     General: Skin is warm and dry.      Findings: No rash.   Neurological:      Mental Status: He is alert and oriented to person, place, and time.   Psychiatric:         Behavior: Behavior normal.       Assessment:      1. Disorder of arteries and arterioles, unspecified    2. Chronic combined systolic and diastolic congestive heart failure, NYHA class 2    3. Paroxysmal atrial fibrillation    4. Type 2 diabetes mellitus with diabetic nephropathy, with long-term current use of insulin        Plan:     In summary, Jose Ruiz is  73M with a history of PAF (minimally symptomatic). His [id number] Score is 3 (age, HTN, DM, CHF) so he should continue eliquis.    Pt has a 9% AF burden based on 8/2023 monitor. Now on flecainide 100 mg bid.    Plan is Zio in 6 months, f/u 1 yr.    Thank you for allowing me to participate in the care of this patient. Please do not hesitate to call me with any questions or concerns.

## 2024-03-04 ENCOUNTER — HOSPITAL ENCOUNTER (OUTPATIENT)
Dept: CARDIOLOGY | Facility: CLINIC | Age: 74
Discharge: HOME | End: 2024-03-04
Payer: MEDICARE

## 2024-03-04 ENCOUNTER — OFFICE VISIT (OUTPATIENT)
Dept: ELECTROPHYSIOLOGY | Facility: CLINIC | Age: 74
End: 2024-03-04
Payer: MEDICARE

## 2024-03-04 VITALS
HEART RATE: 68 BPM | BODY MASS INDEX: 23.91 KG/M2 | SYSTOLIC BLOOD PRESSURE: 176 MMHG | DIASTOLIC BLOOD PRESSURE: 81 MMHG | WEIGHT: 186.31 LBS | HEIGHT: 74 IN

## 2024-03-04 DIAGNOSIS — I48.0 PAROXYSMAL ATRIAL FIBRILLATION: ICD-10-CM

## 2024-03-04 DIAGNOSIS — E11.21 TYPE 2 DIABETES MELLITUS WITH DIABETIC NEPHROPATHY, WITH LONG-TERM CURRENT USE OF INSULIN: ICD-10-CM

## 2024-03-04 DIAGNOSIS — I50.42 CHRONIC COMBINED SYSTOLIC AND DIASTOLIC CONGESTIVE HEART FAILURE, NYHA CLASS 2: ICD-10-CM

## 2024-03-04 DIAGNOSIS — Z79.4 TYPE 2 DIABETES MELLITUS WITH DIABETIC NEPHROPATHY, WITH LONG-TERM CURRENT USE OF INSULIN: ICD-10-CM

## 2024-03-04 DIAGNOSIS — I77.9 DISORDER OF ARTERIES AND ARTERIOLES, UNSPECIFIED: Primary | ICD-10-CM

## 2024-03-04 LAB
OHS QRS DURATION: 90 MS
OHS QTC CALCULATION: 398 MS

## 2024-03-04 PROCEDURE — 93005 ELECTROCARDIOGRAM TRACING: CPT | Mod: S$GLB,,, | Performed by: INTERNAL MEDICINE

## 2024-03-04 PROCEDURE — 99999 PR PBB SHADOW E&M-EST. PATIENT-LVL III: CPT | Mod: PBBFAC,,, | Performed by: INTERNAL MEDICINE

## 2024-03-04 PROCEDURE — 93010 ELECTROCARDIOGRAM REPORT: CPT | Mod: S$GLB,,, | Performed by: INTERNAL MEDICINE

## 2024-03-04 PROCEDURE — 99214 OFFICE O/P EST MOD 30 MIN: CPT | Mod: S$GLB,,, | Performed by: INTERNAL MEDICINE

## 2024-03-19 ENCOUNTER — TELEPHONE (OUTPATIENT)
Dept: OPTOMETRY | Facility: CLINIC | Age: 74
End: 2024-03-19
Payer: MEDICARE
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
## 2024-04-18 ENCOUNTER — PROCEDURE VISIT (OUTPATIENT)
Dept: OPHTHALMOLOGY | Facility: CLINIC | Age: 74
End: 2024-04-18
Payer: MEDICARE

## 2024-04-18 DIAGNOSIS — Z79.4 TYPE 2 DIABETES MELLITUS WITH BOTH EYES AFFECTED BY MODERATE NONPROLIFERATIVE RETINOPATHY AND MACULAR EDEMA, WITH LONG-TERM CURRENT USE OF INSULIN: Primary | ICD-10-CM

## 2024-04-18 DIAGNOSIS — E11.3313 TYPE 2 DIABETES MELLITUS WITH BOTH EYES AFFECTED BY MODERATE NONPROLIFERATIVE RETINOPATHY AND MACULAR EDEMA, WITH LONG-TERM CURRENT USE OF INSULIN: Primary | ICD-10-CM

## 2024-04-18 PROCEDURE — 92134 CPTRZ OPH DX IMG PST SGM RTA: CPT | Mod: S$GLB,,, | Performed by: OPHTHALMOLOGY

## 2024-04-18 PROCEDURE — 67028 INJECTION EYE DRUG: CPT | Mod: RT,S$GLB,, | Performed by: OPHTHALMOLOGY

## 2024-04-18 PROCEDURE — 99499 UNLISTED E&M SERVICE: CPT | Mod: S$GLB,,, | Performed by: OPHTHALMOLOGY

## 2024-04-18 RX ORDER — LATANOPROST 50 UG/ML
1 SOLUTION/ DROPS OPHTHALMIC NIGHTLY
Qty: 2.5 ML | Refills: 10 | Status: SHIPPED | OUTPATIENT
Start: 2024-04-18 | End: 2025-04-18

## 2024-04-18 RX ADMIN — Medication 1.25 MG: at 03:04

## 2024-04-18 NOTE — PROGRESS NOTES
Subjective:       Patient ID: Jose Pete is a 73 y.o. male      Chief Complaint   Patient presents with    Diabetic Eye Exam     History of Present Illness  HPI    DLS: 11/2/2023 Dr. Garcia     Eye Meds: No gtts    73 y.o. male is here for injection, right eye. Pt states that he ran out   of Latanoprost drops 3 weeks ago. Denies eye pain and flashes/floaters. No   discomfort. No noticeable VA changes since last visit. Do have trouble   with glare from bright sunlight.   Last edited by Elo Granda OA on 4/18/2024  1:58 PM.        Imaging:    See report    Assessment/Plan:     1. Type 2 diabetes mellitus with both eyes affected by moderate nonproliferative retinopathy and macular edema, with long-term current use of insulin  Lost to f/u again    OD ME worsened OS slightly improved but sig ME still OU  Get Eylea auth and return in few weeks for OS     Discussed importance of compliance with Rx plan    Pt only likes one eye at a time  Av Od today and OS 2 wks, Ey if can get auth    Would hold off on change to steroids until has gl evaluation or pt wants CE/IOL    Diabetic Retinopathy discussed in detail, all questions answered  Stressed importance of good BS/BP/Chol Control  RTC immediately PRN any vision changes, oswald blurry vision, missing vision, floaters, distortions, etc    - Prior authorization Order  - Posterior Segment OCT Retina-Both eyes      Follow up in about 2 weeks (around 5/2/2024), or if symptoms worsen or fail to improve, for Comprehensive Examination, OCT Mac, Injection Left eye, Eylea.     Patient identified.  Timeout performed.    Risks, benefits, and alternatives to treatment were discussed in detail with the patient, including bleeding/infection (endophthalmitis)/etc.  The patient voiced understanding and wished to proceed with the procedure.  See separate consent form.    Injection Procedure Note:  Diagnosis: CSME Right Eye    Topical Proparacaine drop placed then topical 5%  Betadine  Sterile gloves used, and sterile lid speculum placed.  5% Betadine placed at injection site again prior to injection.  Avastin 1.25mg in 0.05cc Injected inferotemporally 3.5-4mm posterior to the limbus.  Complications: None  Va at least CF at 5 feet post injection.  Retina, ONH, IOP normal after injection.    Followup as above.  Patient should return immediately PRN.  Retinal Detachment and Endophthalmitis precautions given.

## 2024-04-22 RX ORDER — FUROSEMIDE 40 MG/1
40 TABLET ORAL DAILY
Qty: 30 TABLET | Refills: 11 | Status: SHIPPED | OUTPATIENT
Start: 2024-04-22 | End: 2025-04-22

## 2024-04-22 NOTE — TELEPHONE ENCOUNTER
Care Due:                  Date            Visit Type   Department     Provider  --------------------------------------------------------------------------------                                EP -                              PRIMARY      [facility] INTERNAL  Last Visit: 12-      CARE ([facility])   MEDICINE       LEONARDO MOREIRA  Next Visit: None Scheduled  None         None Found                                                            Last  Test          Frequency    Reason                     Performed    Due Date  --------------------------------------------------------------------------------    HBA1C.......  6 months...  insulin..................  12-   06-    Lipid Panel.  12 months..  atorvastatin.............  04- 04-    Health [facility] Embedded Care Due Messages. Reference number: [id number].   4/22/2024 10:24:10 AM CDT

## 2024-05-01 DIAGNOSIS — E11.9 TYPE 2 DIABETES MELLITUS WITHOUT COMPLICATION: ICD-10-CM

## 2024-06-04 ENCOUNTER — TELEPHONE (OUTPATIENT)
Dept: OPHTHALMOLOGY | Facility: CLINIC | Age: 74
End: 2024-06-04
Payer: MEDICARE

## 2024-06-13 ENCOUNTER — PROCEDURE VISIT (OUTPATIENT)
Dept: OPHTHALMOLOGY | Facility: CLINIC | Age: 74
End: 2024-06-13
Payer: MEDICARE

## 2024-06-13 DIAGNOSIS — H40.051 OCULAR HYPERTENSION, RIGHT EYE: ICD-10-CM

## 2024-06-13 DIAGNOSIS — Z79.4 TYPE 2 DIABETES MELLITUS WITH BOTH EYES AFFECTED BY MODERATE NONPROLIFERATIVE RETINOPATHY AND MACULAR EDEMA, WITH LONG-TERM CURRENT USE OF INSULIN: Primary | ICD-10-CM

## 2024-06-13 DIAGNOSIS — E11.3313 TYPE 2 DIABETES MELLITUS WITH BOTH EYES AFFECTED BY MODERATE NONPROLIFERATIVE RETINOPATHY AND MACULAR EDEMA, WITH LONG-TERM CURRENT USE OF INSULIN: Primary | ICD-10-CM

## 2024-06-13 PROCEDURE — 67028 INJECTION EYE DRUG: CPT | Mod: LT,S$GLB,, | Performed by: OPHTHALMOLOGY

## 2024-06-13 PROCEDURE — 99214 OFFICE O/P EST MOD 30 MIN: CPT | Mod: 25,S$GLB,, | Performed by: OPHTHALMOLOGY

## 2024-06-13 PROCEDURE — 92134 CPTRZ OPH DX IMG PST SGM RTA: CPT | Mod: S$GLB,,, | Performed by: OPHTHALMOLOGY

## 2024-06-13 NOTE — PROGRESS NOTES
Subjective:       Patient ID: Jose Pete is a 73 y.o. male      Chief Complaint   Patient presents with    Follow-up     F/u , missed appt for injection     History of Present Illness  HPI     Follow-up     Additional comments: F/u , missed appt for injection           Comments    DLS 4/18/2024.    Overdue for planned f/u    73 yr old presents to clinic for continued care of CSME OU.  OCT/DFE OU   and Eylea injection OS. Pt states that his vision is stable. No pain no   flashes or floaters still out of latanoprost.  Will see Dr. Huertas in 3   months for glaucoma surgery consult.     No drops    History  ME OU OD>OS  NSC OU     S/p avastin injection OD 4/18/24          Last edited by Lars Garcia MD on 6/13/2024  6:09 PM.        Imaging:    See report    Assessment/Plan:     1. Type 2 diabetes mellitus with both eyes affected by moderate nonproliferative retinopathy and macular edema, with long-term current use of insulin  Lost to f/u yet again  Extremely difficult to have pt follow up for planned treatments    OD ME improved, 8 wks s/p last Avastin OD (April 2024)  OS worse today 7 month s/p last Avastin (Nov 2023)    Discussed importance of compliance with Rx plan    Rec trial of Ey OS today.  Pt only likes one eye at a time  Eval effect of Ey OS and consider for OD next visits    Would hold off on change to steroids until has gl evaluation or pt wants CE/IOL    Diabetic Retinopathy discussed in detail, all questions answered  Stressed importance of good BS/BP/Chol Control  RTC immediately PRN any vision changes, oswald blurry vision, missing vision, floaters, distortions, etc    - Prior authorization Order  - Posterior Segment OCT Retina-Both eyes    2. Diabetic cataract of both eyes  Can consider CE/IOL if can get control of ME  Pt has appt Dr Gallagher in 3 months    3. PVD (posterior vitreous detachment), both eyes  No breaks  RD precautions    4. Ocular hypertension, right  Etiology unclear.  No NVI  noted  IOP WNL today  Pt not taking Xal as instructed.   Will cont to observe and restart if IOP increases      Follow up in about 1 month (around 7/13/2024), or if symptoms worsen or fail to improve, for OCT Mac, Dilated examination, Injection Right eye, Eylea poss.     Patient identified.  Timeout performed.    Risks, benefits, and alternatives to treatment were discussed in detail with the patient, including bleeding/infection (endophthalmitis)/etc.  The patient voiced understanding and wished to proceed with the procedure.  See separate consent form.    Injection Procedure Note:  Diagnosis: NPDR wME Left Eye    Topical Proparacaine drop placed then topical 5% Betadine  Sterile gloves used, and sterile lid speculum placed.  5% Betadine placed at injection site again prior to injection.  Eylea 2mg in 0.05cc Injected inferotemporally 3.5-4mm posterior to the limbus.  Complications: None  Va at least CF at 5 feet post injection.  Retina, ONH, IOP normal after injection.    Followup as above.  Patient should return immediately PRN.  Retinal Detachment and Endophthalmitis precautions given.

## 2024-07-09 DIAGNOSIS — E11.3313 TYPE 2 DIABETES MELLITUS WITH BOTH EYES AFFECTED BY MODERATE NONPROLIFERATIVE RETINOPATHY AND MACULAR EDEMA, WITH LONG-TERM CURRENT USE OF INSULIN: Primary | ICD-10-CM

## 2024-07-09 DIAGNOSIS — Z79.4 TYPE 2 DIABETES MELLITUS WITH BOTH EYES AFFECTED BY MODERATE NONPROLIFERATIVE RETINOPATHY AND MACULAR EDEMA, WITH LONG-TERM CURRENT USE OF INSULIN: Primary | ICD-10-CM

## 2024-08-28 DIAGNOSIS — E11.9 TYPE 2 DIABETES MELLITUS WITHOUT COMPLICATION: ICD-10-CM

## 2024-10-09 DIAGNOSIS — E11.9 TYPE 2 DIABETES MELLITUS WITHOUT COMPLICATION: ICD-10-CM

## 2024-11-18 DIAGNOSIS — I50.22 CHRONIC SYSTOLIC CONGESTIVE HEART FAILURE, NYHA CLASS 3: ICD-10-CM

## 2024-11-18 DIAGNOSIS — I42.9 CARDIOMYOPATHY, UNSPECIFIED TYPE: ICD-10-CM

## 2024-11-18 NOTE — TELEPHONE ENCOUNTER
Refill Routing Note   Medication(s) are not appropriate for processing by [facility] for the following reason(s):        Required vitals abnormal  Due for refill >6 months ago  Required labs abnormal    [facility] action(s):  Defer               Appointments  past 12m or future 3m with PCP    Date Provider   Last Visit   12/12/2023 Milagros Lebron MD   Next Visit   Visit date not found Milagros Lebron MD   ED visits in past 90 days: 0        Note composed:5:29 PM 11/18/2024

## 2024-11-19 RX ORDER — LISINOPRIL 40 MG/1
40 TABLET ORAL DAILY
Qty: 90 TABLET | Refills: 3 | Status: SHIPPED | OUTPATIENT
Start: 2024-11-19 | End: 2025-11-19

## 2024-12-12 DIAGNOSIS — I50.22 CHRONIC SYSTOLIC CONGESTIVE HEART FAILURE, NYHA CLASS 3: ICD-10-CM

## 2024-12-12 DIAGNOSIS — I42.9 CARDIOMYOPATHY, UNSPECIFIED TYPE: ICD-10-CM

## 2024-12-12 DIAGNOSIS — I10 HYPERTENSION, ESSENTIAL: ICD-10-CM

## 2024-12-12 RX ORDER — CARVEDILOL 25 MG/1
25 TABLET ORAL 2 TIMES DAILY
Qty: 180 TABLET | Refills: 3 | Status: SHIPPED | OUTPATIENT
Start: 2024-12-12

## 2024-12-12 NOTE — TELEPHONE ENCOUNTER
Care Due:                  Date            Visit Type   Department     Provider  --------------------------------------------------------------------------------                                EP -                              PRIMARY      [facility] INTERNAL  Last Visit: 12-      CARE ([facility])   MEDICINE       LEONARDO MOREIRA  Next Visit: None Scheduled  None         None Found                                                            Last  Test          Frequency    Reason                     Performed    Due Date  --------------------------------------------------------------------------------    Office Visit  15 months..  atorvastatin, carvediloL,   12- 03-                             furosemide, insulin,                             lisinopriL, tadalafiL....    CMP.........  12 months..  atorvastatin, furosemide,   12- 12-                             insulin, lisinopriL......    HBA1C.......  6 months...  insulin..................  12-   06-    Lipid Panel.  12 months..  atorvastatin.............  04- 04-    Health [facility] Embedded Care Due Messages. Reference number: [id number].   12/12/2024 10:46:41 AM CST

## 2024-12-12 NOTE — TELEPHONE ENCOUNTER
Refill Routing Note   Medication(s) are not appropriate for processing by [facility] for the following reason(s):        Required vitals abnormal    [facility] action(s):  Defer   Requires appointment : Yes     Requires labs : Yes             Appointments  past 12m or future 3m with PCP    Date Provider   Last Visit   12/12/2023 Milagros Lebron MD   Next Visit   Visit date not found Milagros Lebron MD   ED visits in past 90 days: 0        Note composed:2:31 PM 12/12/2024

## 2025-01-16 ENCOUNTER — OFFICE VISIT (OUTPATIENT)
Dept: OPHTHALMOLOGY | Facility: CLINIC | Age: 75
End: 2025-01-16
Payer: MEDICARE

## 2025-01-16 ENCOUNTER — CLINICAL SUPPORT (OUTPATIENT)
Dept: OPHTHALMOLOGY | Facility: CLINIC | Age: 75
End: 2025-01-16
Payer: MEDICARE

## 2025-01-16 DIAGNOSIS — E11.36 DIABETIC CATARACT OF BOTH EYES: ICD-10-CM

## 2025-01-16 DIAGNOSIS — H43.813 PVD (POSTERIOR VITREOUS DETACHMENT), BOTH EYES: ICD-10-CM

## 2025-01-16 DIAGNOSIS — H40.051 OCULAR HYPERTENSION, RIGHT EYE: ICD-10-CM

## 2025-01-16 DIAGNOSIS — Z79.4 TYPE 2 DIABETES MELLITUS WITH BOTH EYES AFFECTED BY MODERATE NONPROLIFERATIVE RETINOPATHY AND MACULAR EDEMA, WITH LONG-TERM CURRENT USE OF INSULIN: Primary | ICD-10-CM

## 2025-01-16 DIAGNOSIS — H43.11 VITREOUS HEMORRHAGE OF RIGHT EYE: ICD-10-CM

## 2025-01-16 DIAGNOSIS — E11.3313 TYPE 2 DIABETES MELLITUS WITH BOTH EYES AFFECTED BY MODERATE NONPROLIFERATIVE RETINOPATHY AND MACULAR EDEMA, WITH LONG-TERM CURRENT USE OF INSULIN: Primary | ICD-10-CM

## 2025-01-16 PROCEDURE — 1101F PT FALLS ASSESS-DOCD LE1/YR: CPT | Mod: CPTII,S$GLB,, | Performed by: OPHTHALMOLOGY

## 2025-01-16 PROCEDURE — 99214 OFFICE O/P EST MOD 30 MIN: CPT | Mod: 25,S$GLB,, | Performed by: OPHTHALMOLOGY

## 2025-01-16 PROCEDURE — 92134 CPTRZ OPH DX IMG PST SGM RTA: CPT | Mod: S$GLB,,, | Performed by: OPHTHALMOLOGY

## 2025-01-16 PROCEDURE — 99999 PR PBB SHADOW E&M-EST. PATIENT-LVL III: CPT | Mod: PBBFAC,,, | Performed by: OPHTHALMOLOGY

## 2025-01-16 PROCEDURE — 1160F RVW MEDS BY RX/DR IN RCRD: CPT | Mod: CPTII,S$GLB,, | Performed by: OPHTHALMOLOGY

## 2025-01-16 PROCEDURE — 67028 INJECTION EYE DRUG: CPT | Mod: RT,S$GLB,, | Performed by: OPHTHALMOLOGY

## 2025-01-16 PROCEDURE — 2022F DILAT RTA XM EVC RTNOPTHY: CPT | Mod: CPTII,S$GLB,, | Performed by: OPHTHALMOLOGY

## 2025-01-16 PROCEDURE — 1126F AMNT PAIN NOTED NONE PRSNT: CPT | Mod: CPTII,S$GLB,, | Performed by: OPHTHALMOLOGY

## 2025-01-16 PROCEDURE — 3288F FALL RISK ASSESSMENT DOCD: CPT | Mod: CPTII,S$GLB,, | Performed by: OPHTHALMOLOGY

## 2025-01-16 PROCEDURE — 1159F MED LIST DOCD IN RCRD: CPT | Mod: CPTII,S$GLB,, | Performed by: OPHTHALMOLOGY

## 2025-01-16 RX ORDER — LATANOPROST 50 UG/ML
1 SOLUTION/ DROPS OPHTHALMIC NIGHTLY
Qty: 2.5 ML | Refills: 10 | Status: SHIPPED | OUTPATIENT
Start: 2025-01-16 | End: 2026-01-16

## 2025-01-16 RX ADMIN — Medication 1.25 MG: at 11:01
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
## 2025-01-30 ENCOUNTER — TELEPHONE (OUTPATIENT)
Dept: OPHTHALMOLOGY | Facility: CLINIC | Age: 75
End: 2025-01-30
Payer: MEDICARE

## 2025-01-30 DIAGNOSIS — Z79.4 TYPE 2 DIABETES MELLITUS WITH BOTH EYES AFFECTED BY MODERATE NONPROLIFERATIVE RETINOPATHY AND MACULAR EDEMA, WITH LONG-TERM CURRENT USE OF INSULIN: Primary | ICD-10-CM

## 2025-01-30 DIAGNOSIS — E11.3313 TYPE 2 DIABETES MELLITUS WITH BOTH EYES AFFECTED BY MODERATE NONPROLIFERATIVE RETINOPATHY AND MACULAR EDEMA, WITH LONG-TERM CURRENT USE OF INSULIN: Primary | ICD-10-CM

## 2025-02-12 ENCOUNTER — TELEPHONE (OUTPATIENT)
Dept: PODIATRY | Facility: CLINIC | Age: 75
End: 2025-02-12
Payer: MEDICARE

## 2025-02-12 ENCOUNTER — TELEPHONE (OUTPATIENT)
Dept: INTERNAL MEDICINE | Facility: CLINIC | Age: 75
End: 2025-02-12

## 2025-02-12 ENCOUNTER — OFFICE VISIT (OUTPATIENT)
Dept: INTERNAL MEDICINE | Facility: CLINIC | Age: 75
End: 2025-02-12
Payer: MEDICARE

## 2025-02-12 VITALS
HEART RATE: 71 BPM | WEIGHT: 180.25 LBS | DIASTOLIC BLOOD PRESSURE: 76 MMHG | OXYGEN SATURATION: 99 % | SYSTOLIC BLOOD PRESSURE: 132 MMHG | BODY MASS INDEX: 23.13 KG/M2 | TEMPERATURE: 98 F | RESPIRATION RATE: 18 BRPM | HEIGHT: 74 IN

## 2025-02-12 DIAGNOSIS — Z12.12 ENCOUNTER FOR COLORECTAL CANCER SCREENING: ICD-10-CM

## 2025-02-12 DIAGNOSIS — I50.22 CHRONIC SYSTOLIC CONGESTIVE HEART FAILURE, NYHA CLASS 3: ICD-10-CM

## 2025-02-12 DIAGNOSIS — I48.0 PAROXYSMAL ATRIAL FIBRILLATION: ICD-10-CM

## 2025-02-12 DIAGNOSIS — Z79.4 TYPE 2 DIABETES MELLITUS WITH DIABETIC NEPHROPATHY, WITH LONG-TERM CURRENT USE OF INSULIN: ICD-10-CM

## 2025-02-12 DIAGNOSIS — I10 HYPERTENSION, ESSENTIAL: Primary | ICD-10-CM

## 2025-02-12 DIAGNOSIS — I42.9 CARDIOMYOPATHY, UNSPECIFIED TYPE: ICD-10-CM

## 2025-02-12 DIAGNOSIS — Z12.11 ENCOUNTER FOR COLORECTAL CANCER SCREENING: ICD-10-CM

## 2025-02-12 DIAGNOSIS — E11.21 TYPE 2 DIABETES MELLITUS WITH DIABETIC NEPHROPATHY, WITH LONG-TERM CURRENT USE OF INSULIN: ICD-10-CM

## 2025-02-12 DIAGNOSIS — E78.5 HYPERLIPIDEMIA, UNSPECIFIED HYPERLIPIDEMIA TYPE: ICD-10-CM

## 2025-02-12 DIAGNOSIS — L97.529 ULCER OF LEFT FOOT, UNSPECIFIED ULCER STAGE: ICD-10-CM

## 2025-02-12 DIAGNOSIS — Z12.5 SCREENING PSA (PROSTATE SPECIFIC ANTIGEN): ICD-10-CM

## 2025-02-12 PROCEDURE — 99214 OFFICE O/P EST MOD 30 MIN: CPT | Mod: S$GLB,,, | Performed by: PHYSICIAN ASSISTANT

## 2025-02-12 PROCEDURE — G2211 COMPLEX E/M VISIT ADD ON: HCPCS | Mod: S$GLB,,, | Performed by: PHYSICIAN ASSISTANT

## 2025-02-12 PROCEDURE — 3075F SYST BP GE 130 - 139MM HG: CPT | Mod: CPTII,S$GLB,, | Performed by: PHYSICIAN ASSISTANT

## 2025-02-12 PROCEDURE — 1126F AMNT PAIN NOTED NONE PRSNT: CPT | Mod: CPTII,S$GLB,, | Performed by: PHYSICIAN ASSISTANT

## 2025-02-12 PROCEDURE — 3008F BODY MASS INDEX DOCD: CPT | Mod: CPTII,S$GLB,, | Performed by: PHYSICIAN ASSISTANT

## 2025-02-12 PROCEDURE — 4010F ACE/ARB THERAPY RXD/TAKEN: CPT | Mod: CPTII,S$GLB,, | Performed by: PHYSICIAN ASSISTANT

## 2025-02-12 PROCEDURE — 1159F MED LIST DOCD IN RCRD: CPT | Mod: CPTII,S$GLB,, | Performed by: PHYSICIAN ASSISTANT

## 2025-02-12 PROCEDURE — 99999 PR PBB SHADOW E&M-EST. PATIENT-LVL V: CPT | Mod: PBBFAC,,, | Performed by: PHYSICIAN ASSISTANT

## 2025-02-12 PROCEDURE — 1160F RVW MEDS BY RX/DR IN RCRD: CPT | Mod: CPTII,S$GLB,, | Performed by: PHYSICIAN ASSISTANT

## 2025-02-12 PROCEDURE — 3078F DIAST BP <80 MM HG: CPT | Mod: CPTII,S$GLB,, | Performed by: PHYSICIAN ASSISTANT

## 2025-02-12 RX ORDER — CARVEDILOL 25 MG/1
25 TABLET ORAL 2 TIMES DAILY
Qty: 180 TABLET | Refills: 3 | Status: SHIPPED | OUTPATIENT
Start: 2025-02-12

## 2025-02-12 RX ORDER — FUROSEMIDE 40 MG/1
40 TABLET ORAL DAILY
Qty: 30 TABLET | Refills: 11 | Status: SHIPPED | OUTPATIENT
Start: 2025-02-12 | End: 2026-02-12

## 2025-02-12 RX ORDER — ATORVASTATIN CALCIUM 40 MG/1
40 TABLET, FILM COATED ORAL DAILY
Qty: 90 TABLET | Refills: 3 | Status: SHIPPED | OUTPATIENT
Start: 2025-02-12 | End: 2026-02-12

## 2025-02-12 RX ORDER — LISINOPRIL 40 MG/1
40 TABLET ORAL DAILY
Qty: 90 TABLET | Refills: 3 | Status: SHIPPED | OUTPATIENT
Start: 2025-02-12 | End: 2026-02-12

## 2025-02-12 NOTE — TELEPHONE ENCOUNTER
Call pt and and his wife answer and I was able to help in assisting her with scheduling next availability for pt. I was able to scheduled him with Dr. Michael for February 17, 2025. Pt wife verbally confirmed new appointment date and time.
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
## 2025-02-12 NOTE — TELEPHONE ENCOUNTER
----- Message from MARVEL Sanz FNP sent at 2/12/2025 11:51 AM CST -----  Not a problem.  I have some 3p spots left for next few weeks.  Kaye see if you can coordinate same day DE when we book him  Thanks Mya!  Thanks Eveline-for finding spot!  ----- Message -----  From: Mya Arora PA-C  Sent: 2/12/2025  11:48 AM CST  To: MARVEL Neely FNP; #    Do you have any availability to see Mr. Pete?  He is unclear about his insulin.  Should be on long and short acting but just taking short acting.  I think he needs someone to manage his diabetes specifically and additional teaching.    Thanks!  Mya Arora PA-C  [place]  P: [phone]

## 2025-02-14 ENCOUNTER — PROCEDURE VISIT (OUTPATIENT)
Dept: OPHTHALMOLOGY | Facility: CLINIC | Age: 75
End: 2025-02-14
Payer: MEDICARE

## 2025-02-14 DIAGNOSIS — E11.3313 TYPE 2 DIABETES MELLITUS WITH BOTH EYES AFFECTED BY MODERATE NONPROLIFERATIVE RETINOPATHY AND MACULAR EDEMA, WITH LONG-TERM CURRENT USE OF INSULIN: Primary | ICD-10-CM

## 2025-02-14 DIAGNOSIS — Z79.4 TYPE 2 DIABETES MELLITUS WITH BOTH EYES AFFECTED BY MODERATE NONPROLIFERATIVE RETINOPATHY AND MACULAR EDEMA, WITH LONG-TERM CURRENT USE OF INSULIN: Primary | ICD-10-CM

## 2025-02-14 RX ORDER — LATANOPROST 50 UG/ML
1 SOLUTION/ DROPS OPHTHALMIC NIGHTLY
Qty: 2.5 ML | Refills: 10 | Status: SHIPPED | OUTPATIENT
Start: 2025-02-14 | End: 2026-02-14

## 2025-02-14 RX ADMIN — Medication 1.25 MG: at 03:02

## 2025-02-14 NOTE — PROGRESS NOTES
Subjective:       Patient ID: Jose Pete is a 74 y.o. male      Chief Complaint   Patient presents with    Injections     History of Present Illness  HPI    1m OCT/Elicia OS only    Eye gtts:  Latanoprost qhs   Last edited by Lars Garcia MD on 2/14/2025  3:28 PM.        Imaging:    See report    Assessment/Plan:     1. Type 2 diabetes mellitus with both eyes affected by moderate nonproliferative retinopathy and macular edema, with long-term current use of insulin  Lost to f/u yet again  Extremely difficult to have pt follow up for planned treatments    Previously treated with Avastin intermittently     Had Ey once June 2024 before lost to f/u today  Unable to assess effect    Improved OD today 1 mo s/p Av with better OCT and Va  RTC 1m Elicia OD    Av OS today as planned    Pt only will do one eye at a time  Discussed importance of compliance with Rx plan    Would hold off on change to steroids until has gl evaluation or pt wants CE/IOL    Diabetic Retinopathy discussed in detail, all questions answered  Stressed importance of good BS/BP/Chol Control  RTC immediately PRN any vision changes, oswald blurry vision, missing vision, floaters, distortions, etc    - Prior authorization Order  - Posterior Segment OCT Retina-Both eyes      Follow up in about 1 month (around 3/14/2025), or if symptoms worsen or fail to improve, for OCT and INJECTION ONLY (DILATE INJECTION EYE), Injection Right eye, Avastin.

## 2025-02-17 ENCOUNTER — OFFICE VISIT (OUTPATIENT)
Dept: PODIATRY | Facility: CLINIC | Age: 75
End: 2025-02-17
Payer: MEDICARE

## 2025-02-17 VITALS
BODY MASS INDEX: 23.14 KG/M2 | HEART RATE: 67 BPM | WEIGHT: 180.31 LBS | HEIGHT: 74 IN | DIASTOLIC BLOOD PRESSURE: 67 MMHG | RESPIRATION RATE: 18 BRPM | SYSTOLIC BLOOD PRESSURE: 190 MMHG

## 2025-02-17 DIAGNOSIS — M20.5X1 HALLUX LIMITUS, ACQUIRED, RIGHT: ICD-10-CM

## 2025-02-17 DIAGNOSIS — M20.5X2 HALLUX LIMITUS, ACQUIRED, LEFT: ICD-10-CM

## 2025-02-17 DIAGNOSIS — M20.42 HAMMER TOES OF BOTH FEET: ICD-10-CM

## 2025-02-17 DIAGNOSIS — E11.49 TYPE II DIABETES MELLITUS WITH NEUROLOGICAL MANIFESTATIONS: Primary | ICD-10-CM

## 2025-02-17 DIAGNOSIS — T25.222A SECOND DEGREE BURN OF LEFT FOOT, INITIAL ENCOUNTER: ICD-10-CM

## 2025-02-17 DIAGNOSIS — M20.41 HAMMER TOES OF BOTH FEET: ICD-10-CM

## 2025-02-17 PROCEDURE — 3077F SYST BP >= 140 MM HG: CPT | Mod: CPTII,S$GLB,, | Performed by: PODIATRIST

## 2025-02-17 PROCEDURE — 3008F BODY MASS INDEX DOCD: CPT | Mod: CPTII,S$GLB,, | Performed by: PODIATRIST

## 2025-02-17 PROCEDURE — 1159F MED LIST DOCD IN RCRD: CPT | Mod: CPTII,S$GLB,, | Performed by: PODIATRIST

## 2025-02-17 PROCEDURE — 3078F DIAST BP <80 MM HG: CPT | Mod: CPTII,S$GLB,, | Performed by: PODIATRIST

## 2025-02-17 PROCEDURE — 1126F AMNT PAIN NOTED NONE PRSNT: CPT | Mod: CPTII,S$GLB,, | Performed by: PODIATRIST

## 2025-02-17 PROCEDURE — 4010F ACE/ARB THERAPY RXD/TAKEN: CPT | Mod: CPTII,S$GLB,, | Performed by: PODIATRIST

## 2025-02-17 PROCEDURE — 1160F RVW MEDS BY RX/DR IN RCRD: CPT | Mod: CPTII,S$GLB,, | Performed by: PODIATRIST

## 2025-02-17 NOTE — PATIENT INSTRUCTIONS
The following guidelines will help you care for your wound at home:  Clean the lrft foot separate from your body with warm running water and antibacterial soap such as dial soap or saline wound spray  Clean any remaining crust away with soap and water using a cotton-tipped applicator. And DRY completely dry  Apply betadine to the wound twice daily.  Cover with a breathable bandage or white socks which have been bleached clean  Change the dressing daily, or whenever it becomes wet or dirty.  If you were prescribed antibiotics, take them as directed until they are all gone.      Shoe purchasing ideas: (try 6pm.Digital Assent, zapposReadyForZero , XY Mobile, or shoes.Digital Assent for discounted prices) you can visit varsity shoes in [facility] [employer] DSW shoes in [place]  or mobile mum in the [facility] (there are also several shoe brand outlets in the [facility])    ONLY purchase stability style tennis shoes AVOID flex, foam, free, yoga mat style shoes or shoes that claim to feel like clouds  If you have a flatter foot purchase shoes for PRONATION  If you have a high arch purchase shoes for SUPINATION    Shoe examples:    Asics (GT or gel foundations, gel-kayano-30), new balance stability type shoes (such as the 940 series or the [id number]), leia (Guide 16, stabil c3),  Elias (GTS or Beast or transcend), propet, HokaOne (brock 7, arahi, adonis) Elmer (tennis shoes and boots)    Sofft Brand (women) Ryley&Amari (men), bared, clarks, crocs, aerosoles, naturalizers, SAS, ecco, born, best dey, rockports (dress shoes)    Vionic, burkenstocks, fitflops, propet, taos, baretraps, oofos, Hoka or vionic recovery slides/sandals (sandals)    Hoka or vionic recovery slides/sandals, birkenstock rubber sandals, crocs(especially the recovery and support styles), propet. Bared, vionic slippers, PowerStep slippers, Aetrex slippers (house shoes)    Over the Counter Insert Recommendations (always go for FULL length  inserts):  - Spenco brand (orthotic are or total support)  - SuperFeet (look for the ones that offer support and/or pain relief)  - Nath   - PowerStep [place]  - OrthoFeet      Over the counter pain creams: Voltaren Gel, Biofreeze, Bengay, tiger balm, two old goat, lidocaine gel,  Absorbine Veterinary Liniment Gel Topical Analgesic Sore Muscle and Joint Pain Relief    Alternative Pain remedies: Omega-3 EFAs, tumeric with black pepper, green tea, Bromelain, Arnica, garlic    Anti-inflammatory foods  An anti-inflammatory diet should include these foods:  tomatoes  olive oil  green leafy vegetables, such as spinach, kale, and collards  nuts like almonds and walnuts  fatty fish like salmon, mackerel, tuna, and sardines  fruits such as strawberries, blueberries, cherries, and oranges   Foods that cause inflammation  Try to avoid or limit these foods as much as possible:  refined carbohydrates, such as white bread and pastries  French fries and other fried foods  soda and other sugar-sweetened beverages  red meat (burgers, steaks) and processed meat (hot dogs, sausage)  margarine, shortening, and lard          Recommend lotions: eucerin, eucerin for diabetics, aquaphor, A&D ointment, gold bond for diabetics, sween, Smith's Bees all purpose baby ointment,  urea 40 with aloe or SkinIntegra rapid crack repair (found on amazon.com)    Nail Home remedy:  Vicks Vapor rub or Emuaid to nails for easier manageability    Diabetes increases your chances of developing foot problems. So inspect your feet every day. This helps you find small skin irritations before they become serious infections. If you have trouble seeing the bottoms of your feet, use a mirror or ask a family member or friend to help.     Pressure spots on the bottom of the foot are common areas where problems develop.   How to check your feet  Below are tips to help you look for foot problems. Try to check your feet at the same time each day, such as when you get  out of bed in the morning:  Check the top of each foot. The tops of toes, back of the heel, and outer edge of the foot can get a lot of rubbing from poor-fitting shoes.  Check the bottom of each foot. Daily wear and tear often leads to problems at pressure spots.  Check the toes and nails. Fungal infections often occur between toes. Toenail problems can also be a sign of fungal infections or lead to breaks in the skin.  Check your shoes, too. Loose objects inside a shoe can injure the foot. Use your hand to feel inside your shoes for things like gonsalo, loose stitching, or rough areas that could irritate your skin.  Warning signs  Look for any color changes in the foot. Redness with streaks can signal a severe infection, which needs immediate medical attention. Tell your doctor right away if you have any of these problems:  Swelling, sometimes with color changes, may be a sign of poor blood flow or infection. Symptoms include tenderness and an increase in the size of your foot.  Warm or hot areas on your feet may be signs of infection. A foot that is cold may not be getting enough blood.  Sensations such as burning, tingling, or pins and needles can be signs of a problem. Also check for areas that may be numb.  Hot spots are caused by friction or pressure. Look for hot spots in areas that get a lot of rubbing. Hot spots can turn into blisters, calluses, or sores.  Cracks and sores are caused by dry or irritated skin. They are a sign that the skin is breaking down, which can lead to infection.  Toenail problems to watch for include nails growing into the skin (ingrown toenail) and causing redness or pain. Thick, yellow, or discolored nails can signal a fungal infection.  Drainage and odor can develop from untreated sores and ulcers. Call your doctor right away if you notice white or yellow drainage, bleeding, or unpleasant odor.   © 2741-7731 [employer]. [address] [place] [address], [place] [address].  All rights reserved. This information is not intended as a substitute for professional medical care. Always follow your healthcare professional's instructions.        Step-by-Step:  Inspecting Your Feet (Diabetes)    Date Last Reviewed: 10/1/2016  © 3408-9261 [employer]. [address] [place] [address]. All rights reserved. This information is not intended as a substitute for professional medical care. Always follow your healthcare professional's instructions.

## 2025-02-17 NOTE — PROGRESS NOTES
Subjective:      Patient ID: Jose Pete is a 74 y.o. male.    Chief Complaint: Wound Care (Burned himself about a month ago ) and Wound Check (Healing good )    Jose Pete is a 74 y.o. male with  has a past medical history of Cataract, CHF (congestive heart failure), Combined systolic and diastolic cardiac dysfunction, Hypertension, essential, Pleural effusion, Proteinuria due to type 2 diabetes mellitus, SOB (shortness of breath), Type 2 diabetes mellitus, and Type 2 diabetes mellitus with diabetic polyneuropathy, without long-term current use of insulin. presents to the podiatry clinic for care of  left foot ulcer.   Location: dorsal Onset of the symptoms was about a month ago. Precipitating event: injured placed his feet in hot water when going to bathe. . Current symptoms include: redness and dry skin . Signs of infection:  Patient denies nausea, vomiting, fever, chills. Symptoms have  improved . Patient has had no prior foot problems. Evaluation to date: none. Treatment to date:  Family has been caring for the site with regular cleansing and topical antibiotics.  His family member also has began him on a regimen of supplements and tighter glycemic control since his injury occurred.  Site is nonpainful secondary to neuropathy.      Shoe gear: Tennis shoes    Chief Complaint   Patient presents with    Wound Care     Burned himself about a month ago     Wound Check     Healing good        Hemoglobin A1C   Date Value Ref Range Status   12/12/2023 9.5 (H) 4.0 - 5.6 % Final     Comment:     ADA Screening Guidelines:  5.7-6.4%  Consistent with prediabetes  >or=6.5%  Consistent with diabetes    High levels of fetal hemoglobin interfere with the HbA1C  assay. Heterozygous hemoglobin variants (HbS, HgC, etc)do  not significantly interfere with this assay.   However, presence of multiple variants may affect accuracy.     08/10/2023 9.1 (H) 4.0 - 5.6 % Final     Comment:     ADA Screening Guidelines:  5.7-6.4%   Consistent with prediabetes  >or=6.5%  Consistent with diabetes    High levels of fetal hemoglobin interfere with the HbA1C  assay. Heterozygous hemoglobin variants (HbS, HgC, etc)do  not significantly interfere with this assay.   However, presence of multiple variants may affect accuracy.     04/24/2023 9.9 (H) 4.0 - 5.6 % Final     Comment:     ADA Screening Guidelines:  5.7-6.4%  Consistent with prediabetes  >or=6.5%  Consistent with diabetes    High levels of fetal hemoglobin interfere with the HbA1C  assay. Heterozygous hemoglobin variants (HbS, HgC, etc)do  not significantly interfere with this assay.   However, presence of multiple variants may affect accuracy.           Problem List[1]    Medications Ordered Prior to Encounter[2]    Review of patient's allergies indicates:   Allergen Reactions    Metformin Diarrhea     On 500mg he had such GI issues he lost weight       Past Surgical History:   Procedure Laterality Date    BACK SURGERY  1990    COLONOSCOPY N/A 05/01/2019    Procedure: COLONOSCOPY;  Surgeon: Neo Cole MD;  Location: [address]);  Service: Endoscopy;  Laterality: N/A;       Family History   Problem Relation Name Age of Onset    Heart failure Mother      Emphysema Father      HIV Brother      Cancer Neg Hx      Amblyopia Neg Hx      Blindness Neg Hx      Cataracts Neg Hx      Glaucoma Neg Hx      Macular degeneration Neg Hx      Retinal detachment Neg Hx      Strabismus Neg Hx         Social History[3]    Review of Systems   Constitutional: Negative for chills and fever.   Cardiovascular:  Negative for claudication and leg swelling.   Respiratory:  Negative for cough and shortness of breath.    Skin:  Positive for dry skin and poor wound healing. Negative for itching and rash.   Musculoskeletal:  Positive for joint pain and stiffness. Negative for falls, joint swelling and muscle weakness.   Gastrointestinal:  Negative for diarrhea, nausea and vomiting.   Neurological:   "Positive for numbness and paresthesias. Negative for tremors and weakness.   Psychiatric/Behavioral:  Negative for altered mental status and hallucinations.            Objective:      Vitals:    02/17/25 1008   BP: (!) 190/67   Pulse: 67   Resp: 18   Weight: 81.8 kg (180 lb 5.4 oz)   Height: 6' 2" (1.88 m)   PainSc: 0-No pain       Physical Exam  Vitals and nursing note reviewed.   Constitutional:       General: He is not in acute distress.     Appearance: He is well-developed. He is not toxic-appearing or diaphoretic.      Comments: alert and oriented x 3.    Cardiovascular:      Pulses:           Dorsalis pedis pulses are 2+ on the right side and 2+ on the left side.        Posterior tibial pulses are 2+ on the right side and 2+ on the left side.      Comments:  Capillary refill time is within normal limits.   Pulmonary:      Effort: No respiratory distress.   Musculoskeletal:         General: No deformity.      Right ankle: No tenderness. No lateral malleolus, medial malleolus, AITF ligament, CF ligament or posterior TF ligament tenderness.      Right Achilles Tendon: No defects. Rodriguez's test negative.      Left ankle: No tenderness. No lateral malleolus, medial malleolus, AITF ligament, CF ligament or posterior TF ligament tenderness.      Left Achilles Tendon: No defects. Rodriguez's test negative.      Right foot: No tenderness or bony tenderness.      Left foot: No tenderness or bony tenderness.      Comments: There is equinus deformity bilateral with decreased dorsiflexion at the ankle joint bilateral.     Decreased first MPJ range of motion both weightbearing and nonweightbearing, no crepitus observed the first MP joint, + dorsal flag sign. Mild  bunion deformity is observed .    Patient has hammertoes of digits 2-5 bilateral partially reducible     Muscle strength is 5/5 in all groups bilaterally.    Fat pad atrophy to heels and met heads bilateral       Feet:      Right foot:      Protective Sensation: " 10 sites tested.  3 sites sensed.      Skin integrity: Ulcer and dry skin present.      Left foot:      Protective Sensation: 10 sites tested.  4 sites sensed.      Skin integrity: Dry skin present.   Lymphadenopathy:      Comments: No lymphatic streaking     Skin:     General: Skin is warm and dry.      Coloration: Skin is not pale.      Nails: There is no clubbing.   Neurological:      Sensory: Sensory deficit present.      Motor: No atrophy.   Psychiatric:         Attention and Perception: He is attentive.         Mood and Affect: Mood is not anxious. Affect is not inappropriate.         Speech: He is communicative. Speech is not slurred.         Behavior: Behavior is not combative.       02/17/2025    Ulcer location: dorsal left foot 1st ray  Signs of infection: none  Drainage: scant serosanguinous  Purulence: No  Crepitus/fluctuance: No  Periwound: Calloused  Base: Mixed Granular/Fibrotic  Depth: dermis  Probe to bone: No                  Assessment:       Encounter Diagnoses   Name Primary?    Type II diabetes mellitus with neurological manifestations Yes    Second degree burn of left foot, initial encounter     Hammer toes of both feet     Hallux limitus, acquired, right     Hallux limitus, acquired, left          Plan:     Problem List Items Addressed This Visit    None  Visit Diagnoses         Type II diabetes mellitus with neurological manifestations    -  Primary    Relevant Orders    DIABETIC SHOES FOR HOME USE      Second degree burn of left foot, initial encounter          Hammer toes of both feet        Relevant Orders    DIABETIC SHOES FOR HOME USE      Hallux limitus, acquired, right        Relevant Orders    DIABETIC SHOES FOR HOME USE      Hallux limitus, acquired, left        Relevant Orders    DIABETIC SHOES FOR HOME USE           I counseled the patient on his conditions, their implications and medical management.    Education about the prevention of limb loss.    Discussed wound healing cycle,  skin integrity, ways to care for skin.Counseled patient on the effects of biomechanical pressure and high blood glucose on healing. He verbalizes understanding that it can increase the chances of delayed healing and this prolonged exposure leads to infection or progression of infection which subsequently can result in loss of limb.    Adequate vitamin supplementation, protein intake, and hydration - discussed with patient    The wound is cleansed of foreign material as much as possible and the base inspected for bone or abscess.  Base is granulation and thin epithelialization and without bone nor joint exposure    Dressings:  Iodosorb and Mepilex border    Detail home care instructions discussed and dispensed.    Follow-up:  2-3 weeks but should call [facility] immediately if any signs of infection, such as fever, chills, sweats, increased redness or pain.    Short-term goals include maintaining good offloading and minimizing bioburden, promoting granulation and epithelialization to healing.  Long-term goals include keeping the wound healed by good offloading and medical management under the direction of internist.    Shoe inspection. Diabetic Foot Education. Patient reminded of the importance of good nutrition and blood sugar control to help prevent podiatric complications of diabetes. Patient instructed on proper foot hygeine. We discussed wearing proper shoe gear, daily foot inspections, never walking without protective shoe gear, never putting sharp instruments to feet.       Rx diabetic shoes for protection and support    Procedures                   [1]   Patient Active Problem List  Diagnosis    Type 2 diabetes mellitus with diabetic nephropathy, with long-term current use of insulin    Hypertension, essential    Chronic combined systolic and diastolic congestive heart failure, NYHA class 2    Heart failure with reduced ejection fraction    Proteinuria due to type 2 diabetes mellitus    Pure hypercholesterolemia  "   Type 2 diabetes mellitus with both eyes affected by moderate nonproliferative retinopathy and macular edema, with long-term current use of insulin    Cardiomyopathy    Orthostatic hypotension    Paroxysmal atrial fibrillation    Disorder of arteries and arterioles, unspecified   [2]   Current Outpatient Medications on File Prior to Visit   Medication Sig Dispense Refill    apixaban (ELIQUIS) 5 mg Tab Take 5 mg by mouth 2 (two) times daily.      atorvastatin (LIPITOR) 40 MG tablet Take 1 tablet (40 mg total) by mouth once daily. 90 tablet 3    blood sugar diagnostic Strp To check BG 5 times daily, to use with insurance preferred meter 200 each 11    blood-glucose meter kit To check BG 5 times daily, to use with insurance preferred meter 1 each 0    carvediloL (COREG) 25 MG tablet Take 1 tablet (25 mg total) by mouth 2 (two) times daily. 180 tablet 3    furosemide (LASIX) 40 MG tablet Take 1 tablet (40 mg total) by mouth once daily. 30 tablet 11    insulin lispro (HUMALOG KWIKPEN INSULIN) 100 unit/mL pen Inject 6 Units into the skin 3 (three) times daily. 15 mL 6    lancets 33 gauge Misc use as directed (Patient taking differently: use as directed) 200 each 11    latanoprost (XALATAN) 0.005 % ophthalmic solution Place 1 drop into both eyes every evening. 2.5 mL 10    lisinopriL (PRINIVIL,ZESTRIL) 40 MG tablet Take 1 tablet (40 mg total) by mouth once daily. 90 tablet 3    pen needle, diabetic 32 gauge x 5/32" Ndle 1 each by Misc.(Non-Drug; Combo Route) route once daily. 100 each 11    aspirin (ECOTRIN) 81 MG EC tablet Take 1 tablet (81 mg total) by mouth once daily.  0    flecainide (TAMBOCOR) 100 MG Tab Take 1 tablet (100 mg total) by mouth every 12 (twelve) hours. 60 tablet 11    lancing device Misc 1 Device by Misc.(Non-Drug; Combo Route) route 2 (two) times daily with meals. 1 each 0    tadalafiL (CIALIS) 20 MG Tab Take 1 tablet (20 mg total) by mouth daily as needed. 10 tablet 5     Current " Facility-Administered Medications on File Prior to Visit   Medication Dose Route Frequency Provider Last Rate Last Admin    bevacizumab (Avastin) 25 mg/mL ophthalmic injection syringe 1.25 mg  1.25 mg Intravitreal     1.25 mg at 01/16/25 1138    bevacizumab (Avastin) 25 mg/mL ophthalmic injection syringe 1.25 mg  1.25 mg Intravitreal     1.25 mg at 02/14/25 1530   [3]   Social History  Socioeconomic History    Marital status:    Tobacco Use    Smoking status: Never    Smokeless tobacco: Never   Substance and Sexual Activity    Alcohol use: No     Alcohol/week: 6.0 standard drinks of alcohol     Types: 6 Cans of beer per week     Comment: hasn't drank since October 2017, previous 1-2 beers/day    Drug use: No    Sexual activity: Yes

## 2025-02-18 ENCOUNTER — LAB VISIT (OUTPATIENT)
Dept: LAB | Facility: HOSPITAL | Age: 75
End: 2025-02-18
Payer: MEDICARE

## 2025-02-18 ENCOUNTER — TELEPHONE (OUTPATIENT)
Dept: INTERNAL MEDICINE | Facility: CLINIC | Age: 75
End: 2025-02-18
Payer: MEDICARE

## 2025-02-18 DIAGNOSIS — E11.21 TYPE 2 DIABETES MELLITUS WITH DIABETIC NEPHROPATHY, WITH LONG-TERM CURRENT USE OF INSULIN: Primary | ICD-10-CM

## 2025-02-18 DIAGNOSIS — E78.5 HYPERLIPIDEMIA, UNSPECIFIED HYPERLIPIDEMIA TYPE: ICD-10-CM

## 2025-02-18 DIAGNOSIS — Z79.4 TYPE 2 DIABETES MELLITUS WITH DIABETIC NEPHROPATHY, WITH LONG-TERM CURRENT USE OF INSULIN: ICD-10-CM

## 2025-02-18 DIAGNOSIS — E11.21 TYPE 2 DIABETES MELLITUS WITH DIABETIC NEPHROPATHY, WITH LONG-TERM CURRENT USE OF INSULIN: ICD-10-CM

## 2025-02-18 DIAGNOSIS — Z12.5 SCREENING PSA (PROSTATE SPECIFIC ANTIGEN): ICD-10-CM

## 2025-02-18 DIAGNOSIS — I10 HYPERTENSION, ESSENTIAL: ICD-10-CM

## 2025-02-18 DIAGNOSIS — Z79.4 TYPE 2 DIABETES MELLITUS WITH DIABETIC NEPHROPATHY, WITH LONG-TERM CURRENT USE OF INSULIN: Primary | ICD-10-CM

## 2025-02-18 LAB
ALBUMIN SERPL BCP-MCNC: 3.7 G/DL (ref 3.5–5.2)
ALP SERPL-CCNC: 66 U/L (ref 40–150)
ALT SERPL W/O P-5'-P-CCNC: 17 U/L (ref 10–44)
ANION GAP SERPL CALC-SCNC: 9 MMOL/L (ref 8–16)
AST SERPL-CCNC: 21 U/L (ref 10–40)
BASOPHILS # BLD AUTO: 0.09 K/UL (ref 0–0.2)
BASOPHILS NFR BLD: 1.2 % (ref 0–1.9)
BILIRUB SERPL-MCNC: 0.6 MG/DL (ref 0.1–1)
BUN SERPL-MCNC: 36 MG/DL (ref 8–23)
CALCIUM SERPL-MCNC: 11 MG/DL (ref 8.7–10.5)
CHLORIDE SERPL-SCNC: 105 MMOL/L (ref 95–110)
CHOLEST SERPL-MCNC: 133 MG/DL (ref 120–199)
CHOLEST/HDLC SERPL: 2.8 {RATIO} (ref 2–5)
CO2 SERPL-SCNC: 27 MMOL/L (ref 23–29)
COMPLEXED PSA SERPL-MCNC: 0.29 NG/ML (ref 0–4)
CREAT SERPL-MCNC: 1.8 MG/DL (ref 0.5–1.4)
DIFFERENTIAL METHOD BLD: ABNORMAL
EOSINOPHIL # BLD AUTO: 0.2 K/UL (ref 0–0.5)
EOSINOPHIL NFR BLD: 2.6 % (ref 0–8)
ERYTHROCYTE [DISTWIDTH] IN BLOOD BY AUTOMATED COUNT: 12.1 % (ref 11.5–14.5)
EST. GFR  (NO RACE VARIABLE): 39 ML/MIN/1.73 M^2
ESTIMATED AVG GLUCOSE: 206 MG/DL (ref 68–131)
GLUCOSE SERPL-MCNC: 149 MG/DL (ref 70–110)
HBA1C MFR BLD: 8.8 % (ref 4–5.6)
HCT VFR BLD AUTO: 36.7 % (ref 40–54)
HDLC SERPL-MCNC: 47 MG/DL (ref 40–75)
HDLC SERPL: 35.3 % (ref 20–50)
HGB BLD-MCNC: 11.8 G/DL (ref 14–18)
IMM GRANULOCYTES # BLD AUTO: 0.02 K/UL (ref 0–0.04)
IMM GRANULOCYTES NFR BLD AUTO: 0.3 % (ref 0–0.5)
LDLC SERPL CALC-MCNC: 66.8 MG/DL (ref 63–159)
LYMPHOCYTES # BLD AUTO: 1.4 K/UL (ref 1–4.8)
LYMPHOCYTES NFR BLD: 19.6 % (ref 18–48)
MCH RBC QN AUTO: 29.9 PG (ref 27–31)
MCHC RBC AUTO-ENTMCNC: 32.2 G/DL (ref 32–36)
MCV RBC AUTO: 93 FL (ref 82–98)
MONOCYTES # BLD AUTO: 0.7 K/UL (ref 0.3–1)
MONOCYTES NFR BLD: 9.8 % (ref 4–15)
NEUTROPHILS # BLD AUTO: 4.8 K/UL (ref 1.8–7.7)
NEUTROPHILS NFR BLD: 66.5 % (ref 38–73)
NONHDLC SERPL-MCNC: 86 MG/DL
NRBC BLD-RTO: 0 /100 WBC
PLATELET # BLD AUTO: 278 K/UL (ref 150–450)
PMV BLD AUTO: 11.8 FL (ref 9.2–12.9)
POTASSIUM SERPL-SCNC: 5.3 MMOL/L (ref 3.5–5.1)
PROT SERPL-MCNC: 7.3 G/DL (ref 6–8.4)
RBC # BLD AUTO: 3.95 M/UL (ref 4.6–6.2)
SODIUM SERPL-SCNC: 141 MMOL/L (ref 136–145)
TRIGL SERPL-MCNC: 96 MG/DL (ref 30–150)
TSH SERPL DL<=0.005 MIU/L-ACNC: 2.85 UIU/ML (ref 0.4–4)
WBC # BLD AUTO: 7.25 K/UL (ref 3.9–12.7)

## 2025-02-18 PROCEDURE — 83036 HEMOGLOBIN GLYCOSYLATED A1C: CPT | Performed by: PHYSICIAN ASSISTANT

## 2025-02-18 PROCEDURE — 80053 COMPREHEN METABOLIC PANEL: CPT | Performed by: PHYSICIAN ASSISTANT

## 2025-02-18 PROCEDURE — 85025 COMPLETE CBC W/AUTO DIFF WBC: CPT | Performed by: PHYSICIAN ASSISTANT

## 2025-02-18 PROCEDURE — 36415 COLL VENOUS BLD VENIPUNCTURE: CPT | Performed by: PHYSICIAN ASSISTANT

## 2025-02-18 PROCEDURE — 84153 ASSAY OF PSA TOTAL: CPT | Performed by: PHYSICIAN ASSISTANT

## 2025-02-18 PROCEDURE — 84443 ASSAY THYROID STIM HORMONE: CPT | Performed by: PHYSICIAN ASSISTANT

## 2025-02-18 PROCEDURE — 80061 LIPID PANEL: CPT | Performed by: PHYSICIAN ASSISTANT

## 2025-02-18 NOTE — TELEPHONE ENCOUNTER
----- Message from Savana sent at 2/18/2025 10:44 AM CST -----  Contact: [phone]  Would like to receive medical advice.Pt daughter called to reschedule pt appt. Would they like a call back or a response via [employer]:  callAdditional information:  Please call to advise.

## 2025-02-19 NOTE — PROGRESS NOTES
Mr. Pete is a patient of Dr. Huffman and was last seen in clinic 3/4/2024.      Subjective:   Patient ID:  Jose Pete is a 74 y.o. male who presents for follow up of Atrial Fibrillation and Atrial Flutter  .     HPI:    Mr. Pete is a 74 y.o. male with HTN, HLD, DM, HFrEF (EF recovered), AF here for follow up.     Background:    Jose Pete has a hx of HTN, HLD, DM2 on insulin, HFrEF based on 9/2018 echo, who was admitted to [facility] in 8/2023 with lightheadedness, near syncope. SBP in the 90s. AF at 71 bpm. DANA with Cr 2. Rehydrated, spontaneously converted to NSR, and started on eliquis. Most recent Cr 1.4.    At his initial visit in 8/2023 it was unclear what Mr. Pete's AF burden was. This prompted a 2 week Zio XT monitor (8/2023) showing sinus rhythm average 74 bpm (range  bpm), AF burden 9% with longest episode 13 hours. Asymptomatic.    Echo in 8/2023 showed EF 60-65% (40-45% in 9/2018).    Following a negative stress test in 12/2023, flecainide 100 mg bid was started.    3/4/2024: Today in the office Mr. Pete denies recurrent arrhythmias. No side-effects on flecainide.No bleeding on eliquis.  ECG is sinus rhythm at 69  bpm with QRSd 90 ms.  In summary, Jose Ruiz is  73M with a history of PAF (minimally symptomatic). His [id number] Score is 3 (age, HTN, DM, CHF) so he should continue eliquis.  Pt has a 9% AF burden based on 8/2023 monitor. Now on flecainide 100 mg bid.  Plan is Zio in 6 months, f/u 1 yr.    Update (02/20/2025):    Today he says he feels well with no cardiac complaints. Mr. Pete reports no chest pain with exertion or at rest, palpitations, SOB, LYONS, dizziness, or syncope.    He is not on eliquis or flecainide but is on carvedilol 25mg BID for HR control.  Kidney function is low, with a creatinine of 1.8 on 2/18/2025.    I have personally reviewed the patient's EKG today, which shows AFL at 68bpm. QRS is 80. QTc is 476.    Relevant Cardiac Test Results:    SPECT  (12/4/2023):  Normal myocardial perfusion scan. There is no evidence of myocardial ischemia or infarction.     2D Echo (8/21/2023):    Left Ventricle: The left ventricle is normal in size. Ventricular mass is normal. Normal wall thickness. Normal wall motion. There is normal systolic function with a visually estimated ejection fraction of 60 - 65%. There is normal diastolic function.    Right Ventricle: Normal right ventricular cavity size. Wall thickness is normal. Right ventricle wall motion  is normal. Systolic function is normal.    Aortic Valve: The aortic valve is a trileaflet valve. There is moderate aortic valve sclerosis. Mild annular calcification.    Mitral Valve: There is bileaflet sclerosis. Mild mitral annular calcification.    IVC/SVC: Normal venous pressure at 3 mmHg.    Pericardium: There is a trivial posterior effusion.    Current Outpatient Medications   Medication Sig    apixaban (ELIQUIS) 5 mg Tab Take 5 mg by mouth 2 (two) times daily.    aspirin (ECOTRIN) 81 MG EC tablet Take 1 tablet (81 mg total) by mouth once daily.    atorvastatin (LIPITOR) 40 MG tablet Take 1 tablet (40 mg total) by mouth once daily.    blood sugar diagnostic Strp To check BG 5 times daily, to use with insurance preferred meter    blood-glucose meter kit To check BG 5 times daily, to use with insurance preferred meter    carvediloL (COREG) 25 MG tablet Take 1 tablet (25 mg total) by mouth 2 (two) times daily.    flecainide (TAMBOCOR) 100 MG Tab Take 1 tablet (100 mg total) by mouth every 12 (twelve) hours.    furosemide (LASIX) 40 MG tablet Take 1 tablet (40 mg total) by mouth once daily.    insulin lispro (HUMALOG KWIKPEN INSULIN) 100 unit/mL pen Inject 6 Units into the skin 3 (three) times daily.    lancets 33 gauge Misc use as directed (Patient taking differently: use as directed)    lancing device Misc 1 Device by Misc.(Non-Drug; Combo Route) route 2 (two) times daily with meals.    latanoprost (XALATAN) 0.005 %  "ophthalmic solution Place 1 drop into both eyes every evening.    lisinopriL (PRINIVIL,ZESTRIL) 40 MG tablet Take 1 tablet (40 mg total) by mouth once daily.    pen needle, diabetic 32 gauge x 5/32" Ndle 1 each by Misc.(Non-Drug; Combo Route) route once daily.    tadalafiL (CIALIS) 20 MG Tab Take 1 tablet (20 mg total) by mouth daily as needed.     Current Facility-Administered Medications   Medication    bevacizumab (Avastin) 25 mg/mL ophthalmic injection syringe 1.25 mg    bevacizumab (Avastin) 25 mg/mL ophthalmic injection syringe 1.25 mg       Review of Systems   Constitutional: Negative for malaise/fatigue.   Cardiovascular:  Negative for chest pain, dyspnea on exertion, irregular heartbeat, leg swelling and palpitations.   Respiratory:  Negative for shortness of breath.    Hematologic/Lymphatic: Negative for bleeding problem.   Skin:  Negative for rash.   Musculoskeletal:  Negative for myalgias.   Gastrointestinal:  Negative for hematemesis, hematochezia and nausea.   Genitourinary:  Negative for hematuria.   Neurological:  Negative for light-headedness.   Psychiatric/Behavioral:  Negative for altered mental status.    Allergic/Immunologic: Negative for persistent infections.     Objective:        BP (!) 136/44   Pulse 68   Ht 6' 2" (1.88 m)   Wt 81.8 kg (180 lb 5.4 oz)   BMI 23.15 kg/m²     Physical Exam  Vitals and nursing note reviewed.   Constitutional:       Appearance: Normal appearance. He is well-developed.   HENT:      Head: Normocephalic.      Nose: Nose normal.   Eyes:      Pupils: Pupils are equal, round, and reactive to light.   Cardiovascular:      Rate and Rhythm: Normal rate and regular rhythm.   Pulmonary:      Effort: No respiratory distress.   Musculoskeletal:         General: Normal range of motion.   Skin:     General: Skin is warm and dry.      Findings: No erythema.   Neurological:      Mental Status: He is alert and oriented to person, place, and time.   Psychiatric:         Speech: " Speech normal.         Behavior: Behavior normal.       Lab Results   Component Value Date     02/18/2025    K 5.3 (H) 02/18/2025    MG 1.6 08/09/2023    BUN 36 (H) 02/18/2025    CREATININE 1.8 (H) 02/18/2025    ALT 17 02/18/2025    AST 21 02/18/2025    HGB 11.8 (L) 02/18/2025    HCT 36.7 (L) 02/18/2025    TSH 2.846 02/18/2025    LDLCALC 66.8 02/18/2025       Recent Labs   Lab 08/09/23  1238   INR 1.0       Assessment:     1. Paroxysmal atrial fibrillation    2. Hypertension, essential    3. Heart failure with reduced ejection fraction    4. On anticoagulant therapy      Plan:     In summary, Mr. Pete is a 74 y.o. male with HTN, HLD, DM, HFrEF (EF recovered), AF here for follow up.   Pt in likely AFL today, asymptomatic, feels well. Rates controlled. He is not on eliquis or flecainide (has his medicine bottles). Will restart eliquis. Update echo.  Pt says he had a recent monitor but no results found - will investigate.     Start eliquis 5mg BID  TTE  Monitor results?  Continue other meds  RTC after testing      *A copy of this note has been sent to Dr. Huffman*    No follow-ups on file.      ------------------------------------------------------------------    MARVEL Bernard, NP-C  Cardiac Electrophysiology

## 2025-02-20 ENCOUNTER — OFFICE VISIT (OUTPATIENT)
Dept: ELECTROPHYSIOLOGY | Facility: CLINIC | Age: 75
End: 2025-02-20
Payer: MEDICARE

## 2025-02-20 VITALS
DIASTOLIC BLOOD PRESSURE: 44 MMHG | WEIGHT: 180.31 LBS | HEART RATE: 68 BPM | BODY MASS INDEX: 23.14 KG/M2 | SYSTOLIC BLOOD PRESSURE: 136 MMHG | HEIGHT: 74 IN

## 2025-02-20 DIAGNOSIS — Z79.01 ON ANTICOAGULANT THERAPY: ICD-10-CM

## 2025-02-20 DIAGNOSIS — I50.20 HEART FAILURE WITH REDUCED EJECTION FRACTION: ICD-10-CM

## 2025-02-20 DIAGNOSIS — I10 HYPERTENSION, ESSENTIAL: ICD-10-CM

## 2025-02-20 DIAGNOSIS — I48.0 PAROXYSMAL ATRIAL FIBRILLATION: Primary | ICD-10-CM

## 2025-02-20 NOTE — Clinical Note
[employer] - this [facility] patient insists he wore a zio or bardy a couple of months ago and I can see an order placed by Dr. Huffman in March of last year. Do you know if he was sent a monitor late last year? He even says he mailed it back. Thanks!

## 2025-02-21 ENCOUNTER — CLINICAL SUPPORT (OUTPATIENT)
Dept: CARDIOLOGY | Facility: HOSPITAL | Age: 75
End: 2025-02-21
Attending: INTERNAL MEDICINE
Payer: MEDICARE

## 2025-02-21 DIAGNOSIS — I48.0 PAROXYSMAL ATRIAL FIBRILLATION: ICD-10-CM

## 2025-02-21 DIAGNOSIS — I50.42 CHRONIC COMBINED SYSTOLIC AND DIASTOLIC CONGESTIVE HEART FAILURE, NYHA CLASS 2: ICD-10-CM

## 2025-02-21 LAB
OHS QRS DURATION: 80 MS
OHS QTC CALCULATION: 476 MS

## 2025-02-24 ENCOUNTER — PATIENT OUTREACH (OUTPATIENT)
Dept: ADMINISTRATIVE | Facility: HOSPITAL | Age: 75
End: 2025-02-24
Payer: MEDICARE

## 2025-02-24 DIAGNOSIS — Z12.11 ENCOUNTER FOR FIT (FECAL IMMUNOCHEMICAL TEST) SCREENING: Primary | ICD-10-CM

## 2025-02-25 ENCOUNTER — RESULTS FOLLOW-UP (OUTPATIENT)
Dept: INTERNAL MEDICINE | Facility: CLINIC | Age: 75
End: 2025-02-25
Payer: MEDICARE

## 2025-02-25 DIAGNOSIS — R79.89 ELEVATED SERUM CREATININE: ICD-10-CM

## 2025-02-25 DIAGNOSIS — E83.52 HYPERCALCEMIA: ICD-10-CM

## 2025-02-25 DIAGNOSIS — E87.5 HYPERKALEMIA: Primary | ICD-10-CM

## 2025-02-26 ENCOUNTER — LAB VISIT (OUTPATIENT)
Dept: LAB | Facility: HOSPITAL | Age: 75
End: 2025-02-26
Payer: MEDICARE

## 2025-02-26 DIAGNOSIS — R79.89 ELEVATED SERUM CREATININE: ICD-10-CM

## 2025-02-26 DIAGNOSIS — E87.5 HYPERKALEMIA: ICD-10-CM

## 2025-02-26 DIAGNOSIS — E83.52 HYPERCALCEMIA: ICD-10-CM

## 2025-02-26 LAB
ALBUMIN SERPL BCP-MCNC: 3.8 G/DL (ref 3.5–5.2)
ALP SERPL-CCNC: 61 U/L (ref 40–150)
ALT SERPL W/O P-5'-P-CCNC: 16 U/L (ref 10–44)
ANION GAP SERPL CALC-SCNC: 6 MMOL/L (ref 8–16)
AST SERPL-CCNC: 23 U/L (ref 10–40)
BILIRUB SERPL-MCNC: 0.5 MG/DL (ref 0.1–1)
BUN SERPL-MCNC: 40 MG/DL (ref 8–23)
CALCIUM SERPL-MCNC: 10.2 MG/DL (ref 8.7–10.5)
CHLORIDE SERPL-SCNC: 108 MMOL/L (ref 95–110)
CO2 SERPL-SCNC: 28 MMOL/L (ref 23–29)
CREAT SERPL-MCNC: 1.9 MG/DL (ref 0.5–1.4)
EST. GFR  (NO RACE VARIABLE): 36.6 ML/MIN/1.73 M^2
GLUCOSE SERPL-MCNC: 187 MG/DL (ref 70–110)
POTASSIUM SERPL-SCNC: 5.4 MMOL/L (ref 3.5–5.1)
PROT SERPL-MCNC: 7.3 G/DL (ref 6–8.4)
SODIUM SERPL-SCNC: 142 MMOL/L (ref 136–145)

## 2025-02-26 PROCEDURE — 80053 COMPREHEN METABOLIC PANEL: CPT | Performed by: PHYSICIAN ASSISTANT

## 2025-02-26 PROCEDURE — 36415 COLL VENOUS BLD VENIPUNCTURE: CPT | Performed by: PHYSICIAN ASSISTANT

## 2025-02-28 ENCOUNTER — TELEPHONE (OUTPATIENT)
Dept: PODIATRY | Facility: CLINIC | Age: 75
End: 2025-02-28
Payer: MEDICARE

## 2025-02-28 NOTE — TELEPHONE ENCOUNTER
Call pt and his daughter answer and stated dad was not home and she will talk with hi to confirmed that appointment for 03/03/2025 with Dr. Michael. Pt daughter stated she will give us a call back at [phone].

## 2025-03-03 ENCOUNTER — TELEPHONE (OUTPATIENT)
Dept: PODIATRY | Facility: CLINIC | Age: 75
End: 2025-03-03
Payer: MEDICARE

## 2025-03-03 NOTE — TELEPHONE ENCOUNTER
Attempted to reach out to patient to help reschedule appointment. Patient voiced he will call back to reschedule he is eating right now.

## 2025-03-05 DIAGNOSIS — E11.3313 TYPE 2 DIABETES MELLITUS WITH BOTH EYES AFFECTED BY MODERATE NONPROLIFERATIVE RETINOPATHY AND MACULAR EDEMA, WITH LONG-TERM CURRENT USE OF INSULIN: ICD-10-CM

## 2025-03-05 DIAGNOSIS — Z79.4 TYPE 2 DIABETES MELLITUS WITH DIABETIC NEPHROPATHY, WITH LONG-TERM CURRENT USE OF INSULIN: ICD-10-CM

## 2025-03-05 DIAGNOSIS — E11.21 TYPE 2 DIABETES MELLITUS WITH DIABETIC NEPHROPATHY, WITH LONG-TERM CURRENT USE OF INSULIN: ICD-10-CM

## 2025-03-05 DIAGNOSIS — Z79.4 TYPE 2 DIABETES MELLITUS WITH BOTH EYES AFFECTED BY MODERATE NONPROLIFERATIVE RETINOPATHY AND MACULAR EDEMA, WITH LONG-TERM CURRENT USE OF INSULIN: ICD-10-CM

## 2025-03-05 RX ORDER — INSULIN LISPRO 100 [IU]/ML
6 INJECTION, SOLUTION INTRAVENOUS; SUBCUTANEOUS 3 TIMES DAILY
Qty: 15 ML | Refills: 6 | Status: SHIPPED | OUTPATIENT
Start: 2025-03-05

## 2025-03-05 NOTE — TELEPHONE ENCOUNTER
No care due was identified.  [facility] Embedded Care Due Messages. Reference number: [id number].   3/05/2025 12:33:53 PM CST

## 2025-03-10 ENCOUNTER — RESULTS FOLLOW-UP (OUTPATIENT)
Dept: INTERNAL MEDICINE | Facility: CLINIC | Age: 75
End: 2025-03-10

## 2025-03-11 ENCOUNTER — RESULTS FOLLOW-UP (OUTPATIENT)
Dept: INTERNAL MEDICINE | Facility: CLINIC | Age: 75
End: 2025-03-11

## 2025-03-11 ENCOUNTER — LAB VISIT (OUTPATIENT)
Dept: LAB | Facility: HOSPITAL | Age: 75
End: 2025-03-11
Attending: INTERNAL MEDICINE
Payer: MEDICARE

## 2025-03-11 ENCOUNTER — TELEPHONE (OUTPATIENT)
Dept: INTERNAL MEDICINE | Facility: CLINIC | Age: 75
End: 2025-03-11
Payer: MEDICARE

## 2025-03-11 DIAGNOSIS — I10 HYPERTENSION, ESSENTIAL: ICD-10-CM

## 2025-03-11 DIAGNOSIS — N18.9 CHRONIC KIDNEY DISEASE, UNSPECIFIED CKD STAGE: ICD-10-CM

## 2025-03-11 DIAGNOSIS — E87.5 HYPERKALEMIA: Primary | ICD-10-CM

## 2025-03-11 DIAGNOSIS — Z12.11 ENCOUNTER FOR FIT (FECAL IMMUNOCHEMICAL TEST) SCREENING: ICD-10-CM

## 2025-03-11 LAB — HEMOCCULT STL QL IA: NEGATIVE

## 2025-03-11 PROCEDURE — 82274 ASSAY TEST FOR BLOOD FECAL: CPT | Performed by: INTERNAL MEDICINE

## 2025-03-11 RX ORDER — LISINOPRIL 20 MG/1
20 TABLET ORAL DAILY
Qty: 90 TABLET | Refills: 3 | Status: SHIPPED | OUTPATIENT
Start: 2025-03-11 | End: 2026-03-11

## 2025-03-11 RX ORDER — AMLODIPINE BESYLATE 2.5 MG/1
2.5 TABLET ORAL DAILY
Qty: 90 TABLET | Refills: 3 | Status: SHIPPED | OUTPATIENT
Start: 2025-03-11 | End: 2026-03-11

## 2025-03-11 NOTE — TELEPHONE ENCOUNTER
Please call patient and daughter  Potassium level remains elevated so let's reduce dose of lisinopril from 40 mg daily to 20 mg daily  New prescription sent to pharmacy  Blood pressure will likely rise due to reduced dose of lisinopril so we are also going to start low dose of amlodipine 2.5 mg daily  Both prescriptions sent to pharmacy  He should follow up with me in 4 weeks to assess BP  Blood work in 2 weeks to assess levels on changed regimen  Nephrology referral also placed

## 2025-03-11 NOTE — TELEPHONE ENCOUNTER
----- Message from Milagros Lebron MD sent at 3/10/2025  3:18 PM CDT -----  Let's reduce dose of lisinopril. May need something additional for BP control. He was on amlodipine previously; may need to resume at low dose when we decrease lisinopril.  I suspect his ckd is due to poorly controlled diabetes. Definitely reasonable to have him see nephrology though not sure if there is much they will do right now other than tell him to get his BG   under control.   ----- Message -----  From: Mya Arora PA-C  Sent: 3/10/2025   6:28 AM CDT  To: MD Dr. Vi Newman - will you review?  Potassium level elevated x 2  He is on lisinopril - should we reduce or discontinue?  Do you think he should see nephrology since creatinine elevated over past year compared to prior?  You have follow up with him in a few weeks.  ----- Message -----  From: Blaine Zhijiang [employer] [facility]  Sent: 2/26/2025   1:58 PM CDT  To: Mya Arora PA-C

## 2025-03-12 ENCOUNTER — PATIENT MESSAGE (OUTPATIENT)
Dept: OPHTHALMOLOGY | Facility: CLINIC | Age: 75
End: 2025-03-12
Payer: MEDICARE

## 2025-03-17 ENCOUNTER — TELEPHONE (OUTPATIENT)
Dept: INTERNAL MEDICINE | Facility: CLINIC | Age: 75
End: 2025-03-17
Payer: MEDICARE

## 2025-03-17 NOTE — TELEPHONE ENCOUNTER
See this message below from 3/11  Was patient called?      Please call patient and daughter  Potassium level remains elevated so let's reduce dose of lisinopril from 40 mg daily to 20 mg daily  New prescription sent to pharmacy  Blood pressure will likely rise due to reduced dose of lisinopril so we are also going to start low dose of amlodipine 2.5 mg daily  Both prescriptions sent to pharmacy  He should follow up with me in 4 weeks to assess BP  Blood work in 2 weeks to assess levels on changed regimen  Nephrology referral also placed

## 2025-03-17 NOTE — TELEPHONE ENCOUNTER
Spoke with patients daughter regarding providers note pt states she spoke with someone and everything has been scheduled except blood work and she would rather speak with her father on a date and time that works for them and will reach out to the clinic by phone or patient portal.

## 2025-03-27 ENCOUNTER — OFFICE VISIT (OUTPATIENT)
Dept: OPHTHALMOLOGY | Facility: CLINIC | Age: 75
End: 2025-03-27
Payer: MEDICARE

## 2025-03-27 ENCOUNTER — CLINICAL SUPPORT (OUTPATIENT)
Dept: OPHTHALMOLOGY | Facility: CLINIC | Age: 75
End: 2025-03-27
Payer: MEDICARE

## 2025-03-27 DIAGNOSIS — Z79.4 TYPE 2 DIABETES MELLITUS WITH BOTH EYES AFFECTED BY MODERATE NONPROLIFERATIVE RETINOPATHY AND MACULAR EDEMA, WITH LONG-TERM CURRENT USE OF INSULIN: Primary | ICD-10-CM

## 2025-03-27 DIAGNOSIS — E11.3313 TYPE 2 DIABETES MELLITUS WITH BOTH EYES AFFECTED BY MODERATE NONPROLIFERATIVE RETINOPATHY AND MACULAR EDEMA, WITH LONG-TERM CURRENT USE OF INSULIN: Primary | ICD-10-CM

## 2025-03-27 PROCEDURE — 92134 CPTRZ OPH DX IMG PST SGM RTA: CPT | Mod: S$GLB,,, | Performed by: OPHTHALMOLOGY

## 2025-03-27 PROCEDURE — 99999 PR PBB SHADOW E&M-EST. PATIENT-LVL II: CPT | Mod: PBBFAC,,, | Performed by: OPHTHALMOLOGY

## 2025-03-27 PROCEDURE — 67028 INJECTION EYE DRUG: CPT | Mod: RT,S$GLB,, | Performed by: OPHTHALMOLOGY

## 2025-03-27 PROCEDURE — 99499 UNLISTED E&M SERVICE: CPT | Mod: S$GLB,,, | Performed by: OPHTHALMOLOGY

## 2025-03-27 RX ADMIN — Medication 1.25 MG: at 11:03

## 2025-03-27 NOTE — PROGRESS NOTES
Subjective:       Patient ID: Jose Pete is a 74 y.o. male      Chief Complaint   Patient presents with    Injections     History of Present Illness  HPI    1 month OCT/Elicia OD only    Pt states his vision has been stable.   Lbs was 113     -Flashes   -Floaters   -Pain     Latanoprost qhs OU    Last edited by Lars Garcia MD on 3/27/2025 11:19 AM.        Imaging:    See report    Assessment/Plan:     1. Type 2 diabetes mellitus with both eyes affected by moderate nonproliferative retinopathy and macular edema, with long-term current use of insulin  Lost to f/u yet again  Extremely difficult to have pt follow up for planned treatments    Previously treated with Avastin intermittently     Had Ey once June 2024 before lost to f/u  Unable to assess effect    Improving OD today 2 mo s/p Av with better OCT (overdue for planned injection)  Av OD today as planned and in 6 wks    Will hold off on OS until has CE/IOL.  Dense cataract and unable to assess effect of Av OS on vision or OCT  Pt to f/u with Dr Gallagher    Pt only will do one eye at a time  Discussed importance of compliance with Rx plan    Would hold off on change to steroids until has gl evaluation or pt wants CE/IOL    Diabetic Retinopathy discussed in detail, all questions answered  Stressed importance of good BS/BP/Chol Control  RTC immediately PRN any vision changes, oswald blurry vision, missing vision, floaters, distortions, etc    - Prior authorization Order  - Posterior Segment OCT Retina-Both eyes      Follow up in about 6 weeks (around 5/8/2025) for OCT and INJECTION ONLY (DILATE INJECTION EYE), Injection Right eye, Avastin.

## 2025-04-04 ENCOUNTER — TELEPHONE (OUTPATIENT)
Dept: INTERNAL MEDICINE | Facility: CLINIC | Age: 75
End: 2025-04-04
Payer: MEDICARE

## 2025-04-04 NOTE — TELEPHONE ENCOUNTER
----- Message from Tech Amaijosue sent at 4/4/2025 10:30 AM CDT -----  Contact: [phone] or [phone]  .1MEDICALADVICE Patient is calling for Medical Advice regarding: needs medical clearance from signed for cataract surgeryPatient wants a call back or thru [facility]: callComments:Please advise patient replies from provider may take up to 48 hours.

## 2025-04-07 ENCOUNTER — OFFICE VISIT (OUTPATIENT)
Dept: INTERNAL MEDICINE | Facility: CLINIC | Age: 75
End: 2025-04-07
Payer: MEDICARE

## 2025-04-07 VITALS
SYSTOLIC BLOOD PRESSURE: 122 MMHG | BODY MASS INDEX: 23.33 KG/M2 | WEIGHT: 181.75 LBS | HEIGHT: 74 IN | HEART RATE: 70 BPM | DIASTOLIC BLOOD PRESSURE: 60 MMHG | OXYGEN SATURATION: 98 %

## 2025-04-07 DIAGNOSIS — Z79.4 TYPE 2 DIABETES MELLITUS WITH DIABETIC NEPHROPATHY, WITH LONG-TERM CURRENT USE OF INSULIN: Primary | ICD-10-CM

## 2025-04-07 DIAGNOSIS — E11.21 TYPE 2 DIABETES MELLITUS WITH DIABETIC NEPHROPATHY, WITH LONG-TERM CURRENT USE OF INSULIN: Primary | ICD-10-CM

## 2025-04-07 DIAGNOSIS — I10 HYPERTENSION, ESSENTIAL: ICD-10-CM

## 2025-04-07 DIAGNOSIS — I50.42 CHRONIC COMBINED SYSTOLIC AND DIASTOLIC CONGESTIVE HEART FAILURE, NYHA CLASS 2: ICD-10-CM

## 2025-04-07 DIAGNOSIS — Z79.4 TYPE 2 DIABETES MELLITUS WITH BOTH EYES AFFECTED BY MODERATE NONPROLIFERATIVE RETINOPATHY AND MACULAR EDEMA, WITH LONG-TERM CURRENT USE OF INSULIN: ICD-10-CM

## 2025-04-07 DIAGNOSIS — E78.00 PURE HYPERCHOLESTEROLEMIA: ICD-10-CM

## 2025-04-07 DIAGNOSIS — N18.32 STAGE 3B CHRONIC KIDNEY DISEASE: ICD-10-CM

## 2025-04-07 DIAGNOSIS — E11.3313 TYPE 2 DIABETES MELLITUS WITH BOTH EYES AFFECTED BY MODERATE NONPROLIFERATIVE RETINOPATHY AND MACULAR EDEMA, WITH LONG-TERM CURRENT USE OF INSULIN: ICD-10-CM

## 2025-04-07 DIAGNOSIS — I48.0 PAROXYSMAL ATRIAL FIBRILLATION: ICD-10-CM

## 2025-04-07 PROCEDURE — 3008F BODY MASS INDEX DOCD: CPT | Mod: CPTII,S$GLB,, | Performed by: INTERNAL MEDICINE

## 2025-04-07 PROCEDURE — 99214 OFFICE O/P EST MOD 30 MIN: CPT | Mod: S$GLB,,, | Performed by: INTERNAL MEDICINE

## 2025-04-07 PROCEDURE — G2211 COMPLEX E/M VISIT ADD ON: HCPCS | Mod: S$GLB,,, | Performed by: INTERNAL MEDICINE

## 2025-04-07 PROCEDURE — 4010F ACE/ARB THERAPY RXD/TAKEN: CPT | Mod: CPTII,S$GLB,, | Performed by: INTERNAL MEDICINE

## 2025-04-07 PROCEDURE — 3062F POS MACROALBUMINURIA REV: CPT | Mod: CPTII,S$GLB,, | Performed by: INTERNAL MEDICINE

## 2025-04-07 PROCEDURE — 1160F RVW MEDS BY RX/DR IN RCRD: CPT | Mod: CPTII,S$GLB,, | Performed by: INTERNAL MEDICINE

## 2025-04-07 PROCEDURE — 1159F MED LIST DOCD IN RCRD: CPT | Mod: CPTII,S$GLB,, | Performed by: INTERNAL MEDICINE

## 2025-04-07 PROCEDURE — 99999 PR PBB SHADOW E&M-EST. PATIENT-LVL V: CPT | Mod: PBBFAC,,, | Performed by: INTERNAL MEDICINE

## 2025-04-07 PROCEDURE — 3052F HG A1C>EQUAL 8.0%<EQUAL 9.0%: CPT | Mod: CPTII,S$GLB,, | Performed by: INTERNAL MEDICINE

## 2025-04-07 PROCEDURE — 3074F SYST BP LT 130 MM HG: CPT | Mod: CPTII,S$GLB,, | Performed by: INTERNAL MEDICINE

## 2025-04-07 PROCEDURE — 3066F NEPHROPATHY DOC TX: CPT | Mod: CPTII,S$GLB,, | Performed by: INTERNAL MEDICINE

## 2025-04-07 PROCEDURE — 1101F PT FALLS ASSESS-DOCD LE1/YR: CPT | Mod: CPTII,S$GLB,, | Performed by: INTERNAL MEDICINE

## 2025-04-07 PROCEDURE — 1126F AMNT PAIN NOTED NONE PRSNT: CPT | Mod: CPTII,S$GLB,, | Performed by: INTERNAL MEDICINE

## 2025-04-07 PROCEDURE — 3288F FALL RISK ASSESSMENT DOCD: CPT | Mod: CPTII,S$GLB,, | Performed by: INTERNAL MEDICINE

## 2025-04-07 PROCEDURE — 3078F DIAST BP <80 MM HG: CPT | Mod: CPTII,S$GLB,, | Performed by: INTERNAL MEDICINE

## 2025-04-07 RX ORDER — INSULIN LISPRO 100 [IU]/ML
16 INJECTION, SOLUTION INTRAVENOUS; SUBCUTANEOUS 3 TIMES DAILY
Start: 2025-04-07

## 2025-04-07 RX ORDER — INSULIN GLARGINE 100 [IU]/ML
10 INJECTION, SOLUTION SUBCUTANEOUS NIGHTLY
Qty: 3 ML | Refills: 5 | Status: SHIPPED | OUTPATIENT
Start: 2025-04-07 | End: 2026-04-07

## 2025-04-07 NOTE — PROGRESS NOTES
"Subjective:       Patient ID: Jose Pete is a 74 y.o. male.    Chief Complaint: Pre-op Exam    HPI  Pt presents for a pre-operative clearance prior to surgery.  Pt denies any personal or family history of bleeding disorders, hypercoaguable disorders, blood clots, pulmonary embolisms, difficulty with anesthesia or sudden death.  Patient is able to walk up a flight of stairs without difficulty breathing.  Denies orthopnea, pnd, CP and SOB.    DM -   Lab Results   Component Value Date    HGBA1C 8.8 (H) 02/18/2025    HGBA1C 9.5 (H) 12/12/2023    HGBA1C 9.1 (H) 08/10/2023         At time of last visit w VINCENZO Arora was taking humalog only, last listed as 6 U TID  Taking 20 units 3 times a day.   Previously prescribed toujeo 15 units qhs. He reports he got a rash when he took this previously.     pAfib, HFrEF, cardiomyopathy, HTN   Carvedilol 25mg bid  Lisinopril 40mg daily  Lasix 40mg daily  Amlodipine 2.5mg  Eliquis 5mg bid - restarted in feb 2025  Atorva 40mg      CKD 3b  Gfr 36; cr 1.6-1.9  Review of Systems   Constitutional:  Negative for fever.   Respiratory:  Negative for shortness of breath.    Cardiovascular:  Negative for chest pain.   Musculoskeletal: Negative.    Skin: Negative.        Objective:   /60 (BP Location: Left arm, Patient Position: Sitting)   Pulse 70   Ht 6' 2" (1.88 m)   Wt 82.5 kg (181 lb 12.3 oz)   SpO2 98%   BMI 23.34 kg/m²      Physical Exam  Constitutional:       General: He is not in acute distress.     Appearance: He is well-developed. He is not diaphoretic.   HENT:      Head: Normocephalic and atraumatic.   Cardiovascular:      Rate and Rhythm: Normal rate and regular rhythm.      Heart sounds: No murmur heard.  Pulmonary:      Effort: Pulmonary effort is normal. No respiratory distress.      Breath sounds: No wheezing or rales.   Skin:     General: Skin is warm and dry.   Neurological:      Mental Status: He is alert and oriented to person, place, and time.   Psychiatric:    "      Behavior: Behavior normal.         Assessment:       1. Type 2 diabetes mellitus with diabetic nephropathy, with long-term current use of insulin    2. Stage 3b chronic kidney disease    3. Chronic combined systolic and diastolic congestive heart failure, NYHA class 2    4. Hypertension, essential    5. Paroxysmal atrial fibrillation    6. Pure hypercholesterolemia    7. Type 2 diabetes mellitus with both eyes affected by moderate nonproliferative retinopathy and macular edema, with long-term current use of insulin        Plan:       Type 2 diabetes mellitus with diabetic nephropathy, with long-term current use of insulin and oderate nonproliferative retinopathy and macular edema  -     Hemoglobin A1C; Future; Expected date: 05/07/2025  -     Basic Metabolic Panel; Future; Expected date: 05/07/2025  -     insulin glargine U-100, Lantus, (LANTUS SOLOSTAR U-100 INSULIN) 100 unit/mL (3 mL) InPn pen; Inject 10 Units into the skin every evening.  Dispense: 3 mL; Refill: 5  -     insulin lispro (HUMALOG KWIKPEN INSULIN) 100 unit/mL pen; Inject 16 Units into the skin 3 (three) times daily.  Start long acting insulin lantus 10 u qhs  Humalog 16 u tid   Follow up in 6 weeks with BG LOGS and labs prior    Stage 3b chronic kidney disease  Chronic combined systolic and diastolic congestive heart failure, NYHA class 2  Hypertension, essential  Paroxysmal atrial fibrillation  Pure hypercholesterolemia  - stable and controlled  - continue current regimen              You are up to date for your primary preventive health care, and there are no reminders at this time.     Lab 3 mo

## 2025-04-07 NOTE — PATIENT INSTRUCTIONS
[facility] PROGRESS NOTE      Darling Graham  : 1956  PCP: Jose Antonio Rodriguez Start lantus 10 units at night - long acting insulin.  You may increase dose weekly by 2 units if fasting morning blood sugar is over 150 up to 20 units    Decrease humalog to 16 units three times day.        PCA infusion   Intravenous Continuous Branden Lara Axis (Degrees) 80    R Axis (Degrees) -82    T Axis (

## 2025-04-25 ENCOUNTER — PATIENT OUTREACH (OUTPATIENT)
Dept: ADMINISTRATIVE | Facility: HOSPITAL | Age: 75
End: 2025-04-25
Payer: MEDICARE

## 2025-04-28 ENCOUNTER — PATIENT OUTREACH (OUTPATIENT)
Dept: ADMINISTRATIVE | Facility: HOSPITAL | Age: 75
End: 2025-04-28
Payer: MEDICARE

## 2025-04-28 NOTE — PROGRESS NOTES
Chart reviewed and updated. Reconciled immunizations.  Simona Negron LPN   Clinical Care Coordinator  [facility]

## 2025-05-05 ENCOUNTER — PATIENT OUTREACH (OUTPATIENT)
Dept: ADMINISTRATIVE | Facility: HOSPITAL | Age: 75
End: 2025-05-05
Payer: MEDICARE

## 2025-05-05 NOTE — PROGRESS NOTES
Chart reviewed and updated. Reconciled immunizations, health maintenance and care everywhere.  Lab appt scheduled.    Anai Osborn [facility]  Panel Care Coordinator  [facility]  [phone]  Milagros Carpenter MD

## 2025-05-19 ENCOUNTER — LAB VISIT (OUTPATIENT)
Dept: LAB | Facility: HOSPITAL | Age: 75
End: 2025-05-19
Attending: INTERNAL MEDICINE
Payer: MEDICARE

## 2025-05-19 ENCOUNTER — OFFICE VISIT (OUTPATIENT)
Dept: INTERNAL MEDICINE | Facility: CLINIC | Age: 75
End: 2025-05-19
Payer: MEDICARE

## 2025-05-19 VITALS
HEART RATE: 70 BPM | SYSTOLIC BLOOD PRESSURE: 120 MMHG | WEIGHT: 173.81 LBS | DIASTOLIC BLOOD PRESSURE: 70 MMHG | BODY MASS INDEX: 22.31 KG/M2 | HEIGHT: 74 IN | OXYGEN SATURATION: 95 %

## 2025-05-19 DIAGNOSIS — E11.65 TYPE 2 DIABETES MELLITUS WITH HYPERGLYCEMIA, UNSPECIFIED LONG TERM INSULIN USE STATUS: ICD-10-CM

## 2025-05-19 DIAGNOSIS — E11.21 TYPE 2 DIABETES MELLITUS WITH DIABETIC NEPHROPATHY, WITH LONG-TERM CURRENT USE OF INSULIN: ICD-10-CM

## 2025-05-19 DIAGNOSIS — N18.32 STAGE 3B CHRONIC KIDNEY DISEASE: ICD-10-CM

## 2025-05-19 DIAGNOSIS — I10 HYPERTENSION, ESSENTIAL: ICD-10-CM

## 2025-05-19 DIAGNOSIS — Z79.4 TYPE 2 DIABETES MELLITUS WITH BOTH EYES AFFECTED BY MODERATE NONPROLIFERATIVE RETINOPATHY AND MACULAR EDEMA, WITH LONG-TERM CURRENT USE OF INSULIN: ICD-10-CM

## 2025-05-19 DIAGNOSIS — E11.3313 TYPE 2 DIABETES MELLITUS WITH BOTH EYES AFFECTED BY MODERATE NONPROLIFERATIVE RETINOPATHY AND MACULAR EDEMA, WITH LONG-TERM CURRENT USE OF INSULIN: ICD-10-CM

## 2025-05-19 DIAGNOSIS — E11.21 TYPE 2 DIABETES MELLITUS WITH DIABETIC NEPHROPATHY, WITH LONG-TERM CURRENT USE OF INSULIN: Primary | ICD-10-CM

## 2025-05-19 DIAGNOSIS — Z79.4 TYPE 2 DIABETES MELLITUS WITH DIABETIC NEPHROPATHY, WITH LONG-TERM CURRENT USE OF INSULIN: ICD-10-CM

## 2025-05-19 DIAGNOSIS — Z79.4 TYPE 2 DIABETES MELLITUS WITH DIABETIC NEPHROPATHY, WITH LONG-TERM CURRENT USE OF INSULIN: Primary | ICD-10-CM

## 2025-05-19 DIAGNOSIS — I50.20 HEART FAILURE WITH REDUCED EJECTION FRACTION: ICD-10-CM

## 2025-05-19 LAB
ALBUMIN SERPL BCP-MCNC: 3.3 G/DL (ref 3.5–5.2)
ALP SERPL-CCNC: 72 UNIT/L (ref 40–150)
ALT SERPL W/O P-5'-P-CCNC: 7 UNIT/L (ref 10–44)
ANION GAP (OHS): 9 MMOL/L (ref 8–16)
AST SERPL-CCNC: 12 UNIT/L (ref 11–45)
BILIRUB SERPL-MCNC: 0.4 MG/DL (ref 0.1–1)
BUN SERPL-MCNC: 50 MG/DL (ref 8–23)
CALCIUM SERPL-MCNC: 9.5 MG/DL (ref 8.7–10.5)
CHLORIDE SERPL-SCNC: 103 MMOL/L (ref 95–110)
CO2 SERPL-SCNC: 26 MMOL/L (ref 23–29)
CREAT SERPL-MCNC: 1.8 MG/DL (ref 0.5–1.4)
EAG (OHS): 232 MG/DL (ref 68–131)
GFR SERPLBLD CREATININE-BSD FMLA CKD-EPI: 39 ML/MIN/1.73/M2
GLUCOSE SERPL-MCNC: 286 MG/DL (ref 70–110)
HBA1C MFR BLD: 9.7 % (ref 4–5.6)
POTASSIUM SERPL-SCNC: 5 MMOL/L (ref 3.5–5.1)
PROT SERPL-MCNC: 7 GM/DL (ref 6–8.4)
SODIUM SERPL-SCNC: 138 MMOL/L (ref 136–145)

## 2025-05-19 PROCEDURE — 3074F SYST BP LT 130 MM HG: CPT | Mod: CPTII,S$GLB,, | Performed by: INTERNAL MEDICINE

## 2025-05-19 PROCEDURE — 99999 PR PBB SHADOW E&M-EST. PATIENT-LVL V: CPT | Mod: PBBFAC,,, | Performed by: INTERNAL MEDICINE

## 2025-05-19 PROCEDURE — 1160F RVW MEDS BY RX/DR IN RCRD: CPT | Mod: CPTII,S$GLB,, | Performed by: INTERNAL MEDICINE

## 2025-05-19 PROCEDURE — 3062F POS MACROALBUMINURIA REV: CPT | Mod: CPTII,S$GLB,, | Performed by: INTERNAL MEDICINE

## 2025-05-19 PROCEDURE — 3066F NEPHROPATHY DOC TX: CPT | Mod: CPTII,S$GLB,, | Performed by: INTERNAL MEDICINE

## 2025-05-19 PROCEDURE — 1159F MED LIST DOCD IN RCRD: CPT | Mod: CPTII,S$GLB,, | Performed by: INTERNAL MEDICINE

## 2025-05-19 PROCEDURE — 3288F FALL RISK ASSESSMENT DOCD: CPT | Mod: CPTII,S$GLB,, | Performed by: INTERNAL MEDICINE

## 2025-05-19 PROCEDURE — 99214 OFFICE O/P EST MOD 30 MIN: CPT | Mod: S$GLB,,, | Performed by: INTERNAL MEDICINE

## 2025-05-19 PROCEDURE — 1126F AMNT PAIN NOTED NONE PRSNT: CPT | Mod: CPTII,S$GLB,, | Performed by: INTERNAL MEDICINE

## 2025-05-19 PROCEDURE — 3008F BODY MASS INDEX DOCD: CPT | Mod: CPTII,S$GLB,, | Performed by: INTERNAL MEDICINE

## 2025-05-19 PROCEDURE — 3052F HG A1C>EQUAL 8.0%<EQUAL 9.0%: CPT | Mod: CPTII,S$GLB,, | Performed by: INTERNAL MEDICINE

## 2025-05-19 PROCEDURE — 3078F DIAST BP <80 MM HG: CPT | Mod: CPTII,S$GLB,, | Performed by: INTERNAL MEDICINE

## 2025-05-19 PROCEDURE — 83036 HEMOGLOBIN GLYCOSYLATED A1C: CPT

## 2025-05-19 PROCEDURE — 82040 ASSAY OF SERUM ALBUMIN: CPT

## 2025-05-19 PROCEDURE — 1101F PT FALLS ASSESS-DOCD LE1/YR: CPT | Mod: CPTII,S$GLB,, | Performed by: INTERNAL MEDICINE

## 2025-05-19 PROCEDURE — 4010F ACE/ARB THERAPY RXD/TAKEN: CPT | Mod: CPTII,S$GLB,, | Performed by: INTERNAL MEDICINE

## 2025-05-19 PROCEDURE — G2211 COMPLEX E/M VISIT ADD ON: HCPCS | Mod: S$GLB,,, | Performed by: INTERNAL MEDICINE

## 2025-05-19 PROCEDURE — 36415 COLL VENOUS BLD VENIPUNCTURE: CPT

## 2025-05-19 RX ORDER — PEN NEEDLE, DIABETIC 30 GX3/16"
1 NEEDLE, DISPOSABLE MISCELLANEOUS DAILY
Qty: 100 EACH | Refills: 3 | Status: SHIPPED | OUTPATIENT
Start: 2025-05-19

## 2025-05-19 NOTE — PROGRESS NOTES
"Subjective:       Patient ID: Jose Pete is a 74 y.o. male.    Chief Complaint: Follow-up    HPI  74 y.o. male here for follow up of    DM2  Lab Results   Component Value Date    HGBA1C 8.8 (H) 02/18/2025    HGBA1C 9.5 (H) 12/12/2023    HGBA1C 9.1 (H) 08/10/2023     In April started:  Lantus 10 units qhs  Humalog 16 u tid    Had an argument w his daughter, she has all his blood sugar logs.    Reports his BG was around 120.   He reports "I don't take my insulin when it's low."    pAfib, HFrEF, cardiomyopathy, HTN   Carvedilol 25mg bid  Lisinopril 40mg daily  Lasix 40mg daily  Amlodipine 2.5mg  Eliquis 5mg bid - restarted in feb 2025  Atorva 40mg        CKD 3b  Gfr 36; cr 1.6-1.9    Continues lisinopril, atorvastatin, carvedilol, furosemide.  Review of Systems   Constitutional:  Negative for fever.   Respiratory:  Negative for shortness of breath.    Cardiovascular:  Negative for chest pain.   Musculoskeletal: Negative.    Skin: Negative.        Objective:   /70 (BP Location: Left arm, Patient Position: Sitting)   Pulse 70   Ht 6' 2" (1.88 m)   Wt 78.8 kg (173 lb 13.3 oz)   SpO2 95%   BMI 22.32 kg/m²      Physical Exam  Constitutional:       General: He is not in acute distress.     Appearance: He is well-developed. He is not diaphoretic.   HENT:      Head: Normocephalic and atraumatic.   Cardiovascular:      Rate and Rhythm: Normal rate and regular rhythm.      Heart sounds: No murmur heard.  Pulmonary:      Effort: Pulmonary effort is normal. No respiratory distress.      Breath sounds: No wheezing or rales.   Skin:     General: Skin is warm and dry.   Neurological:      Mental Status: He is alert and oriented to person, place, and time.   Psychiatric:         Behavior: Behavior normal.         Assessment:       1. Type 2 diabetes mellitus with diabetic nephropathy, with long-term current use of insulin    2. Stage 3b chronic kidney disease    3. Heart failure with reduced ejection fraction    4. " Hypertension, essential        Plan:       Type 2 diabetes mellitus with diabetic nephropathy, with long-term current use of insulin  -     Comprehensive Metabolic Panel; Future; Expected date: 05/19/2025  -     Hemoglobin A1C; Future; Expected date: 05/19/2025    Stage 3b chronic kidney disease  Cmp today  Avoid nephrotoxins    Heart failure with reduced ejection fraction  Euvolemic  Continue meds as above    Hypertension, essential  At goal  Continue meds as above        Health Maintenance Due   Topic    Hemoglobin A1c       labs

## 2025-05-20 NOTE — TELEPHONE ENCOUNTER
No care due was identified.  [facility] Embedded Care Due Messages. Reference number: [id number].   5/19/2025 7:19:59 PM CDT

## 2025-05-20 NOTE — TELEPHONE ENCOUNTER
Refill Decision Note   Jose Pete  is requesting a refill authorization.  Brief Assessment and Rationale for Refill:  Approve     Medication Therapy Plan:        Comments:     Note composed:7:29 PM 05/19/2025

## 2025-07-15 DIAGNOSIS — Z79.4 TYPE 2 DIABETES MELLITUS WITH BOTH EYES AFFECTED BY MODERATE NONPROLIFERATIVE RETINOPATHY AND MACULAR EDEMA, WITH LONG-TERM CURRENT USE OF INSULIN: ICD-10-CM

## 2025-07-15 DIAGNOSIS — E11.3313 TYPE 2 DIABETES MELLITUS WITH BOTH EYES AFFECTED BY MODERATE NONPROLIFERATIVE RETINOPATHY AND MACULAR EDEMA, WITH LONG-TERM CURRENT USE OF INSULIN: ICD-10-CM

## 2025-07-15 DIAGNOSIS — Z79.4 TYPE 2 DIABETES MELLITUS WITH DIABETIC NEPHROPATHY, WITH LONG-TERM CURRENT USE OF INSULIN: ICD-10-CM

## 2025-07-15 DIAGNOSIS — E11.21 TYPE 2 DIABETES MELLITUS WITH DIABETIC NEPHROPATHY, WITH LONG-TERM CURRENT USE OF INSULIN: ICD-10-CM

## 2025-07-15 NOTE — TELEPHONE ENCOUNTER
No care due was identified.  [facility] Embedded Care Due Messages. Reference number: [id number].   7/15/2025 1:14:42 PM CDT

## 2025-07-16 RX ORDER — INSULIN LISPRO 100 [IU]/ML
16 INJECTION, SOLUTION INTRAVENOUS; SUBCUTANEOUS 3 TIMES DAILY
Qty: 45 ML | Refills: 1 | Status: SHIPPED | OUTPATIENT
Start: 2025-07-16

## 2025-07-16 NOTE — TELEPHONE ENCOUNTER
Jose Pete  is requesting a refill authorization.  Brief Assessment and Rationale for Refill:  Approve     Medication Therapy Plan:  renal labs reviewed, dose ok      Pharmacist review requested: Yes   Extended chart review required: Yes   Comments:     Note composed:8:39 AM 07/16/2025

## 2025-07-16 NOTE — TELEPHONE ENCOUNTER
Refill Routing Note   Medication(s) are not appropriate for processing by [facility] for the following reason(s):     DDI not previously overridden by current provider--after initial override, the Refill Center will be able to continue overrides      Required labs abnormal: Cr    [facility] action(s):  Defer           Pharmacist review requested: Yes     Appointments  past 12m or future 3m with PCP    Date Provider   Last Visit   5/19/2025 Milagros Lebron MD   Next Visit   8/12/2025 Milagros Lebron MD   ED visits in past 90 days: 0        Note composed:5:23 AM 07/16/2025

## 2025-08-04 ENCOUNTER — HOSPITAL ENCOUNTER (OUTPATIENT)
Facility: HOSPITAL | Age: 75
Discharge: HOME | End: 2025-08-05
Attending: STUDENT IN AN ORGANIZED HEALTH CARE EDUCATION/TRAINING PROGRAM | Admitting: STUDENT IN AN ORGANIZED HEALTH CARE EDUCATION/TRAINING PROGRAM
Payer: MEDICARE

## 2025-08-04 DIAGNOSIS — R07.9 CHEST PAIN: ICD-10-CM

## 2025-08-04 DIAGNOSIS — R55 NEAR SYNCOPE: Primary | ICD-10-CM

## 2025-08-04 LAB
ABSOLUTE EOSINOPHIL (OHS): 0.19 K/UL
ABSOLUTE MONOCYTE (OHS): 0.91 K/UL (ref 0.3–1)
ABSOLUTE NEUTROPHIL COUNT (OHS): 4.81 K/UL (ref 1.8–7.7)
ALBUMIN SERPL BCP-MCNC: 3.2 G/DL (ref 3.5–5.2)
ALLENS TEST: ABNORMAL
ALP SERPL-CCNC: 67 UNIT/L (ref 40–150)
ALT SERPL W/O P-5'-P-CCNC: 9 UNIT/L (ref 0–55)
ANION GAP (OHS): 9 MMOL/L (ref 8–16)
AST SERPL-CCNC: 15 UNIT/L (ref 0–50)
B-OH-BUTYR BLD STRIP-SCNC: 0.1 MMOL/L
BACTERIA #/AREA URNS AUTO: ABNORMAL /HPF
BASOPHILS # BLD AUTO: 0.05 K/UL
BASOPHILS NFR BLD AUTO: 0.7 %
BILIRUB SERPL-MCNC: 0.4 MG/DL (ref 0.1–1)
BILIRUB UR QL STRIP.AUTO: NEGATIVE
BUN SERPL-MCNC: 39 MG/DL (ref 8–23)
CALCIUM SERPL-MCNC: 9.4 MG/DL (ref 8.7–10.5)
CHLORIDE SERPL-SCNC: 105 MMOL/L (ref 95–110)
CLARITY UR: CLEAR
CO2 SERPL-SCNC: 23 MMOL/L (ref 23–29)
COLOR UR AUTO: YELLOW
CREAT SERPL-MCNC: 1.9 MG/DL (ref 0.5–1.4)
ERYTHROCYTE [DISTWIDTH] IN BLOOD BY AUTOMATED COUNT: 12.9 % (ref 11.5–14.5)
GFR SERPLBLD CREATININE-BSD FMLA CKD-EPI: 37 ML/MIN/1.73/M2
GLUCOSE SERPL-MCNC: 228 MG/DL (ref 70–110)
GLUCOSE UR QL STRIP: ABNORMAL
HCO3 UR-SCNC: 24.3 MMOL/L (ref 24–28)
HCT VFR BLD AUTO: 29.6 % (ref 40–54)
HGB BLD-MCNC: 9.8 GM/DL (ref 14–18)
HGB UR QL STRIP: NEGATIVE
HYALINE CASTS UR QL AUTO: 43 /LPF (ref 0–1)
IMM GRANULOCYTES # BLD AUTO: 0.03 K/UL (ref 0–0.04)
IMM GRANULOCYTES NFR BLD AUTO: 0.4 % (ref 0–0.5)
KETONES UR QL STRIP: NEGATIVE
LEUKOCYTE ESTERASE UR QL STRIP: NEGATIVE
LYMPHOCYTES # BLD AUTO: 1.24 K/UL (ref 1–4.8)
MAGNESIUM SERPL-MCNC: 1.7 MG/DL (ref 1.6–2.6)
MCH RBC QN AUTO: 28.6 PG (ref 27–31)
MCHC RBC AUTO-ENTMCNC: 33.1 G/DL (ref 32–36)
MCV RBC AUTO: 86 FL (ref 82–98)
MICROSCOPIC COMMENT: ABNORMAL
NITRITE UR QL STRIP: NEGATIVE
NT-PROBNP SERPL-MCNC: 2552 PG/ML
NUCLEATED RBC (/100WBC) (OHS): 0 /100 WBC
OHS QRS DURATION: 74 MS
OHS QRS DURATION: 76 MS
OHS QRS DURATION: 78 MS
OHS QTC CALCULATION: 451 MS
OHS QTC CALCULATION: 451 MS
OHS QTC CALCULATION: 464 MS
PCO2 BLDA: 26.4 MMHG (ref 35–45)
PH SMN: 7.57 [PH] (ref 7.35–7.45)
PH UR STRIP: 5 [PH]
PLATELET # BLD AUTO: 174 K/UL (ref 150–450)
PMV BLD AUTO: 11.7 FL (ref 9.2–12.9)
PO2 BLDA: 170 MMHG (ref 40–60)
POC BE: 2 MMOL/L (ref -2–2)
POC SATURATED O2: 100 % (ref 95–100)
POC TCO2: 25 MMOL/L (ref 24–29)
POCT GLUCOSE: 243 MG/DL (ref 70–110)
POTASSIUM SERPL-SCNC: 4.3 MMOL/L (ref 3.5–5.1)
PROT SERPL-MCNC: 6.2 GM/DL (ref 6–8.4)
PROT UR QL STRIP: ABNORMAL
RBC # BLD AUTO: 3.43 M/UL (ref 4.6–6.2)
RBC #/AREA URNS AUTO: 2 /HPF (ref 0–4)
RELATIVE EOSINOPHIL (OHS): 2.6 %
RELATIVE LYMPHOCYTE (OHS): 17.2 % (ref 18–48)
RELATIVE MONOCYTE (OHS): 12.6 % (ref 4–15)
RELATIVE NEUTROPHIL (OHS): 66.5 % (ref 38–73)
SAMPLE: ABNORMAL
SITE: ABNORMAL
SODIUM SERPL-SCNC: 137 MMOL/L (ref 136–145)
SP GR UR STRIP: 1.02
SQUAMOUS #/AREA URNS AUTO: 1 /HPF
TROPONIN I SERPL HS-MCNC: 12 NG/L
TROPONIN I SERPL HS-MCNC: 8 NG/L
TROPONIN I SERPL HS-MCNC: 9 NG/L
TROPONIN I SERPL HS-MCNC: 9 NG/L
UROBILINOGEN UR STRIP-ACNC: NEGATIVE EU/DL
WBC # BLD AUTO: 7.23 K/UL (ref 3.9–12.7)
WBC #/AREA URNS AUTO: 3 /HPF (ref 0–5)

## 2025-08-04 PROCEDURE — 93010 ELECTROCARDIOGRAM REPORT: CPT | Mod: ,,, | Performed by: INTERNAL MEDICINE

## 2025-08-04 PROCEDURE — 85025 COMPLETE CBC W/AUTO DIFF WBC: CPT | Performed by: STUDENT IN AN ORGANIZED HEALTH CARE EDUCATION/TRAINING PROGRAM

## 2025-08-04 PROCEDURE — 84484 ASSAY OF TROPONIN QUANT: CPT | Mod: 91 | Performed by: PHYSICIAN ASSISTANT

## 2025-08-04 PROCEDURE — 93005 ELECTROCARDIOGRAM TRACING: CPT

## 2025-08-04 PROCEDURE — 82962 GLUCOSE BLOOD TEST: CPT

## 2025-08-04 PROCEDURE — 84484 ASSAY OF TROPONIN QUANT: CPT | Performed by: STUDENT IN AN ORGANIZED HEALTH CARE EDUCATION/TRAINING PROGRAM

## 2025-08-04 PROCEDURE — 82803 BLOOD GASES ANY COMBINATION: CPT

## 2025-08-04 PROCEDURE — 84484 ASSAY OF TROPONIN QUANT: CPT

## 2025-08-04 PROCEDURE — 83880 ASSAY OF NATRIURETIC PEPTIDE: CPT | Performed by: STUDENT IN AN ORGANIZED HEALTH CARE EDUCATION/TRAINING PROGRAM

## 2025-08-04 PROCEDURE — 82040 ASSAY OF SERUM ALBUMIN: CPT | Performed by: STUDENT IN AN ORGANIZED HEALTH CARE EDUCATION/TRAINING PROGRAM

## 2025-08-04 PROCEDURE — G0378 HOSPITAL OBSERVATION PER HR: HCPCS

## 2025-08-04 PROCEDURE — 25000003 PHARM REV CODE 250: Performed by: PHYSICIAN ASSISTANT

## 2025-08-04 PROCEDURE — 99900035 HC TECH TIME PER 15 MIN (STAT)

## 2025-08-04 PROCEDURE — 99285 EMERGENCY DEPT VISIT HI MDM: CPT | Mod: 25

## 2025-08-04 PROCEDURE — 82010 KETONE BODYS QUAN: CPT

## 2025-08-04 PROCEDURE — 83735 ASSAY OF MAGNESIUM: CPT | Performed by: STUDENT IN AN ORGANIZED HEALTH CARE EDUCATION/TRAINING PROGRAM

## 2025-08-04 PROCEDURE — 81003 URINALYSIS AUTO W/O SCOPE: CPT

## 2025-08-04 RX ORDER — ATORVASTATIN CALCIUM 40 MG/1
40 TABLET, FILM COATED ORAL DAILY
Status: DISCONTINUED | OUTPATIENT
Start: 2025-08-05 | End: 2025-08-05 | Stop reason: HOSPADM

## 2025-08-04 RX ORDER — NAPROXEN SODIUM 220 MG/1
81 TABLET, FILM COATED ORAL DAILY
Status: DISCONTINUED | OUTPATIENT
Start: 2025-08-05 | End: 2025-08-05 | Stop reason: HOSPADM

## 2025-08-04 RX ORDER — GLUCAGON 1 MG
1 KIT INJECTION
Status: DISCONTINUED | OUTPATIENT
Start: 2025-08-04 | End: 2025-08-05 | Stop reason: HOSPADM

## 2025-08-04 RX ORDER — INSULIN ASPART 100 [IU]/ML
0-5 INJECTION, SOLUTION INTRAVENOUS; SUBCUTANEOUS
Status: DISCONTINUED | OUTPATIENT
Start: 2025-08-04 | End: 2025-08-05 | Stop reason: HOSPADM

## 2025-08-04 RX ORDER — AMLODIPINE BESYLATE 2.5 MG/1
2.5 TABLET ORAL DAILY
Status: DISCONTINUED | OUTPATIENT
Start: 2025-08-04 | End: 2025-08-05 | Stop reason: HOSPADM

## 2025-08-04 RX ORDER — IBUPROFEN 200 MG
24 TABLET ORAL
Status: DISCONTINUED | OUTPATIENT
Start: 2025-08-04 | End: 2025-08-05 | Stop reason: HOSPADM

## 2025-08-04 RX ORDER — NALOXONE HCL 0.4 MG/ML
0.02 VIAL (ML) INJECTION
Status: DISCONTINUED | OUTPATIENT
Start: 2025-08-04 | End: 2025-08-05 | Stop reason: HOSPADM

## 2025-08-04 RX ORDER — CARVEDILOL 12.5 MG/1
25 TABLET ORAL 2 TIMES DAILY
Status: DISCONTINUED | OUTPATIENT
Start: 2025-08-04 | End: 2025-08-05 | Stop reason: HOSPADM

## 2025-08-04 RX ORDER — ACETAMINOPHEN 325 MG/1
650 TABLET ORAL EVERY 6 HOURS PRN
Status: DISCONTINUED | OUTPATIENT
Start: 2025-08-04 | End: 2025-08-05 | Stop reason: HOSPADM

## 2025-08-04 RX ORDER — SODIUM CHLORIDE 0.9 % (FLUSH) 0.9 %
10 SYRINGE (ML) INJECTION EVERY 12 HOURS PRN
Status: DISCONTINUED | OUTPATIENT
Start: 2025-08-04 | End: 2025-08-05 | Stop reason: HOSPADM

## 2025-08-04 RX ORDER — ONDANSETRON HYDROCHLORIDE 2 MG/ML
4 INJECTION, SOLUTION INTRAVENOUS EVERY 8 HOURS PRN
Status: DISCONTINUED | OUTPATIENT
Start: 2025-08-04 | End: 2025-08-05 | Stop reason: HOSPADM

## 2025-08-04 RX ORDER — FUROSEMIDE 40 MG/1
40 TABLET ORAL DAILY
Status: DISCONTINUED | OUTPATIENT
Start: 2025-08-05 | End: 2025-08-05 | Stop reason: HOSPADM

## 2025-08-04 RX ORDER — IBUPROFEN 200 MG
16 TABLET ORAL
Status: DISCONTINUED | OUTPATIENT
Start: 2025-08-04 | End: 2025-08-05 | Stop reason: HOSPADM

## 2025-08-04 RX ORDER — LISINOPRIL 20 MG/1
40 TABLET ORAL DAILY
Status: DISCONTINUED | OUTPATIENT
Start: 2025-08-05 | End: 2025-08-05 | Stop reason: HOSPADM

## 2025-08-04 RX ADMIN — CARVEDILOL 25 MG: 12.5 TABLET, FILM COATED ORAL at 10:08

## 2025-08-04 RX ADMIN — AMLODIPINE BESYLATE 2.5 MG: 2.5 TABLET ORAL at 06:08

## 2025-08-04 RX ADMIN — APIXABAN 5 MG: 5 TABLET, FILM COATED ORAL at 10:08

## 2025-08-04 NOTE — H&P
ED Observation Unit  History and Physical      I assumed care of this patient from the Main ED at onset of observation time, 1743 on 08/04/2025.       History of Present Illness:  Jose Pete 74 y.o. male with type 2 diabetes, CHF, CKD, aFib on apixaban presents to ED via EMS with complaint of near syncopal episode. The patient reports that he feels fine and denies having an episode or any symptoms before/during/after reported episode. Denies any pain, lightheadedness, nausea, vomiting, fevers, chest pain or shortness of breath. His significant other witnessed the event and was the one to call EMS. She reports that he was diaphoretic and had a near syncopal event going up the stairs. Significant other reports patient did not lose consciousness or fall as he was caught.     I reviewed the ED Provider Note dated 8/4/25 prior to my evaluation of this patient.  I reviewed all labs and imaging performed in the Main ED, prior to patient being placed in Observation. Patient was placed in the ED Observation Unit for Near syncope.    PMHx   Past Medical History:   Diagnosis Date    Cataract     CHF (congestive heart failure)     Combined systolic and diastolic cardiac dysfunction 2005    Hypertension, essential 11/12/2017    Pleural effusion 11/12/2017    treated with thoracentesis, appeared to be due to CHF    Proteinuria due to type 2 diabetes mellitus 2017    SOB (shortness of breath) 11/14/2017    Type 2 diabetes mellitus 2005    Type 2 diabetes mellitus with diabetic polyneuropathy, without long-term current use of insulin 12/12/2017      Past Surgical History:   Procedure Laterality Date    BACK SURGERY  1990    COLONOSCOPY N/A 05/01/2019    Procedure: COLONOSCOPY;  Surgeon: Neo Cole MD;  Location: [address]);  Service: Endoscopy;  Laterality: N/A;        Family Hx   Family History   Problem Relation Name Age of Onset    Heart failure Mother      Emphysema Father      HIV Brother      Cancer Neg Hx       Amblyopia Neg Hx      Blindness Neg Hx      Cataracts Neg Hx      Glaucoma Neg Hx      Macular degeneration Neg Hx      Retinal detachment Neg Hx      Strabismus Neg Hx          Social Hx   Social History     Socioeconomic History    Marital status:    Tobacco Use    Smoking status: Never    Smokeless tobacco: Never   Substance and Sexual Activity    Alcohol use: No     Alcohol/week: 6.0 standard drinks of alcohol     Types: 6 Cans of beer per week     Comment: hasn't drank since October 2017, previous 1-2 beers/day    Drug use: No    Sexual activity: Yes        Vital Signs   Vitals:    08/04/25 1600 08/04/25 1615 08/04/25 1700 08/04/25 1800   BP: (!) 177/101 (!) 175/88 (!) 184/103 (!) 185/108   BP Location: Left arm Left arm     Patient Position: Sitting Sitting     Pulse: 66 67 66 65   Resp: 18 20     Temp:       TempSrc:       SpO2: 100% 100% 99% 100%   Weight:       Height:            Review of Systems  Review of Systems   Respiratory:  Negative for shortness of breath.    Cardiovascular:  Negative for chest pain.   Gastrointestinal:  Negative for abdominal pain.       Physical Exam  Physical Exam  Vitals and nursing note reviewed.   Constitutional:       General: He is not in acute distress.     Appearance: He is well-developed and normal weight. He is not ill-appearing, toxic-appearing or diaphoretic.   HENT:      Head: Normocephalic and atraumatic.      Right Ear: External ear normal.      Left Ear: External ear normal.      Nose: Nose normal.      Mouth/Throat:      Mouth: Mucous membranes are moist.   Eyes:      Extraocular Movements: Extraocular movements intact.      Conjunctiva/sclera: Conjunctivae normal.      Pupils: Pupils are equal, round, and reactive to light.   Cardiovascular:      Rate and Rhythm: Normal rate and regular rhythm.      Heart sounds: Normal heart sounds.   Pulmonary:      Effort: Pulmonary effort is normal.      Breath sounds: Normal breath sounds.   Abdominal:       General: Bowel sounds are normal.      Palpations: Abdomen is soft.      Tenderness: There is no abdominal tenderness.   Musculoskeletal:         General: Normal range of motion.      Cervical back: Normal range of motion and neck supple.      Right lower leg: No edema.      Left lower leg: No edema.   Skin:     General: Skin is warm and dry.      Capillary Refill: Capillary refill takes less than 2 seconds.   Neurological:      General: No focal deficit present.      Mental Status: He is alert and oriented to person, place, and time.      Cranial Nerves: No cranial nerve deficit.   Psychiatric:         Mood and Affect: Mood normal. Mood is not anxious.         Behavior: Behavior normal. Behavior is not agitated.         Thought Content: Thought content normal.         Judgment: Judgment normal.         Medications:   Scheduled Meds:   amLODIPine  2.5 mg Oral Daily    apixaban  5 mg Oral BID    [START ON 8/5/2025] aspirin  81 mg Oral Daily    [START ON 8/5/2025] atorvastatin  40 mg Oral Daily    bevacizumab  1.25 mg Intravitreal     bevacizumab  1.25 mg Intravitreal     bevacizumab  1.25 mg Intravitreal     [START ON 8/5/2025] furosemide  40 mg Oral Daily     Continuous Infusions:  PRN Meds:.  Current Facility-Administered Medications:     acetaminophen, 650 mg, Oral, Q6H PRN    bevacizumab, 1.25 mg, Intravitreal,     bevacizumab, 1.25 mg, Intravitreal,     bevacizumab, 1.25 mg, Intravitreal,     dextrose 50%, 12.5 g, Intravenous, PRN    dextrose 50%, 25 g, Intravenous, PRN    glucagon (human recombinant), 1 mg, Intramuscular, PRN    glucose, 16 g, Oral, PRN    glucose, 24 g, Oral, PRN    insulin aspart U-100, 0-5 Units, Subcutaneous, QID (AC + HS) PRN    naloxone, 0.02 mg, Intravenous, PRN    ondansetron, 4 mg, Intravenous, Q8H PRN    sodium chloride 0.9%, 10 mL, Intravenous, Q12H PRN      Assessment/Plan:  Near Syncope     Patient reports feeling well and doesn't feel like he almost passed out earlier today.  Witnessed near syncopal episode by his significant other, who called EMS. He has no current complaints.   -telemetry   -echocardiogram   -trend troponin   -BNP is elevated. Patient has a history of heart failure but does not appear fluid overloaded. Will continue home lasix.     Case was discussed with the ED provider, MD Jasmyne and MD Angle.

## 2025-08-04 NOTE — PROVIDER PROGRESS NOTES - EMERGENCY DEPT.
"Encounter Date: 8/4/2025    ED Physician Progress Notes        ED Resident HAND-OFF NOTE:  Jose Pete is a 74 y.o. male who presented to the ED on 8/4/2025, patient C/O near syncopal event. I assumed care of patient from off-going ED physician team. Patient pending cardiac workup with repeat troponin.    Thus far, Jose Pete has been monitored with serial troponin and EKGs  Medications - No data to display    BP (!) 151/67   Pulse 67   Temp 97.7 °F (36.5 °C) (Oral)   Resp 12   Ht 5' 10" (1.778 m)   Wt 83.9 kg (185 lb)   SpO2 97%   BMI 26.54 kg/m²         Disposition: I anticipate patient will be admitted for further observation.   ______________________  Kaye Barbour MD   Emergency Medicine Resident      UPDATE:    Mr. Jose ePte has been monitored concerning a-fib and near-syncopal event. He has remained hemodynamically stable since hand-off. Serial troponin tests have resulted with delta of 4. Admitting for further observation for continued troponin, EKG monitoring, and cardiac echo. Patient is agree-able to this plan.       :  Near syncope (Primary)  Chest pain    "

## 2025-08-04 NOTE — ED PROVIDER NOTES
Encounter Date: 8/4/2025       History     Chief Complaint   Patient presents with    Fatigue     Near syncope at home, friends activated EMS. AAOx 4. Hypotensive prior to 800cc NS administers by EMS, now 121/66    Hyperglycemia          Jose Pete 74 y.o. male with type 2 diabetes, CHF, CKD, aFib on apixaban presents to ED via EMS with complaint of near syncopal episode. The patient reports that he feels fine and denies having an episode or any symptoms before/during/after reported episode. Denies any pain, lightheadedness, nausea, vomiting, fevers.  His significant other witnessed the event and was the one to call EMS.  She reports that he was diaphoretic and had a near syncopal event going up the stairs.  Significant other reports patient did not lose consciousness or fall as he was caught.            The history is provided by the patient and a significant other.     Review of patient's allergies indicates:   Allergen Reactions    Metformin Diarrhea     On 500mg he had such GI issues he lost weight     Past Medical History:   Diagnosis Date    Cataract     CHF (congestive heart failure)     Combined systolic and diastolic cardiac dysfunction 2005    Hypertension, essential 11/12/2017    Pleural effusion 11/12/2017    treated with thoracentesis, appeared to be due to CHF    Proteinuria due to type 2 diabetes mellitus 2017    SOB (shortness of breath) 11/14/2017    Type 2 diabetes mellitus 2005    Type 2 diabetes mellitus with diabetic polyneuropathy, without long-term current use of insulin 12/12/2017     Past Surgical History:   Procedure Laterality Date    BACK SURGERY  1990    COLONOSCOPY N/A 05/01/2019    Procedure: COLONOSCOPY;  Surgeon: Neo Cole MD;  Location: [address]);  Service: Endoscopy;  Laterality: N/A;     Family History   Problem Relation Name Age of Onset    Heart failure Mother      Emphysema Father      HIV Brother      Cancer Neg Hx      Amblyopia Neg Hx       Blindness Neg Hx      Cataracts Neg Hx      Glaucoma Neg Hx      Macular degeneration Neg Hx      Retinal detachment Neg Hx      Strabismus Neg Hx       Social History[1]  Review of Systems    Physical Exam     Initial Vitals   BP Pulse Resp Temp SpO2   08/04/25 1307 08/04/25 1308 08/04/25 1307 08/04/25 1308 08/04/25 1308   121/66 71 16 97.7 °F (36.5 °C) 100 %      MAP       --                Physical Exam    Constitutional: He appears well-developed.   HENT:   Head: Normocephalic and atraumatic.   Eyes: Conjunctivae and EOM are normal. Pupils are equal, round, and reactive to light.   Neck: No JVD present.   Normal range of motion.  Cardiovascular:  Normal rate, normal heart sounds and intact distal pulses.     Exam reveals no gallop and no friction rub.       No murmur heard.  Pulmonary/Chest: Breath sounds normal. No respiratory distress. He has no wheezes.   Abdominal: Abdomen is soft. Bowel sounds are normal. He exhibits no distension. There is no abdominal tenderness.   Musculoskeletal:         General: Normal range of motion.      Cervical back: Normal range of motion.     Neurological: He is alert and oriented to person, place, and time. He has normal strength and normal reflexes. He displays normal reflexes. No cranial nerve deficit or sensory deficit. GCS score is 15. GCS eye subscore is 4. GCS verbal subscore is 5. GCS motor subscore is 6.   Skin: Skin is warm and dry.         ED Course   Procedures  Labs Reviewed   COMPREHENSIVE METABOLIC PANEL - Abnormal       Result Value    Sodium 137      Potassium 4.3      Chloride 105      CO2 23      Glucose 228 (*)     BUN 39 (*)     Creatinine 1.9 (*)     Calcium 9.4      Protein Total 6.2      Albumin 3.2 (*)     Bilirubin Total 0.4      ALP 67      AST 15      ALT 9      Anion Gap 9      eGFR 37 (*)    NT-PRO NATRIURETIC PEPTIDE - Abnormal    NT-proBNP 2,552 (*)     Narrative:     NOTE:  Access complete set of age - and/or gender-specific reference intervals  for this test in the [facility] Collection Manual.   CBC WITH DIFFERENTIAL - Abnormal    WBC 7.23      RBC 3.43 (*)     HGB 9.8 (*)     HCT 29.6 (*)     MCV 86      MCH 28.6      MCHC 33.1      RDW 12.9      Platelet Count 174      MPV 11.7      Nucleated RBC 0      Neut % 66.5      Lymph % 17.2 (*)     Mono % 12.6      Eos % 2.6      Basophil % 0.7      Imm Grans % 0.4      Neut # 4.81      Lymph # 1.24      Mono # 0.91      Eos # 0.19      Baso # 0.05      Imm Grans # 0.03     POCT GLUCOSE - Abnormal    POCT Glucose 243 (*)    ISTAT PROCEDURE - Abnormal    POC PH 7.573 (*)     POC PCO2 26.4 (*)     POC PO2 170 (*)     POC HCO3 24.3      POC BE 2      POC SATURATED O2 100      POC TCO2 25      Sample VENOUS      Site Other      Allens Test N/A     TROPONIN I HIGH SENSITIVITY - Normal    Troponin High Sensitive 9     MAGNESIUM - Normal    Magnesium  1.7     TROPONIN I HIGH SENSITIVITY - Normal    Troponin High Sensitive 8     BETA - HYDROXYBUTYRATE, SERUM - Normal    Beta-Hydroxybutyrate 0.1     CBC W/ AUTO DIFFERENTIAL    Narrative:     The following orders were created for panel order CBC auto differential.  Procedure                               Abnormality         Status                     ---------                               -----------         ------                     CBC with Differential[[id number]]       Abnormal            Final result                 Please view results for these tests on the individual orders.   URINALYSIS, REFLEX TO URINE CULTURE   TROPONIN I HIGH SENSITIVITY        ECG Results              EKG 12-lead (Final result)        Collection Time Result Time QRS Duration [facility] QTC Calculation    08/04/25 13:58:44 08/04/25 14:19:06 78 451                     Final result by Interface, Lab In [facility] (08/04/25 14:19:14)                   Narrative:    Test Reason : R07.9,    Vent. Rate :  70 BPM     Atrial Rate : 280 BPM     P-R Int :    ms          QRS Dur :  78 ms      QT Int :  418 ms       P-R-T Axes :     30 102 degrees    QTcB Int : 451 ms    Atrial flutter with 4:1 A-V conduction  Cannot rule out Anterior infarct (cited on or before 04-Aug-2025)  Abnormal ECG  When compared with ECG of 04-Aug-2025 13:27,  No significant change was found  Confirmed by Hermelinda Huffman ([id number]) on 8/4/2025 2:19:04 PM    Referred By: AAAREFERRAL SELF           Confirmed By: Hermelinda Huffman                                     EKG 12-lead (Final result)        Collection Time Result Time QRS Duration [facility] QTC Calculation    08/04/25 13:23:27 08/04/25 14:18:14 74 464                     Final result by Interface, Lab In [facility] (08/04/25 14:18:18)                   Narrative:    Test Reason : R53.1,    Vent. Rate :  70 BPM     Atrial Rate : 280 BPM     P-R Int :    ms          QRS Dur :  74 ms      QT Int : 430 ms       P-R-T Axes :     44 104 degrees    QTcB Int : 464 ms    Atrial flutter with 4:1 A-V conduction  Cannot rule out Anterior infarct , new  Abnormal ECG  When compared with ECG of 20-Feb-2025 14:58,  Anterior forces are slightly less  Confirmed by Matthew Dennis ([id number]) on 8/4/2025 2:18:09 PM    Referred By:            Confirmed By: Matthew Dennis                                  Imaging Results              X-Ray Chest AP Portable (Final result)  Result time 08/04/25 14:21:56      Final result by Bear Jones III, MD (08/04/25 14:21:56)                   Impression:      No acute process seen.      Electronically signed by: Bear Jones MD  Date:    08/04/2025  Time:    14:21               Narrative:    EXAMINATION:  XR CHEST AP PORTABLE    CLINICAL HISTORY:  near syncope;    FINDINGS:  Chest one view:    Heart size is normal.  Lungs are clear.  The bones are noncontributory.                                       Medications - No data to display  Medical Decision Making  Jose Pete 74 y.o. male with type 2 diabetes, CHF, CKD, aFib on apixaban presents to ED via EMS with complaint of near syncopal  episode.    History and physical exam as above. Initial vital signs stable and non-actionable. Initial work-up to include:  See above    Differential includes but is not limited to:  Vasovagal syncope, DKA, ACS, dehydration, cardiogenic syncope     Work-up includes:  EKG, troponin, BNP, POCT glucose, CBC, CMP, beta hydroxybutyrate, urine culture and microscopy     During EMS transport EMS reports the patient was hypotensive so they administered 800 cc and blood pressure increased. On arrival, the patient appears well but due to witnessed reported episode and concerning initial EKG (signs of elevation of ST segment in the inferior leads) we did a full cardiac workup.  EMS reported that he had hyperglycemia and POCT glucose was 243 on arrival so we did a DKA workup. Repeat EKG showed improvement. Care was handed off to oncoming team to continue work-up of pre-syncopal event. This patient may be a good candidate for ED observation to monitor but can also be discharged if he is stable, no emergent results, or symptoms concerning for investigation or admission.       I decided to obtain the patient's medical records and review relevant documentation from hospital records and clinic records.  Pertinent information is noted.     Amount and/or Complexity of Data Reviewed  Labs: ordered. Decision-making details documented in ED Course.  Radiology: ordered.               ED Course as of 08/04/25 1523   Mon Aug 04, 2025   1324 Received 800 cc IVF en route via EMS [NN]   1325 History of type 2 diabetes, hypertension, CHF, dyslipidemia, paroxysmal AFib on Eliquis, CKD [NN]   1326 Nuclear stress in 2023  ·  Normal myocardial perfusion scan. There is no evidence of myocardial ischemia or infarction.  ·  The gated perfusion images showed an ejection fraction of 38% at rest. The gated perfusion images showed an ejection fraction of 38% post stress. Normal ejection fraction is greater than 47%.  ·  There is mild global hypokinesis at  rest and stress.  ·  LV cavity size is normal at rest and normal at stress.  ·  The ECG portion of the study is negative for ischemia at 80% maximal predicted heart rate.  ·  The patient reported no chest pain during the stress test.  ·  During stress, rare PACs and occasional PVCs are noted.  ·  The exercise capacity was below average.  Achieved 7 METs.  ·  There are no prior studies for comparison.   [NN]   1344 Brought in by EMS for near syncopal episode.  Patient was going up a few steps to his home when his significant other/friend reported that he started to seem lightheaded and became diaphoretic.  She states that he had to grab both of the rales of the stairs.  She was able to help assist him up the stairs.  She states that he did not fall or completely lose consciousness.  Episode was reportedly brief but he did need significant assistance.  The patient denies any significant events.  He denies passing out.  He denies any preceding symptoms.  He states that he feels in his usual state of health.  EMS reported that the patient was hypotensive.  I am unsure what his actual blood pressure was on their arrival.  He did receive almost a L of IV fluids in his now normotensive here in the emergency department.  Vitals normal. [NN]   1345 On exam, patient resting comfortably.  Lungs clear.  Normal rate.  Abdomen is soft, nontender, nondistended.  Normal neurologic exam.  No signs of head trauma.  No spinal tenderness.  Neck is supple with normal range of motion.  Follows commands in all 4 extremities, 5/5 motor, sensation light touch intact in all 4 extremities, cranial nerves 2-12 grossly intact. [NN]   1346 His EKG was concerning for some mild ST elevations in the inferior leads.  EKG also shows atrial flutter.  I was concerned for STEMI given the ST-elevation in the inferior leads however patient is not having any chest pain or shortness of breath.  Discussed with cardiology who recommends continuing to trend  his EKG and cardiac markers.  They do not recommend activating a code STEMI based on his current EKG and symptomology. [NN]   1421 POCT Glucose(!): 243  hyperglycemia [ES]   1421 ISTAT PROCEDURE(!!)  pH 7.573, pCO2 26.4, pO2 170 indicates a respiratory alkalosis [ES]      ED Course User Index  [ES] Roma Feliciano MD  [NN] Chata Spicer MD                               Clinical Impression:  Final diagnoses:  [R55] Near syncope (Primary)  [R07.9] Chest pain                       [1]   Social History  Tobacco Use    Smoking status: Never    Smokeless tobacco: Never   Substance Use Topics    Alcohol use: No     Alcohol/week: 6.0 standard drinks of alcohol     Types: 6 Cans of beer per week     Comment: hasn't drank since October 2017, previous 1-2 beers/day    Drug use: No        Roma Feliciano MD  Resident  08/04/25 [id number]

## 2025-08-05 VITALS
SYSTOLIC BLOOD PRESSURE: 135 MMHG | BODY MASS INDEX: 26.48 KG/M2 | TEMPERATURE: 98 F | DIASTOLIC BLOOD PRESSURE: 78 MMHG | OXYGEN SATURATION: 97 % | WEIGHT: 184.94 LBS | HEART RATE: 70 BPM | HEIGHT: 70 IN | RESPIRATION RATE: 16 BRPM

## 2025-08-05 LAB
ANION GAP (OHS): 9 MMOL/L (ref 8–16)
AORTIC SIZE INDEX (SOV): 2.1 CM/M2
AV AREA BY CONTINUOUS VTI: 3.5 CM2
AV INDEX (PROSTH): 1.06
AV LVOT MEAN GRADIENT: 1 MMHG
AV LVOT PEAK GRADIENT: 1 MMHG
AV MEAN GRADIENT: 1 MMHG
AV PEAK GRADIENT: 1 MMHG
AV VALVE AREA BY VELOCITY RATIO: 3.1 CM²
AV VALVE AREA: 3.3 CM2
AV VELOCITY RATIO: 1
BSA FOR ECHO PROCEDURE: 2.04 M2
BUN SERPL-MCNC: 34 MG/DL (ref 8–23)
CALCIUM SERPL-MCNC: 9.2 MG/DL (ref 8.7–10.5)
CHLORIDE SERPL-SCNC: 107 MMOL/L (ref 95–110)
CO2 SERPL-SCNC: 21 MMOL/L (ref 23–29)
CREAT SERPL-MCNC: 1.5 MG/DL (ref 0.5–1.4)
CV ECHO LV RWT: 0.72 CM
DOP CALC AO PEAK VEL: 0.5 M/S
DOP CALC AO VTI: 11.5 CM
DOP CALC LVOT AREA: 3.1 CM2
DOP CALC LVOT DIAMETER: 2 CM
DOP CALC LVOT PEAK VEL: 0.5 M/S
DOP CALCLVOT PEAK VEL VTI: 12.2 CM
E/E' RATIO: 10 M/S
ECHO EF ESTIMATED: 62 %
ECHO LV POSTERIOR WALL: 1.4 CM (ref 0.6–1.1)
FRACTIONAL SHORTENING: 33.3 % (ref 28–44)
GFR SERPLBLD CREATININE-BSD FMLA CKD-EPI: 49 ML/MIN/1.73/M2
GLUCOSE SERPL-MCNC: 194 MG/DL (ref 70–110)
HOLD SPECIMEN: NORMAL
INTERVENTRICULAR SEPTUM: 1.6 CM (ref 0.6–1.1)
LA MAJOR: 6.6 CM
LA MINOR: 5.9 CM
LA WIDTH: 3.7 CM
LEFT ATRIUM SIZE: 4.4 CM
LEFT ATRIUM VOLUME INDEX: 43 ML/M2
LEFT ATRIUM VOLUME: 86 CM3
LEFT INTERNAL DIMENSION IN SYSTOLE: 2.6 CM (ref 2.1–4)
LEFT VENTRICLE DIASTOLIC VOLUME INDEX: 32.67 ML/M2
LEFT VENTRICLE DIASTOLIC VOLUME: 66 ML
LEFT VENTRICLE MASS INDEX: 111.2 G/M2
LEFT VENTRICLE SYSTOLIC VOLUME INDEX: 12.4 ML/M2
LEFT VENTRICLE SYSTOLIC VOLUME: 25 ML
LEFT VENTRICULAR INTERNAL DIMENSION IN DIASTOLE: 3.9 CM (ref 3.5–6)
LEFT VENTRICULAR MASS: 224.6 G
LV LATERAL E/E' RATIO: 7.6 M/S
LV SEPTAL E/E' RATIO: 13.6 M/S
Lab: 2.4 CM/M
MV PEAK E VEL: 0.68 M/S
OHS CV CPX PATIENT HEIGHT IN: 70
OHS CV RV/LV RATIO: 0.69 CM
POTASSIUM SERPL-SCNC: 4.1 MMOL/L (ref 3.5–5.1)
RA MAJOR: 6.01 CM
RA PRESSURE ESTIMATED: 3 MMHG
RA WIDTH: 4.07 CM
RIGHT VENTRICLE DIASTOLIC BASEL DIMENSION: 2.7 CM
SINUS: 4.2 CM
SODIUM SERPL-SCNC: 137 MMOL/L (ref 136–145)
STJ: 3.6 CM
TDI LATERAL: 0.09 M/S
TDI SEPTAL: 0.05 M/S
TDI: 0.07 M/S
TRICUSPID ANNULAR PLANE SYSTOLIC EXCURSION: 2.2 CM
Z-SCORE OF LEFT VENTRICULAR DIMENSION IN END DIASTOLE: -4.23
Z-SCORE OF LEFT VENTRICULAR DIMENSION IN END SYSTOLE: -2.68

## 2025-08-05 PROCEDURE — 25000003 PHARM REV CODE 250: Performed by: PHYSICIAN ASSISTANT

## 2025-08-05 PROCEDURE — 80048 BASIC METABOLIC PNL TOTAL CA: CPT | Performed by: PHYSICIAN ASSISTANT

## 2025-08-05 PROCEDURE — G0378 HOSPITAL OBSERVATION PER HR: HCPCS

## 2025-08-05 RX ADMIN — ASPIRIN 81 MG CHEWABLE TABLET 81 MG: 81 TABLET CHEWABLE at 07:08

## 2025-08-05 RX ADMIN — ATORVASTATIN CALCIUM 40 MG: 40 TABLET, FILM COATED ORAL at 07:08

## 2025-08-05 RX ADMIN — APIXABAN 5 MG: 5 TABLET, FILM COATED ORAL at 07:08

## 2025-08-05 RX ADMIN — LISINOPRIL 40 MG: 20 TABLET ORAL at 07:08

## 2025-08-05 RX ADMIN — AMLODIPINE BESYLATE 2.5 MG: 2.5 TABLET ORAL at 07:08

## 2025-08-05 RX ADMIN — FUROSEMIDE 40 MG: 40 TABLET ORAL at 07:08

## 2025-08-05 RX ADMIN — CARVEDILOL 25 MG: 12.5 TABLET, FILM COATED ORAL at 07:08

## 2025-08-05 NOTE — ED NOTES
Took report from Austin BOUCHER, and assumed care of pt at this time. Pt resting comfortably and independently repositioned in stretcher with bed locked in lowest position for safety. NAD noted at this time. Respirations even and unlabored and visible chest rise noted.  Patient offered bathroom assistance and denies need at this time. Pt instructed to call if assistance is needed. Pt on continuous cardiac, BP, and O2 monitoring. Call light within reach. No needs at this time. Will continue to monitor.

## 2025-08-05 NOTE — PROGRESS NOTES
"ED Observation Unit  Progress Note      HPI   "Jose Pete 74 y.o. male with type 2 diabetes, CHF, CKD, aFib on apixaban presents to ED via EMS with complaint of near syncopal episode. The patient reports that he feels fine and denies having an episode or any symptoms before/during/after reported episode. Denies any pain, lightheadedness, nausea, vomiting, fevers, chest pain or shortness of breath. His significant other witnessed the event and was the one to call EMS. She reports that he was diaphoretic and had a near syncopal event going up the stairs. Significant other reports patient did not lose consciousness or fall as he was caught. "    Interval History   Hypertensive, afebrile.  To receive home antihypertensives this morning.  Patient has no complaints this morning.  Echocardiogram is pending.    PMHx   Past Medical History:   Diagnosis Date    Cataract     CHF (congestive heart failure)     Combined systolic and diastolic cardiac dysfunction 2005    Hypertension, essential 11/12/2017    Pleural effusion 11/12/2017    treated with thoracentesis, appeared to be due to CHF    Proteinuria due to type 2 diabetes mellitus 2017    SOB (shortness of breath) 11/14/2017    Type 2 diabetes mellitus 2005    Type 2 diabetes mellitus with diabetic polyneuropathy, without long-term current use of insulin 12/12/2017      Past Surgical History:   Procedure Laterality Date    BACK SURGERY  1990    COLONOSCOPY N/A 05/01/2019    Procedure: COLONOSCOPY;  Surgeon: Neo Cole MD;  Location: [address]);  Service: Endoscopy;  Laterality: N/A;        Family Hx   Family History   Problem Relation Name Age of Onset    Heart failure Mother      Emphysema Father      HIV Brother      Cancer Neg Hx      Amblyopia Neg Hx      Blindness Neg Hx      Cataracts Neg Hx      Glaucoma Neg Hx      Macular degeneration Neg Hx      Retinal detachment Neg Hx      Strabismus Neg Hx          Social Hx   Social History "     Socioeconomic History    Marital status:    Tobacco Use    Smoking status: Never    Smokeless tobacco: Never   Substance and Sexual Activity    Alcohol use: No     Alcohol/week: 6.0 standard drinks of alcohol     Types: 6 Cans of beer per week     Comment: hasn't drank since October 2017, previous 1-2 beers/day    Drug use: No    Sexual activity: Yes        Vital Signs   Vitals:    08/05/25 0600 08/05/25 0700 08/05/25 0739 08/05/25 0740   BP:    (!) 174/90   Pulse: 68 69     Resp:       Temp:   97.6 °F (36.4 °C)    TempSrc:   Oral    SpO2: 96% 98%     Weight:       Height:            Review of Systems  Review of Systems   All other systems reviewed and are negative.      Brief Physical Exam/Reassessment   Physical Exam  Constitutional:       General: He is not in acute distress.     Appearance: He is not ill-appearing.   HENT:      Head: Normocephalic and atraumatic.   Eyes:      Pupils: Pupils are equal, round, and reactive to light.   Cardiovascular:      Rate and Rhythm: Normal rate and regular rhythm.   Pulmonary:      Effort: Pulmonary effort is normal.      Breath sounds: Normal breath sounds.   Abdominal:      General: Abdomen is flat.      Palpations: Abdomen is soft.   Musculoskeletal:      Right lower leg: No edema.      Left lower leg: No edema.   Skin:     General: Skin is warm and dry.   Neurological:      General: No focal deficit present.      Mental Status: He is alert. Mental status is at baseline.      Motor: No weakness.   Psychiatric:         Mood and Affect: Mood normal.         Labs/Imaging   Labs Reviewed   COMPREHENSIVE METABOLIC PANEL - Abnormal       Result Value    Sodium 137      Potassium 4.3      Chloride 105      CO2 23      Glucose 228 (*)     BUN 39 (*)     Creatinine 1.9 (*)     Calcium 9.4      Protein Total 6.2      Albumin 3.2 (*)     Bilirubin Total 0.4      ALP 67      AST 15      ALT 9      Anion Gap 9      eGFR 37 (*)    NT-PRO NATRIURETIC PEPTIDE - Abnormal     NT-proBNP 2,552 (*)     Narrative:     NOTE:  Access complete set of age - and/or gender-specific reference intervals for this test in the [facility] Collection Manual.   CBC WITH DIFFERENTIAL - Abnormal    WBC 7.23      RBC 3.43 (*)     HGB 9.8 (*)     HCT 29.6 (*)     MCV 86      MCH 28.6      MCHC 33.1      RDW 12.9      Platelet Count 174      MPV 11.7      Nucleated RBC 0      Neut % 66.5      Lymph % 17.2 (*)     Mono % 12.6      Eos % 2.6      Basophil % 0.7      Imm Grans % 0.4      Neut # 4.81      Lymph # 1.24      Mono # 0.91      Eos # 0.19      Baso # 0.05      Imm Grans # 0.03     URINALYSIS, REFLEX TO URINE CULTURE - Abnormal    Color, UA Yellow      Appearance, UA Clear      pH, UA 5.0      Spec Grav UA 1.020      Protein, UA 1+ (*)     Glucose, UA 2+ (*)     Ketones, UA Negative      Bilirubin, UA Negative      Blood, UA Negative      Nitrites, UA Negative      Urobilinogen, UA Negative      Leukocyte Esterase, UA Negative     URINALYSIS MICROSCOPIC - Abnormal    RBC, UA 2      WBC, UA 3      Bacteria, UA Few (*)     Squamous Epithelial Cells, UA 1      Hyaline Casts, UA 43 (*)     Microscopic Comment       BASIC METABOLIC PANEL - Abnormal    Sodium 137      Potassium 4.1      Chloride 107      CO2 21 (*)     Glucose 194 (*)     BUN 34 (*)     Creatinine 1.5 (*)     Calcium 9.2      Anion Gap 9      eGFR 49 (*)    POCT GLUCOSE - Abnormal    POCT Glucose 243 (*)    ISTAT PROCEDURE - Abnormal    POC PH 7.573 (*)     POC PCO2 26.4 (*)     POC PO2 170 (*)     POC HCO3 24.3      POC BE 2      POC SATURATED O2 100      POC TCO2 25      Sample VENOUS      Site Other      Allens Test N/A     TROPONIN I HIGH SENSITIVITY - Normal    Troponin High Sensitive 9     MAGNESIUM - Normal    Magnesium  1.7     TROPONIN I HIGH SENSITIVITY - Normal    Troponin High Sensitive 8     BETA - HYDROXYBUTYRATE, SERUM - Normal    Beta-Hydroxybutyrate 0.1     TROPONIN I HIGH SENSITIVITY - Normal    Troponin High  Sensitive 12     TROPONIN I HIGH SENSITIVITY - Normal    Troponin High Sensitive 9     CBC W/ AUTO DIFFERENTIAL    Narrative:     The following orders were created for panel order CBC auto differential.  Procedure                               Abnormality         Status                     ---------                               -----------         ------                     CBC with Differential[[id number]]       Abnormal            Final result                 Please view results for these tests on the individual orders.   GREY TOP URINE HOLD      Imaging Results              X-Ray Chest AP Portable (Final result)  Result time 08/04/25 14:21:56      Final result by Bear Jones III, MD (08/04/25 14:21:56)                   Impression:      No acute process seen.      Electronically signed by: Bear Jones MD  Date:    08/04/2025  Time:    14:21               Narrative:    EXAMINATION:  XR CHEST AP PORTABLE    CLINICAL HISTORY:  near syncope;    FINDINGS:  Chest one view:    Heart size is normal.  Lungs are clear.  The bones are noncontributory.                                       I reviewed all labs, imaging, EKGs.     Plan   Near Syncope      Patient reports feeling well and doesn't feel like he almost passed out earlier today. Witnessed near syncopal episode by his significant other, who called EMS. He has no current complaints.   -telemetry   -echocardiogram pending  -troponins remain flat, ACS ruled out  -BNP is elevated. Patient has a history of heart failure but does not appear fluid overloaded. Will continue home lasix.      I have discussed this case with JER Quintanilla.     JER Patterson HELIO

## 2025-08-05 NOTE — CARE UPDATE
EDOU UPDATE    - went to DC patient, but hypotensive sbp low 90s, asymptomatic.  - monitor vitals q30 mins x3 occurences. Will reassess for DC at that time.    JER Patterson HELIO

## 2025-08-05 NOTE — DISCHARGE INSTRUCTIONS
Please monitor your blood pressure at home twice a day and keep a log.  Please follow up with your primary care doctor as soon as possible

## 2025-08-05 NOTE — ED NOTES
Assumed care for pt after recieving report from nightshift RN. Pt. resting in bed in NAD, RR e/u. Vital signs stable and within desired limits at this time of assessment. Pt. offered bathroom assistance and denies need at this time. Explanation of care/wait provided. Pt verbalizes no needs at this time. Bed in low, locked position with rails up and call bell in reach. Pt's white board updated with today's care team and plan.     Patient identifiers for Jose Pete 74 y.o. male checked and correct.  Chief Complaint   Patient presents with    Fatigue     Near syncope at home, friends activated EMS. AAOx 4. Hypotensive prior to 800cc NS administers by EMS, now 121/66    Hyperglycemia          Past Medical History:   Diagnosis Date    Cataract     CHF (congestive heart failure)     Combined systolic and diastolic cardiac dysfunction 2005    Hypertension, essential 11/12/2017    Pleural effusion 11/12/2017    treated with thoracentesis, appeared to be due to CHF    Proteinuria due to type 2 diabetes mellitus 2017    SOB (shortness of breath) 11/14/2017    Type 2 diabetes mellitus 2005    Type 2 diabetes mellitus with diabetic polyneuropathy, without long-term current use of insulin 12/12/2017     Allergies reported:   Review of patient's allergies indicates:   Allergen Reactions    Metformin Diarrhea     On 500mg he had such GI issues he lost weight         LOC: Patient is awake, alert, and aware of environment with an appropriate affect. Patient is oriented x 4 and speaking appropriately.  APPEARANCE: Patient resting comfortably and in no acute distress. Patient is clean and well groomed, patient's clothing is properly fastened.  HEENT: WDL  SKIN: The skin is warm and dry. Patient has normal skin turgor and moist mucus membranes.   MUSCULOSKELETAL: Patient is moving all extremities well, no obvious deformities noted. Pulses intact.   RESPIRATORY: Airway is open and patent. Respirations are spontaneous and  non-labored with normal effort and rate.  CARDIAC: Patient has a normal rate and rhythm.76 on cardiac monitor. No peripheral edema noted. Denies chest pain and SOB at at time of assessment.   ABDOMEN: No distention noted. Soft and non-tender upon palpation.  NEUROLOGICAL: pupils 3mm, PERRL. Facial expression is symmetrical. Hand grasps are equal bilaterally. Normal sensation in all extremities when touched with finger.

## 2025-08-05 NOTE — DISCHARGE SUMMARY
"ED Observation Unit  Discharge Summary        History of Present Illness:    "Jose Pete 74 y.o. male with type 2 diabetes, CHF, CKD, aFib on apixaban presents to ED via EMS with complaint of near syncopal episode. The patient reports that he feels fine and denies having an episode or any symptoms before/during/after reported episode. Denies any pain, lightheadedness, nausea, vomiting, fevers, chest pain or shortness of breath. His significant other witnessed the event and was the one to call EMS. She reports that he was diaphoretic and had a near syncopal event going up the stairs. Significant other reports patient did not lose consciousness or fall as he was caught. "     Observation Course:    Patient was admitted after a near syncopal event for telemetry monitoring and echocardiogram.  No significant events overnight.  ACS was ruled out.  Patient neurologically intact without deficits.  Echocardiogram without significant findings.  See below:       Left Ventricle: The left ventricle is normal in size. Moderately increased wall thickness. There is concentric remodeling. There is low normal systolic function with a visually estimated ejection fraction of 50 - 55%. Unable to assess diastolic function due to atrial fibrillation.    Right Ventricle: The right ventricle is normal in size measuring 2.7 cm. Wall thickness is normal. Systolic function is normal.    Biatrial enlargement    Aortic Valve: The aortic valve is a trileaflet valve. There is mild aortic valve sclerosis.    Aorta: The aortic root is mildly dilated measuring 4.2 cm. The proximal ascending aorta is normal in size.    IVC/SVC: Normal venous pressure at 3 mmHg.     Patient hypertensive near discharge, but did receive home medications.  Patient has close follow up with his internal medicine physician on 08/12/2025.    Encouraged patient to monitor his blood pressure twice a day and keep a log.  Also encouraged patient to sit up slowly and avoid " standing immediately.    Consultants:    None    Final Diagnosis:  Near-syncope    Discharge Condition: Good    Disposition: Home or Self Care     Time spent on the discharge of the patient including review of hospital course with the patient. reviewing discharge medications and arranging follow-up care 35 minutes.  Patient was seen and examined on the date of discharge and determined to be suitable for discharge.    Follow Up:  Future Appointments   Date Time Provider Department Center   8/12/2025 11:30 AM Milagros Lebron MD [facility] Aryan marcellus [facility]     JER Patterson [facility]

## 2025-08-07 ENCOUNTER — PATIENT OUTREACH (OUTPATIENT)
Dept: ADMINISTRATIVE | Facility: CLINIC | Age: 75
End: 2025-08-07
Payer: MEDICARE

## 2025-08-07 NOTE — PROGRESS NOTES
C3 nurse attempted to contact Jose Pete for a TCC post hospital discharge follow up call. LVM . The patient has a scheduled [facility] appointment with Milagros Lebron MD on 08/12/25 @ 6002

## 2025-08-11 ENCOUNTER — TELEPHONE (OUTPATIENT)
Dept: INTERNAL MEDICINE | Facility: CLINIC | Age: 75
End: 2025-08-11
Payer: MEDICARE

## 2025-08-12 ENCOUNTER — OFFICE VISIT (OUTPATIENT)
Dept: INTERNAL MEDICINE | Facility: CLINIC | Age: 75
End: 2025-08-12
Payer: MEDICARE

## 2025-08-12 ENCOUNTER — LAB VISIT (OUTPATIENT)
Dept: LAB | Facility: HOSPITAL | Age: 75
End: 2025-08-12
Attending: INTERNAL MEDICINE
Payer: MEDICARE

## 2025-08-12 VITALS
WEIGHT: 171.5 LBS | SYSTOLIC BLOOD PRESSURE: 116 MMHG | BODY MASS INDEX: 24.55 KG/M2 | HEIGHT: 70 IN | HEART RATE: 72 BPM | DIASTOLIC BLOOD PRESSURE: 74 MMHG | OXYGEN SATURATION: 99 %

## 2025-08-12 DIAGNOSIS — E11.3313 TYPE 2 DIABETES MELLITUS WITH BOTH EYES AFFECTED BY MODERATE NONPROLIFERATIVE RETINOPATHY AND MACULAR EDEMA, WITH LONG-TERM CURRENT USE OF INSULIN: ICD-10-CM

## 2025-08-12 DIAGNOSIS — Z79.4 TYPE 2 DIABETES MELLITUS WITH BOTH EYES AFFECTED BY MODERATE NONPROLIFERATIVE RETINOPATHY AND MACULAR EDEMA, WITH LONG-TERM CURRENT USE OF INSULIN: ICD-10-CM

## 2025-08-12 DIAGNOSIS — Z79.4 TYPE 2 DIABETES MELLITUS WITH DIABETIC NEPHROPATHY, WITH LONG-TERM CURRENT USE OF INSULIN: Primary | ICD-10-CM

## 2025-08-12 DIAGNOSIS — I48.0 PAROXYSMAL ATRIAL FIBRILLATION: ICD-10-CM

## 2025-08-12 DIAGNOSIS — E11.21 TYPE 2 DIABETES MELLITUS WITH DIABETIC NEPHROPATHY, WITH LONG-TERM CURRENT USE OF INSULIN: Primary | ICD-10-CM

## 2025-08-12 LAB
ALBUMIN SERPL BCP-MCNC: 3.5 G/DL (ref 3.5–5.2)
ALP SERPL-CCNC: 75 UNIT/L (ref 40–150)
ALT SERPL W/O P-5'-P-CCNC: 11 UNIT/L (ref 0–55)
ANION GAP (OHS): 9 MMOL/L (ref 8–16)
AST SERPL-CCNC: 16 UNIT/L (ref 0–50)
BILIRUB SERPL-MCNC: 0.4 MG/DL (ref 0.1–1)
BUN SERPL-MCNC: 32 MG/DL (ref 8–23)
CALCIUM SERPL-MCNC: 9.8 MG/DL (ref 8.7–10.5)
CHLORIDE SERPL-SCNC: 103 MMOL/L (ref 95–110)
CO2 SERPL-SCNC: 26 MMOL/L (ref 23–29)
CREAT SERPL-MCNC: 1.9 MG/DL (ref 0.5–1.4)
EAG (OHS): 286 MG/DL (ref 68–131)
GFR SERPLBLD CREATININE-BSD FMLA CKD-EPI: 37 ML/MIN/1.73/M2
GLUCOSE SERPL-MCNC: 293 MG/DL (ref 70–110)
HBA1C MFR BLD: 11.6 % (ref 4–5.6)
POTASSIUM SERPL-SCNC: 4.3 MMOL/L (ref 3.5–5.1)
PROT SERPL-MCNC: 7 GM/DL (ref 6–8.4)
SODIUM SERPL-SCNC: 138 MMOL/L (ref 136–145)

## 2025-08-12 PROCEDURE — 99999 PR PBB SHADOW E&M-EST. PATIENT-LVL IV: CPT | Mod: PBBFAC,,, | Performed by: INTERNAL MEDICINE

## 2025-08-12 PROCEDURE — 1160F RVW MEDS BY RX/DR IN RCRD: CPT | Mod: CPTII,S$GLB,, | Performed by: INTERNAL MEDICINE

## 2025-08-12 PROCEDURE — 3066F NEPHROPATHY DOC TX: CPT | Mod: CPTII,S$GLB,, | Performed by: INTERNAL MEDICINE

## 2025-08-12 PROCEDURE — 3046F HEMOGLOBIN A1C LEVEL >9.0%: CPT | Mod: CPTII,S$GLB,, | Performed by: INTERNAL MEDICINE

## 2025-08-12 PROCEDURE — 3062F POS MACROALBUMINURIA REV: CPT | Mod: CPTII,S$GLB,, | Performed by: INTERNAL MEDICINE

## 2025-08-12 PROCEDURE — 3078F DIAST BP <80 MM HG: CPT | Mod: CPTII,S$GLB,, | Performed by: INTERNAL MEDICINE

## 2025-08-12 PROCEDURE — 1159F MED LIST DOCD IN RCRD: CPT | Mod: CPTII,S$GLB,, | Performed by: INTERNAL MEDICINE

## 2025-08-12 PROCEDURE — 99214 OFFICE O/P EST MOD 30 MIN: CPT | Mod: S$GLB,,, | Performed by: INTERNAL MEDICINE

## 2025-08-12 PROCEDURE — 4010F ACE/ARB THERAPY RXD/TAKEN: CPT | Mod: CPTII,S$GLB,, | Performed by: INTERNAL MEDICINE

## 2025-08-12 PROCEDURE — 83036 HEMOGLOBIN GLYCOSYLATED A1C: CPT

## 2025-08-12 PROCEDURE — 3074F SYST BP LT 130 MM HG: CPT | Mod: CPTII,S$GLB,, | Performed by: INTERNAL MEDICINE

## 2025-08-12 PROCEDURE — 82247 BILIRUBIN TOTAL: CPT

## 2025-08-12 PROCEDURE — 3008F BODY MASS INDEX DOCD: CPT | Mod: CPTII,S$GLB,, | Performed by: INTERNAL MEDICINE

## 2025-08-12 PROCEDURE — 1101F PT FALLS ASSESS-DOCD LE1/YR: CPT | Mod: CPTII,S$GLB,, | Performed by: INTERNAL MEDICINE

## 2025-08-12 PROCEDURE — 36415 COLL VENOUS BLD VENIPUNCTURE: CPT

## 2025-08-12 PROCEDURE — 3288F FALL RISK ASSESSMENT DOCD: CPT | Mod: CPTII,S$GLB,, | Performed by: INTERNAL MEDICINE

## 2025-08-12 RX ORDER — INSULIN GLARGINE 300 [IU]/ML
10 INJECTION, SOLUTION SUBCUTANEOUS DAILY
Qty: 1.5 ML | Refills: 5 | Status: SHIPPED | OUTPATIENT
Start: 2025-08-12 | End: 2026-08-12